# Patient Record
Sex: FEMALE | Race: WHITE | NOT HISPANIC OR LATINO | URBAN - METROPOLITAN AREA
[De-identification: names, ages, dates, MRNs, and addresses within clinical notes are randomized per-mention and may not be internally consistent; named-entity substitution may affect disease eponyms.]

---

## 2017-01-27 ENCOUNTER — OUTPATIENT (OUTPATIENT)
Dept: OUTPATIENT SERVICES | Facility: HOSPITAL | Age: 72
LOS: 1 days | End: 2017-01-27
Payer: MEDICARE

## 2017-01-27 VITALS
HEART RATE: 64 BPM | RESPIRATION RATE: 16 BRPM | TEMPERATURE: 97 F | SYSTOLIC BLOOD PRESSURE: 112 MMHG | DIASTOLIC BLOOD PRESSURE: 57 MMHG | HEIGHT: 61 IN | WEIGHT: 154.1 LBS

## 2017-01-27 DIAGNOSIS — Z98.890 OTHER SPECIFIED POSTPROCEDURAL STATES: Chronic | ICD-10-CM

## 2017-01-27 DIAGNOSIS — I25.10 ATHEROSCLEROTIC HEART DISEASE OF NATIVE CORONARY ARTERY WITHOUT ANGINA PECTORIS: ICD-10-CM

## 2017-01-27 DIAGNOSIS — G56.01 CARPAL TUNNEL SYNDROME, RIGHT UPPER LIMB: ICD-10-CM

## 2017-01-27 DIAGNOSIS — Z01.818 ENCOUNTER FOR OTHER PREPROCEDURAL EXAMINATION: ICD-10-CM

## 2017-01-27 LAB
ALBUMIN SERPL ELPH-MCNC: 4.1 G/DL — SIGNIFICANT CHANGE UP (ref 3.3–5)
ALP SERPL-CCNC: 44 U/L — SIGNIFICANT CHANGE UP (ref 40–120)
ALT FLD-CCNC: 28 U/L — SIGNIFICANT CHANGE UP (ref 12–78)
ANION GAP SERPL CALC-SCNC: 9 MMOL/L — SIGNIFICANT CHANGE UP (ref 5–17)
AST SERPL-CCNC: 25 U/L — SIGNIFICANT CHANGE UP (ref 15–37)
BILIRUB SERPL-MCNC: 0.4 MG/DL — SIGNIFICANT CHANGE UP (ref 0.2–1.2)
BUN SERPL-MCNC: 27 MG/DL — HIGH (ref 7–23)
CALCIUM SERPL-MCNC: 9.4 MG/DL — SIGNIFICANT CHANGE UP (ref 8.5–10.1)
CHLORIDE SERPL-SCNC: 106 MMOL/L — SIGNIFICANT CHANGE UP (ref 96–108)
CO2 SERPL-SCNC: 26 MMOL/L — SIGNIFICANT CHANGE UP (ref 22–31)
CREAT SERPL-MCNC: 1.1 MG/DL — SIGNIFICANT CHANGE UP (ref 0.5–1.3)
GLUCOSE SERPL-MCNC: 141 MG/DL — HIGH (ref 70–99)
HCT VFR BLD CALC: 33.8 % — LOW (ref 34.5–45)
HGB BLD-MCNC: 11.3 G/DL — LOW (ref 11.5–15.5)
MCHC RBC-ENTMCNC: 30.7 PG — SIGNIFICANT CHANGE UP (ref 27–34)
MCHC RBC-ENTMCNC: 33.3 GM/DL — SIGNIFICANT CHANGE UP (ref 32–36)
MCV RBC AUTO: 92 FL — SIGNIFICANT CHANGE UP (ref 80–100)
PLATELET # BLD AUTO: 194 K/UL — SIGNIFICANT CHANGE UP (ref 150–400)
POTASSIUM SERPL-MCNC: 4.1 MMOL/L — SIGNIFICANT CHANGE UP (ref 3.5–5.3)
POTASSIUM SERPL-SCNC: 4.1 MMOL/L — SIGNIFICANT CHANGE UP (ref 3.5–5.3)
PROT SERPL-MCNC: 7.8 G/DL — SIGNIFICANT CHANGE UP (ref 6–8.3)
RBC # BLD: 3.67 M/UL — LOW (ref 3.8–5.2)
RBC # FLD: 13 % — SIGNIFICANT CHANGE UP (ref 10.3–14.5)
SODIUM SERPL-SCNC: 141 MMOL/L — SIGNIFICANT CHANGE UP (ref 135–145)
WBC # BLD: 5.9 K/UL — SIGNIFICANT CHANGE UP (ref 3.8–10.5)
WBC # FLD AUTO: 5.9 K/UL — SIGNIFICANT CHANGE UP (ref 3.8–10.5)

## 2017-01-27 PROCEDURE — 93005 ELECTROCARDIOGRAM TRACING: CPT

## 2017-01-27 PROCEDURE — 85027 COMPLETE CBC AUTOMATED: CPT

## 2017-01-27 PROCEDURE — 80053 COMPREHEN METABOLIC PANEL: CPT

## 2017-01-27 PROCEDURE — G0463: CPT

## 2017-01-27 PROCEDURE — 93010 ELECTROCARDIOGRAM REPORT: CPT

## 2017-01-27 NOTE — H&P PST ADULT - PMH
Aortic valve prosthesis present    Arthritis    CAD (coronary artery disease)    Carpal tunnel syndrome of right wrist    Colitis    COPD (chronic obstructive pulmonary disease)    Heart murmur    Hyperlipemia    Hypertension    Hypothyroid    Other iron deficiency anemia    PVD (peripheral vascular disease)

## 2017-01-27 NOTE — H&P PST ADULT - NSANTHOSAYNRD_GEN_A_CORE
No. FLAVIO screening performed.  STOP BANG Legend: 0-2 = LOW Risk; 3-4 = INTERMEDIATE Risk; 5-8 = HIGH Risk

## 2017-01-27 NOTE — H&P PST ADULT - PSH
Aortic valve replaced  2011 with bovine  Carotid disease, bilateral    Cataract extraction status of left eye    Cataract extraction status of right eye    H/O carotid endarterectomy  Both sides 2002

## 2017-01-27 NOTE — H&P PST ADULT - PROBLEM SELECTOR PLAN 3
Cardiac clearance with Dr. Becerril. Advised to make appt ASAP. Continue with present management. Hold ASA one week pre op.

## 2017-01-27 NOTE — H&P PST ADULT - ASSESSMENT
72 yo female with right carpal tunnel syndrome scheduled for a release of right carpal tunnel on 2/7/17 with Dr. Hassan.

## 2017-01-27 NOTE — ASU PATIENT PROFILE, ADULT - VISION (WITH CORRECTIVE LENSES IF THE PATIENT USUALLY WEARS THEM):
Normal vision: sees adequately in most situations; can see medication labels, newsprint wears eyeglasses and contact lens/Normal vision: sees adequately in most situations; can see medication labels, newsprint

## 2017-01-27 NOTE — H&P PST ADULT - PROBLEM SELECTOR PLAN 2
Labs- CBC, CMP and EKG. CXR on chart from Nov 2015.   MC with Dr. Joseph  Pre op and [facility] instructions reviewed and given. Instructed to take all her routine meds am of surgery with a sip of wtaer. Hold HCTZ day of surgery. Hold MVI, Celebrex and ASA one week pre op.

## 2017-01-27 NOTE — H&P PST ADULT - HISTORY OF PRESENT ILLNESS
72 yo female with HTN and CAD scheduled for a release of right carpal tunnel on 2/7/17 with Dr. Hassan. C/O numbness, tingling and pain of the right hand. Denies pain today but feels pins and needles. Patient is right hand dominant.

## 2017-02-01 ENCOUNTER — APPOINTMENT (OUTPATIENT)
Dept: CARDIOLOGY | Facility: CLINIC | Age: 72
End: 2017-02-01

## 2017-02-01 VITALS
HEART RATE: 64 BPM | BODY MASS INDEX: 29.83 KG/M2 | WEIGHT: 158 LBS | OXYGEN SATURATION: 93 % | HEIGHT: 61 IN | SYSTOLIC BLOOD PRESSURE: 126 MMHG | DIASTOLIC BLOOD PRESSURE: 72 MMHG

## 2017-02-06 RX ORDER — SODIUM CHLORIDE 9 MG/ML
1000 INJECTION, SOLUTION INTRAVENOUS
Qty: 0 | Refills: 0 | Status: DISCONTINUED | OUTPATIENT
Start: 2017-02-07 | End: 2017-02-07

## 2017-02-06 RX ORDER — ACETAMINOPHEN 500 MG
650 TABLET ORAL ONCE
Qty: 0 | Refills: 0 | Status: COMPLETED | OUTPATIENT
Start: 2017-02-07 | End: 2017-02-07

## 2017-02-07 ENCOUNTER — OUTPATIENT (OUTPATIENT)
Dept: OUTPATIENT SERVICES | Facility: HOSPITAL | Age: 72
LOS: 1 days | Discharge: HOME | End: 2017-02-07
Payer: MEDICARE

## 2017-02-07 ENCOUNTER — TRANSCRIPTION ENCOUNTER (OUTPATIENT)
Age: 72
End: 2017-02-07

## 2017-02-07 VITALS — TEMPERATURE: 98 F | SYSTOLIC BLOOD PRESSURE: 119 MMHG | HEART RATE: 58 BPM | DIASTOLIC BLOOD PRESSURE: 40 MMHG

## 2017-02-07 VITALS
TEMPERATURE: 98 F | HEIGHT: 61 IN | HEART RATE: 66 BPM | SYSTOLIC BLOOD PRESSURE: 148 MMHG | WEIGHT: 154.1 LBS | DIASTOLIC BLOOD PRESSURE: 53 MMHG | OXYGEN SATURATION: 95 % | RESPIRATION RATE: 16 BRPM

## 2017-02-07 DIAGNOSIS — Z01.818 ENCOUNTER FOR OTHER PREPROCEDURAL EXAMINATION: ICD-10-CM

## 2017-02-07 DIAGNOSIS — Z98.890 OTHER SPECIFIED POSTPROCEDURAL STATES: Chronic | ICD-10-CM

## 2017-02-07 DIAGNOSIS — G56.01 CARPAL TUNNEL SYNDROME, RIGHT UPPER LIMB: ICD-10-CM

## 2017-02-07 PROCEDURE — 64721 CARPAL TUNNEL SURGERY: CPT | Mod: RT

## 2017-02-07 PROCEDURE — 64727 INTERNAL NEUROLYSIS: CPT | Mod: RT

## 2017-02-07 RX ORDER — SODIUM CHLORIDE 9 MG/ML
1000 INJECTION, SOLUTION INTRAVENOUS
Qty: 0 | Refills: 0 | Status: DISCONTINUED | OUTPATIENT
Start: 2017-02-07 | End: 2017-02-07

## 2017-02-07 RX ORDER — OXYCODONE HYDROCHLORIDE 5 MG/1
10 TABLET ORAL EVERY 6 HOURS
Qty: 0 | Refills: 0 | Status: DISCONTINUED | OUTPATIENT
Start: 2017-02-07 | End: 2017-02-07

## 2017-02-07 RX ORDER — HYDROMORPHONE HYDROCHLORIDE 2 MG/ML
0.5 INJECTION INTRAMUSCULAR; INTRAVENOUS; SUBCUTANEOUS
Qty: 0 | Refills: 0 | Status: DISCONTINUED | OUTPATIENT
Start: 2017-02-07 | End: 2017-02-07

## 2017-02-07 RX ORDER — OXYCODONE HYDROCHLORIDE 5 MG/1
5 TABLET ORAL EVERY 4 HOURS
Qty: 0 | Refills: 0 | Status: DISCONTINUED | OUTPATIENT
Start: 2017-02-07 | End: 2017-02-07

## 2017-02-07 RX ORDER — CEFAZOLIN SODIUM 1 G
2000 VIAL (EA) INJECTION ONCE
Qty: 0 | Refills: 0 | Status: COMPLETED | OUTPATIENT
Start: 2017-02-07 | End: 2017-02-07

## 2017-02-07 RX ADMIN — Medication 650 MILLIGRAM(S): at 07:21

## 2017-02-07 RX ADMIN — SODIUM CHLORIDE 80 MILLILITER(S): 9 INJECTION, SOLUTION INTRAVENOUS at 09:15

## 2017-02-07 RX ADMIN — SODIUM CHLORIDE 50 MILLILITER(S): 9 INJECTION, SOLUTION INTRAVENOUS at 07:16

## 2017-02-07 NOTE — ASU DISCHARGE PLAN (ADULT/PEDIATRIC). - INSTRUCTIONS
Call office for appointment if not already made.  Leave dressing in place until seen by Dr. Hassan. Increase fluids

## 2017-02-07 NOTE — ASU DISCHARGE PLAN (ADULT/PEDIATRIC). - MEDICATION SUMMARY - MEDICATIONS TO TAKE
I will START or STAY ON the medications listed below when I get home from the hospital:    aspirin 81 mg oral tablet  -- 2 tab(s) by mouth once a day  -- Indication: For prior med    CeleBREX 200 mg oral capsule  -- 1 tab(s) by mouth once a day (at bedtime)  -- Indication: For prior med    Tylenol  -- 2 tab(s) by mouth prn, As Needed  -- Indication: For prior med    Vicodin 5 mg-300 mg oral tablet  -- 1-2 tabs by mouth every 4-6 hours as needed for pain  - already e-prescribed by Dr. Hassan from his office EMR  -- Indication: For pain    quinapril 40 mg oral tablet  -- 1 tab(s) by mouth once a day  -- Indication: For prior med    Neurontin 300 mg oral capsule  -- 1 cap(s) by mouth once a day (at bedtime)  -- Indication: For prior med    Lipitor 40 mg oral tablet  -- 1 tab(s) by mouth once a day (at bedtime)  -- Indication: For prior med    simvastatin 10 mg oral tablet  -- 1 tab(s) by mouth once a day (at bedtime)  -- Indication: For prior med    ALPRAZolam 0.5 mg oral tablet  --  by mouth   as needed  -- Indication: For prior med    metoprolol succinate 25 mg oral tablet, extended release  -- 1 tab(s) by mouth once a day  -- Indication: For prior med    Norvasc 2.5 mg oral tablet  -- 1 tab(s) by mouth once a day  -- Indication: For prior med    hydroCHLOROthiazide  -- 1 tab(s) by mouth once a day  12.5 mg  -- Indication: For prior med    ferrous sulfate 200 mg (65 mg elemental iron) oral tablet  -- 2  by mouth once a day  -- Indication: For prior med    sucralfate 1 g oral tablet  -- 1 tab(s) by mouth 4 times a day (before meals and at bedtime)  -- Indication: For prior med    Cosopt  --  to each affected eye   1 drop 2 times daily  -- Indication: For prior med    Xalatan 0.005% ophthalmic solution  -- 1 drop(s) to each affected eye once a day (in the evening)  -- Indication: For prior med    PriLOSEC 20 mg oral delayed release capsule  -- 1 cap(s) by mouth once a day  -- Indication: For prior med    levothyroxine 88 mcg (0.088 mg) oral capsule  -- 1 cap(s) by mouth once a day  -- Indication: For prior med    Centrum Silver Women's  --   once a day  -- Indication: For prior med    multivitamin  -- 1 tab(s)  once a day  -- Indication: For prior med    Vitamin D3 1000 intl units oral capsule  -- 1 cap(s) by mouth once a day  -- Indication: For prior med    Vitamin B6 100 mg oral tablet  -- 1 tab(s) by mouth once a day  -- Indication: For prior med

## 2017-02-10 DIAGNOSIS — I73.9 PERIPHERAL VASCULAR DISEASE, UNSPECIFIED: ICD-10-CM

## 2017-02-10 DIAGNOSIS — G56.01 CARPAL TUNNEL SYNDROME, RIGHT UPPER LIMB: ICD-10-CM

## 2017-02-10 DIAGNOSIS — E78.5 HYPERLIPIDEMIA, UNSPECIFIED: ICD-10-CM

## 2017-02-10 DIAGNOSIS — E78.00 PURE HYPERCHOLESTEROLEMIA, UNSPECIFIED: ICD-10-CM

## 2017-02-10 DIAGNOSIS — I11.9 HYPERTENSIVE HEART DISEASE WITHOUT HEART FAILURE: ICD-10-CM

## 2017-02-10 DIAGNOSIS — Z95.3 PRESENCE OF XENOGENIC HEART VALVE: ICD-10-CM

## 2017-02-10 DIAGNOSIS — Z79.82 LONG TERM (CURRENT) USE OF ASPIRIN: ICD-10-CM

## 2017-02-10 DIAGNOSIS — E03.9 HYPOTHYROIDISM, UNSPECIFIED: ICD-10-CM

## 2017-02-10 DIAGNOSIS — J44.9 CHRONIC OBSTRUCTIVE PULMONARY DISEASE, UNSPECIFIED: ICD-10-CM

## 2017-02-10 DIAGNOSIS — I25.10 ATHEROSCLEROTIC HEART DISEASE OF NATIVE CORONARY ARTERY WITHOUT ANGINA PECTORIS: ICD-10-CM

## 2017-02-10 DIAGNOSIS — M19.90 UNSPECIFIED OSTEOARTHRITIS, UNSPECIFIED SITE: ICD-10-CM

## 2017-02-10 DIAGNOSIS — Z79.899 OTHER LONG TERM (CURRENT) DRUG THERAPY: ICD-10-CM

## 2017-02-10 DIAGNOSIS — Z87.891 PERSONAL HISTORY OF NICOTINE DEPENDENCE: ICD-10-CM

## 2017-04-25 PROBLEM — M19.90 UNSPECIFIED OSTEOARTHRITIS, UNSPECIFIED SITE: Chronic | Status: ACTIVE | Noted: 2017-01-27

## 2017-04-25 PROBLEM — R01.1 CARDIAC MURMUR, UNSPECIFIED: Chronic | Status: ACTIVE | Noted: 2017-01-27

## 2017-04-25 PROBLEM — I25.10 ATHEROSCLEROTIC HEART DISEASE OF NATIVE CORONARY ARTERY WITHOUT ANGINA PECTORIS: Chronic | Status: ACTIVE | Noted: 2017-01-27

## 2017-04-25 PROBLEM — D50.8 OTHER IRON DEFICIENCY ANEMIAS: Chronic | Status: ACTIVE | Noted: 2017-01-27

## 2017-04-27 ENCOUNTER — OUTPATIENT (OUTPATIENT)
Dept: OUTPATIENT SERVICES | Facility: HOSPITAL | Age: 72
LOS: 1 days | End: 2017-04-27
Payer: MEDICARE

## 2017-04-27 VITALS
SYSTOLIC BLOOD PRESSURE: 130 MMHG | HEART RATE: 55 BPM | WEIGHT: 153 LBS | RESPIRATION RATE: 16 BRPM | DIASTOLIC BLOOD PRESSURE: 80 MMHG | TEMPERATURE: 98 F

## 2017-04-27 DIAGNOSIS — Z98.890 OTHER SPECIFIED POSTPROCEDURAL STATES: Chronic | ICD-10-CM

## 2017-04-27 DIAGNOSIS — R19.07 GENERALIZED INTRA-ABDOMINAL AND PELVIC SWELLING, MASS AND LUMP: ICD-10-CM

## 2017-04-27 DIAGNOSIS — Z01.818 ENCOUNTER FOR OTHER PREPROCEDURAL EXAMINATION: ICD-10-CM

## 2017-04-27 LAB
ALBUMIN SERPL ELPH-MCNC: 4.2 G/DL — SIGNIFICANT CHANGE UP (ref 3.3–5)
ALP SERPL-CCNC: 45 U/L — SIGNIFICANT CHANGE UP (ref 40–120)
ALT FLD-CCNC: 30 U/L — SIGNIFICANT CHANGE UP (ref 12–78)
ANION GAP SERPL CALC-SCNC: 9 MMOL/L — SIGNIFICANT CHANGE UP (ref 5–17)
AST SERPL-CCNC: 26 U/L — SIGNIFICANT CHANGE UP (ref 15–37)
BASOPHILS # BLD AUTO: 0.1 K/UL — SIGNIFICANT CHANGE UP (ref 0–0.2)
BASOPHILS NFR BLD AUTO: 0.9 % — SIGNIFICANT CHANGE UP (ref 0–2)
BILIRUB SERPL-MCNC: 0.5 MG/DL — SIGNIFICANT CHANGE UP (ref 0.2–1.2)
BUN SERPL-MCNC: 28 MG/DL — HIGH (ref 7–23)
CALCIUM SERPL-MCNC: 9.3 MG/DL — SIGNIFICANT CHANGE UP (ref 8.5–10.1)
CHLORIDE SERPL-SCNC: 107 MMOL/L — SIGNIFICANT CHANGE UP (ref 96–108)
CO2 SERPL-SCNC: 25 MMOL/L — SIGNIFICANT CHANGE UP (ref 22–31)
CREAT SERPL-MCNC: 1.1 MG/DL — SIGNIFICANT CHANGE UP (ref 0.5–1.3)
EOSINOPHIL # BLD AUTO: 0.4 K/UL — SIGNIFICANT CHANGE UP (ref 0–0.5)
EOSINOPHIL NFR BLD AUTO: 6.4 % — HIGH (ref 0–6)
GLUCOSE SERPL-MCNC: 98 MG/DL — SIGNIFICANT CHANGE UP (ref 70–99)
HCT VFR BLD CALC: 34.6 % — SIGNIFICANT CHANGE UP (ref 34.5–45)
HGB BLD-MCNC: 11.8 G/DL — SIGNIFICANT CHANGE UP (ref 11.5–15.5)
LYMPHOCYTES # BLD AUTO: 1.9 K/UL — SIGNIFICANT CHANGE UP (ref 1–3.3)
LYMPHOCYTES # BLD AUTO: 31.3 % — SIGNIFICANT CHANGE UP (ref 13–44)
MCHC RBC-ENTMCNC: 31.1 PG — SIGNIFICANT CHANGE UP (ref 27–34)
MCHC RBC-ENTMCNC: 34 GM/DL — SIGNIFICANT CHANGE UP (ref 32–36)
MCV RBC AUTO: 91.6 FL — SIGNIFICANT CHANGE UP (ref 80–100)
MONOCYTES # BLD AUTO: 0.5 K/UL — SIGNIFICANT CHANGE UP (ref 0–0.9)
MONOCYTES NFR BLD AUTO: 8.2 % — SIGNIFICANT CHANGE UP (ref 1–9)
NEUTROPHILS # BLD AUTO: 3.2 K/UL — SIGNIFICANT CHANGE UP (ref 1.8–7.4)
NEUTROPHILS NFR BLD AUTO: 53.2 % — SIGNIFICANT CHANGE UP (ref 43–77)
PLATELET # BLD AUTO: 215 K/UL — SIGNIFICANT CHANGE UP (ref 150–400)
POTASSIUM SERPL-MCNC: 4.2 MMOL/L — SIGNIFICANT CHANGE UP (ref 3.5–5.3)
POTASSIUM SERPL-SCNC: 4.2 MMOL/L — SIGNIFICANT CHANGE UP (ref 3.5–5.3)
PROT SERPL-MCNC: 7.9 G/DL — SIGNIFICANT CHANGE UP (ref 6–8.3)
RBC # BLD: 3.78 M/UL — LOW (ref 3.8–5.2)
RBC # FLD: 12.9 % — SIGNIFICANT CHANGE UP (ref 10.3–14.5)
SODIUM SERPL-SCNC: 141 MMOL/L — SIGNIFICANT CHANGE UP (ref 135–145)
WBC # BLD: 6.1 K/UL — SIGNIFICANT CHANGE UP (ref 3.8–10.5)
WBC # FLD AUTO: 6.1 K/UL — SIGNIFICANT CHANGE UP (ref 3.8–10.5)

## 2017-04-27 PROCEDURE — 80053 COMPREHEN METABOLIC PANEL: CPT

## 2017-04-27 PROCEDURE — 93005 ELECTROCARDIOGRAM TRACING: CPT

## 2017-04-27 PROCEDURE — 86850 RBC ANTIBODY SCREEN: CPT

## 2017-04-27 PROCEDURE — 86900 BLOOD TYPING SEROLOGIC ABO: CPT

## 2017-04-27 PROCEDURE — 85027 COMPLETE CBC AUTOMATED: CPT

## 2017-04-27 PROCEDURE — G0463: CPT

## 2017-04-27 PROCEDURE — 86901 BLOOD TYPING SEROLOGIC RH(D): CPT

## 2017-04-27 PROCEDURE — 93010 ELECTROCARDIOGRAM REPORT: CPT | Mod: NC

## 2017-04-27 RX ORDER — SUCRALFATE 1 G
1 TABLET ORAL
Qty: 0 | Refills: 0 | COMMUNITY

## 2017-04-27 NOTE — H&P PST ADULT - PSH
Aortic valve replaced  2011 with bovine  Carotid disease, bilateral    Cataract extraction status of left eye    Cataract extraction status of right eye    H/O carotid endarterectomy  Both sides 2002 Aortic valve replaced  2011 with bovine  Carotid disease, bilateral    Cataract extraction status of left eye    Cataract extraction status of right eye    H/O carotid endarterectomy  Both sides 2002  H/O foot surgery  (Right foot, 2010)  History of colonoscopy    S/P carpal tunnel release  (Right, 1/2017)

## 2017-04-27 NOTE — H&P PST ADULT - NEGATIVE CARDIOVASCULAR SYMPTOMS
no peripheral edema/no dyspnea on exertion/no claudication/no paroxysmal nocturnal dyspnea/no palpitations/no chest pain/no orthopnea

## 2017-04-27 NOTE — H&P PST ADULT - ASSESSMENT
72 yo female with right carpal tunnel syndrome scheduled for a release of right carpal tunnel on 2/7/17 with Dr. Hassan. 73yo female with generalized intra-abdominal and pelvic swelling, mass and lump

## 2017-04-27 NOTE — H&P PST ADULT - HISTORY OF PRESENT ILLNESS
70 yo female with HTN and CAD scheduled for a release of right carpal tunnel on 2/7/17 with Dr. Hassan. C/O numbness, tingling and pain of the right hand. Denies pain today but feels pins and needles. Patient is right hand dominant. 72 yo female with HTN and CAD 71yo female with PMH of HTN and CAD here for PST. Pt states she was diagnosed with "sac filled with fluid in the uterus for the last 3 years". Pt s/p sonogram in office and was told "there is a change to the sac and I need surgery". Pt denies postmenopausal bleeding. Pt denies abdominal and pelvic pain. Pt electing for dilation and curettage hysteroscopy with myosure and ultrasound, leep of cervix on 5/9/17.

## 2017-04-27 NOTE — H&P PST ADULT - GASTROINTESTINAL DETAILS
no guarding/no organomegaly/no bruit/normal/nontender/soft/no rigidity/bowel sounds normal/no masses palpable/no rebound tenderness/no distention

## 2017-04-27 NOTE — H&P PST ADULT - PROBLEM SELECTOR PLAN 2
Labs- CBC, CMP and EKG. CXR on chart from Nov 2015.   MC with Dr. Joseph  Pre op and [facility] instructions reviewed and given. Instructed to take all her routine meds am of surgery with a sip of wtaer. Hold HCTZ day of surgery. Hold MVI, Celebrex and ASA one week pre op. Dilation and curettage hysteroscopy with myosure and ultrasound, leep of cervix on 5/9/17.   Medical clearance with Dr. Becerril. Pt being seen by dermatologist for rash later today.   CBC, Comprehensive panel and EKG ordered.   Pre-op instructions given and pt verbalized understanding.

## 2017-04-27 NOTE — H&P PST ADULT - NEUROLOGICAL
details… detailed exam negative Alert & oriented; no sensory, motor or coordination deficits, normal reflexes

## 2017-04-27 NOTE — H&P PST ADULT - PMH
Aortic valve prosthesis present    Arthritis    CAD (coronary artery disease)    Carpal tunnel syndrome of right wrist    Colitis    COPD (chronic obstructive pulmonary disease)    Heart murmur    Hyperlipemia    Hypertension    Hypothyroid    Other iron deficiency anemia    PVD (peripheral vascular disease) Aortic valve prosthesis present    Arthritis    CAD (coronary artery disease)    Glaucoma    Heart murmur    Hyperlipemia    Hypertension    Hypothyroid    Other iron deficiency anemia    PVD (peripheral vascular disease) Aortic valve prosthesis present    Arthritis    CAD (coronary artery disease)    Generalized intra-abdominal and pelvic swelling, mass and lump    Glaucoma    Heart murmur    Hyperlipemia    Hypertension    Hypothyroid    Other iron deficiency anemia    PVD (peripheral vascular disease)

## 2017-04-27 NOTE — H&P PST ADULT - OTHER CARE PROVIDERS
Dr. Becerril (Cardiology) Dr. Becerril (Cardiology), Dr. Kinney (Oncologist), Dr Chavez (Antonette Dye) - Dermatologist

## 2017-04-27 NOTE — H&P PST ADULT - PROBLEM SELECTOR PLAN 1
scheduled for a release of right carpal tunnel on 2/7/17 with Dr. Hassan. Cardiac clearance with Dr. Becerril. Advised to make appt ASAP. Continue with present management. Hold ASA one week pre op.

## 2017-05-01 ENCOUNTER — APPOINTMENT (OUTPATIENT)
Dept: CARDIOLOGY | Facility: CLINIC | Age: 72
End: 2017-05-01

## 2017-05-01 VITALS
HEART RATE: 70 BPM | SYSTOLIC BLOOD PRESSURE: 164 MMHG | HEIGHT: 61 IN | BODY MASS INDEX: 29.83 KG/M2 | DIASTOLIC BLOOD PRESSURE: 76 MMHG | WEIGHT: 158 LBS | OXYGEN SATURATION: 91 %

## 2017-05-01 DIAGNOSIS — I73.9 PERIPHERAL VASCULAR DISEASE, UNSPECIFIED: ICD-10-CM

## 2017-05-01 RX ORDER — SUCRALFATE 1 G/1
1 TABLET ORAL
Qty: 90 | Refills: 0 | Status: ACTIVE | COMMUNITY
Start: 2016-11-29

## 2017-11-07 ENCOUNTER — EMERGENCY (EMERGENCY)
Facility: HOSPITAL | Age: 72
LOS: 1 days | Discharge: HOME | End: 2017-11-07
Attending: EMERGENCY MEDICINE | Admitting: EMERGENCY MEDICINE
Payer: MEDICARE

## 2017-11-07 VITALS
HEART RATE: 72 BPM | TEMPERATURE: 98 F | SYSTOLIC BLOOD PRESSURE: 227 MMHG | HEIGHT: 62 IN | OXYGEN SATURATION: 99 % | DIASTOLIC BLOOD PRESSURE: 81 MMHG | RESPIRATION RATE: 16 BRPM | WEIGHT: 156.09 LBS

## 2017-11-07 DIAGNOSIS — R42 DIZZINESS AND GIDDINESS: ICD-10-CM

## 2017-11-07 DIAGNOSIS — Z98.890 OTHER SPECIFIED POSTPROCEDURAL STATES: Chronic | ICD-10-CM

## 2017-11-07 DIAGNOSIS — Z95.2 PRESENCE OF PROSTHETIC HEART VALVE: ICD-10-CM

## 2017-11-07 DIAGNOSIS — E03.9 HYPOTHYROIDISM, UNSPECIFIED: ICD-10-CM

## 2017-11-07 DIAGNOSIS — Z79.82 LONG TERM (CURRENT) USE OF ASPIRIN: ICD-10-CM

## 2017-11-07 DIAGNOSIS — I10 ESSENTIAL (PRIMARY) HYPERTENSION: ICD-10-CM

## 2017-11-07 DIAGNOSIS — I25.10 ATHEROSCLEROTIC HEART DISEASE OF NATIVE CORONARY ARTERY WITHOUT ANGINA PECTORIS: ICD-10-CM

## 2017-11-07 DIAGNOSIS — E78.5 HYPERLIPIDEMIA, UNSPECIFIED: ICD-10-CM

## 2017-11-07 LAB
ALBUMIN SERPL ELPH-MCNC: 4.1 G/DL — SIGNIFICANT CHANGE UP (ref 3.3–5)
ALP SERPL-CCNC: 49 U/L — SIGNIFICANT CHANGE UP (ref 40–120)
ALT FLD-CCNC: 53 U/L — SIGNIFICANT CHANGE UP (ref 12–78)
ANION GAP SERPL CALC-SCNC: 9 MMOL/L — SIGNIFICANT CHANGE UP (ref 5–17)
AST SERPL-CCNC: 41 U/L — HIGH (ref 15–37)
BASOPHILS # BLD AUTO: 0.1 K/UL — SIGNIFICANT CHANGE UP (ref 0–0.2)
BASOPHILS NFR BLD AUTO: 1.4 % — SIGNIFICANT CHANGE UP (ref 0–2)
BILIRUB SERPL-MCNC: 0.4 MG/DL — SIGNIFICANT CHANGE UP (ref 0.2–1.2)
BUN SERPL-MCNC: 21 MG/DL — SIGNIFICANT CHANGE UP (ref 7–23)
CALCIUM SERPL-MCNC: 9.2 MG/DL — SIGNIFICANT CHANGE UP (ref 8.5–10.1)
CHLORIDE SERPL-SCNC: 107 MMOL/L — SIGNIFICANT CHANGE UP (ref 96–108)
CK SERPL-CCNC: 116 U/L — SIGNIFICANT CHANGE UP (ref 26–192)
CO2 SERPL-SCNC: 23 MMOL/L — SIGNIFICANT CHANGE UP (ref 22–31)
CREAT SERPL-MCNC: 1.2 MG/DL — SIGNIFICANT CHANGE UP (ref 0.5–1.3)
EOSINOPHIL # BLD AUTO: 0.4 K/UL — SIGNIFICANT CHANGE UP (ref 0–0.5)
EOSINOPHIL NFR BLD AUTO: 7.1 % — HIGH (ref 0–6)
GLUCOSE SERPL-MCNC: 110 MG/DL — HIGH (ref 70–99)
HCT VFR BLD CALC: 36.7 % — SIGNIFICANT CHANGE UP (ref 34.5–45)
HGB BLD-MCNC: 12.2 G/DL — SIGNIFICANT CHANGE UP (ref 11.5–15.5)
INR BLD: 1.06 RATIO — SIGNIFICANT CHANGE UP (ref 0.88–1.16)
LYMPHOCYTES # BLD AUTO: 1.9 K/UL — SIGNIFICANT CHANGE UP (ref 1–3.3)
LYMPHOCYTES # BLD AUTO: 31 % — SIGNIFICANT CHANGE UP (ref 13–44)
MCHC RBC-ENTMCNC: 30.4 PG — SIGNIFICANT CHANGE UP (ref 27–34)
MCHC RBC-ENTMCNC: 33.3 GM/DL — SIGNIFICANT CHANGE UP (ref 32–36)
MCV RBC AUTO: 91.3 FL — SIGNIFICANT CHANGE UP (ref 80–100)
MONOCYTES # BLD AUTO: 0.6 K/UL — SIGNIFICANT CHANGE UP (ref 0–0.9)
MONOCYTES NFR BLD AUTO: 9.4 % — HIGH (ref 1–9)
NEUTROPHILS # BLD AUTO: 3.2 K/UL — SIGNIFICANT CHANGE UP (ref 1.8–7.4)
NEUTROPHILS NFR BLD AUTO: 51.1 % — SIGNIFICANT CHANGE UP (ref 43–77)
NT-PROBNP SERPL-SCNC: 736 PG/ML — HIGH (ref 0–125)
PLATELET # BLD AUTO: 251 K/UL — SIGNIFICANT CHANGE UP (ref 150–400)
POTASSIUM SERPL-MCNC: 4.3 MMOL/L — SIGNIFICANT CHANGE UP (ref 3.5–5.3)
POTASSIUM SERPL-SCNC: 4.3 MMOL/L — SIGNIFICANT CHANGE UP (ref 3.5–5.3)
PROT SERPL-MCNC: 8.7 G/DL — HIGH (ref 6–8.3)
PROTHROM AB SERPL-ACNC: 11.6 SEC — SIGNIFICANT CHANGE UP (ref 9.8–12.7)
RBC # BLD: 4.02 M/UL — SIGNIFICANT CHANGE UP (ref 3.8–5.2)
RBC # FLD: 12.5 % — SIGNIFICANT CHANGE UP (ref 10.3–14.5)
SODIUM SERPL-SCNC: 139 MMOL/L — SIGNIFICANT CHANGE UP (ref 135–145)
TROPONIN I SERPL-MCNC: <.015 NG/ML — SIGNIFICANT CHANGE UP (ref 0.01–0.04)
WBC # BLD: 6.2 K/UL — SIGNIFICANT CHANGE UP (ref 3.8–10.5)
WBC # FLD AUTO: 6.2 K/UL — SIGNIFICANT CHANGE UP (ref 3.8–10.5)

## 2017-11-07 PROCEDURE — 83880 ASSAY OF NATRIURETIC PEPTIDE: CPT

## 2017-11-07 PROCEDURE — 71045 X-RAY EXAM CHEST 1 VIEW: CPT

## 2017-11-07 PROCEDURE — 84484 ASSAY OF TROPONIN QUANT: CPT

## 2017-11-07 PROCEDURE — 36415 COLL VENOUS BLD VENIPUNCTURE: CPT

## 2017-11-07 PROCEDURE — 96374 THER/PROPH/DIAG INJ IV PUSH: CPT

## 2017-11-07 PROCEDURE — 85027 COMPLETE CBC AUTOMATED: CPT

## 2017-11-07 PROCEDURE — 99284 EMERGENCY DEPT VISIT MOD MDM: CPT | Mod: 25

## 2017-11-07 PROCEDURE — 99285 EMERGENCY DEPT VISIT HI MDM: CPT

## 2017-11-07 PROCEDURE — 85610 PROTHROMBIN TIME: CPT

## 2017-11-07 PROCEDURE — 70450 CT HEAD/BRAIN W/O DYE: CPT | Mod: 26

## 2017-11-07 PROCEDURE — 70450 CT HEAD/BRAIN W/O DYE: CPT

## 2017-11-07 PROCEDURE — 71010: CPT | Mod: 26

## 2017-11-07 PROCEDURE — 93005 ELECTROCARDIOGRAM TRACING: CPT

## 2017-11-07 PROCEDURE — 80053 COMPREHEN METABOLIC PANEL: CPT

## 2017-11-07 PROCEDURE — 82550 ASSAY OF CK (CPK): CPT

## 2017-11-07 RX ORDER — ACETAMINOPHEN 500 MG
650 TABLET ORAL ONCE
Qty: 0 | Refills: 0 | Status: COMPLETED | OUTPATIENT
Start: 2017-11-07 | End: 2017-11-07

## 2017-11-07 RX ORDER — LABETALOL HCL 100 MG
10 TABLET ORAL ONCE
Qty: 0 | Refills: 0 | Status: COMPLETED | OUTPATIENT
Start: 2017-11-07 | End: 2017-11-07

## 2017-11-07 RX ADMIN — Medication 10 MILLIGRAM(S): at 23:05

## 2017-11-07 RX ADMIN — Medication 650 MILLIGRAM(S): at 23:37

## 2017-11-07 NOTE — ED ADULT NURSE NOTE - PMH
Aortic valve prosthesis present    Arthritis    CAD (coronary artery disease)    Generalized intra-abdominal and pelvic swelling, mass and lump    Glaucoma    Heart murmur    Hyperlipemia    Hypertension    Hypothyroid    Other iron deficiency anemia    PVD (peripheral vascular disease)

## 2017-11-07 NOTE — ED ADULT NURSE NOTE - PSH
Aortic valve replaced  2011 with bovine  Carotid disease, bilateral    Cataract extraction status of left eye    Cataract extraction status of right eye    H/O carotid endarterectomy  Both sides 2002  H/O foot surgery  (Right foot, 2010)  History of colonoscopy    S/P carpal tunnel release  (Right, 1/2017)

## 2017-11-08 VITALS
TEMPERATURE: 98 F | RESPIRATION RATE: 18 BRPM | SYSTOLIC BLOOD PRESSURE: 176 MMHG | HEART RATE: 65 BPM | OXYGEN SATURATION: 95 % | DIASTOLIC BLOOD PRESSURE: 58 MMHG

## 2017-11-08 PROBLEM — Z95.2 PRESENCE OF PROSTHETIC HEART VALVE: Chronic | Status: ACTIVE | Noted: 2017-01-27

## 2017-11-08 RX ADMIN — Medication 650 MILLIGRAM(S): at 00:00

## 2017-11-08 NOTE — ED PROVIDER NOTE - OBJECTIVE STATEMENT
72 female presents to ER c/o elevated blood pressure, dizziness and headache. Patient states she recently went on vacation and was not watching her diet, ate foods high in sodium, and has noted her blood pressure has been increasing despite taking blood pressure medication. Patient states she took and extra dose of her metoprolol and doubled her norvasc today and had blood pressure of 227/81. Patient states she feels some shortness of breath, denies chest pain, no syncope.

## 2017-11-08 NOTE — ED PROVIDER NOTE - PROGRESS NOTE DETAILS
patient feeling well, labs, ekg, ct head reviwed, blood pressure improved, patient told she should stay overnight for cardiac consultation in the morning, patient states she feels well, does not want to stay, states she will call her PMD and cardiologist for the morning, understands to return to ER if having chest pain, difficulty breatihing or worsening of symptoms

## 2017-12-19 ENCOUNTER — NON-APPOINTMENT (OUTPATIENT)
Age: 72
End: 2017-12-19

## 2017-12-19 ENCOUNTER — APPOINTMENT (OUTPATIENT)
Dept: CARDIOLOGY | Facility: CLINIC | Age: 72
End: 2017-12-19
Payer: MEDICARE

## 2017-12-19 VITALS
OXYGEN SATURATION: 93 % | HEIGHT: 61 IN | DIASTOLIC BLOOD PRESSURE: 70 MMHG | HEART RATE: 59 BPM | BODY MASS INDEX: 28.51 KG/M2 | SYSTOLIC BLOOD PRESSURE: 130 MMHG | WEIGHT: 151 LBS

## 2017-12-19 DIAGNOSIS — I65.29 OCCLUSION AND STENOSIS OF UNSPECIFIED CAROTID ARTERY: ICD-10-CM

## 2017-12-19 DIAGNOSIS — R09.89 OTHER SPECIFIED SYMPTOMS AND SIGNS INVOLVING THE CIRCULATORY AND RESPIRATORY SYSTEMS: ICD-10-CM

## 2017-12-19 DIAGNOSIS — E78.00 PURE HYPERCHOLESTEROLEMIA, UNSPECIFIED: ICD-10-CM

## 2017-12-19 DIAGNOSIS — I10 ESSENTIAL (PRIMARY) HYPERTENSION: ICD-10-CM

## 2017-12-19 PROCEDURE — 93000 ELECTROCARDIOGRAM COMPLETE: CPT

## 2017-12-19 PROCEDURE — 99215 OFFICE O/P EST HI 40 MIN: CPT

## 2017-12-19 RX ORDER — ROSUVASTATIN CALCIUM 10 MG/1
10 TABLET, FILM COATED ORAL
Qty: 90 | Refills: 3 | Status: ACTIVE | COMMUNITY
Start: 1900-01-01 | End: 1900-01-01

## 2017-12-19 RX ORDER — AMLODIPINE BESYLATE 5 MG/1
5 TABLET ORAL
Qty: 180 | Refills: 3 | Status: ACTIVE | COMMUNITY
Start: 2016-03-08

## 2018-01-15 ENCOUNTER — APPOINTMENT (OUTPATIENT)
Dept: CARDIOLOGY | Facility: CLINIC | Age: 73
End: 2018-01-15
Payer: MEDICARE

## 2018-01-15 PROCEDURE — 93306 TTE W/DOPPLER COMPLETE: CPT

## 2018-01-19 ENCOUNTER — APPOINTMENT (OUTPATIENT)
Dept: CARDIOLOGY | Facility: CLINIC | Age: 73
End: 2018-01-19
Payer: MEDICARE

## 2018-01-19 PROCEDURE — 93880 EXTRACRANIAL BILAT STUDY: CPT

## 2018-02-05 ENCOUNTER — INPATIENT (INPATIENT)
Facility: HOSPITAL | Age: 73
LOS: 2 days | Discharge: HOME | DRG: 194 | End: 2018-02-08
Attending: FAMILY MEDICINE | Admitting: HOSPITALIST
Payer: MEDICARE

## 2018-02-05 VITALS
TEMPERATURE: 98 F | SYSTOLIC BLOOD PRESSURE: 132 MMHG | WEIGHT: 147.93 LBS | RESPIRATION RATE: 18 BRPM | DIASTOLIC BLOOD PRESSURE: 56 MMHG | HEART RATE: 76 BPM | OXYGEN SATURATION: 94 %

## 2018-02-05 DIAGNOSIS — J18.9 PNEUMONIA, UNSPECIFIED ORGANISM: ICD-10-CM

## 2018-02-05 DIAGNOSIS — M19.90 UNSPECIFIED OSTEOARTHRITIS, UNSPECIFIED SITE: ICD-10-CM

## 2018-02-05 DIAGNOSIS — Z98.890 OTHER SPECIFIED POSTPROCEDURAL STATES: Chronic | ICD-10-CM

## 2018-02-05 DIAGNOSIS — R74.8 ABNORMAL LEVELS OF OTHER SERUM ENZYMES: ICD-10-CM

## 2018-02-05 DIAGNOSIS — I10 ESSENTIAL (PRIMARY) HYPERTENSION: ICD-10-CM

## 2018-02-05 DIAGNOSIS — Z29.9 ENCOUNTER FOR PROPHYLACTIC MEASURES, UNSPECIFIED: ICD-10-CM

## 2018-02-05 DIAGNOSIS — D64.9 ANEMIA, UNSPECIFIED: ICD-10-CM

## 2018-02-05 DIAGNOSIS — E78.5 HYPERLIPIDEMIA, UNSPECIFIED: ICD-10-CM

## 2018-02-05 DIAGNOSIS — E03.9 HYPOTHYROIDISM, UNSPECIFIED: ICD-10-CM

## 2018-02-05 DIAGNOSIS — I25.10 ATHEROSCLEROTIC HEART DISEASE OF NATIVE CORONARY ARTERY WITHOUT ANGINA PECTORIS: ICD-10-CM

## 2018-02-05 DIAGNOSIS — H40.9 UNSPECIFIED GLAUCOMA: ICD-10-CM

## 2018-02-05 LAB
ALBUMIN SERPL ELPH-MCNC: 3.8 G/DL — SIGNIFICANT CHANGE UP (ref 3.3–5)
ALP SERPL-CCNC: 53 U/L — SIGNIFICANT CHANGE UP (ref 40–120)
ALT FLD-CCNC: 24 U/L — SIGNIFICANT CHANGE UP (ref 12–78)
ANION GAP SERPL CALC-SCNC: 9 MMOL/L — SIGNIFICANT CHANGE UP (ref 5–17)
APTT BLD: 34.2 SEC — SIGNIFICANT CHANGE UP (ref 27.5–37.4)
AST SERPL-CCNC: 42 U/L — HIGH (ref 15–37)
BASE EXCESS BLDA CALC-SCNC: -4.4 MMOL/L — LOW (ref -2–2)
BASOPHILS # BLD AUTO: 0.1 K/UL — SIGNIFICANT CHANGE UP (ref 0–0.2)
BASOPHILS NFR BLD AUTO: 1.1 % — SIGNIFICANT CHANGE UP (ref 0–2)
BILIRUB SERPL-MCNC: 0.3 MG/DL — SIGNIFICANT CHANGE UP (ref 0.2–1.2)
BLOOD GAS COMMENTS ARTERIAL: SIGNIFICANT CHANGE UP
BUN SERPL-MCNC: 19 MG/DL — SIGNIFICANT CHANGE UP (ref 7–23)
CALCIUM SERPL-MCNC: 8.9 MG/DL — SIGNIFICANT CHANGE UP (ref 8.5–10.1)
CHLORIDE SERPL-SCNC: 107 MMOL/L — SIGNIFICANT CHANGE UP (ref 96–108)
CO2 SERPL-SCNC: 23 MMOL/L — SIGNIFICANT CHANGE UP (ref 22–31)
CREAT SERPL-MCNC: 0.97 MG/DL — SIGNIFICANT CHANGE UP (ref 0.5–1.3)
EOSINOPHIL # BLD AUTO: 0.3 K/UL — SIGNIFICANT CHANGE UP (ref 0–0.5)
EOSINOPHIL NFR BLD AUTO: 4.8 % — SIGNIFICANT CHANGE UP (ref 0–6)
GLUCOSE SERPL-MCNC: 102 MG/DL — HIGH (ref 70–99)
HCO3 BLDA-SCNC: 21 MMOL/L — LOW (ref 23–27)
HCT VFR BLD CALC: 32.3 % — LOW (ref 34.5–45)
HGB BLD-MCNC: 11 G/DL — LOW (ref 11.5–15.5)
HOROWITZ INDEX BLDA+IHG-RTO: 40 — SIGNIFICANT CHANGE UP
INR BLD: 1.11 RATIO — SIGNIFICANT CHANGE UP (ref 0.88–1.16)
LACTATE SERPL-SCNC: 1.2 MMOL/L — SIGNIFICANT CHANGE UP (ref 0.7–2)
LYMPHOCYTES # BLD AUTO: 1.6 K/UL — SIGNIFICANT CHANGE UP (ref 1–3.3)
LYMPHOCYTES # BLD AUTO: 23.2 % — SIGNIFICANT CHANGE UP (ref 13–44)
MCHC RBC-ENTMCNC: 31.2 PG — SIGNIFICANT CHANGE UP (ref 27–34)
MCHC RBC-ENTMCNC: 34 GM/DL — SIGNIFICANT CHANGE UP (ref 32–36)
MCV RBC AUTO: 91.8 FL — SIGNIFICANT CHANGE UP (ref 80–100)
MONOCYTES # BLD AUTO: 0.5 K/UL — SIGNIFICANT CHANGE UP (ref 0–0.9)
MONOCYTES NFR BLD AUTO: 7.4 % — SIGNIFICANT CHANGE UP (ref 1–9)
NEUTROPHILS # BLD AUTO: 4.5 K/UL — SIGNIFICANT CHANGE UP (ref 1.8–7.4)
NEUTROPHILS NFR BLD AUTO: 63.5 % — SIGNIFICANT CHANGE UP (ref 43–77)
PCO2 BLDA: 33 MMHG — SIGNIFICANT CHANGE UP (ref 32–46)
PH BLDA: 7.4 — SIGNIFICANT CHANGE UP (ref 7.35–7.45)
PLATELET # BLD AUTO: 309 K/UL — SIGNIFICANT CHANGE UP (ref 150–400)
PO2 BLDA: 66 MMHG — LOW (ref 74–108)
POTASSIUM SERPL-MCNC: 3.7 MMOL/L — SIGNIFICANT CHANGE UP (ref 3.5–5.3)
POTASSIUM SERPL-SCNC: 3.7 MMOL/L — SIGNIFICANT CHANGE UP (ref 3.5–5.3)
PROT SERPL-MCNC: 8.3 G/DL — SIGNIFICANT CHANGE UP (ref 6–8.3)
PROTHROM AB SERPL-ACNC: 12.1 SEC — SIGNIFICANT CHANGE UP (ref 9.8–12.7)
RAPID RVP RESULT: DETECTED
RBC # BLD: 3.52 M/UL — LOW (ref 3.8–5.2)
RBC # FLD: 14 % — SIGNIFICANT CHANGE UP (ref 10.3–14.5)
RSV RNA SPEC QL NAA+PROBE: DETECTED
SAO2 % BLDA: 92 % — SIGNIFICANT CHANGE UP (ref 92–96)
SODIUM SERPL-SCNC: 139 MMOL/L — SIGNIFICANT CHANGE UP (ref 135–145)
WBC # BLD: 7.1 K/UL — SIGNIFICANT CHANGE UP (ref 3.8–10.5)
WBC # FLD AUTO: 7.1 K/UL — SIGNIFICANT CHANGE UP (ref 3.8–10.5)

## 2018-02-05 PROCEDURE — 99223 1ST HOSP IP/OBS HIGH 75: CPT | Mod: AI,GC

## 2018-02-05 PROCEDURE — 93010 ELECTROCARDIOGRAM REPORT: CPT

## 2018-02-05 PROCEDURE — 71046 X-RAY EXAM CHEST 2 VIEWS: CPT | Mod: 26

## 2018-02-05 PROCEDURE — 99285 EMERGENCY DEPT VISIT HI MDM: CPT

## 2018-02-05 RX ORDER — PANTOPRAZOLE SODIUM 20 MG/1
40 TABLET, DELAYED RELEASE ORAL
Qty: 0 | Refills: 0 | Status: DISCONTINUED | OUTPATIENT
Start: 2018-02-05 | End: 2018-02-08

## 2018-02-05 RX ORDER — ASPIRIN/CALCIUM CARB/MAGNESIUM 324 MG
162 TABLET ORAL DAILY
Qty: 0 | Refills: 0 | Status: DISCONTINUED | OUTPATIENT
Start: 2018-02-05 | End: 2018-02-08

## 2018-02-05 RX ORDER — LATANOPROST 0.05 MG/ML
1 SOLUTION/ DROPS OPHTHALMIC; TOPICAL AT BEDTIME
Qty: 0 | Refills: 0 | Status: DISCONTINUED | OUTPATIENT
Start: 2018-02-05 | End: 2018-02-08

## 2018-02-05 RX ORDER — ATORVASTATIN CALCIUM 80 MG/1
40 TABLET, FILM COATED ORAL AT BEDTIME
Qty: 0 | Refills: 0 | Status: DISCONTINUED | OUTPATIENT
Start: 2018-02-05 | End: 2018-02-08

## 2018-02-05 RX ORDER — ENOXAPARIN SODIUM 100 MG/ML
40 INJECTION SUBCUTANEOUS EVERY 24 HOURS
Qty: 0 | Refills: 0 | Status: DISCONTINUED | OUTPATIENT
Start: 2018-02-05 | End: 2018-02-08

## 2018-02-05 RX ORDER — AZITHROMYCIN 500 MG/1
500 TABLET, FILM COATED ORAL ONCE
Qty: 0 | Refills: 0 | Status: COMPLETED | OUTPATIENT
Start: 2018-02-05 | End: 2018-02-05

## 2018-02-05 RX ORDER — CEFTRIAXONE 500 MG/1
1 INJECTION, POWDER, FOR SOLUTION INTRAMUSCULAR; INTRAVENOUS EVERY 24 HOURS
Qty: 0 | Refills: 0 | Status: DISCONTINUED | OUTPATIENT
Start: 2018-02-06 | End: 2018-02-08

## 2018-02-05 RX ORDER — SUCRALFATE 1 G
1 TABLET ORAL AT BEDTIME
Qty: 0 | Refills: 0 | Status: DISCONTINUED | OUTPATIENT
Start: 2018-02-05 | End: 2018-02-08

## 2018-02-05 RX ORDER — SUCRALFATE 1 G
1 TABLET ORAL
Qty: 0 | Refills: 0 | COMMUNITY

## 2018-02-05 RX ORDER — TIZANIDINE 4 MG/1
2 TABLET ORAL
Qty: 0 | Refills: 0 | COMMUNITY

## 2018-02-05 RX ORDER — GABAPENTIN 400 MG/1
300 CAPSULE ORAL AT BEDTIME
Qty: 0 | Refills: 0 | Status: DISCONTINUED | OUTPATIENT
Start: 2018-02-05 | End: 2018-02-08

## 2018-02-05 RX ORDER — METOPROLOL TARTRATE 50 MG
50 TABLET ORAL
Qty: 0 | Refills: 0 | Status: DISCONTINUED | OUTPATIENT
Start: 2018-02-05 | End: 2018-02-08

## 2018-02-05 RX ORDER — PYRIDOXINE HCL (VITAMIN B6) 100 MG
100 TABLET ORAL DAILY
Qty: 0 | Refills: 0 | Status: DISCONTINUED | OUTPATIENT
Start: 2018-02-05 | End: 2018-02-08

## 2018-02-05 RX ORDER — SODIUM CHLORIDE 9 MG/ML
1000 INJECTION INTRAMUSCULAR; INTRAVENOUS; SUBCUTANEOUS ONCE
Qty: 0 | Refills: 0 | Status: COMPLETED | OUTPATIENT
Start: 2018-02-05 | End: 2018-02-05

## 2018-02-05 RX ORDER — LEVOTHYROXINE SODIUM 125 MCG
100 TABLET ORAL DAILY
Qty: 0 | Refills: 0 | Status: DISCONTINUED | OUTPATIENT
Start: 2018-02-05 | End: 2018-02-08

## 2018-02-05 RX ORDER — MULTIVIT-MIN/FERROUS GLUCONATE 9 MG/15 ML
1 LIQUID (ML) ORAL
Qty: 0 | Refills: 0 | COMMUNITY

## 2018-02-05 RX ORDER — FERROUS SULFATE 325(65) MG
2 TABLET ORAL
Qty: 0 | Refills: 0 | COMMUNITY

## 2018-02-05 RX ORDER — CEFTRIAXONE 500 MG/1
1 INJECTION, POWDER, FOR SOLUTION INTRAMUSCULAR; INTRAVENOUS ONCE
Qty: 0 | Refills: 0 | Status: COMPLETED | OUTPATIENT
Start: 2018-02-05 | End: 2018-02-05

## 2018-02-05 RX ORDER — CHOLECALCIFEROL (VITAMIN D3) 125 MCG
1000 CAPSULE ORAL DAILY
Qty: 0 | Refills: 0 | Status: DISCONTINUED | OUTPATIENT
Start: 2018-02-05 | End: 2018-02-08

## 2018-02-05 RX ORDER — IRON SUCROSE 20 MG/ML
0 INJECTION, SOLUTION INTRAVENOUS
Qty: 0 | Refills: 0 | COMMUNITY

## 2018-02-05 RX ORDER — CELECOXIB 200 MG/1
200 CAPSULE ORAL
Qty: 0 | Refills: 0 | Status: DISCONTINUED | OUTPATIENT
Start: 2018-02-05 | End: 2018-02-08

## 2018-02-05 RX ORDER — ZOLEDRONIC ACID 5 MG/100ML
0 INJECTION, SOLUTION INTRAVENOUS
Qty: 0 | Refills: 0 | COMMUNITY

## 2018-02-05 RX ORDER — LACTOBACILLUS ACIDOPHILUS 100MM CELL
1 CAPSULE ORAL DAILY
Qty: 0 | Refills: 0 | Status: DISCONTINUED | OUTPATIENT
Start: 2018-02-05 | End: 2018-02-08

## 2018-02-05 RX ORDER — MULTIVIT-MIN/FERROUS GLUCONATE 9 MG/15 ML
0 LIQUID (ML) ORAL
Qty: 0 | Refills: 0 | COMMUNITY

## 2018-02-05 RX ORDER — AZITHROMYCIN 500 MG/1
500 TABLET, FILM COATED ORAL EVERY 24 HOURS
Qty: 0 | Refills: 0 | Status: DISCONTINUED | OUTPATIENT
Start: 2018-02-06 | End: 2018-02-08

## 2018-02-05 RX ORDER — MULTIVIT-MIN/FERROUS GLUCONATE 9 MG/15 ML
1 LIQUID (ML) ORAL DAILY
Qty: 0 | Refills: 0 | Status: DISCONTINUED | OUTPATIENT
Start: 2018-02-05 | End: 2018-02-08

## 2018-02-05 RX ORDER — AMLODIPINE BESYLATE 2.5 MG/1
5 TABLET ORAL
Qty: 0 | Refills: 0 | Status: DISCONTINUED | OUTPATIENT
Start: 2018-02-05 | End: 2018-02-08

## 2018-02-05 RX ORDER — ACETAMINOPHEN 500 MG
650 TABLET ORAL ONCE
Qty: 0 | Refills: 0 | Status: COMPLETED | OUTPATIENT
Start: 2018-02-05 | End: 2018-02-05

## 2018-02-05 RX ORDER — IPRATROPIUM/ALBUTEROL SULFATE 18-103MCG
3 AEROSOL WITH ADAPTER (GRAM) INHALATION EVERY 8 HOURS
Qty: 0 | Refills: 0 | Status: DISCONTINUED | OUTPATIENT
Start: 2018-02-05 | End: 2018-02-06

## 2018-02-05 RX ORDER — ACETAMINOPHEN 500 MG
2 TABLET ORAL
Qty: 0 | Refills: 0 | COMMUNITY

## 2018-02-05 RX ORDER — DORZOLAMIDE HYDROCHLORIDE TIMOLOL MALEATE 20; 5 MG/ML; MG/ML
1 SOLUTION/ DROPS OPHTHALMIC
Qty: 0 | Refills: 0 | Status: DISCONTINUED | OUTPATIENT
Start: 2018-02-05 | End: 2018-02-08

## 2018-02-05 RX ORDER — LISINOPRIL 2.5 MG/1
40 TABLET ORAL DAILY
Qty: 0 | Refills: 0 | Status: DISCONTINUED | OUTPATIENT
Start: 2018-02-05 | End: 2018-02-08

## 2018-02-05 RX ADMIN — Medication 50 MILLIGRAM(S): at 18:30

## 2018-02-05 RX ADMIN — Medication 1 GRAM(S): at 23:40

## 2018-02-05 RX ADMIN — AMLODIPINE BESYLATE 5 MILLIGRAM(S): 2.5 TABLET ORAL at 18:30

## 2018-02-05 RX ADMIN — CELECOXIB 200 MILLIGRAM(S): 200 CAPSULE ORAL at 20:07

## 2018-02-05 RX ADMIN — Medication 3 MILLILITER(S): at 22:46

## 2018-02-05 RX ADMIN — CEFTRIAXONE 100 GRAM(S): 500 INJECTION, POWDER, FOR SOLUTION INTRAMUSCULAR; INTRAVENOUS at 15:58

## 2018-02-05 RX ADMIN — CELECOXIB 200 MILLIGRAM(S): 200 CAPSULE ORAL at 18:30

## 2018-02-05 RX ADMIN — ENOXAPARIN SODIUM 40 MILLIGRAM(S): 100 INJECTION SUBCUTANEOUS at 18:30

## 2018-02-05 RX ADMIN — GABAPENTIN 300 MILLIGRAM(S): 400 CAPSULE ORAL at 23:40

## 2018-02-05 RX ADMIN — AZITHROMYCIN 255 MILLIGRAM(S): 500 TABLET, FILM COATED ORAL at 17:19

## 2018-02-05 RX ADMIN — ATORVASTATIN CALCIUM 40 MILLIGRAM(S): 80 TABLET, FILM COATED ORAL at 23:40

## 2018-02-05 RX ADMIN — SODIUM CHLORIDE 1000 MILLILITER(S): 9 INJECTION INTRAMUSCULAR; INTRAVENOUS; SUBCUTANEOUS at 15:58

## 2018-02-05 RX ADMIN — Medication 650 MILLIGRAM(S): at 17:28

## 2018-02-05 NOTE — ED ADULT NURSE NOTE - OBJECTIVE STATEMENT
pt reports 1 week of wheezing, SOB, chest congestion, unproductive cough. c/o shortness of breath , chest congestion,. pt went to pcp and was prescribed antibiotics without relief. diminished lungs on the bases.  speaks in full sentences, equal chest rise and fall. ambulatory steadily. denies fever, chills, nausea, vomiting

## 2018-02-05 NOTE — H&P ADULT - PROBLEM SELECTOR PLAN 3
Chronic, stable  Continue Metoprolol and Norvasc Normocytic  Trend, consider iron studies and FOBT if persists

## 2018-02-05 NOTE — H&P ADULT - NSHPPHYSICALEXAM_GEN_ALL_CORE
General: Well developed, well nourished, NAD  HEENT: NCAT, PERRLA, EOMI bl, moist mucous membranes   Neck: Supple, nontender, no mass  Neurology: A&Ox3, nonfocal, CN II-XII grossly intact, sensation intact, no gait abnormalities   Respiratory: +R basilar crackles  CV: RRR, +S1/S2, no murmurs, rubs or gallops  Abdominal: Soft, NT, ND +BSx4  Extremities: No C/C/E, + peripheral pulses  MSK: Normal ROM, no joint erythema or warmth, no joint swelling   Skin: warm, dry, normal color, no rash or abnormal lesions

## 2018-02-05 NOTE — H&P ADULT - NSHPREVIEWOFSYSTEMS_GEN_ALL_CORE
CONSTITUTIONAL: No weakness, fevers or chills  EYES/ENT: No visual changes;  No vertigo or throat pain   NECK: No pain or stiffness  RESPIRATORY: +cough, no wheezing, hemoptysis; +shortness of breath  CARDIOVASCULAR: No chest pain or palpitations  GASTROINTESTINAL: No abdominal or epigastric pain. No nausea, vomiting, or hematemesis; No diarrhea or constipation. No melena or hematochezia.  GENITOURINARY: No dysuria, frequency or hematuria  NEUROLOGICAL: No numbness or weakness  SKIN: No itching, burning, rashes, or lesions   All other review of systems is negative unless indicated above.

## 2018-02-05 NOTE — ED PROVIDER NOTE - OBJECTIVE STATEMENT
pt c/o sob cough since 2/2. pt saw pmd, had outpt labs and cxr showing pna, has been on po abx, but sob worsening. no fevers, chills, ha, cp, abd pain, d/n/v.  pmd - federbush

## 2018-02-05 NOTE — H&P ADULT - PROBLEM SELECTOR PLAN 1
CXR shows R lobe PNA  Continue Zithromax and Ceftriaxone  RVP pending CXR shows R lobe PNA  Continue Zithromax and Ceftriaxone  Duonebs  Strep Ag, Legionella Ag pending  RVP pending

## 2018-02-05 NOTE — H&P ADULT - ASSESSMENT
71yo F w/PMHx Arthritis, Glaucoma, Aortic valve prosthesis(2011), CAD, HTN, HLD, PVD, Hypothyroid presents with worsening SOB, congestion and cough which began 1 week ago.  Admitted with R lobe PNA.

## 2018-02-05 NOTE — ED ADULT TRIAGE NOTE - CHIEF COMPLAINT QUOTE
c/o shortness of breath , chest congestion, unproductive cough X Friday. Dx with pneumonia, on antibiotic, no relief.

## 2018-02-05 NOTE — H&P ADULT - PROBLEM SELECTOR PLAN 8
Lovenox for DVT ppx Lovenox 40SQ for DVT ppx  Bacid appears stable   Trend, call PMD in am to confirm hx. consider workup as outpatient Continue Celebrex

## 2018-02-06 DIAGNOSIS — J22 UNSPECIFIED ACUTE LOWER RESPIRATORY INFECTION: ICD-10-CM

## 2018-02-06 LAB
ANION GAP SERPL CALC-SCNC: 10 MMOL/L — SIGNIFICANT CHANGE UP (ref 5–17)
BUN SERPL-MCNC: 16 MG/DL — SIGNIFICANT CHANGE UP (ref 7–23)
CALCIUM SERPL-MCNC: 8.5 MG/DL — SIGNIFICANT CHANGE UP (ref 8.5–10.1)
CHLORIDE SERPL-SCNC: 109 MMOL/L — HIGH (ref 96–108)
CO2 SERPL-SCNC: 21 MMOL/L — LOW (ref 22–31)
CREAT SERPL-MCNC: 0.78 MG/DL — SIGNIFICANT CHANGE UP (ref 0.5–1.3)
CRP SERPL-MCNC: 0.6 MG/DL — HIGH (ref 0–0.4)
GLUCOSE SERPL-MCNC: 80 MG/DL — SIGNIFICANT CHANGE UP (ref 70–99)
HCT VFR BLD CALC: 32.2 % — LOW (ref 34.5–45)
HGB BLD-MCNC: 10.7 G/DL — LOW (ref 11.5–15.5)
MCHC RBC-ENTMCNC: 30.6 PG — SIGNIFICANT CHANGE UP (ref 27–34)
MCHC RBC-ENTMCNC: 33.1 GM/DL — SIGNIFICANT CHANGE UP (ref 32–36)
MCV RBC AUTO: 92.2 FL — SIGNIFICANT CHANGE UP (ref 80–100)
PLATELET # BLD AUTO: 266 K/UL — SIGNIFICANT CHANGE UP (ref 150–400)
POTASSIUM SERPL-MCNC: 3.9 MMOL/L — SIGNIFICANT CHANGE UP (ref 3.5–5.3)
POTASSIUM SERPL-SCNC: 3.9 MMOL/L — SIGNIFICANT CHANGE UP (ref 3.5–5.3)
RBC # BLD: 3.49 M/UL — LOW (ref 3.8–5.2)
RBC # FLD: 14.3 % — SIGNIFICANT CHANGE UP (ref 10.3–14.5)
SODIUM SERPL-SCNC: 140 MMOL/L — SIGNIFICANT CHANGE UP (ref 135–145)
WBC # BLD: 6.3 K/UL — SIGNIFICANT CHANGE UP (ref 3.8–10.5)
WBC # FLD AUTO: 6.3 K/UL — SIGNIFICANT CHANGE UP (ref 3.8–10.5)

## 2018-02-06 PROCEDURE — 99233 SBSQ HOSP IP/OBS HIGH 50: CPT

## 2018-02-06 PROCEDURE — 71250 CT THORAX DX C-: CPT | Mod: 26

## 2018-02-06 PROCEDURE — 93306 TTE W/DOPPLER COMPLETE: CPT | Mod: 26

## 2018-02-06 RX ORDER — ALBUTEROL 90 UG/1
2.5 AEROSOL, METERED ORAL EVERY 8 HOURS
Qty: 0 | Refills: 0 | Status: DISCONTINUED | OUTPATIENT
Start: 2018-02-06 | End: 2018-02-08

## 2018-02-06 RX ORDER — BUDESONIDE AND FORMOTEROL FUMARATE DIHYDRATE 160; 4.5 UG/1; UG/1
2 AEROSOL RESPIRATORY (INHALATION)
Qty: 0 | Refills: 0 | Status: DISCONTINUED | OUTPATIENT
Start: 2018-02-06 | End: 2018-02-08

## 2018-02-06 RX ADMIN — Medication 50 MILLIGRAM(S): at 17:28

## 2018-02-06 RX ADMIN — CELECOXIB 200 MILLIGRAM(S): 200 CAPSULE ORAL at 17:32

## 2018-02-06 RX ADMIN — Medication 100 MICROGRAM(S): at 06:00

## 2018-02-06 RX ADMIN — Medication 1 GRAM(S): at 21:07

## 2018-02-06 RX ADMIN — AZITHROMYCIN 255 MILLIGRAM(S): 500 TABLET, FILM COATED ORAL at 16:09

## 2018-02-06 RX ADMIN — Medication 50 MILLIGRAM(S): at 06:00

## 2018-02-06 RX ADMIN — Medication 162 MILLIGRAM(S): at 11:32

## 2018-02-06 RX ADMIN — Medication 100 MILLIGRAM(S): at 11:33

## 2018-02-06 RX ADMIN — AMLODIPINE BESYLATE 5 MILLIGRAM(S): 2.5 TABLET ORAL at 17:28

## 2018-02-06 RX ADMIN — ENOXAPARIN SODIUM 40 MILLIGRAM(S): 100 INJECTION SUBCUTANEOUS at 17:32

## 2018-02-06 RX ADMIN — BUDESONIDE AND FORMOTEROL FUMARATE DIHYDRATE 2 PUFF(S): 160; 4.5 AEROSOL RESPIRATORY (INHALATION) at 18:52

## 2018-02-06 RX ADMIN — Medication 600 MILLIGRAM(S): at 01:49

## 2018-02-06 RX ADMIN — PANTOPRAZOLE SODIUM 40 MILLIGRAM(S): 20 TABLET, DELAYED RELEASE ORAL at 05:59

## 2018-02-06 RX ADMIN — AMLODIPINE BESYLATE 5 MILLIGRAM(S): 2.5 TABLET ORAL at 05:59

## 2018-02-06 RX ADMIN — DORZOLAMIDE HYDROCHLORIDE TIMOLOL MALEATE 1 DROP(S): 20; 5 SOLUTION/ DROPS OPHTHALMIC at 17:28

## 2018-02-06 RX ADMIN — ATORVASTATIN CALCIUM 40 MILLIGRAM(S): 80 TABLET, FILM COATED ORAL at 21:07

## 2018-02-06 RX ADMIN — ALBUTEROL 2.5 MILLIGRAM(S): 90 AEROSOL, METERED ORAL at 15:03

## 2018-02-06 RX ADMIN — GABAPENTIN 300 MILLIGRAM(S): 400 CAPSULE ORAL at 21:07

## 2018-02-06 RX ADMIN — CEFTRIAXONE 100 GRAM(S): 500 INJECTION, POWDER, FOR SOLUTION INTRAMUSCULAR; INTRAVENOUS at 15:12

## 2018-02-06 RX ADMIN — Medication 1000 UNIT(S): at 11:32

## 2018-02-06 RX ADMIN — Medication 1 TABLET(S): at 11:32

## 2018-02-06 RX ADMIN — CELECOXIB 200 MILLIGRAM(S): 200 CAPSULE ORAL at 19:07

## 2018-02-06 RX ADMIN — LATANOPROST 1 DROP(S): 0.05 SOLUTION/ DROPS OPHTHALMIC; TOPICAL at 21:07

## 2018-02-06 RX ADMIN — Medication 600 MILLIGRAM(S): at 17:28

## 2018-02-06 RX ADMIN — LISINOPRIL 40 MILLIGRAM(S): 2.5 TABLET ORAL at 05:59

## 2018-02-06 NOTE — CONSULT NOTE ADULT - PROBLEM SELECTOR RECOMMENDATION 9
on emp ABX  check markers  isolation precs for RSV  keep sat > 88 pct  will check ct chest to eval for eff and or atelectasis and extent of PNA  will change NEBS to albuterol and will change Mucinex prn to atc  will add symbicort  supportive medical regimen and care  prognosis guarded  nutrition  asst with ADL  serial labs  old TTE reviewed, HFpEF stage II, will repeat TTE, proBNP noted

## 2018-02-06 NOTE — CONSULT NOTE ADULT - SUBJECTIVE AND OBJECTIVE BOX
Date/Time Patient Seen:  		  Referring MD:   Data Reviewed	       Patient is a 72y old  Female who presents with a chief complaint of SOB (05 Feb 2018 17:26)      Subjective/HPI  in bed  seen and examined  vs and meds reviewed  on Isolation precs  RSV infection positive on RVP  on o2 NC    CXR shows PNA    73yo F w/PMHx Arthritis, Glaucoma, Aortic valve prosthesis(2011), CAD, HTN, HLD, PVD, Hypothyroid presents with worsening SOB, congestion and cough which began 1 week ago.  Pt saw her PCP 3 days ago and was prescribed Augmentin.    positive sick contacts       PAST MEDICAL & SURGICAL HISTORY:  Generalized intra-abdominal and pelvic swelling, mass and lump  Glaucoma  Aortic valve prosthesis present  CAD (coronary artery disease)  Arthritis  Heart murmur  Other iron deficiency anemia  Carpal tunnel syndrome of right wrist  PVD (peripheral vascular disease)  Hyperlipemia  Colitis  Hypothyroid  Hypertension  COPD (chronic obstructive pulmonary disease)  H/O foot surgery: (Right foot, 2010)  History of colonoscopy  S/P carpal tunnel release: (Right, 1/2017)  H/O carotid endarterectomy: Both sides 2002  Aortic valve replaced: 2011 with bovine  Carotid disease, bilateral  Cataract extraction status of left eye  Cataract extraction status of right eye        Medication list         MEDICATIONS  (STANDING):  ALBUTerol/ipratropium for Nebulization 3 milliLiter(s) Nebulizer every 8 hours  amLODIPine   Tablet 5 milliGRAM(s) Oral two times a day  aspirin  chewable 162 milliGRAM(s) Oral daily  atorvastatin 40 milliGRAM(s) Oral at bedtime  azithromycin  IVPB 500 milliGRAM(s) IV Intermittent every 24 hours  cefTRIAXone   IVPB 1 Gram(s) IV Intermittent every 24 hours  celecoxib 200 milliGRAM(s) Oral after dinner  cholecalciferol 1000 Unit(s) Oral daily  dorzolamide 2%/timolol 0.5% Ophthalmic Solution 1 Drop(s) Both EYES two times a day  enoxaparin Injectable 40 milliGRAM(s) SubCutaneous every 24 hours  gabapentin 300 milliGRAM(s) Oral at bedtime  lactobacillus acidophilus 1 Tablet(s) Oral daily  latanoprost 0.005% Ophthalmic Solution 1 Drop(s) Both EYES at bedtime  levothyroxine 100 MICROGram(s) Oral daily  lisinopril 40 milliGRAM(s) Oral daily  metoprolol succinate ER 50 milliGRAM(s) Oral two times a day  multivitamin/minerals 1 Tablet(s) Oral daily  pantoprazole    Tablet 40 milliGRAM(s) Oral before breakfast  pyridoxine 100 milliGRAM(s) Oral daily  sucralfate 1 Gram(s) Oral at bedtime    MEDICATIONS  (PRN):  guaiFENesin  milliGRAM(s) Oral every 12 hours PRN Cough         Vitals log        ICU Vital Signs Last 24 Hrs  T(C): 36.7 (06 Feb 2018 05:58), Max: 36.9 (05 Feb 2018 14:40)  T(F): 98 (06 Feb 2018 05:58), Max: 98.5 (05 Feb 2018 14:40)  HR: 70 (06 Feb 2018 05:58) (67 - 80)  BP: 135/73 (06 Feb 2018 05:58) (132/56 - 146/65)  BP(mean): --  ABP: --  ABP(mean): --  RR: 18 (06 Feb 2018 05:58) (16 - 18)  SpO2: 91% (06 Feb 2018 05:58) (90% - 94%)           Input and Output:  I&O's Detail      Lab Data                        10.7   6.3   )-----------( 266      ( 06 Feb 2018 08:10 )             32.2     02-06    140  |  109<H>  |  16  ----------------------------<  80  3.9   |  21<L>  |  0.78    Ca    8.5      06 Feb 2018 08:10    TPro  8.3  /  Alb  3.8  /  TBili  0.3  /  DBili  x   /  AST  42<H>  /  ALT  24  /  AlkPhos  53  02-05    ABG - ( 05 Feb 2018 22:58 )  pH: 7.40  /  pCO2: 33    /  pO2: 66    / HCO3: 21    / Base Excess: -4.4  /  SaO2: 92                      Review of Systems	      Objective     Physical Examination  head at  heart s1s2  lungs dec BS  abd soft        Pertinent Lab findings & Imaging      Multani:  NO   Adequate UO     I&O's Detail           Discussed with:     Cultures:	        Radiology

## 2018-02-06 NOTE — PATIENT PROFILE ADULT. - NS TRANSFER PATIENT BELONGINGS
Clothing/Jewelry/Money (specify)/yellow wedding band with white stones, earrings/Cell Phone/PDA (specify)

## 2018-02-06 NOTE — PROGRESS NOTE ADULT - PROBLEM SELECTOR PLAN 1
CXR shows R lobe PNA  Continue Zithromax and Ceftriaxone  Duonebs  Strep Ag, Legionella Ag pending  RVP pending CT chest + for multilobar pneumonia   Continue Zithromax and Ceftriaxone  Duonebs  Strep Ag, Legionella Ag pending  RVP , +RSV  F/u Blood cultures.

## 2018-02-06 NOTE — PATIENT PROFILE ADULT. - VISION (WITH CORRECTIVE LENSES IF THE PATIENT USUALLY WEARS THEM):
reading glasses/ contact lens/Partially impaired: cannot see medication labels or newsprint, but can see obstacles in path, and the surrounding layout; can count fingers at arm's length

## 2018-02-06 NOTE — PROGRESS NOTE ADULT - ASSESSMENT
73yo F w/PMHx Arthritis, Glaucoma, Aortic valve prosthesis(2011), CAD, HTN, HLD, PVD, Hypothyroid presents with worsening SOB, congestion and cough which began 1 week ago.  Admitted with R lobe PNA. 73yo F w/PMHx Arthritis, Glaucoma, Aortic valve prosthesis(2011), CAD, HTN, HLD, PVD, Hypothyroid presents with worsening SOB, congestion and cough which began 1 week ago.  Admitted with multilobar pneumonia

## 2018-02-07 LAB
CEA SERPL-MCNC: 8.6 NG/ML — HIGH (ref 0–3.8)
LDH SERPL L TO P-CCNC: 356 U/L — HIGH (ref 50–242)

## 2018-02-07 PROCEDURE — 99233 SBSQ HOSP IP/OBS HIGH 50: CPT

## 2018-02-07 RX ORDER — SODIUM CHLORIDE 0.65 %
1 AEROSOL, SPRAY (ML) NASAL
Qty: 0 | Refills: 0 | Status: DISCONTINUED | OUTPATIENT
Start: 2018-02-07 | End: 2018-02-08

## 2018-02-07 RX ADMIN — Medication 100 MILLIGRAM(S): at 23:21

## 2018-02-07 RX ADMIN — ATORVASTATIN CALCIUM 40 MILLIGRAM(S): 80 TABLET, FILM COATED ORAL at 23:20

## 2018-02-07 RX ADMIN — Medication 600 MILLIGRAM(S): at 17:00

## 2018-02-07 RX ADMIN — PANTOPRAZOLE SODIUM 40 MILLIGRAM(S): 20 TABLET, DELAYED RELEASE ORAL at 06:25

## 2018-02-07 RX ADMIN — DORZOLAMIDE HYDROCHLORIDE TIMOLOL MALEATE 1 DROP(S): 20; 5 SOLUTION/ DROPS OPHTHALMIC at 06:59

## 2018-02-07 RX ADMIN — Medication 50 MILLIGRAM(S): at 17:00

## 2018-02-07 RX ADMIN — GABAPENTIN 300 MILLIGRAM(S): 400 CAPSULE ORAL at 23:21

## 2018-02-07 RX ADMIN — ENOXAPARIN SODIUM 40 MILLIGRAM(S): 100 INJECTION SUBCUTANEOUS at 18:34

## 2018-02-07 RX ADMIN — Medication 100 MILLIGRAM(S): at 11:21

## 2018-02-07 RX ADMIN — Medication 100 MICROGRAM(S): at 05:37

## 2018-02-07 RX ADMIN — LISINOPRIL 40 MILLIGRAM(S): 2.5 TABLET ORAL at 06:25

## 2018-02-07 RX ADMIN — Medication 100 MILLIGRAM(S): at 13:45

## 2018-02-07 RX ADMIN — Medication 1 TABLET(S): at 11:20

## 2018-02-07 RX ADMIN — Medication 1 SPRAY(S): at 06:59

## 2018-02-07 RX ADMIN — Medication 1 TABLET(S): at 11:19

## 2018-02-07 RX ADMIN — ALBUTEROL 2.5 MILLIGRAM(S): 90 AEROSOL, METERED ORAL at 15:01

## 2018-02-07 RX ADMIN — Medication 1000 UNIT(S): at 11:19

## 2018-02-07 RX ADMIN — CEFTRIAXONE 100 GRAM(S): 500 INJECTION, POWDER, FOR SOLUTION INTRAMUSCULAR; INTRAVENOUS at 15:43

## 2018-02-07 RX ADMIN — Medication 1 GRAM(S): at 23:21

## 2018-02-07 RX ADMIN — ALBUTEROL 2.5 MILLIGRAM(S): 90 AEROSOL, METERED ORAL at 07:33

## 2018-02-07 RX ADMIN — BUDESONIDE AND FORMOTEROL FUMARATE DIHYDRATE 2 PUFF(S): 160; 4.5 AEROSOL RESPIRATORY (INHALATION) at 06:27

## 2018-02-07 RX ADMIN — BUDESONIDE AND FORMOTEROL FUMARATE DIHYDRATE 2 PUFF(S): 160; 4.5 AEROSOL RESPIRATORY (INHALATION) at 18:34

## 2018-02-07 RX ADMIN — AMLODIPINE BESYLATE 5 MILLIGRAM(S): 2.5 TABLET ORAL at 17:01

## 2018-02-07 RX ADMIN — DORZOLAMIDE HYDROCHLORIDE TIMOLOL MALEATE 1 DROP(S): 20; 5 SOLUTION/ DROPS OPHTHALMIC at 17:00

## 2018-02-07 RX ADMIN — AMLODIPINE BESYLATE 5 MILLIGRAM(S): 2.5 TABLET ORAL at 06:26

## 2018-02-07 RX ADMIN — LATANOPROST 1 DROP(S): 0.05 SOLUTION/ DROPS OPHTHALMIC; TOPICAL at 23:21

## 2018-02-07 RX ADMIN — CELECOXIB 200 MILLIGRAM(S): 200 CAPSULE ORAL at 18:35

## 2018-02-07 RX ADMIN — Medication 50 MILLIGRAM(S): at 06:26

## 2018-02-07 RX ADMIN — Medication 162 MILLIGRAM(S): at 11:21

## 2018-02-07 RX ADMIN — AZITHROMYCIN 255 MILLIGRAM(S): 500 TABLET, FILM COATED ORAL at 17:03

## 2018-02-07 RX ADMIN — Medication 600 MILLIGRAM(S): at 06:25

## 2018-02-07 RX ADMIN — ALBUTEROL 2.5 MILLIGRAM(S): 90 AEROSOL, METERED ORAL at 23:05

## 2018-02-07 NOTE — PROGRESS NOTE ADULT - PROBLEM SELECTOR PLAN 1
ct chest reviewed  LN noted  will check LDH and CEA  will need outpatient PET scan and possible LN biopsy  will cont Pulm Rx regimen and ABX for likely PNA as evidenced on CT chest   pt will need CT chest repeat to monitor for resolution of infiltrate  cont supportive measures and isolation precs.   nutrition  oral hygiene  keep sat > 88 pct  increase activity as tolerated  cough regimen

## 2018-02-07 NOTE — PROGRESS NOTE ADULT - ASSESSMENT
71yo F w/PMHx Arthritis, Glaucoma, Aortic valve prosthesis(2011), CAD, HTN, HLD, PVD, Hypothyroid presents with worsening SOB, congestion and cough which began 1 week ago.  Admitted with multilobar pneumonia

## 2018-02-07 NOTE — PROGRESS NOTE ADULT - PROBLEM SELECTOR PLAN 1
CT chest + for multilobar pneumonia   Continue Zithromax and Ceftriaxone day 3   Duonebs  Strep Ag, Legionella Ag pending  RVP , +RSV  neg Blood cultures.

## 2018-02-07 NOTE — PROGRESS NOTE ADULT - ATTENDING COMMENTS
pt seen and examine  see above - pt with multifocal pna on day 3 Rocephin / Zithromax , blood cult neg , dc plan in am .
Continue antibiotics.  F/u Cultures.  Nebs PRN   Ambulate as tolerated.  Droplet precautions

## 2018-02-07 NOTE — PROGRESS NOTE ADULT - PROBLEM SELECTOR PLAN 4
mild / nonspecific only ast
appears stable   Trend, call PMD in am to confirm hx. consider workup as outpatient

## 2018-02-08 ENCOUNTER — TRANSCRIPTION ENCOUNTER (OUTPATIENT)
Age: 73
End: 2018-02-08

## 2018-02-08 VITALS — OXYGEN SATURATION: 90 %

## 2018-02-08 PROCEDURE — 85730 THROMBOPLASTIN TIME PARTIAL: CPT

## 2018-02-08 PROCEDURE — 96375 TX/PRO/DX INJ NEW DRUG ADDON: CPT

## 2018-02-08 PROCEDURE — 87633 RESP VIRUS 12-25 TARGETS: CPT

## 2018-02-08 PROCEDURE — 83605 ASSAY OF LACTIC ACID: CPT

## 2018-02-08 PROCEDURE — 36600 WITHDRAWAL OF ARTERIAL BLOOD: CPT

## 2018-02-08 PROCEDURE — 96365 THER/PROPH/DIAG IV INF INIT: CPT

## 2018-02-08 PROCEDURE — 94640 AIRWAY INHALATION TREATMENT: CPT

## 2018-02-08 PROCEDURE — 80053 COMPREHEN METABOLIC PANEL: CPT

## 2018-02-08 PROCEDURE — 71046 X-RAY EXAM CHEST 2 VIEWS: CPT

## 2018-02-08 PROCEDURE — 99285 EMERGENCY DEPT VISIT HI MDM: CPT | Mod: 25

## 2018-02-08 PROCEDURE — 96372 THER/PROPH/DIAG INJ SC/IM: CPT | Mod: 59

## 2018-02-08 PROCEDURE — 83615 LACTATE (LD) (LDH) ENZYME: CPT

## 2018-02-08 PROCEDURE — 71250 CT THORAX DX C-: CPT

## 2018-02-08 PROCEDURE — 85027 COMPLETE CBC AUTOMATED: CPT

## 2018-02-08 PROCEDURE — C8929: CPT

## 2018-02-08 PROCEDURE — 99239 HOSP IP/OBS DSCHRG MGMT >30: CPT

## 2018-02-08 PROCEDURE — 87040 BLOOD CULTURE FOR BACTERIA: CPT

## 2018-02-08 PROCEDURE — 87486 CHLMYD PNEUM DNA AMP PROBE: CPT

## 2018-02-08 PROCEDURE — 99221 1ST HOSP IP/OBS SF/LOW 40: CPT

## 2018-02-08 PROCEDURE — 36415 COLL VENOUS BLD VENIPUNCTURE: CPT

## 2018-02-08 PROCEDURE — 87798 DETECT AGENT NOS DNA AMP: CPT

## 2018-02-08 PROCEDURE — 87581 M.PNEUMON DNA AMP PROBE: CPT

## 2018-02-08 PROCEDURE — 93005 ELECTROCARDIOGRAM TRACING: CPT

## 2018-02-08 PROCEDURE — 93306 TTE W/DOPPLER COMPLETE: CPT

## 2018-02-08 PROCEDURE — 96376 TX/PRO/DX INJ SAME DRUG ADON: CPT

## 2018-02-08 PROCEDURE — 82378 CARCINOEMBRYONIC ANTIGEN: CPT

## 2018-02-08 PROCEDURE — 85610 PROTHROMBIN TIME: CPT

## 2018-02-08 PROCEDURE — 94760 N-INVAS EAR/PLS OXIMETRY 1: CPT

## 2018-02-08 RX ORDER — LACTOBACILLUS ACIDOPHILUS 100MM CELL
1 CAPSULE ORAL
Qty: 10 | Refills: 0 | OUTPATIENT
Start: 2018-02-08 | End: 2018-02-17

## 2018-02-08 RX ORDER — BUDESONIDE AND FORMOTEROL FUMARATE DIHYDRATE 160; 4.5 UG/1; UG/1
2 AEROSOL RESPIRATORY (INHALATION)
Qty: 1 | Refills: 0 | OUTPATIENT
Start: 2018-02-08 | End: 2018-03-09

## 2018-02-08 RX ORDER — TIZANIDINE 4 MG/1
1 TABLET ORAL
Qty: 0 | Refills: 0 | COMMUNITY

## 2018-02-08 RX ORDER — CIPROFLOXACIN LACTATE 400MG/40ML
1 VIAL (ML) INTRAVENOUS
Qty: 6 | Refills: 0 | OUTPATIENT
Start: 2018-02-08 | End: 2018-02-13

## 2018-02-08 RX ORDER — ALBUTEROL 90 UG/1
3 AEROSOL, METERED ORAL
Qty: 1 | Refills: 0 | OUTPATIENT
Start: 2018-02-08 | End: 2018-03-09

## 2018-02-08 RX ORDER — LACTOBACILLUS ACIDOPHILUS 100MM CELL
1 CAPSULE ORAL
Qty: 0 | Refills: 0 | COMMUNITY
Start: 2018-02-08

## 2018-02-08 RX ADMIN — BUDESONIDE AND FORMOTEROL FUMARATE DIHYDRATE 2 PUFF(S): 160; 4.5 AEROSOL RESPIRATORY (INHALATION) at 07:05

## 2018-02-08 RX ADMIN — Medication 162 MILLIGRAM(S): at 11:02

## 2018-02-08 RX ADMIN — ALBUTEROL 2.5 MILLIGRAM(S): 90 AEROSOL, METERED ORAL at 07:36

## 2018-02-08 RX ADMIN — Medication 1 SPRAY(S): at 00:16

## 2018-02-08 RX ADMIN — Medication 1 TABLET(S): at 11:02

## 2018-02-08 RX ADMIN — Medication 600 MILLIGRAM(S): at 06:59

## 2018-02-08 RX ADMIN — Medication 50 MILLIGRAM(S): at 06:59

## 2018-02-08 RX ADMIN — LISINOPRIL 40 MILLIGRAM(S): 2.5 TABLET ORAL at 07:00

## 2018-02-08 RX ADMIN — AMLODIPINE BESYLATE 5 MILLIGRAM(S): 2.5 TABLET ORAL at 07:01

## 2018-02-08 RX ADMIN — Medication 1000 UNIT(S): at 11:02

## 2018-02-08 RX ADMIN — Medication 1 TABLET(S): at 11:03

## 2018-02-08 RX ADMIN — Medication 100 MICROGRAM(S): at 06:59

## 2018-02-08 RX ADMIN — Medication 100 MILLIGRAM(S): at 11:02

## 2018-02-08 RX ADMIN — PANTOPRAZOLE SODIUM 40 MILLIGRAM(S): 20 TABLET, DELAYED RELEASE ORAL at 07:00

## 2018-02-08 RX ADMIN — DORZOLAMIDE HYDROCHLORIDE TIMOLOL MALEATE 1 DROP(S): 20; 5 SOLUTION/ DROPS OPHTHALMIC at 07:00

## 2018-02-08 NOTE — PROGRESS NOTE ADULT - PROBLEM SELECTOR PLAN 1
on emp ABX  on NEBS  on Mucinex  RSV supportive measures  monitor vs and Sat  CT chest reviewed, LN noted, LDH and CEA elevated  will need LN sampling as outpatient, after recovery from RSV and LRTI  increase activity  nutrition  dvt p  oral and skin hygiene  discussed with the patient

## 2018-02-08 NOTE — DISCHARGE NOTE ADULT - CARE PLAN
Principal Discharge DX:	PNA (pneumonia)  Goal:	s/p iv abx  change to po abx , blood cult neg  Assessment and plan of treatment:	stable  Secondary Diagnosis:	Hypertension  Assessment and plan of treatment:	low salt diet/ on  meds  Secondary Diagnosis:	Hypothyroid  Assessment and plan of treatment:	on meds / tsh  Secondary Diagnosis:	CAD (coronary artery disease)  Assessment and plan of treatment:	on meds stable on asa

## 2018-02-08 NOTE — DISCHARGE NOTE ADULT - HOSPITAL COURSE
73yo F w/PMHx Arthritis, Glaucoma, Aortic valve prosthesis(2011), CAD, HTN, HLD, PVD, Hypothyroid presents with worsening SOB, congestion and cough which began 1 week ago.    Admitted with multilobar pneumonia  ct chest -   Multifocal pneumonia right lung. Small right pleural effusion. Please   image to resolution.  Mediastinal and probable bilateral hilar adenopathy    started on iv abx Zithromax and Ceftriaxone   Duonebs  RVP , +RSV  ,  Blood cultures neg to date .     ·   hx CAD (coronary artery disease). Continue ASA.   ·: Anemia, unspecified type.: Normocytic  mild , out pt iron studies  .    Elevated liver enzymes.  mild / nonspecific possible sec to infection / resolved    : Hypertension.  chr  stable on    Metoprolol and Norvasc.       hosp course pt start feeling better  on  iv abx , no fever , no distress .     pt with multifocal pna on day 4  Rocephin / Zithromax switch to po abx  , blood cult neg tod ate  , dc plan .   .  as per pulmo dr garcia Lower resp. tract infection on emp ABX  on NEBS  on Mucinex  RSV supportive measures  monitor vs and Sat  CT chest reviewed,-  LN noted, LDH and CEA elevated  will need LN sampling as outpatient, after recovery from RSV and LRTI  increase activity .  pt medically stable  clear by pulm dr jose flores to be dc , pt clear to fu dr estrada office pmd notified dc plan fu with in 1wk , fu pulm out pt FOR LN BIOPSY AND PET SCAN .   physical exam 2-8-18 ,  day of dc Vital Signs Last 24 Hrs  T(C): 36.6 (08 Feb 2018 04:53), Max: 36.8 (07 Feb 2018 20:36)  T(F): 97.9 (08 Feb 2018 04:53), Max: 98.2 (07 Feb 2018 20:36)  HR: 73 (08 Feb 2018 07:38) (65 - 93)  BP: 127/77 (08 Feb 2018 04:53) (106/63 - 148/69)  BP(mean): --  RR: 16 (08 Feb 2018 04:53) (16 - 18)  SpO2: 89% (08 Feb 2018 10:57) (89% - 94%)  GEN no distress , HEENT nt/nc , perrla , CVS s1s2 no tachy , , CHEST bl air entery present  coarse  mild bl , no wheeze  , CNS aao/3 , no focal deficit  , GI soft , bs present ,  intact , EXT no edema, pedal pulse present , SKIN no rash.

## 2018-02-08 NOTE — DISCHARGE NOTE ADULT - CARE PROVIDER_API CALL
Freddie Pradhan), Internal Medicine  [address] [place] [address]  Phone: [phone]  Fax: [phone]

## 2018-02-08 NOTE — DISCHARGE NOTE ADULT - PLAN OF CARE
s/p iv abx  change to po abx , blood cult neg stable low salt diet/ on  meds on meds / tsh on meds stable on asa

## 2018-02-08 NOTE — DISCHARGE NOTE ADULT - MEDICATION SUMMARY - MEDICATIONS TO TAKE
I will START or STAY ON the medications listed below when I get home from the hospital:    aspirin 81 mg oral tablet  -- 2 tab(s) by mouth once a day  -- Indication: For CAD (coronary artery disease)    CeleBREX 200 mg oral capsule  -- 1 tab(s) by mouth once a day (at bedtime)  -- Indication: For Arthritis    quinapril 40 mg oral tablet  -- 1 tab(s) by mouth once a day  -- Indication: For Hypertension    Neurontin 300 mg oral capsule  -- 1 cap(s) by mouth once a day (at bedtime)  -- Indication: For Neeuropathy     rosuvastatin 10 mg oral tablet  -- 1 tab(s) by mouth once a day (at bedtime)  -- Indication: For Hld    Metoprolol Succinate ER 50 mg oral tablet, extended release  -- 1 tab(s) by mouth 2 times a day  -- Indication: For Htn    Symbicort 80 mcg-4.5 mcg/inh inhalation aerosol  -- 2 puff(s) inhaled 2 times a day   -- Check with your doctor before becoming pregnant.  For inhalation only.  Rinse mouth thoroughly after use.    -- Indication: For bronchospasm     albuterol 0.63 mg/3 mL (0.021%) inhalation solution  -- 3 milliliter(s) by nebulizer 2 times a day, As Needed -for bronchospasm   -- For inhalation only.  It is very important that you take or use this exactly as directed.  Do not skip doses or discontinue unless directed by your doctor.  Obtain medical advice before taking any non-prescription drugs as some may affect the action of this medication.    -- Indication: For brochospasm     Norvasc 5 mg oral tablet  -- 1 tab(s) by mouth 2 times a day  -- Indication: For Hypertension    guaiFENesin 100 mg/5 mL oral liquid  -- 5 milliliter(s) by mouth every 6 hours, As needed, Cough  -- Indication: For Cough     sucralfate 1 g oral tablet  -- 1 tab(s) by mouth once a day (at bedtime)  -- Indication: For Gi prophy     dorzolamide-timolol 2.23%-0.68% ophthalmic solution  -- 1 drop(s) to each affected eye 2 times a day  -- Indication: For Glaucoma    Xalatan 0.005% ophthalmic solution  -- 1 drop(s) to each affected eye once a day (in the evening)  -- Indication: For Glaucoma    lactobacillus acidophilus oral capsule  -- 1 cap(s) by mouth once a day   -- Indication: For Gi prophy     PriLOSEC 20 mg oral delayed release capsule  -- 1 cap(s) by mouth once a day  -- Indication: For Gi prophy     Levaquin 500 mg oral tablet  -- 1 tab(s) by mouth every 24 hours   -- Avoid prolonged or excessive exposure to direct and/or artificial sunlight while taking this medication.  Do not take dairy products, antacids, or iron preparations within one hour of this medication.  Finish all this medication unless otherwise directed by prescriber.  May cause drowsiness or dizziness.  Medication should be taken with plenty of water.    -- Indication: For Pneumonia    levothyroxine 100 mcg (0.1 mg) oral tablet  -- 1 tab(s) by mouth once a day  -- Indication: For Hypothyroid    Centrum Silver Women's oral tablet  -- 1 tab(s) by mouth once a day  -- Indication: For Need for prophylactic measure    Vitamin B6 100 mg oral tablet  -- 1 tab(s) by mouth once a day  -- Indication: For Need for prophylactic measure    Vitamin D3 1000 intl units oral capsule  -- 1 cap(s) by mouth once a day  -- Indication: For Need for prophylactic measure I will START or STAY ON the medications listed below when I get home from the hospital:    aspirin 81 mg oral tablet  -- 2 tab(s) by mouth once a day  -- Indication: For CAD (coronary artery disease)    CeleBREX 200 mg oral capsule  -- 1 tab(s) by mouth once a day (at bedtime)  -- Indication: For Arthritis    quinapril 40 mg oral tablet  -- 1 tab(s) by mouth once a day  -- Indication: For Hypertension    Neurontin 300 mg oral capsule  -- 1 cap(s) by mouth once a day (at bedtime)  -- Indication: For Neuropathy    rosuvastatin 10 mg oral tablet  -- 1 tab(s) by mouth once a day (at bedtime)  -- Indication: For Hyperlipemia    Metoprolol Succinate ER 50 mg oral tablet, extended release  -- 1 tab(s) by mouth 2 times a day  -- Indication: For Hypertension    Symbicort 80 mcg-4.5 mcg/inh inhalation aerosol  -- 2 puff(s) inhaled 2 times a day   -- Check with your doctor before becoming pregnant.  For inhalation only.  Rinse mouth thoroughly after use.    -- Indication: For bronchopspam     albuterol 0.63 mg/3 mL (0.021%) inhalation solution  -- 3 milliliter(s) by nebulizer 2 times a day, As Needed -for bronchospasm   -- For inhalation only.  It is very important that you take or use this exactly as directed.  Do not skip doses or discontinue unless directed by your doctor.  Obtain medical advice before taking any non-prescription drugs as some may affect the action of this medication.    -- Indication: For bronchospasm     Norvasc 5 mg oral tablet  -- 1 tab(s) by mouth 2 times a day  -- Indication: For Htn    guaiFENesin 100 mg/5 mL oral liquid  -- 5 milliliter(s) by mouth every 6 hours, As needed, Cough  -- Indication: For Cough     sucralfate 1 g oral tablet  -- 1 tab(s) by mouth once a day (at bedtime)  -- Indication: For Gi prophy     dorzolamide-timolol 2.23%-0.68% ophthalmic solution  -- 1 drop(s) to each affected eye 2 times a day  -- Indication: For Glaucoma    Xalatan 0.005% ophthalmic solution  -- 1 drop(s) to each affected eye once a day (in the evening)  -- Indication: For Glaucoma    lactobacillus acidophilus oral capsule  -- 1 cap(s) by mouth once a day   -- Indication: For Gi prophy     PriLOSEC 20 mg oral delayed release capsule  -- 1 cap(s) by mouth once a day  -- Indication: For Gi prophy     Levaquin 500 mg oral tablet  -- 1 tab(s) by mouth every 24 hours   -- Avoid prolonged or excessive exposure to direct and/or artificial sunlight while taking this medication.  Do not take dairy products, antacids, or iron preparations within one hour of this medication.  Finish all this medication unless otherwise directed by prescriber.  May cause drowsiness or dizziness.  Medication should be taken with plenty of water.    -- Indication: For PNA (pneumonia)    levothyroxine 100 mcg (0.1 mg) oral tablet  -- 1 tab(s) by mouth once a day  -- Indication: For Hypothyroid    Centrum Silver Women's oral tablet  -- 1 tab(s) by mouth once a day  -- Indication: For Need for prophylactic measure    Vitamin B6 100 mg oral tablet  -- 1 tab(s) by mouth once a day  -- Indication: For Need for prophylactic measure    Vitamin D3 1000 intl units oral capsule  -- 1 cap(s) by mouth once a day  -- Indication: For Need for prophylactic measure

## 2018-02-08 NOTE — DISCHARGE NOTE ADULT - PATIENT PORTAL LINK FT
You can access the [facility] Patient Portal, offered by [facility], by registering with the following website: [email or web address]

## 2018-02-08 NOTE — DISCHARGE NOTE ADULT - NSTOBACCOHOTLINE_GEN_A_CS
[facility] Smokers Quitline ([id number]) [facility] Smokers Quitline ([id number])

## 2018-02-08 NOTE — DISCHARGE NOTE ADULT - ADDITIONAL INSTRUCTIONS
- need out pt PET SCAN   also possible  LYMPHNODE BIOPSY  out pt need LN sampling as outpatient, after recovery from RSV and LRTI  increase activity  / as tumor marker CEA high / pmd dr estrada notified dc plan  fu with in 1wk . pt will need to [facility] pulmonary in 1wk.

## 2018-02-08 NOTE — PROGRESS NOTE ADULT - SUBJECTIVE AND OBJECTIVE BOX
Date/Time Patient Seen:  		  Referring MD:   Data Reviewed	       Patient is a 72y old  Female who presents with a chief complaint of shortness of breath (06 Feb 2018 15:55)  in bed  feels better  on ABX  on isolation precs      Subjective/HPI     PAST MEDICAL & SURGICAL HISTORY:  Generalized intra-abdominal and pelvic swelling, mass and lump  Glaucoma  Aortic valve prosthesis present  CAD (coronary artery disease)  Arthritis  Heart murmur  Other iron deficiency anemia  Carpal tunnel syndrome of right wrist  PVD (peripheral vascular disease)  Hyperlipemia  Colitis  Hypothyroid  Hypertension  COPD (chronic obstructive pulmonary disease)  H/O foot surgery: (Right foot, 2010)  History of colonoscopy  S/P carpal tunnel release: (Right, 1/2017)  H/O carotid endarterectomy: Both sides 2002  Aortic valve replaced: 2011 with bovine  Carotid disease, bilateral  Cataract extraction status of left eye  Cataract extraction status of right eye        Medication list         MEDICATIONS  (STANDING):  ALBUTerol    0.083% 2.5 milliGRAM(s) Nebulizer every 8 hours  amLODIPine   Tablet 5 milliGRAM(s) Oral two times a day  aspirin  chewable 162 milliGRAM(s) Oral daily  atorvastatin 40 milliGRAM(s) Oral at bedtime  azithromycin  IVPB 500 milliGRAM(s) IV Intermittent every 24 hours  buDESOnide  80 MICROgram(s)/formoterol 4.5 MICROgram(s) Inhaler 2 Puff(s) Inhalation two times a day  cefTRIAXone   IVPB 1 Gram(s) IV Intermittent every 24 hours  celecoxib 200 milliGRAM(s) Oral after dinner  cholecalciferol 1000 Unit(s) Oral daily  dorzolamide 2%/timolol 0.5% Ophthalmic Solution 1 Drop(s) Both EYES two times a day  enoxaparin Injectable 40 milliGRAM(s) SubCutaneous every 24 hours  gabapentin 300 milliGRAM(s) Oral at bedtime  guaiFENesin  milliGRAM(s) Oral every 12 hours  lactobacillus acidophilus 1 Tablet(s) Oral daily  latanoprost 0.005% Ophthalmic Solution 1 Drop(s) Both EYES at bedtime  levothyroxine 100 MICROGram(s) Oral daily  lisinopril 40 milliGRAM(s) Oral daily  metoprolol succinate ER 50 milliGRAM(s) Oral two times a day  multivitamin/minerals 1 Tablet(s) Oral daily  pantoprazole    Tablet 40 milliGRAM(s) Oral before breakfast  pyridoxine 100 milliGRAM(s) Oral daily  sucralfate 1 Gram(s) Oral at bedtime    MEDICATIONS  (PRN):         Vitals log        ICU Vital Signs Last 24 Hrs  T(C): 36.7 (07 Feb 2018 04:40), Max: 37.1 (06 Feb 2018 19:27)  T(F): 98 (07 Feb 2018 04:40), Max: 98.7 (06 Feb 2018 19:27)  HR: 65 (07 Feb 2018 04:40) (65 - 72)  BP: 110/66 (07 Feb 2018 04:40) (110/66 - 135/73)  BP(mean): --  ABP: --  ABP(mean): --  RR: 17 (07 Feb 2018 04:40) (17 - 20)  SpO2: 90% (07 Feb 2018 04:40) (90% - 96%)           Input and Output:  I&O's Detail    06 Feb 2018 07:01  -  07 Feb 2018 05:47  --------------------------------------------------------  IN:    IV PiggyBack: 300 mL  Total IN: 300 mL    OUT:  Total OUT: 0 mL    Total NET: 300 mL          Lab Data                        10.7   6.3   )-----------( 266      ( 06 Feb 2018 08:10 )             32.2     02-06    140  |  109<H>  |  16  ----------------------------<  80  3.9   |  21<L>  |  0.78    Ca    8.5      06 Feb 2018 08:10    TPro  8.3  /  Alb  3.8  /  TBili  0.3  /  DBili  x   /  AST  42<H>  /  ALT  24  /  AlkPhos  53  02-05    ABG - ( 05 Feb 2018 22:58 )  pH: 7.40  /  pCO2: 33    /  pO2: 66    / HCO3: 21    / Base Excess: -4.4  /  SaO2: 92                      Review of Systems	      Objective     Physical Examination    head at  heart s1s2  lungs dec BS  abd soft      Pertinent Lab findings & Imaging      Rangel:  NO   Adequate UO     I&O's Detail    06 Feb 2018 07:01  -  07 Feb 2018 05:47  --------------------------------------------------------  IN:    IV PiggyBack: 300 mL  Total IN: 300 mL    OUT:  Total OUT: 0 mL    Total NET: 300 mL               Discussed with:     Cultures:	        Radiology
Date/Time Patient Seen:  		  Referring MD:   Data Reviewed	       Patient is a 72y old  Female who presents with a chief complaint of shortness of breath (06 Feb 2018 15:55)  in bed  seen and examined  vs and meds reviewed        Subjective/HPI     PAST MEDICAL & SURGICAL HISTORY:  Generalized intra-abdominal and pelvic swelling, mass and lump  Glaucoma  Aortic valve prosthesis present  CAD (coronary artery disease)  Arthritis  Heart murmur  Other iron deficiency anemia  Carpal tunnel syndrome of right wrist  PVD (peripheral vascular disease)  Hyperlipemia  Colitis  Hypothyroid  Hypertension  COPD (chronic obstructive pulmonary disease)  H/O foot surgery: (Right foot, 2010)  History of colonoscopy  S/P carpal tunnel release: (Right, 1/2017)  H/O carotid endarterectomy: Both sides 2002  Aortic valve replaced: 2011 with bovine  Carotid disease, bilateral  Cataract extraction status of left eye  Cataract extraction status of right eye        Medication list         MEDICATIONS  (STANDING):  ALBUTerol    0.083% 2.5 milliGRAM(s) Nebulizer every 8 hours  amLODIPine   Tablet 5 milliGRAM(s) Oral two times a day  aspirin  chewable 162 milliGRAM(s) Oral daily  atorvastatin 40 milliGRAM(s) Oral at bedtime  azithromycin  IVPB 500 milliGRAM(s) IV Intermittent every 24 hours  buDESOnide  80 MICROgram(s)/formoterol 4.5 MICROgram(s) Inhaler 2 Puff(s) Inhalation two times a day  cefTRIAXone   IVPB 1 Gram(s) IV Intermittent every 24 hours  celecoxib 200 milliGRAM(s) Oral after dinner  cholecalciferol 1000 Unit(s) Oral daily  dorzolamide 2%/timolol 0.5% Ophthalmic Solution 1 Drop(s) Both EYES two times a day  enoxaparin Injectable 40 milliGRAM(s) SubCutaneous every 24 hours  gabapentin 300 milliGRAM(s) Oral at bedtime  guaiFENesin  milliGRAM(s) Oral every 12 hours  lactobacillus acidophilus 1 Tablet(s) Oral daily  latanoprost 0.005% Ophthalmic Solution 1 Drop(s) Both EYES at bedtime  levothyroxine 100 MICROGram(s) Oral daily  lisinopril 40 milliGRAM(s) Oral daily  metoprolol succinate ER 50 milliGRAM(s) Oral two times a day  multivitamin/minerals 1 Tablet(s) Oral daily  pantoprazole    Tablet 40 milliGRAM(s) Oral before breakfast  pyridoxine 100 milliGRAM(s) Oral daily  sucralfate 1 Gram(s) Oral at bedtime    MEDICATIONS  (PRN):  guaiFENesin    Syrup 100 milliGRAM(s) Oral every 6 hours PRN Cough  sodium chloride 0.65% Nasal 1 Spray(s) Both Nostrils two times a day PRN Nasal Congestion         Vitals log        ICU Vital Signs Last 24 Hrs  T(C): 36.6 (08 Feb 2018 04:53), Max: 36.8 (07 Feb 2018 20:36)  T(F): 97.9 (08 Feb 2018 04:53), Max: 98.2 (07 Feb 2018 20:36)  HR: 65 (08 Feb 2018 04:53) (65 - 93)  BP: 127/77 (08 Feb 2018 04:53) (106/63 - 148/69)  BP(mean): --  ABP: --  ABP(mean): --  RR: 16 (08 Feb 2018 04:53) (16 - 18)  SpO2: 94% (08 Feb 2018 04:53) (90% - 94%)           Input and Output:  I&O's Detail    06 Feb 2018 07:01  -  07 Feb 2018 07:00  --------------------------------------------------------  IN:    IV PiggyBack: 300 mL  Total IN: 300 mL    OUT:  Total OUT: 0 mL    Total NET: 300 mL          Lab Data                        10.7   6.3   )-----------( 266      ( 06 Feb 2018 08:10 )             32.2     02-06    140  |  109<H>  |  16  ----------------------------<  80  3.9   |  21<L>  |  0.78    Ca    8.5      06 Feb 2018 08:10              Review of Systems	      Objective     Physical Examination    head at  heart s1s2  lungs dec BS  abd soft      Pertinent Lab findings & Imaging      Rangel:  NO   Adequate UO     I&O's Detail    06 Feb 2018 07:01  -  07 Feb 2018 07:00  --------------------------------------------------------  IN:    IV PiggyBack: 300 mL  Total IN: 300 mL    OUT:  Total OUT: 0 mL    Total NET: 300 mL               Discussed with:     Cultures:	        Radiology
Patient is a 72y old  Female who presents with a chief complaint of SOB (05 Feb 2018 17:26)       INTERVAL HPI /OVERNIGHT EVENTS: Sob has been improving, No chest pain, palpitation or any other new complaints     MEDICATIONS  (STANDING):  ALBUTerol    0.083% 2.5 milliGRAM(s) Nebulizer every 8 hours  amLODIPine   Tablet 5 milliGRAM(s) Oral two times a day  aspirin  chewable 162 milliGRAM(s) Oral daily  atorvastatin 40 milliGRAM(s) Oral at bedtime  azithromycin  IVPB 500 milliGRAM(s) IV Intermittent every 24 hours  buDESOnide  80 MICROgram(s)/formoterol 4.5 MICROgram(s) Inhaler 2 Puff(s) Inhalation two times a day  cefTRIAXone   IVPB 1 Gram(s) IV Intermittent every 24 hours  celecoxib 200 milliGRAM(s) Oral after dinner  cholecalciferol 1000 Unit(s) Oral daily  dorzolamide 2%/timolol 0.5% Ophthalmic Solution 1 Drop(s) Both EYES two times a day  enoxaparin Injectable 40 milliGRAM(s) SubCutaneous every 24 hours  gabapentin 300 milliGRAM(s) Oral at bedtime  guaiFENesin  milliGRAM(s) Oral every 12 hours  lactobacillus acidophilus 1 Tablet(s) Oral daily  latanoprost 0.005% Ophthalmic Solution 1 Drop(s) Both EYES at bedtime  levothyroxine 100 MICROGram(s) Oral daily  lisinopril 40 milliGRAM(s) Oral daily  metoprolol succinate ER 50 milliGRAM(s) Oral two times a day  multivitamin/minerals 1 Tablet(s) Oral daily  pantoprazole    Tablet 40 milliGRAM(s) Oral before breakfast  pyridoxine 100 milliGRAM(s) Oral daily  sucralfate 1 Gram(s) Oral at bedtime    MEDICATIONS  (PRN):      Allergies    No Known Allergies    Intolerances        REVIEW OF SYSTEMS:  CONSTITUTIONAL: No fever,  or fatigue  EYES: No eye pain, visual disturbances, or discharge  ENMT:  No difficulty hearing, tinnitus, vertigo; No sinus or throat pain  NECK: No pain or stiffness  RESPIRATORY: No cough, wheezing, chills or hemoptysis; + shortness of breath  CARDIOVASCULAR: No chest pain, palpitations, dizziness, or leg swelling  GASTROINTESTINAL: No abdominal or epigastric pain. No nausea, vomiting, or hematemesis; No diarrhea or constipation. No melena or hematochezia.  GENITOURINARY: No dysuria, frequency, hematuria, or incontinence  NEUROLOGICAL: No headaches, loss of strength, numbness, or tremors  SKIN: No itching, burning, rashes, or lesions   LYMPH NODES: No enlarged glands  ENDOCRINE: No heat or cold intolerance; No hair loss; No polydipsia or polyuria  MUSCULOSKELETAL: No joint pain or swelling; No muscle, back, or extremity pain  HEME/LYMPH: No easy bruising, or bleeding gums  ALLERGY AND IMMUNOLOGIC: No hives or eczema    Vital Signs Last 24 Hrs  T(C): 36.7 (06 Feb 2018 05:58), Max: 36.9 (05 Feb 2018 14:40)  T(F): 98 (06 Feb 2018 05:58), Max: 98.5 (05 Feb 2018 14:40)  HR: 70 (06 Feb 2018 05:58) (67 - 80)  BP: 135/73 (06 Feb 2018 05:58) (132/56 - 146/65)  BP(mean): --  RR: 18 (06 Feb 2018 05:58) (16 - 18)  SpO2: 91% (06 Feb 2018 05:58) (90% - 94%)    PHYSICAL EXAM:  GENERAL: NAD, well-groomed, well-developed  HEAD:  Atraumatic, Normocephalic  EYES: EOMI, PERRLA, conjunctiva and sclera clear  ENMT: No tonsillar erythema, exudates, or enlargement; Moist mucous membranes.  NECK: Supple, No JVD, Normal thyroid  NERVOUS SYSTEM:  Alert & Oriented X3, Good concentration; Non Focal   CHEST/LUNG: Decreased breath sounds bilat, + crackles   HEART: Regular rate and rhythm; No murmurs, rubs, or gallops  ABDOMEN: Soft, Nontender, Nondistended; Bowel sounds present  EXTREMITIES:  2+ Peripheral Pulses, No clubbing, cyanosis, or edema  LYMPH: No lymphadenopathy noted  SKIN: No rashes or lesions    LABS:                        10.7   6.3   )-----------( 266      ( 06 Feb 2018 08:10 )             32.2     06 Feb 2018 08:10    140    |  109    |  16     ----------------------------<  80     3.9     |  21     |  0.78     Ca    8.5        06 Feb 2018 08:10    TPro  8.3    /  Alb  3.8    /  TBili  0.3    /  DBili  x      /  AST  42     /  ALT  24     /  AlkPhos  53     05 Feb 2018 15:45    PT/INR - ( 05 Feb 2018 15:45 )   PT: 12.1 sec;   INR: 1.11 ratio         PTT - ( 05 Feb 2018 15:45 )  PTT:34.2 sec  CAPILLARY BLOOD GLUCOSE        BLOOD CULTURE    RADIOLOGY & ADDITIONAL TESTS:    Imaging Personally Reviewed:  [ ] YES     Consultant(s) Notes Reviewed:      Care Discussed with Consultants/Other Providers:
Patient is a 72y old  Female who presents with a chief complaint of shortness of breath (06 Feb 2018 15:55)      HPI:  73yo F w/PMHx Arthritis, Glaucoma, Aortic valve prosthesis(2011), CAD, HTN, HLD, PVD, Hypothyroid presents with worsening SOB, congestion and cough which began 1 week ago.  Pt saw her PCP 3 days ago and was prescribed Augmentin.  Pt states her condition has continued to worsen over the past 3 days.  Notes associated intermittent headaches, nausea and body aches.  Denies fever or chills.  No sick contacts.      admitted with multibolar pna with rvp positive - pt seen and examine today pt state still dry cough , no fever , no resp distress , tolerating po  , oob .     INTERVAL HPI/OVERNIGHT EVENTS:  T(C): 36.6 (02-07-18 @ 14:10), Max: 37.1 (02-06-18 @ 19:27)  HR: 67 (02-07-18 @ 14:10) (65 - 72)  BP: 106/63 (02-07-18 @ 14:10) (106/63 - 128/64)  RR: 17 (02-07-18 @ 14:10) (17 - 20)  SpO2: 92% (02-07-18 @ 14:10) (90% - 96%)  Wt(kg): --  I&O's Summary    06 Feb 2018 07:01  -  07 Feb 2018 07:00  --------------------------------------------------------  IN: 300 mL / OUT: 0 mL / NET: 300 mL      REVIEW OF SYSTEMS:  CONSTITUTIONAL: No fever,  or fatigue  EYES: No eye pain, visual disturbances,   ENMT: ; No sinus or throat pain  NECK: No pain or stiffness  RESPIRATORY:  yes  cough,  mild wheezing,  + shortness of breath  CARDIOVASCULAR: No chest pain, palpitations, dizziness, or leg swelling  GASTROINTESTINAL: No abdominal or epigastric pain. No nausea, vomiting,; No diarrhea or constipation. No melena  GENITOURINARY: No dysuria, frequency, hematuria,   NEUROLOGICAL: No headaches, loss of strength, numbness, or tremors  SKIN: No itching, burning, rashes, or lesions     MUSCULOSKELETAL: No joint pain or swelling; No muscle, back, or extremity pain        PHYSICAL EXAM:  GENERAL: NAD, well-groomed, well-developed  HEAD:  Atraumatic, Normocephalic  EYES: EOMI, PERRLA, conjunctiva and sclera clear  ENMT: Moist mucous membranes.  NECK: Supple, No JVD,   NERVOUS SYSTEM:  Alert & Oriented X3, Good concentration; Non Focal   CHEST/LUNG: Decreased breath sounds bilat, + coarse   HEART: Regular rate and rhythm; No murmurs, rubs, or gallops  ABDOMEN: Soft, Nontender, Nondistended; Bowel sounds present  EXTREMITIES:  2+ Peripheral Pulses, No clubbing, cyanosis, or edema    SKIN: No rashes or lesions      MEDICATIONS  (STANDING):  ALBUTerol    0.083% 2.5 milliGRAM(s) Nebulizer every 8 hours  amLODIPine   Tablet 5 milliGRAM(s) Oral two times a day  aspirin  chewable 162 milliGRAM(s) Oral daily  atorvastatin 40 milliGRAM(s) Oral at bedtime  azithromycin  IVPB 500 milliGRAM(s) IV Intermittent every 24 hours  buDESOnide  80 MICROgram(s)/formoterol 4.5 MICROgram(s) Inhaler 2 Puff(s) Inhalation two times a day  cefTRIAXone   IVPB 1 Gram(s) IV Intermittent every 24 hours  celecoxib 200 milliGRAM(s) Oral after dinner  cholecalciferol 1000 Unit(s) Oral daily  dorzolamide 2%/timolol 0.5% Ophthalmic Solution 1 Drop(s) Both EYES two times a day  enoxaparin Injectable 40 milliGRAM(s) SubCutaneous every 24 hours  gabapentin 300 milliGRAM(s) Oral at bedtime  guaiFENesin  milliGRAM(s) Oral every 12 hours  lactobacillus acidophilus 1 Tablet(s) Oral daily  latanoprost 0.005% Ophthalmic Solution 1 Drop(s) Both EYES at bedtime  levothyroxine 100 MICROGram(s) Oral daily  lisinopril 40 milliGRAM(s) Oral daily  metoprolol succinate ER 50 milliGRAM(s) Oral two times a day  multivitamin/minerals 1 Tablet(s) Oral daily  pantoprazole    Tablet 40 milliGRAM(s) Oral before breakfast  pyridoxine 100 milliGRAM(s) Oral daily  sucralfate 1 Gram(s) Oral at bedtime    MEDICATIONS  (PRN):  guaiFENesin    Syrup 100 milliGRAM(s) Oral every 6 hours PRN Cough  sodium chloride 0.65% Nasal 1 Spray(s) Both Nostrils two times a day PRN Nasal Congestion      LABS:                        10.7   6.3   )-----------( 266      ( 06 Feb 2018 08:10 )             32.2     02-06    140  |  109<H>  |  16  ----------------------------<  80  3.9   |  21<L>  |  0.78    Ca    8.5      06 Feb 2018 08:10    TPro  8.3  /  Alb  3.8  /  TBili  0.3  /  DBili  x   /  AST  42<H>  /  ALT  24  /  AlkPhos  53  02-05    PT/INR - ( 05 Feb 2018 15:45 )   PT: 12.1 sec;   INR: 1.11 ratio         PTT - ( 05 Feb 2018 15:45 )  PTT:34.2 sec    CAPILLARY BLOOD GLUCOSE        ABG - ( 05 Feb 2018 22:58 )  pH: 7.40  /  pCO2: 33    /  pO2: 66    / HCO3: 21    / Base Excess: -4.4  /  SaO2: 92                02-05 @ 21:48   No growth to date.  --  --          RADIOLOGY & ADDITIONAL TESTS:    Imaging Personally Reviewed:     no new test , echo -Left Ventricle: Not all standard views obtained. Grossly, normal left   ventricular systolic function.  Aortic Valve: Bioprosthetic aortic valve replacement with no significant   aortic insufficiency visualized. Peak transaortic gradient equals 41   mmHg, and mean transaortic gradient equals 24 mmHg, which is probably   mildly elevated in the setting of a prosthetic valve.  Mitral Valve: Mitral annular calcification and calcified mitral valve   leaflets. Mild mitral insufficiency.  Left Atrium: Mild enlarged  Right Ventricle: Normal right ventricular size and systolic function.  Right Atrium: Normal  Pericardium/Pleura: Normal pericardium with no pericardial effusion.      Advance Directives:  full code

## 2018-02-10 LAB
CULTURE RESULTS: SIGNIFICANT CHANGE UP
CULTURE RESULTS: SIGNIFICANT CHANGE UP
SPECIMEN SOURCE: SIGNIFICANT CHANGE UP
SPECIMEN SOURCE: SIGNIFICANT CHANGE UP

## 2018-02-15 ENCOUNTER — APPOINTMENT (OUTPATIENT)
Dept: THORACIC SURGERY | Facility: CLINIC | Age: 73
End: 2018-02-15
Payer: MEDICARE

## 2018-02-15 VITALS
HEIGHT: 61 IN | SYSTOLIC BLOOD PRESSURE: 116 MMHG | OXYGEN SATURATION: 87 % | BODY MASS INDEX: 27.94 KG/M2 | WEIGHT: 148 LBS | HEART RATE: 76 BPM | DIASTOLIC BLOOD PRESSURE: 60 MMHG

## 2018-02-15 DIAGNOSIS — R59.0 LOCALIZED ENLARGED LYMPH NODES: ICD-10-CM

## 2018-02-15 DIAGNOSIS — Z87.891 PERSONAL HISTORY OF NICOTINE DEPENDENCE: ICD-10-CM

## 2018-02-15 PROCEDURE — 99205 OFFICE O/P NEW HI 60 MIN: CPT

## 2018-02-15 RX ORDER — METOPROLOL SUCCINATE 25 MG/1
25 TABLET, EXTENDED RELEASE ORAL
Qty: 90 | Refills: 0 | Status: COMPLETED | COMMUNITY
Start: 2017-10-21

## 2018-02-15 RX ORDER — SIMVASTATIN 40 MG/1
40 TABLET, FILM COATED ORAL
Qty: 90 | Refills: 3 | Status: DISCONTINUED | COMMUNITY
End: 2018-02-15

## 2018-02-15 RX ORDER — HYDROCORTISONE 1 %
12 CREAM (GRAM) TOPICAL
Qty: 1200 | Refills: 0 | Status: COMPLETED | COMMUNITY
Start: 2017-09-26

## 2018-02-15 RX ORDER — PREDNISONE 5 MG/1
5 TABLET ORAL
Qty: 30 | Refills: 0 | Status: ACTIVE | COMMUNITY
Start: 2018-02-14

## 2018-02-15 RX ORDER — AMLODIPINE BESYLATE 2.5 MG/1
2.5 TABLET ORAL
Qty: 90 | Refills: 0 | Status: COMPLETED | COMMUNITY
Start: 2017-11-07

## 2018-02-15 RX ORDER — ATORVASTATIN CALCIUM 10 MG/1
10 TABLET, FILM COATED ORAL
Qty: 90 | Refills: 0 | Status: COMPLETED | COMMUNITY
Start: 2017-08-17

## 2018-02-15 RX ORDER — PSYLLIUM HUSK 0.4 G
CAPSULE ORAL
Refills: 0 | Status: ACTIVE | COMMUNITY

## 2018-02-15 RX ORDER — PANTOPRAZOLE 40 MG/1
40 TABLET, DELAYED RELEASE ORAL
Qty: 30 | Refills: 0 | Status: DISCONTINUED | COMMUNITY
Start: 2016-11-11 | End: 2018-02-15

## 2018-02-15 RX ORDER — LEVOFLOXACIN 500 MG/1
500 TABLET, FILM COATED ORAL
Qty: 6 | Refills: 0 | Status: COMPLETED | COMMUNITY
Start: 2018-02-08

## 2018-02-15 RX ORDER — SIMVASTATIN 5 MG/1
5 TABLET, FILM COATED ORAL
Qty: 90 | Refills: 0 | Status: COMPLETED | COMMUNITY
Start: 2017-12-07

## 2018-02-15 RX ORDER — TIZANIDINE 2 MG/1
2 TABLET ORAL
Qty: 30 | Refills: 0 | Status: ACTIVE | COMMUNITY
Start: 2017-12-02

## 2018-02-15 RX ORDER — MUPIROCIN 20 MG/G
2 OINTMENT TOPICAL
Qty: 22 | Refills: 0 | Status: COMPLETED | COMMUNITY
Start: 2018-02-12

## 2018-02-15 RX ORDER — HYDRALAZINE HYDROCHLORIDE 10 MG/1
10 TABLET ORAL
Qty: 180 | Refills: 0 | Status: COMPLETED | COMMUNITY
Start: 2017-12-07

## 2018-02-15 RX ORDER — MOMETASONE FUROATE 1 MG/G
0.1 CREAM TOPICAL
Qty: 60 | Refills: 0 | Status: COMPLETED | COMMUNITY
Start: 2017-11-06

## 2018-02-15 RX ORDER — BENZONATATE 200 MG/1
200 CAPSULE ORAL
Qty: 30 | Refills: 0 | Status: COMPLETED | COMMUNITY
Start: 2018-02-12

## 2018-02-15 RX ORDER — AMOXICILLIN AND CLAVULANATE POTASSIUM 875; 125 MG/1; MG/1
875-125 TABLET, COATED ORAL
Qty: 14 | Refills: 0 | Status: COMPLETED | COMMUNITY
Start: 2018-02-02

## 2018-02-15 RX ORDER — HYDROCHLOROTHIAZIDE 12.5 MG/1
12.5 CAPSULE ORAL
Qty: 90 | Refills: 0 | Status: DISCONTINUED | COMMUNITY
Start: 2016-03-08 | End: 2018-02-15

## 2018-02-15 RX ORDER — LEVOTHYROXINE SODIUM 0.1 MG/1
100 TABLET ORAL
Refills: 0 | Status: ACTIVE | COMMUNITY

## 2018-02-26 ENCOUNTER — OUTPATIENT (OUTPATIENT)
Dept: OUTPATIENT SERVICES | Facility: HOSPITAL | Age: 73
LOS: 1 days | End: 2018-02-26

## 2018-02-26 VITALS
TEMPERATURE: 97 F | HEIGHT: 61 IN | DIASTOLIC BLOOD PRESSURE: 60 MMHG | RESPIRATION RATE: 14 BRPM | WEIGHT: 141.98 LBS | HEART RATE: 63 BPM | OXYGEN SATURATION: 91 % | SYSTOLIC BLOOD PRESSURE: 130 MMHG

## 2018-02-26 DIAGNOSIS — R06.00 DYSPNEA, UNSPECIFIED: ICD-10-CM

## 2018-02-26 DIAGNOSIS — R59.0 LOCALIZED ENLARGED LYMPH NODES: ICD-10-CM

## 2018-02-26 DIAGNOSIS — Z98.890 OTHER SPECIFIED POSTPROCEDURAL STATES: Chronic | ICD-10-CM

## 2018-02-26 DIAGNOSIS — Z95.2 PRESENCE OF PROSTHETIC HEART VALVE: ICD-10-CM

## 2018-02-26 LAB
ALBUMIN SERPL ELPH-MCNC: 3.9 G/DL — SIGNIFICANT CHANGE UP (ref 3.3–5)
ALP SERPL-CCNC: 59 U/L — SIGNIFICANT CHANGE UP (ref 40–120)
ALT FLD-CCNC: 38 U/L — HIGH (ref 4–33)
APTT BLD: 25.1 SEC — LOW (ref 27.5–37.4)
AST SERPL-CCNC: 58 U/L — HIGH (ref 4–32)
BILIRUB SERPL-MCNC: 0.2 MG/DL — SIGNIFICANT CHANGE UP (ref 0.2–1.2)
BLD GP AB SCN SERPL QL: NEGATIVE — SIGNIFICANT CHANGE UP
BUN SERPL-MCNC: 29 MG/DL — HIGH (ref 7–23)
CALCIUM SERPL-MCNC: 8.8 MG/DL — SIGNIFICANT CHANGE UP (ref 8.4–10.5)
CHLORIDE SERPL-SCNC: 101 MMOL/L — SIGNIFICANT CHANGE UP (ref 98–107)
CO2 SERPL-SCNC: 21 MMOL/L — LOW (ref 22–31)
CREAT SERPL-MCNC: 0.91 MG/DL — SIGNIFICANT CHANGE UP (ref 0.5–1.3)
GLUCOSE SERPL-MCNC: 106 MG/DL — HIGH (ref 70–99)
HCT VFR BLD CALC: 35 % — SIGNIFICANT CHANGE UP (ref 34.5–45)
HGB BLD-MCNC: 11.5 G/DL — SIGNIFICANT CHANGE UP (ref 11.5–15.5)
INR BLD: 0.98 — SIGNIFICANT CHANGE UP (ref 0.88–1.17)
MCHC RBC-ENTMCNC: 30.6 PG — SIGNIFICANT CHANGE UP (ref 27–34)
MCHC RBC-ENTMCNC: 32.9 % — SIGNIFICANT CHANGE UP (ref 32–36)
MCV RBC AUTO: 93.1 FL — SIGNIFICANT CHANGE UP (ref 80–100)
NRBC # FLD: 0 — SIGNIFICANT CHANGE UP
PLATELET # BLD AUTO: 392 K/UL — SIGNIFICANT CHANGE UP (ref 150–400)
PMV BLD: 9.8 FL — SIGNIFICANT CHANGE UP (ref 7–13)
POTASSIUM SERPL-MCNC: 4.8 MMOL/L — SIGNIFICANT CHANGE UP (ref 3.5–5.3)
POTASSIUM SERPL-SCNC: 4.8 MMOL/L — SIGNIFICANT CHANGE UP (ref 3.5–5.3)
PROT SERPL-MCNC: 7.3 G/DL — SIGNIFICANT CHANGE UP (ref 6–8.3)
PROTHROM AB SERPL-ACNC: 11.3 SEC — SIGNIFICANT CHANGE UP (ref 9.8–13.1)
RBC # BLD: 3.76 M/UL — LOW (ref 3.8–5.2)
RBC # FLD: 14.2 % — SIGNIFICANT CHANGE UP (ref 10.3–14.5)
RH IG SCN BLD-IMP: POSITIVE — SIGNIFICANT CHANGE UP
SODIUM SERPL-SCNC: 138 MMOL/L — SIGNIFICANT CHANGE UP (ref 135–145)
WBC # BLD: 15.43 K/UL — HIGH (ref 3.8–10.5)
WBC # FLD AUTO: 15.43 K/UL — HIGH (ref 3.8–10.5)

## 2018-02-26 PROCEDURE — 93010 ELECTROCARDIOGRAM REPORT: CPT

## 2018-02-26 RX ORDER — SUCRALFATE 1 G
1 TABLET ORAL
Qty: 0 | Refills: 0 | COMMUNITY

## 2018-02-26 RX ORDER — AMLODIPINE BESYLATE 2.5 MG/1
1 TABLET ORAL
Qty: 0 | Refills: 0 | COMMUNITY

## 2018-02-26 RX ORDER — METOPROLOL TARTRATE 50 MG
1 TABLET ORAL
Qty: 0 | Refills: 0 | COMMUNITY

## 2018-02-26 RX ORDER — OMEPRAZOLE 10 MG/1
1 CAPSULE, DELAYED RELEASE ORAL
Qty: 0 | Refills: 0 | COMMUNITY

## 2018-02-26 RX ORDER — DORZOLAMIDE HYDROCHLORIDE TIMOLOL MALEATE 20; 5 MG/ML; MG/ML
1 SOLUTION/ DROPS OPHTHALMIC
Qty: 0 | Refills: 0 | COMMUNITY

## 2018-02-26 RX ORDER — GABAPENTIN 400 MG/1
1 CAPSULE ORAL
Qty: 0 | Refills: 0 | COMMUNITY

## 2018-02-26 RX ORDER — MULTIVIT-MIN/FERROUS GLUCONATE 9 MG/15 ML
1 LIQUID (ML) ORAL
Qty: 0 | Refills: 0 | COMMUNITY

## 2018-02-26 RX ORDER — PYRIDOXINE HCL (VITAMIN B6) 100 MG
1 TABLET ORAL
Qty: 0 | Refills: 0 | COMMUNITY

## 2018-02-26 RX ORDER — QUINAPRIL HYDROCHLORIDE 40 MG/1
1 TABLET, FILM COATED ORAL
Qty: 0 | Refills: 0 | COMMUNITY

## 2018-02-26 RX ORDER — ROSUVASTATIN CALCIUM 5 MG/1
1 TABLET ORAL
Qty: 0 | Refills: 0 | COMMUNITY

## 2018-02-26 RX ORDER — SODIUM CHLORIDE 9 MG/ML
1000 INJECTION, SOLUTION INTRAVENOUS
Qty: 0 | Refills: 0 | Status: DISCONTINUED | OUTPATIENT
Start: 2018-03-01 | End: 2018-03-01

## 2018-02-26 RX ORDER — LEVOTHYROXINE SODIUM 125 MCG
1 TABLET ORAL
Qty: 0 | Refills: 0 | COMMUNITY

## 2018-02-26 NOTE — H&P PST ADULT - NS MD HP INPLANTS MED DEV
Heart valve/Bovine aortic valve replacement, hardware to right foot Porcine aortic valve replacement, [employer] Epic supra 19 mm. Model [id number] serial  [id number],  hardware to right foot/Heart valve

## 2018-02-26 NOTE — H&P PST ADULT - PROBLEM SELECTOR PLAN 2
Pt reports she was seen by cardiology for pre-op eval, requested on chart. I spoke with Dr. Becerril, OK for pt to stop her ASA fo 4 days, last dose was yesterday 2/25/18.

## 2018-02-26 NOTE — H&P PST ADULT - RS GEN HX ROS MEA POS PC
Pt on nasal O2 2L/min  for past few weeks.  O2 SATS on 2L O2 ranges from low 88-91 per pt/dyspnea dyspnea/Pt on nasal O2 2L/min at night and prn SOB during daytime,  for past few weeks.  O2 SATS on 2L O2 ranges from low 88-91 per pt

## 2018-02-26 NOTE — H&P PST ADULT - ASSESSMENT
71 y/o female with SOB when walking short distances which started 1 month ago which has been worsening. CXR was done, dx with pneumonia. Admitted to [facility].  Chest CT was done, enlarged lymph nodes discovered.  Now scheduled for Flexible Bronchoscopy, Endobronchial ultrasound, possible mediastinoscopy 3/1/18.

## 2018-02-26 NOTE — H&P PST ADULT - HISTORY OF PRESENT ILLNESS
71 y/o female with SOB when walking short distances which started 1 month ago which has been worsening. CXR was done, dx with pneumonia. Admitted to [facility].  Chest CT was done, enlarged lymph nodes discovered.  Now scheduled for Flexible Bronchoscopy, Endobronchial ultrasound, possible mediastinoscopy 3/1/18.

## 2018-02-26 NOTE — H&P PST ADULT - RESPIRATORY AND THORAX COMMENTS
SOB walking short distances which started 1 month ago which has been worsening. CXR was done, dx with pneumonia. Admitted to [facility].  Chest CT was done, enlarged lymph nodes discovered.  Now scheduled for Flexible Bronchoscopy, Endobronchial ultrasound, possible mediastinoscopy 3/1/18. SOB walking short distances which started 1 month ago which has been worsening. CXR was done, dx with pneumonia. Admitted to [facility].  Chest CT was done, enlarged lymph nodes discovered.  Now scheduled for Flexible Bronchoscopy, Endobronchial ultrasound, possible mediastinoscopy 3/1/18.. pt reports she started on Prednisone 2 weeks ago, and completed weaning dose pack.  She was off the medication for a few days, and now on day 3 of weaning course of prednisone

## 2018-02-26 NOTE — H&P PST ADULT - NEGATIVE CARDIOVASCULAR SYMPTOMS
no palpitations/no claudication/no peripheral edema/no chest pain/no paroxysmal nocturnal dyspnea/no orthopnea

## 2018-02-26 NOTE — H&P PST ADULT - PROBLEM SELECTOR PLAN 1
Flexible Bronchoscopy, Endobronchial ultrasound, possible mediastinoscopy 3/1/18.     CBC CMP PT/PTT T&S EKG    Most recent Pulmonary consult/ Note requested on chart    FLAVIO precautions, OR booking informed

## 2018-02-26 NOTE — H&P PST ADULT - PMH
Aortic valve prosthesis present    Arthritis    CAD (coronary artery disease)    Emphysema    Generalized intra-abdominal and pelvic swelling, mass and lump    Glaucoma    Heart murmur    Hyperlipemia    Hypertension    Hypothyroid    Other iron deficiency anemia    Pneumonia  2/2018  PVD (peripheral vascular disease)

## 2018-02-26 NOTE — H&P PST ADULT - CARDIOVASCULAR SYMPTOMS
dyspnea on exertion/when ambulting short distances dyspnea on exertion/when ambulating short distances

## 2018-03-01 ENCOUNTER — TRANSCRIPTION ENCOUNTER (OUTPATIENT)
Age: 73
End: 2018-03-01

## 2018-03-01 ENCOUNTER — RESULT REVIEW (OUTPATIENT)
Age: 73
End: 2018-03-01

## 2018-03-01 ENCOUNTER — APPOINTMENT (OUTPATIENT)
Dept: THORACIC SURGERY | Facility: HOSPITAL | Age: 73
End: 2018-03-01
Payer: MEDICARE

## 2018-03-01 ENCOUNTER — INPATIENT (INPATIENT)
Facility: HOSPITAL | Age: 73
LOS: 0 days | Discharge: HOME | End: 2018-03-02
Attending: THORACIC SURGERY (CARDIOTHORACIC VASCULAR SURGERY) | Admitting: THORACIC SURGERY (CARDIOTHORACIC VASCULAR SURGERY)
Payer: MEDICARE

## 2018-03-01 VITALS
RESPIRATION RATE: 18 BRPM | SYSTOLIC BLOOD PRESSURE: 132 MMHG | DIASTOLIC BLOOD PRESSURE: 46 MMHG | TEMPERATURE: 98 F | WEIGHT: 141.98 LBS | HEART RATE: 71 BPM | OXYGEN SATURATION: 90 % | HEIGHT: 61 IN

## 2018-03-01 DIAGNOSIS — Z98.890 OTHER SPECIFIED POSTPROCEDURAL STATES: Chronic | ICD-10-CM

## 2018-03-01 DIAGNOSIS — R59.0 LOCALIZED ENLARGED LYMPH NODES: ICD-10-CM

## 2018-03-01 LAB — RH IG SCN BLD-IMP: POSITIVE — SIGNIFICANT CHANGE UP

## 2018-03-01 PROCEDURE — 71045 X-RAY EXAM CHEST 1 VIEW: CPT | Mod: 26

## 2018-03-01 PROCEDURE — 31652 BRONCH EBUS SAMPLNG 1/2 NODE: CPT

## 2018-03-01 PROCEDURE — 88172 CYTP DX EVAL FNA 1ST EA SITE: CPT | Mod: 26

## 2018-03-01 PROCEDURE — 88173 CYTOPATH EVAL FNA REPORT: CPT | Mod: 26

## 2018-03-01 PROCEDURE — 88360 TUMOR IMMUNOHISTOCHEM/MANUAL: CPT | Mod: 26

## 2018-03-01 PROCEDURE — 88341 IMHCHEM/IMCYTCHM EA ADD ANTB: CPT | Mod: 26,59

## 2018-03-01 PROCEDURE — 88342 IMHCHEM/IMCYTCHM 1ST ANTB: CPT | Mod: 26,59

## 2018-03-01 PROCEDURE — 88305 TISSUE EXAM BY PATHOLOGIST: CPT | Mod: 26

## 2018-03-01 PROCEDURE — 88112 CYTOPATH CELL ENHANCE TECH: CPT | Mod: 26,59

## 2018-03-01 RX ORDER — BUDESONIDE, MICRONIZED 100 %
0.5 POWDER (GRAM) MISCELLANEOUS
Qty: 0 | Refills: 0 | Status: DISCONTINUED | OUTPATIENT
Start: 2018-03-01 | End: 2018-03-02

## 2018-03-01 RX ORDER — BUDESONIDE AND FORMOTEROL FUMARATE DIHYDRATE 160; 4.5 UG/1; UG/1
2 AEROSOL RESPIRATORY (INHALATION)
Qty: 0 | Refills: 0 | COMMUNITY

## 2018-03-01 RX ORDER — LEVOTHYROXINE SODIUM 125 MCG
100 TABLET ORAL DAILY
Qty: 0 | Refills: 0 | Status: DISCONTINUED | OUTPATIENT
Start: 2018-03-01 | End: 2018-03-02

## 2018-03-01 RX ORDER — ACETAMINOPHEN 500 MG
650 TABLET ORAL ONCE
Qty: 0 | Refills: 0 | Status: COMPLETED | OUTPATIENT
Start: 2018-03-01 | End: 2018-03-01

## 2018-03-01 RX ORDER — PANTOPRAZOLE SODIUM 20 MG/1
40 TABLET, DELAYED RELEASE ORAL
Qty: 0 | Refills: 0 | Status: DISCONTINUED | OUTPATIENT
Start: 2018-03-01 | End: 2018-03-02

## 2018-03-01 RX ORDER — DOCUSATE SODIUM 100 MG
100 CAPSULE ORAL THREE TIMES A DAY
Qty: 0 | Refills: 0 | Status: DISCONTINUED | OUTPATIENT
Start: 2018-03-01 | End: 2018-03-02

## 2018-03-01 RX ORDER — ASPIRIN/CALCIUM CARB/MAGNESIUM 324 MG
162 TABLET ORAL DAILY
Qty: 0 | Refills: 0 | Status: DISCONTINUED | OUTPATIENT
Start: 2018-03-02 | End: 2018-03-02

## 2018-03-01 RX ORDER — AMLODIPINE BESYLATE 2.5 MG/1
5 TABLET ORAL
Qty: 0 | Refills: 0 | Status: DISCONTINUED | OUTPATIENT
Start: 2018-03-01 | End: 2018-03-02

## 2018-03-01 RX ORDER — LISINOPRIL 2.5 MG/1
40 TABLET ORAL DAILY
Qty: 0 | Refills: 0 | Status: DISCONTINUED | OUTPATIENT
Start: 2018-03-01 | End: 2018-03-02

## 2018-03-01 RX ORDER — ALPRAZOLAM 0.25 MG
0.5 TABLET ORAL THREE TIMES A DAY
Qty: 0 | Refills: 0 | Status: DISCONTINUED | OUTPATIENT
Start: 2018-03-01 | End: 2018-03-02

## 2018-03-01 RX ORDER — ATORVASTATIN CALCIUM 80 MG/1
40 TABLET, FILM COATED ORAL AT BEDTIME
Qty: 0 | Refills: 0 | Status: DISCONTINUED | OUTPATIENT
Start: 2018-03-01 | End: 2018-03-02

## 2018-03-01 RX ORDER — METOPROLOL TARTRATE 50 MG
50 TABLET ORAL
Qty: 0 | Refills: 0 | Status: DISCONTINUED | OUTPATIENT
Start: 2018-03-01 | End: 2018-03-02

## 2018-03-01 RX ORDER — GABAPENTIN 400 MG/1
300 CAPSULE ORAL AT BEDTIME
Qty: 0 | Refills: 0 | Status: DISCONTINUED | OUTPATIENT
Start: 2018-03-01 | End: 2018-03-02

## 2018-03-01 RX ORDER — DORZOLAMIDE HYDROCHLORIDE TIMOLOL MALEATE 20; 5 MG/ML; MG/ML
1 SOLUTION/ DROPS OPHTHALMIC
Qty: 0 | Refills: 0 | Status: DISCONTINUED | OUTPATIENT
Start: 2018-03-01 | End: 2018-03-02

## 2018-03-01 RX ORDER — ALBUTEROL 90 UG/1
2.5 AEROSOL, METERED ORAL
Qty: 0 | Refills: 0 | Status: DISCONTINUED | OUTPATIENT
Start: 2018-03-01 | End: 2018-03-02

## 2018-03-01 RX ORDER — LATANOPROST 0.05 MG/ML
1 SOLUTION/ DROPS OPHTHALMIC; TOPICAL AT BEDTIME
Qty: 0 | Refills: 0 | Status: DISCONTINUED | OUTPATIENT
Start: 2018-03-01 | End: 2018-03-02

## 2018-03-01 RX ORDER — CIPROFLOXACIN LACTATE 400MG/40ML
1 VIAL (ML) INTRAVENOUS
Qty: 0 | Refills: 0 | COMMUNITY

## 2018-03-01 RX ORDER — HEPARIN SODIUM 5000 [USP'U]/ML
5000 INJECTION INTRAVENOUS; SUBCUTANEOUS EVERY 12 HOURS
Qty: 0 | Refills: 0 | Status: DISCONTINUED | OUTPATIENT
Start: 2018-03-01 | End: 2018-03-02

## 2018-03-01 RX ADMIN — GABAPENTIN 300 MILLIGRAM(S): 400 CAPSULE ORAL at 21:50

## 2018-03-01 RX ADMIN — Medication 5 MILLIGRAM(S): at 21:50

## 2018-03-01 RX ADMIN — DORZOLAMIDE HYDROCHLORIDE TIMOLOL MALEATE 1 DROP(S): 20; 5 SOLUTION/ DROPS OPHTHALMIC at 20:58

## 2018-03-01 RX ADMIN — Medication 650 MILLIGRAM(S): at 18:00

## 2018-03-01 RX ADMIN — ATORVASTATIN CALCIUM 40 MILLIGRAM(S): 80 TABLET, FILM COATED ORAL at 21:50

## 2018-03-01 RX ADMIN — LATANOPROST 1 DROP(S): 0.05 SOLUTION/ DROPS OPHTHALMIC; TOPICAL at 21:38

## 2018-03-01 RX ADMIN — SODIUM CHLORIDE 30 MILLILITER(S): 9 INJECTION, SOLUTION INTRAVENOUS at 19:51

## 2018-03-01 RX ADMIN — SODIUM CHLORIDE 30 MILLILITER(S): 9 INJECTION, SOLUTION INTRAVENOUS at 17:35

## 2018-03-01 RX ADMIN — Medication 650 MILLIGRAM(S): at 18:30

## 2018-03-01 RX ADMIN — HEPARIN SODIUM 5000 UNIT(S): 5000 INJECTION INTRAVENOUS; SUBCUTANEOUS at 18:05

## 2018-03-01 RX ADMIN — Medication 35 MILLIGRAM(S): at 23:23

## 2018-03-01 RX ADMIN — DORZOLAMIDE HYDROCHLORIDE TIMOLOL MALEATE 1 DROP(S): 20; 5 SOLUTION/ DROPS OPHTHALMIC at 21:16

## 2018-03-01 NOTE — BRIEF OPERATIVE NOTE - PROCEDURE
<<-----Click on this checkbox to enter Procedure EBUS during bronchoscopy  03/01/2018  EBUS with TBNA and  endobronchial biopsy  Active  CSUMMERS

## 2018-03-01 NOTE — ASU PREOP CHECKLIST - COMMENTS
took took quinapril, metoprolol, norvasc, and levothyroxine this am with a sip of water took quinapril, metoprolol, nor vasc, and levothyroxine this am with a sip of water

## 2018-03-02 VITALS — OXYGEN SATURATION: 94 %

## 2018-03-02 PROCEDURE — 99238 HOSP IP/OBS DSCHRG MGMT 30/<: CPT

## 2018-03-02 RX ORDER — OMEGA-3 ACID ETHYL ESTERS 1 G
1 CAPSULE ORAL
Qty: 0 | Refills: 0 | COMMUNITY

## 2018-03-02 RX ORDER — DOCUSATE SODIUM 100 MG
1 CAPSULE ORAL
Qty: 0 | Refills: 0 | COMMUNITY
Start: 2018-03-02

## 2018-03-02 RX ORDER — CELECOXIB 200 MG/1
1 CAPSULE ORAL
Qty: 0 | Refills: 0 | COMMUNITY

## 2018-03-02 RX ORDER — CALCIUM CARBONATE 500(1250)
2 TABLET ORAL ONCE
Qty: 0 | Refills: 0 | Status: COMPLETED | OUTPATIENT
Start: 2018-03-02 | End: 2018-03-02

## 2018-03-02 RX ORDER — LACTOBACILLUS ACIDOPHILUS 100MM CELL
1 CAPSULE ORAL
Qty: 0 | Refills: 0 | COMMUNITY

## 2018-03-02 RX ADMIN — Medication 162 MILLIGRAM(S): at 11:24

## 2018-03-02 RX ADMIN — Medication 0.5 MILLIGRAM(S): at 10:13

## 2018-03-02 RX ADMIN — LISINOPRIL 40 MILLIGRAM(S): 2.5 TABLET ORAL at 06:26

## 2018-03-02 RX ADMIN — AMLODIPINE BESYLATE 5 MILLIGRAM(S): 2.5 TABLET ORAL at 05:34

## 2018-03-02 RX ADMIN — Medication 50 MILLIGRAM(S): at 05:34

## 2018-03-02 RX ADMIN — HEPARIN SODIUM 5000 UNIT(S): 5000 INJECTION INTRAVENOUS; SUBCUTANEOUS at 05:34

## 2018-03-02 RX ADMIN — Medication 20 MILLIGRAM(S): at 05:34

## 2018-03-02 RX ADMIN — Medication 2 TABLET(S): at 11:23

## 2018-03-02 RX ADMIN — Medication 100 MICROGRAM(S): at 05:35

## 2018-03-02 RX ADMIN — PANTOPRAZOLE SODIUM 40 MILLIGRAM(S): 20 TABLET, DELAYED RELEASE ORAL at 05:35

## 2018-03-02 RX ADMIN — DORZOLAMIDE HYDROCHLORIDE TIMOLOL MALEATE 1 DROP(S): 20; 5 SOLUTION/ DROPS OPHTHALMIC at 08:54

## 2018-03-02 NOTE — DISCHARGE NOTE ADULT - HOSPITAL COURSE
71 y/o female c/o worsening SOB when walking short distances for 1 month. CXR showed PNA & she was admitted to [facility].  Chest CT showed enlarged lymph nodes.   Therefore on 3/1, she underwent a Flexible Bronchoscopy, EBBUS, TBNA.  She did well and was discharged the next day. 73 y/o female c/o worsening SOB when walking short distances for 1 month. CXR showed PNA & she was admitted to [facility].  Chest CT showed enlarged lymph nodes.   Therefore on 3/1, she underwent a Flexible Bronchoscopy, EBBUS, TBNA.  She did well and was discharged the next day. Kept for observation overnight. Today pt feels well. C.o productive cough with back pain at times, present prior to admission. Using Oxygen as she does at home. O2 sats remain stable. Lungs decreased throughout.   Vital Signs Last 24 Hrs  T(C): 36.8 (02 Mar 2018 08:50), Max: 36.9 (01 Mar 2018 19:49)  T(F): 98.2 (02 Mar 2018 08:50), Max: 98.4 (01 Mar 2018 19:49)  HR: 76 (02 Mar 2018 10:18) (61 - 82)  BP: 126/37 (02 Mar 2018 08:50) (106/40 - 157/60)  BP(mean): --  RR: 22 (02 Mar 2018 08:50) (18 - 23)  SpO2: 94% (02 Mar 2018 10:18) (88% - 94%)  Cleared for discharge for Dr. Galo. Will follow up in office in 7-10 days.

## 2018-03-02 NOTE — DISCHARGE NOTE ADULT - NS AS ACTIVITY OBS
Walking-Outdoors allowed/Stairs allowed/Walking-Indoors allowed/Do not make important decisions/Do not drive or operate machinery/Return to Work/School allowed/Showering allowed/Driving allowed/No Heavy lifting/straining

## 2018-03-02 NOTE — DISCHARGE NOTE ADULT - CARE PLAN
Principal Discharge DX:	Shortness of breath  Goal:	s/p EBUS. Goal is improved breathing and final pathology.  Assessment and plan of treatment:	Walk 4-5 x per day. Increase as tolerated. You may climb stairs. Continue to use incentive spirometer. Cont to use breathing treatments and oxygen as ordered. You may shower daily.   See Dr. Galo in 7-10 days. Call for an apt. [phone].   See Dr. Marks Pulmonologist next week for follow up

## 2018-03-02 NOTE — DISCHARGE NOTE ADULT - CARE PROVIDER_API CALL
Gigi Galo), Thoracic Surgery  [id number] [place] [address]  [place] [address]  Phone: [phone]  Fax: [phone]    Freddie Pradhan), Internal Medicine  [address] [facility]  [address]  [place] [address]  Phone: [phone]  Fax: [phone]    Weil, Peter A (MD), Critical Care Medicine; Internal Medicine; Pulmonary Disease  [address] [facility]  [address]  [place] [address]  Phone: [phone]  Fax: [phone]

## 2018-03-02 NOTE — PROGRESS NOTE ADULT - SUBJECTIVE AND OBJECTIVE BOX
ANESTHESIA POSTOP CHECK    72y Female POSTOP DAY 1 S/P     Vital Signs Last 24 Hrs  T(C): 36.8 (02 Mar 2018 08:50), Max: 36.9 (01 Mar 2018 19:49)  T(F): 98.2 (02 Mar 2018 08:50), Max: 98.4 (01 Mar 2018 19:49)  HR: 82 (02 Mar 2018 08:50) (61 - 82)  BP: 126/37 (02 Mar 2018 08:50) (106/40 - 157/60)  BP(mean): --  RR: 22 (02 Mar 2018 08:50) (18 - 23)  SpO2: 88% (02 Mar 2018 08:50) (88% - 91%)  I&O's Summary    01 Mar 2018 07:01  -  02 Mar 2018 07:00  --------------------------------------------------------  IN: 135 mL / OUT: 550 mL / NET: -415 mL        [ x] NO APPARENT ANESTHESIA COMPLICATIONS      Comments:

## 2018-03-02 NOTE — DISCHARGE NOTE ADULT - MEDICATION SUMMARY - MEDICATIONS TO TAKE
I will START or STAY ON the medications listed below when I get home from the hospital:    guiafenesin ( 100mg/5ml)  -- orally every 6 hours, As Needed  -- Indication: For cough    budesonide 0.5 mg/2 mL inhalation suspension  -- 2 milliliter(s) inhaled 2 times a day  -- Indication: For inhaler    predniSONE 5 mg oral tablet  -- decreasing doses as per md instructions  -- Indication: For steroid, continue taper as instructed.     Aspirin Low Dose 81 mg oral delayed release tablet  -- 2 tab(s) by mouth once a day in morning  -- Indication: For antiplatelet    CeleBREX 200 mg oral capsule  -- 1 cap(s) by mouth once a day (at bedtime) -   -- Indication: For analgesic    Tylenol 325 mg oral tablet  -- 2 tab(s) by mouth every 6 hours, As Needed for pain  -- Indication: For pain    quinapril 40 mg oral tablet  -- 1 tab(s) by mouth once a day in morning  -- Indication: For blood pressrue    Neurontin 300 mg oral capsule  -- 1 cap(s) by mouth once a day (at bedtime)  -- Indication: For pain    Crestor 10 mg oral tablet  -- 1 tab(s) by mouth once a day (at bedtime)  -- Indication: For cholesterol    benzonatate 200 mg oral capsule  -- 1 cap(s) by mouth 3 times a day, As Needed  -- Indication: For cough    ALPRAZolam 0.5 mg oral tablet  -- 1 tab(s) by mouth 3 times a day, As Needed  -- Indication: For anxiety    Metoprolol Succinate ER 50 mg oral tablet, extended release  -- 1 tab(s) by mouth 2 times a day  -- Indication: For heart rate control    albuterol 0.63 mg/3 mL (0.021%) inhalation solution  -- 3 milliliter(s) inhaled 2 times a day, As Needed  -- Indication: For breathing    Norvasc 5 mg oral tablet  -- 1 tab(s) by mouth 2 times a day  -- Indication: For blood pressure    docusate sodium 100 mg oral capsule  -- 1 cap(s) by mouth 3 times a day  -- Indication: For constipation    sucralfate 1 g oral tablet  -- 1 tab(s) by mouth once a day (at bedtime)  -- Indication: For GI agent    tiZANidine 2 mg oral tablet  -- 2 tab(s) by mouth once a day (at bedtime), As Needed  -- Indication: For muscle relaxant    Fish Oil 1200 mg oral capsule  -- 1 cap(s) by mouth 2 times daily   -- Indication: For vitamin    Cosopt 2.23%-0.68% ophthalmic solution  -- 1 drop(s) to each affected eye 2 times a day  -- Indication: For eye drop    Xalatan 0.005% ophthalmic solution  -- 1 drop(s) to each affected eye once a day (in the evening)  -- Indication: For eye drop    lactobacillus acidophilus oral capsule  -- 1 cap(s) by mouth once a day in morning -  -- Indication: For probiotic    PriLOSEC 20 mg oral delayed release capsule  -- 1 cap(s) by mouth once a day in morning  -- Indication: For stomach protection, take whatever you normally do    esomeprazole 40 mg oral delayed release capsule  -- 1 cap(s) by mouth once a day, As Needed  -- Indication: For stomach protection, take whatever you normally do    levothyroxine 100 mcg (0.1 mg) oral tablet  -- 1 tab(s) by mouth once a day in morning  -- Indication: For thyroid    Multiple Vitamins oral tablet  -- 1 tab(s) by mouth once a day in morning   -- Indication: For vitamin    Vitamin B6 100 mg oral tablet  -- 1 tab(s) by mouth once a day in morning  -- Indication: For vitamin    Vitamin D3 1000 intl units oral tablet  -- 1 tab(s) by mouth once a day in morning  -- Indication: For vitamin

## 2018-03-02 NOTE — DISCHARGE NOTE ADULT - PATIENT PORTAL LINK FT
You can access the [facility] Patient Portal, offered by [facility], by registering with the following website: [email or web address]

## 2018-03-02 NOTE — DISCHARGE NOTE ADULT - PLAN OF CARE
s/p EBUS. Goal is improved breathing and final pathology. Walk 4-5 x per day. Increase as tolerated. You may climb stairs. Continue to use incentive spirometer. Cont to use breathing treatments and oxygen as ordered. You may shower daily.   See Dr. Galo in 7-10 days. Call for an apt. [phone].   See Dr. Marks Pulmonologist next week for follow up

## 2018-03-02 NOTE — PROGRESS NOTE ADULT - SUBJECTIVE AND OBJECTIVE BOX
Pt is without complaints.  She voided and tolerated her PO intake.    VSS    Lungs CTA  Heart S1, S2  Abd Soft    CXR- no PTX    A/P S/p EBUS, TBNA, endobronchial bx    Discharge home 3/2

## 2018-03-02 NOTE — DISCHARGE NOTE ADULT - CARE PROVIDERS DIRECT ADDRESSES
[email or web address]

## 2018-03-05 ENCOUNTER — INPATIENT (INPATIENT)
Facility: HOSPITAL | Age: 73
LOS: 2 days | Discharge: HOME | DRG: 682 | End: 2018-03-08
Attending: INTERNAL MEDICINE | Admitting: HOSPITALIST
Payer: MEDICARE

## 2018-03-05 VITALS
HEART RATE: 78 BPM | HEIGHT: 61 IN | DIASTOLIC BLOOD PRESSURE: 51 MMHG | SYSTOLIC BLOOD PRESSURE: 100 MMHG | TEMPERATURE: 96 F | OXYGEN SATURATION: 92 % | RESPIRATION RATE: 15 BRPM | WEIGHT: 139.99 LBS

## 2018-03-05 DIAGNOSIS — Z98.890 OTHER SPECIFIED POSTPROCEDURAL STATES: Chronic | ICD-10-CM

## 2018-03-05 LAB
ALBUMIN SERPL ELPH-MCNC: 3.1 G/DL — LOW (ref 3.3–5)
ALP SERPL-CCNC: 60 U/L — SIGNIFICANT CHANGE UP (ref 40–120)
ALT FLD-CCNC: 55 U/L — SIGNIFICANT CHANGE UP (ref 12–78)
ANION GAP SERPL CALC-SCNC: 12 MMOL/L — SIGNIFICANT CHANGE UP (ref 5–17)
APPEARANCE UR: CLEAR — SIGNIFICANT CHANGE UP
AST SERPL-CCNC: 79 U/L — HIGH (ref 15–37)
BACTERIA # UR AUTO: ABNORMAL
BASOPHILS # BLD AUTO: 0.1 K/UL — SIGNIFICANT CHANGE UP (ref 0–0.2)
BASOPHILS NFR BLD AUTO: 0.7 % — SIGNIFICANT CHANGE UP (ref 0–2)
BILIRUB SERPL-MCNC: 0.5 MG/DL — SIGNIFICANT CHANGE UP (ref 0.2–1.2)
BILIRUB UR-MCNC: ABNORMAL
BUN SERPL-MCNC: 45 MG/DL — HIGH (ref 7–23)
CALCIUM SERPL-MCNC: 8.7 MG/DL — SIGNIFICANT CHANGE UP (ref 8.5–10.1)
CHLORIDE SERPL-SCNC: 101 MMOL/L — SIGNIFICANT CHANGE UP (ref 96–108)
CO2 SERPL-SCNC: 22 MMOL/L — SIGNIFICANT CHANGE UP (ref 22–31)
COLOR SPEC: YELLOW — SIGNIFICANT CHANGE UP
CREAT SERPL-MCNC: 1.4 MG/DL — HIGH (ref 0.5–1.3)
DIFF PNL FLD: ABNORMAL
EOSINOPHIL # BLD AUTO: 0.1 K/UL — SIGNIFICANT CHANGE UP (ref 0–0.5)
EOSINOPHIL NFR BLD AUTO: 0.3 % — SIGNIFICANT CHANGE UP (ref 0–6)
EPI CELLS # UR: SIGNIFICANT CHANGE UP
GLUCOSE SERPL-MCNC: 145 MG/DL — HIGH (ref 70–99)
GLUCOSE UR QL: NEGATIVE — SIGNIFICANT CHANGE UP
HCT VFR BLD CALC: 36.6 % — SIGNIFICANT CHANGE UP (ref 34.5–45)
HGB BLD-MCNC: 12.6 G/DL — SIGNIFICANT CHANGE UP (ref 11.5–15.5)
INR BLD: 1.08 RATIO — SIGNIFICANT CHANGE UP (ref 0.88–1.16)
KETONES UR-MCNC: ABNORMAL
LACTATE SERPL-SCNC: 3.7 MMOL/L — HIGH (ref 0.7–2)
LEUKOCYTE ESTERASE UR-ACNC: ABNORMAL
LIDOCAIN IGE QN: 286 U/L — SIGNIFICANT CHANGE UP (ref 73–393)
LYMPHOCYTES # BLD AUTO: 0.8 K/UL — LOW (ref 1–3.3)
LYMPHOCYTES # BLD AUTO: 4.5 % — LOW (ref 13–44)
MCHC RBC-ENTMCNC: 31 PG — SIGNIFICANT CHANGE UP (ref 27–34)
MCHC RBC-ENTMCNC: 34.4 GM/DL — SIGNIFICANT CHANGE UP (ref 32–36)
MCV RBC AUTO: 90.2 FL — SIGNIFICANT CHANGE UP (ref 80–100)
MONOCYTES # BLD AUTO: 0.8 K/UL — SIGNIFICANT CHANGE UP (ref 0–0.9)
MONOCYTES NFR BLD AUTO: 4.4 % — SIGNIFICANT CHANGE UP (ref 1–9)
NEUTROPHILS # BLD AUTO: 16.2 K/UL — HIGH (ref 1.8–7.4)
NEUTROPHILS NFR BLD AUTO: 90.2 % — HIGH (ref 43–77)
NITRITE UR-MCNC: NEGATIVE — SIGNIFICANT CHANGE UP
PH UR: 5 — SIGNIFICANT CHANGE UP (ref 5–8)
PLATELET # BLD AUTO: 270 K/UL — SIGNIFICANT CHANGE UP (ref 150–400)
POTASSIUM SERPL-MCNC: 4.4 MMOL/L — SIGNIFICANT CHANGE UP (ref 3.5–5.3)
POTASSIUM SERPL-SCNC: 4.4 MMOL/L — SIGNIFICANT CHANGE UP (ref 3.5–5.3)
PROT SERPL-MCNC: 7.1 G/DL — SIGNIFICANT CHANGE UP (ref 6–8.3)
PROT UR-MCNC: 25 MG/DL
PROTHROM AB SERPL-ACNC: 11.8 SEC — SIGNIFICANT CHANGE UP (ref 9.8–12.7)
RBC # BLD: 4.06 M/UL — SIGNIFICANT CHANGE UP (ref 3.8–5.2)
RBC # FLD: 13.8 % — SIGNIFICANT CHANGE UP (ref 10.3–14.5)
RBC CASTS # UR COMP ASSIST: SIGNIFICANT CHANGE UP /HPF (ref 0–4)
SODIUM SERPL-SCNC: 135 MMOL/L — SIGNIFICANT CHANGE UP (ref 135–145)
SP GR SPEC: 1.01 — SIGNIFICANT CHANGE UP (ref 1.01–1.02)
UROBILINOGEN FLD QL: 4
WBC # BLD: 18 K/UL — HIGH (ref 3.8–10.5)
WBC # FLD AUTO: 18 K/UL — HIGH (ref 3.8–10.5)
WBC UR QL: ABNORMAL

## 2018-03-05 PROCEDURE — 71045 X-RAY EXAM CHEST 1 VIEW: CPT | Mod: 26

## 2018-03-05 RX ORDER — IOHEXOL 300 MG/ML
30 INJECTION, SOLUTION INTRAVENOUS ONCE
Qty: 0 | Refills: 0 | Status: COMPLETED | OUTPATIENT
Start: 2018-03-05 | End: 2018-03-05

## 2018-03-05 RX ORDER — SODIUM CHLORIDE 9 MG/ML
1000 INJECTION INTRAMUSCULAR; INTRAVENOUS; SUBCUTANEOUS
Qty: 0 | Refills: 0 | Status: COMPLETED | OUTPATIENT
Start: 2018-03-05 | End: 2018-03-06

## 2018-03-05 RX ORDER — MORPHINE SULFATE 50 MG/1
2 CAPSULE, EXTENDED RELEASE ORAL ONCE
Qty: 0 | Refills: 0 | Status: DISCONTINUED | OUTPATIENT
Start: 2018-03-05 | End: 2018-03-05

## 2018-03-05 RX ORDER — VANCOMYCIN HCL 1 G
125 VIAL (EA) INTRAVENOUS ONCE
Qty: 0 | Refills: 0 | Status: COMPLETED | OUTPATIENT
Start: 2018-03-05 | End: 2018-03-05

## 2018-03-05 RX ORDER — ONDANSETRON 8 MG/1
4 TABLET, FILM COATED ORAL ONCE
Qty: 0 | Refills: 0 | Status: COMPLETED | OUTPATIENT
Start: 2018-03-05 | End: 2018-03-05

## 2018-03-05 RX ADMIN — Medication 125 MILLIGRAM(S): at 23:29

## 2018-03-05 RX ADMIN — MORPHINE SULFATE 2 MILLIGRAM(S): 50 CAPSULE, EXTENDED RELEASE ORAL at 22:43

## 2018-03-05 RX ADMIN — SODIUM CHLORIDE 1000 MILLILITER(S): 9 INJECTION INTRAMUSCULAR; INTRAVENOUS; SUBCUTANEOUS at 22:42

## 2018-03-05 RX ADMIN — IOHEXOL 30 MILLILITER(S): 300 INJECTION, SOLUTION INTRAVENOUS at 22:44

## 2018-03-05 RX ADMIN — Medication 60 MILLIGRAM(S): at 23:29

## 2018-03-05 RX ADMIN — MORPHINE SULFATE 2 MILLIGRAM(S): 50 CAPSULE, EXTENDED RELEASE ORAL at 23:48

## 2018-03-05 RX ADMIN — ONDANSETRON 4 MILLIGRAM(S): 8 TABLET, FILM COATED ORAL at 22:43

## 2018-03-05 NOTE — ED ADULT NURSE NOTE - OBJECTIVE STATEMENT
Pt received ambulatory, alert and oriented x 3 c/o ab pain and diarrhea. Pt stated 8/10 sharp pain to lower abdomen, c/o diarrhea--5 episodes in 3 hours. Pt c/o nausea, denies vomiting, fever, chills, chest pain. 22 g IV inserted to left wrist. Blood specimen obtained and sent to labs. No acute s/s of distress.

## 2018-03-06 ENCOUNTER — TRANSCRIPTION ENCOUNTER (OUTPATIENT)
Age: 73
End: 2018-03-06

## 2018-03-06 DIAGNOSIS — I25.10 ATHEROSCLEROTIC HEART DISEASE OF NATIVE CORONARY ARTERY WITHOUT ANGINA PECTORIS: ICD-10-CM

## 2018-03-06 DIAGNOSIS — J18.9 PNEUMONIA, UNSPECIFIED ORGANISM: ICD-10-CM

## 2018-03-06 DIAGNOSIS — Z29.9 ENCOUNTER FOR PROPHYLACTIC MEASURES, UNSPECIFIED: ICD-10-CM

## 2018-03-06 DIAGNOSIS — E78.5 HYPERLIPIDEMIA, UNSPECIFIED: ICD-10-CM

## 2018-03-06 DIAGNOSIS — J43.9 EMPHYSEMA, UNSPECIFIED: ICD-10-CM

## 2018-03-06 DIAGNOSIS — I10 ESSENTIAL (PRIMARY) HYPERTENSION: ICD-10-CM

## 2018-03-06 DIAGNOSIS — K76.9 LIVER DISEASE, UNSPECIFIED: ICD-10-CM

## 2018-03-06 DIAGNOSIS — R19.7 DIARRHEA, UNSPECIFIED: ICD-10-CM

## 2018-03-06 DIAGNOSIS — N17.9 ACUTE KIDNEY FAILURE, UNSPECIFIED: ICD-10-CM

## 2018-03-06 DIAGNOSIS — A41.9 SEPSIS, UNSPECIFIED ORGANISM: ICD-10-CM

## 2018-03-06 LAB
ALBUMIN SERPL ELPH-MCNC: 2.6 G/DL — LOW (ref 3.3–5)
ALP SERPL-CCNC: 60 U/L — SIGNIFICANT CHANGE UP (ref 40–120)
ALT FLD-CCNC: 144 U/L — HIGH (ref 12–78)
ANION GAP SERPL CALC-SCNC: 11 MMOL/L — SIGNIFICANT CHANGE UP (ref 5–17)
AST SERPL-CCNC: 158 U/L — HIGH (ref 15–37)
BILIRUB SERPL-MCNC: 0.6 MG/DL — SIGNIFICANT CHANGE UP (ref 0.2–1.2)
BUN SERPL-MCNC: 41 MG/DL — HIGH (ref 7–23)
C DIFF BY PCR RESULT: SIGNIFICANT CHANGE UP
C DIFF TOX GENS STL QL NAA+PROBE: SIGNIFICANT CHANGE UP
CALCIUM SERPL-MCNC: 7.2 MG/DL — LOW (ref 8.5–10.1)
CEA SERPL-MCNC: 12.2 NG/ML — HIGH (ref 0–3.8)
CHLORIDE SERPL-SCNC: 105 MMOL/L — SIGNIFICANT CHANGE UP (ref 96–108)
CO2 SERPL-SCNC: 16 MMOL/L — LOW (ref 22–31)
CREAT SERPL-MCNC: 0.99 MG/DL — SIGNIFICANT CHANGE UP (ref 0.5–1.3)
GLUCOSE SERPL-MCNC: 137 MG/DL — HIGH (ref 70–99)
HAV IGM SER-ACNC: SIGNIFICANT CHANGE UP
HBV CORE IGM SER-ACNC: SIGNIFICANT CHANGE UP
HBV SURFACE AG SER-ACNC: SIGNIFICANT CHANGE UP
HCT VFR BLD CALC: 31.8 % — LOW (ref 34.5–45)
HCV AB S/CO SERPL IA: 0.07 S/CO — SIGNIFICANT CHANGE UP
HCV AB SERPL-IMP: SIGNIFICANT CHANGE UP
HGB BLD-MCNC: 10.8 G/DL — LOW (ref 11.5–15.5)
LACTATE SERPL-SCNC: 2.3 MMOL/L — HIGH (ref 0.7–2)
LACTATE SERPL-SCNC: 2.7 MMOL/L — HIGH (ref 0.7–2)
LDH SERPL L TO P-CCNC: 769 U/L — HIGH (ref 50–242)
LYMPHOCYTES # BLD AUTO: 1 % — LOW (ref 13–44)
MCHC RBC-ENTMCNC: 31.1 PG — SIGNIFICANT CHANGE UP (ref 27–34)
MCHC RBC-ENTMCNC: 34 GM/DL — SIGNIFICANT CHANGE UP (ref 32–36)
MCV RBC AUTO: 91.4 FL — SIGNIFICANT CHANGE UP (ref 80–100)
MONOCYTES NFR BLD AUTO: 1 % — SIGNIFICANT CHANGE UP (ref 1–9)
MRSA PCR RESULT.: SIGNIFICANT CHANGE UP
NEUTROPHILS NFR BLD AUTO: 95 % — HIGH (ref 43–77)
NON-GYNECOLOGICAL CYTOLOGY STUDY: SIGNIFICANT CHANGE UP
PLATELET # BLD AUTO: 231 K/UL — SIGNIFICANT CHANGE UP (ref 150–400)
POTASSIUM SERPL-MCNC: 4.7 MMOL/L — SIGNIFICANT CHANGE UP (ref 3.5–5.3)
POTASSIUM SERPL-SCNC: 4.7 MMOL/L — SIGNIFICANT CHANGE UP (ref 3.5–5.3)
PROT SERPL-MCNC: 6 G/DL — SIGNIFICANT CHANGE UP (ref 6–8.3)
RBC # BLD: 3.48 M/UL — LOW (ref 3.8–5.2)
RBC # FLD: 13.8 % — SIGNIFICANT CHANGE UP (ref 10.3–14.5)
S AUREUS DNA NOSE QL NAA+PROBE: SIGNIFICANT CHANGE UP
SODIUM SERPL-SCNC: 132 MMOL/L — LOW (ref 135–145)
WBC # BLD: 18.1 K/UL — HIGH (ref 3.8–10.5)
WBC # FLD AUTO: 18.1 K/UL — HIGH (ref 3.8–10.5)

## 2018-03-06 PROCEDURE — 99223 1ST HOSP IP/OBS HIGH 75: CPT | Mod: AI,GC

## 2018-03-06 PROCEDURE — 12345: CPT | Mod: NC

## 2018-03-06 PROCEDURE — 99285 EMERGENCY DEPT VISIT HI MDM: CPT

## 2018-03-06 PROCEDURE — 74176 CT ABD & PELVIS W/O CONTRAST: CPT | Mod: 26

## 2018-03-06 RX ORDER — METOPROLOL TARTRATE 50 MG
50 TABLET ORAL DAILY
Qty: 0 | Refills: 0 | Status: DISCONTINUED | OUTPATIENT
Start: 2018-03-06 | End: 2018-03-08

## 2018-03-06 RX ORDER — IPRATROPIUM/ALBUTEROL SULFATE 18-103MCG
3 AEROSOL WITH ADAPTER (GRAM) INHALATION
Qty: 0 | Refills: 0 | COMMUNITY

## 2018-03-06 RX ORDER — ALBUTEROL 90 UG/1
3 AEROSOL, METERED ORAL
Qty: 0 | Refills: 0 | COMMUNITY

## 2018-03-06 RX ORDER — HEPARIN SODIUM 5000 [USP'U]/ML
5000 INJECTION INTRAVENOUS; SUBCUTANEOUS EVERY 12 HOURS
Qty: 0 | Refills: 0 | Status: DISCONTINUED | OUTPATIENT
Start: 2018-03-06 | End: 2018-03-07

## 2018-03-06 RX ORDER — AMLODIPINE BESYLATE 2.5 MG/1
5 TABLET ORAL
Qty: 0 | Refills: 0 | Status: DISCONTINUED | OUTPATIENT
Start: 2018-03-06 | End: 2018-03-08

## 2018-03-06 RX ORDER — VANCOMYCIN HCL 1 G
1000 VIAL (EA) INTRAVENOUS ONCE
Qty: 0 | Refills: 0 | Status: COMPLETED | OUTPATIENT
Start: 2018-03-06 | End: 2018-03-06

## 2018-03-06 RX ORDER — LACTOBACILLUS ACIDOPHILUS 100MM CELL
1 CAPSULE ORAL
Qty: 0 | Refills: 0 | COMMUNITY

## 2018-03-06 RX ORDER — ALPRAZOLAM 0.25 MG
0.25 TABLET ORAL ONCE
Qty: 0 | Refills: 0 | Status: DISCONTINUED | OUTPATIENT
Start: 2018-03-06 | End: 2018-03-06

## 2018-03-06 RX ORDER — ALBUTEROL 90 UG/1
2.5 AEROSOL, METERED ORAL EVERY 8 HOURS
Qty: 0 | Refills: 0 | Status: DISCONTINUED | OUTPATIENT
Start: 2018-03-06 | End: 2018-03-08

## 2018-03-06 RX ORDER — LATANOPROST 0.05 MG/ML
1 SOLUTION/ DROPS OPHTHALMIC; TOPICAL AT BEDTIME
Qty: 0 | Refills: 0 | Status: DISCONTINUED | OUTPATIENT
Start: 2018-03-06 | End: 2018-03-08

## 2018-03-06 RX ORDER — PIPERACILLIN AND TAZOBACTAM 4; .5 G/20ML; G/20ML
3.38 INJECTION, POWDER, LYOPHILIZED, FOR SOLUTION INTRAVENOUS ONCE
Qty: 0 | Refills: 0 | Status: COMPLETED | OUTPATIENT
Start: 2018-03-06 | End: 2018-03-06

## 2018-03-06 RX ORDER — OMEPRAZOLE 10 MG/1
1 CAPSULE, DELAYED RELEASE ORAL
Qty: 0 | Refills: 0 | COMMUNITY

## 2018-03-06 RX ORDER — PIPERACILLIN AND TAZOBACTAM 4; .5 G/20ML; G/20ML
3.38 INJECTION, POWDER, LYOPHILIZED, FOR SOLUTION INTRAVENOUS EVERY 8 HOURS
Qty: 0 | Refills: 0 | Status: DISCONTINUED | OUTPATIENT
Start: 2018-03-06 | End: 2018-03-06

## 2018-03-06 RX ORDER — PANTOPRAZOLE SODIUM 20 MG/1
40 TABLET, DELAYED RELEASE ORAL
Qty: 0 | Refills: 0 | Status: DISCONTINUED | OUTPATIENT
Start: 2018-03-06 | End: 2018-03-08

## 2018-03-06 RX ORDER — ASPIRIN/CALCIUM CARB/MAGNESIUM 324 MG
81 TABLET ORAL DAILY
Qty: 0 | Refills: 0 | Status: DISCONTINUED | OUTPATIENT
Start: 2018-03-06 | End: 2018-03-08

## 2018-03-06 RX ORDER — CHOLECALCIFEROL (VITAMIN D3) 125 MCG
1000 CAPSULE ORAL DAILY
Qty: 0 | Refills: 0 | Status: DISCONTINUED | OUTPATIENT
Start: 2018-03-06 | End: 2018-03-08

## 2018-03-06 RX ORDER — DORZOLAMIDE HYDROCHLORIDE TIMOLOL MALEATE 20; 5 MG/ML; MG/ML
1 SOLUTION/ DROPS OPHTHALMIC
Qty: 0 | Refills: 0 | Status: DISCONTINUED | OUTPATIENT
Start: 2018-03-06 | End: 2018-03-08

## 2018-03-06 RX ORDER — ATORVASTATIN CALCIUM 80 MG/1
40 TABLET, FILM COATED ORAL AT BEDTIME
Qty: 0 | Refills: 0 | Status: DISCONTINUED | OUTPATIENT
Start: 2018-03-06 | End: 2018-03-08

## 2018-03-06 RX ORDER — ESOMEPRAZOLE MAGNESIUM 40 MG/1
1 CAPSULE, DELAYED RELEASE ORAL
Qty: 0 | Refills: 0 | COMMUNITY

## 2018-03-06 RX ORDER — VANCOMYCIN HCL 1 G
1000 VIAL (EA) INTRAVENOUS EVERY 24 HOURS
Qty: 0 | Refills: 0 | Status: DISCONTINUED | OUTPATIENT
Start: 2018-03-06 | End: 2018-03-06

## 2018-03-06 RX ORDER — GABAPENTIN 400 MG/1
300 CAPSULE ORAL AT BEDTIME
Qty: 0 | Refills: 0 | Status: DISCONTINUED | OUTPATIENT
Start: 2018-03-06 | End: 2018-03-08

## 2018-03-06 RX ORDER — FUROSEMIDE 40 MG
10 TABLET ORAL ONCE
Qty: 0 | Refills: 0 | Status: COMPLETED | OUTPATIENT
Start: 2018-03-06 | End: 2018-03-06

## 2018-03-06 RX ORDER — BUDESONIDE, MICRONIZED 100 %
2 POWDER (GRAM) MISCELLANEOUS
Qty: 0 | Refills: 0 | COMMUNITY

## 2018-03-06 RX ORDER — BUDESONIDE, MICRONIZED 100 %
0.5 POWDER (GRAM) MISCELLANEOUS
Qty: 0 | Refills: 0 | Status: DISCONTINUED | OUTPATIENT
Start: 2018-03-06 | End: 2018-03-08

## 2018-03-06 RX ORDER — PYRIDOXINE HCL (VITAMIN B6) 100 MG
100 TABLET ORAL DAILY
Qty: 0 | Refills: 0 | Status: DISCONTINUED | OUTPATIENT
Start: 2018-03-06 | End: 2018-03-08

## 2018-03-06 RX ORDER — ALPRAZOLAM 0.25 MG
1 TABLET ORAL
Qty: 0 | Refills: 0 | COMMUNITY

## 2018-03-06 RX ORDER — VANCOMYCIN HCL 1 G
VIAL (EA) INTRAVENOUS
Qty: 0 | Refills: 0 | Status: DISCONTINUED | OUTPATIENT
Start: 2018-03-06 | End: 2018-03-06

## 2018-03-06 RX ORDER — VANCOMYCIN HCL 1 G
125 VIAL (EA) INTRAVENOUS EVERY 6 HOURS
Qty: 0 | Refills: 0 | Status: DISCONTINUED | OUTPATIENT
Start: 2018-03-06 | End: 2018-03-06

## 2018-03-06 RX ORDER — LEVOTHYROXINE SODIUM 125 MCG
100 TABLET ORAL DAILY
Qty: 0 | Refills: 0 | Status: DISCONTINUED | OUTPATIENT
Start: 2018-03-06 | End: 2018-03-08

## 2018-03-06 RX ORDER — SUCRALFATE 1 G
1 TABLET ORAL AT BEDTIME
Qty: 0 | Refills: 0 | Status: DISCONTINUED | OUTPATIENT
Start: 2018-03-06 | End: 2018-03-08

## 2018-03-06 RX ORDER — SODIUM CHLORIDE 9 MG/ML
1000 INJECTION INTRAMUSCULAR; INTRAVENOUS; SUBCUTANEOUS ONCE
Qty: 0 | Refills: 0 | Status: COMPLETED | OUTPATIENT
Start: 2018-03-06 | End: 2018-03-06

## 2018-03-06 RX ADMIN — ALBUTEROL 2.5 MILLIGRAM(S): 90 AEROSOL, METERED ORAL at 19:45

## 2018-03-06 RX ADMIN — Medication 1 GRAM(S): at 21:21

## 2018-03-06 RX ADMIN — ALBUTEROL 2.5 MILLIGRAM(S): 90 AEROSOL, METERED ORAL at 15:12

## 2018-03-06 RX ADMIN — Medication 250 MILLIGRAM(S): at 15:03

## 2018-03-06 RX ADMIN — PANTOPRAZOLE SODIUM 40 MILLIGRAM(S): 20 TABLET, DELAYED RELEASE ORAL at 08:17

## 2018-03-06 RX ADMIN — GABAPENTIN 300 MILLIGRAM(S): 400 CAPSULE ORAL at 21:17

## 2018-03-06 RX ADMIN — Medication 0.5 MILLIGRAM(S): at 19:44

## 2018-03-06 RX ADMIN — Medication 100 MILLIGRAM(S): at 16:07

## 2018-03-06 RX ADMIN — Medication 100 MICROGRAM(S): at 06:30

## 2018-03-06 RX ADMIN — Medication 10 MILLIGRAM(S): at 23:08

## 2018-03-06 RX ADMIN — PIPERACILLIN AND TAZOBACTAM 25 GRAM(S): 4; .5 INJECTION, POWDER, LYOPHILIZED, FOR SOLUTION INTRAVENOUS at 10:29

## 2018-03-06 RX ADMIN — PIPERACILLIN AND TAZOBACTAM 200 GRAM(S): 4; .5 INJECTION, POWDER, LYOPHILIZED, FOR SOLUTION INTRAVENOUS at 03:19

## 2018-03-06 RX ADMIN — Medication 600 MILLIGRAM(S): at 22:03

## 2018-03-06 RX ADMIN — Medication 81 MILLIGRAM(S): at 11:00

## 2018-03-06 RX ADMIN — DORZOLAMIDE HYDROCHLORIDE TIMOLOL MALEATE 1 DROP(S): 20; 5 SOLUTION/ DROPS OPHTHALMIC at 17:48

## 2018-03-06 RX ADMIN — Medication 1 TABLET(S): at 11:00

## 2018-03-06 RX ADMIN — DORZOLAMIDE HYDROCHLORIDE TIMOLOL MALEATE 1 DROP(S): 20; 5 SOLUTION/ DROPS OPHTHALMIC at 06:30

## 2018-03-06 RX ADMIN — Medication 0.5 MILLIGRAM(S): at 08:15

## 2018-03-06 RX ADMIN — SODIUM CHLORIDE 1000 MILLILITER(S): 9 INJECTION INTRAMUSCULAR; INTRAVENOUS; SUBCUTANEOUS at 00:07

## 2018-03-06 RX ADMIN — ATORVASTATIN CALCIUM 40 MILLIGRAM(S): 80 TABLET, FILM COATED ORAL at 21:18

## 2018-03-06 RX ADMIN — Medication 20 MILLIGRAM(S): at 18:29

## 2018-03-06 RX ADMIN — Medication 0.25 MILLIGRAM(S): at 01:28

## 2018-03-06 RX ADMIN — LATANOPROST 1 DROP(S): 0.05 SOLUTION/ DROPS OPHTHALMIC; TOPICAL at 23:03

## 2018-03-06 RX ADMIN — HEPARIN SODIUM 5000 UNIT(S): 5000 INJECTION INTRAVENOUS; SUBCUTANEOUS at 06:29

## 2018-03-06 RX ADMIN — Medication 100 MILLIGRAM(S): at 11:00

## 2018-03-06 RX ADMIN — Medication 1000 UNIT(S): at 11:00

## 2018-03-06 RX ADMIN — AMLODIPINE BESYLATE 5 MILLIGRAM(S): 2.5 TABLET ORAL at 17:48

## 2018-03-06 RX ADMIN — Medication 125 MILLIGRAM(S): at 11:02

## 2018-03-06 RX ADMIN — HEPARIN SODIUM 5000 UNIT(S): 5000 INJECTION INTRAVENOUS; SUBCUTANEOUS at 17:50

## 2018-03-06 RX ADMIN — PIPERACILLIN AND TAZOBACTAM 25 GRAM(S): 4; .5 INJECTION, POWDER, LYOPHILIZED, FOR SOLUTION INTRAVENOUS at 18:29

## 2018-03-06 RX ADMIN — Medication 100 MILLIGRAM(S): at 21:21

## 2018-03-06 RX ADMIN — Medication 125 MILLIGRAM(S): at 06:30

## 2018-03-06 RX ADMIN — SODIUM CHLORIDE 1000 MILLILITER(S): 9 INJECTION INTRAMUSCULAR; INTRAVENOUS; SUBCUTANEOUS at 03:19

## 2018-03-06 NOTE — H&P ADULT - PROBLEM SELECTOR PLAN 8
- continue norvasc, metoprolol with hold parameters  - hold hydralazine for now due to low BP  - hold quinapril for now due to KALYAN

## 2018-03-06 NOTE — H&P ADULT - PROBLEM SELECTOR PLAN 4
- possibly from dehydration  - s/p 3 IVF boluses  - f/u AM lab and monitor renal function  - hold home med quinapril for now due to KALYAN  - renal dosing for empiric abx therapy for pneumonia   - avoid nephrotoxic agents

## 2018-03-06 NOTE — ED PROVIDER NOTE - OBJECTIVE STATEMENT
72 female presents to ER c/o lower abdominal pain, cramping and diarrhea started today associated with nausea, decreased appetite. Patient had recently been admitted to [place] 2/5/18 to 2/8/18 for pneumonia, treated with antibiotics and at [facility] 3/1/18 to 3/2/18 for broncoscopy, currently on oral steroids.

## 2018-03-06 NOTE — CONSULT NOTE ADULT - SUBJECTIVE AND OBJECTIVE BOX
Date/Time Patient Seen:  		  Referring MD:   Data Reviewed	       Patient is a 72y old  Female who presents with a chief complaint of Diarrhea, weakness (06 Mar 2018 03:59)      Subjective/HPI    in bed  seen and examined  vs and meds reviewed  on nc o2 and on isolation precs  pt with recent bronchoscopy and LN sampling by thoracic surgery  pt known to me from prior admission to [facility]  pt sees Dr. Weil for Pulmonary medicine as outpatient    73 yo female with pmhx of AVR, arthritis, CAD, emphysema on 3L home O2, glaucoma, c. diff, PNA, HLD, HTN, hypothyroidism, anemia, PVD, presented to ED with multiple episodes of diarrhea and SOB. Patient was recently admitted to [place] on 2/2018 with multilobar PNA, found to be RSV+, was treated with rocephin and zithromax. Patient was found to have mediastinal lymphadenopathy on chest CT and underwent bronchoscopy, EBUS with TBNA and endobronchial biopsy at [facility] on 3/1, stated that she will get the results of the biopsy by end of this week. Patient was discharged home from [facility] and stated that she continues to feel increasing SOB and weak since her initial PNA diagnosis. Patient also stated that she started having multiple episodes of watery diarrhea today(5 at home, 3 in ER). Complained of right sided abdominal pain during examination. Also complained of right sided middle back pain for the past few days. Denies any fever, chills, chest pain, palpitations, nausea, vomiting, dysuria.      PAST MEDICAL & SURGICAL HISTORY:  Clostridium difficile colitis  Emphysema  Pneumonia: 2/2018  Generalized intra-abdominal and pelvic swelling, mass and lump  Glaucoma  Aortic valve prosthesis present  CAD (coronary artery disease)  Arthritis  Heart murmur  Other iron deficiency anemia  Carpal tunnel syndrome of right wrist  PVD (peripheral vascular disease)  Hyperlipemia  Colitis  Hypothyroid  Hypertension  COPD (chronic obstructive pulmonary disease)  H/O foot surgery: (Right foot, 2010)  History of colonoscopy  S/P carpal tunnel release: (Right, 1/2017)  H/O carotid endarterectomy: Both sides 2002  Aortic valve replaced: 2011 with bovine  Carotid disease, bilateral  Cataract extraction status of left eye  Cataract extraction status of right eye        Medication list         MEDICATIONS  (STANDING):  amLODIPine   Tablet 5 milliGRAM(s) Oral two times a day  aspirin enteric coated 81 milliGRAM(s) Oral daily  atorvastatin 40 milliGRAM(s) Oral at bedtime  buDESOnide   0.5 milliGRAM(s) Respule 0.5 milliGRAM(s) Inhalation two times a day  cholecalciferol 1000 Unit(s) Oral daily  dorzolamide 2%/timolol 0.5% Ophthalmic Solution 1 Drop(s) Both EYES two times a day  gabapentin 300 milliGRAM(s) Oral at bedtime  heparin  Injectable 5000 Unit(s) SubCutaneous every 12 hours  latanoprost 0.005% Ophthalmic Solution 1 Drop(s) Both EYES at bedtime  levothyroxine 100 MICROGram(s) Oral daily  metoprolol succinate ER 50 milliGRAM(s) Oral daily  multivitamin 1 Tablet(s) Oral daily  pantoprazole    Tablet 40 milliGRAM(s) Oral before breakfast  piperacillin/tazobactam IVPB. 3.375 Gram(s) IV Intermittent every 8 hours  pyridoxine 100 milliGRAM(s) Oral daily  sucralfate 1 Gram(s) Oral at bedtime  vancomycin    Solution 125 milliGRAM(s) Oral every 6 hours    MEDICATIONS  (PRN):         Vitals log        ICU Vital Signs Last 24 Hrs  T(C): 36.6 (06 Mar 2018 06:10), Max: 36.6 (06 Mar 2018 06:10)  T(F): 97.9 (06 Mar 2018 06:10), Max: 97.9 (06 Mar 2018 06:10)  HR: 77 (06 Mar 2018 03:23) (72 - 80)  BP: 102/60 (06 Mar 2018 06:10) (100/51 - 107/60)  BP(mean): --  ABP: --  ABP(mean): --  RR: 17 (06 Mar 2018 06:10) (15 - 18)  SpO2: 95% (06 Mar 2018 06:10) (92% - 97%)       non smoker  non drinker      Input and Output:  I&O's Detail      Lab Data                        10.8   18.1  )-----------( 231      ( 06 Mar 2018 07:39 )             31.8     03-06    132<L>  |  105  |  41<H>  ----------------------------<  137<H>  4.7   |  16<L>  |  0.99    Ca    7.2<L>      06 Mar 2018 06:55    TPro  6.0  /  Alb  2.6<L>  /  TBili  0.6  /  DBili  x   /  AST  158<H>  /  ALT  144<H>  /  AlkPhos  60  03-06            Review of Systems	    sob  araujo  weak  diarrhea  frail      Objective     Physical Examination  extr trace edema  head at  heart s1s2  lungs dec BS  abd soft  cn grossly int  weak and frail        Pertinent Lab findings & Imaging      Multani:  NO   Adequate UO     I&O's Detail           Discussed with:     Cultures:	        Radiology      EXAM:  CT ABDOMEN AND PELVIS OC                            PROCEDURE DATE:  03/06/2018          INTERPRETATION:  CT ABDOMEN AND PELVIS WITHOUT CONTRAST    INDICATION: Abdominal pain and diarrhea.    TECHNIQUE: Abdominopelvic CT without intravenous contrast.    COMPARISON: Prior CT 11/14/2012. CT of the chest 2/6/2018.    FINDINGS:    ABSENCE OF INTRAVENOUS CONTRAST LIMITS EVALUATION FOR FOCAL LESIONS,   NEOPLASM, AND VASCULAR PATHOLOGY.    Lower Thorax: There is dense consolidation of the right middle and right   lower lobes, likely with a small pleural effusion.    Liver: New 3.4 cm hypodense lesion in the posterior right hepatic lobe.   New 1.9 cm hypodense lesion in the inferior right hepatic lobe.  Biliary: No dilatation.      Spleen: No suspicious lesions.      Pancreas: No inflammatory changes or ductal dilatation.      Adrenals: Normal.      Kidneys: No hydronephrosis or solid mass.      Vessels: Normal caliber.        GI tract: No evidence of small bowel obstruction. No wall thickening or   inflammatory changes.        Peritoneum/retroperitoneum and mesentery: No free air. No organized fluid   collection. No adenopathy.        Pelvic organs/Bladder: No pelvic masses. Bladder is normal.        Abdominal wall: Unremarkable.  Bones and soft tissues: No destructive lesion.        IMPRESSION:    Dense consolidation of the right middle lower lobes, progressing from   prior exam 2/6/2018 likely reflecting pneumonia. Follow-up to resolution   is advised. Necrotizing pneumonia is not excluded. Possibility of   underlying neoplasm is raised given the progression from prior exam.    New ill-defined hypodense lesions in the liver, suspicious for   possibility of metastatic disease. Follow-up evaluation is recommended   with contrast enhanced MRI.                JOSÉ COLÓN M.D., ATTENDING RADIOLOGIST  This document has been electronically signed. Mar  6 2018  2:37AM

## 2018-03-06 NOTE — H&P ADULT - NSHPOUTPATIENTPROVIDERS_GEN_ALL_CORE
Dr. Pradhan PMD  Dr. Becerril Cardio   Dr. Weil Pulm Dr. Kobren Gyn  Dr. Salazar Onco  Dr. Urrutia Endocrine

## 2018-03-06 NOTE — H&P ADULT - NSHPREVIEWOFSYSTEMS_GEN_ALL_CORE
Constitutional: +fatigue, denies fever, chills, diaphoresis   HEENT: denies blurry vision, difficulty hearing  Respiratory: + worsening SOB, +BAZAN, +cough, denies sputum production, wheezing, hemoptysis  Cardiovascular: denies CP, palpitations, edema  Gastrointestinal: + diarrhea, + right sided abdominal pain, denies nausea, vomiting, constipation, melena, hematochezia   Genitourinary: denies dysuria, frequency, urgency, hematuria   Musculoskeletal: +right sided back pain  Neurologic: denies headache, weakness, dizziness, paresthesias, numbness/tingling  ROS negative except as noted above

## 2018-03-06 NOTE — CONSULT NOTE ADULT - SUBJECTIVE AND OBJECTIVE BOX
[facility], Division of Infectious Diseases  SHIRA Hernandez A. Lee    Covering Dr. COURTNEY Newton    PAYAL, GENIE  72y, Female  [id number]    HPI--  72F recently hospitalized here with RSV infection and concern of pneumonia. Patient had a CT scan of the chest which was notable for R side consolidation and pleural effusion. Patient was treated  for pneumonia and discharged home on PO Levofloxacin. She followed up withe Dr. Weil of pulmonary and was referred to Dr. Galo for further evaluation. Patient underwent EBUS at [facility] on 3/1. Procedure was supposed to be ambulatory but she was kept in hospital a day for her hypoxemia. She is on a tapering course of steroids. Her breathing has not worsened, but whatever improvement she had has plateaued. Full operative report is not available for review, but the brief operative note reports endobronchial implants. Pathology released this PM is consistent with metastatic carcinoma, with special studies pending (patient is unaware of this diagnosis).     The day prior to admission she underwent trigger point injections in her neck. That evening she developed prolific nonbloody diarrhea. She spent "3 hours" in the bathroom. She had nausea but no vomiting. Her breathing is about the same. No CP. No fevers, chills, or rigors. No urinary symptoms. Patient states that she had a history of C. difficile colitis when she had her OHS in 2011, but another physician told her that she did not have in fact have C. difficile.       PMH/PSH--  Clostridium difficile colitis  Emphysema  Pneumonia  Generalized intra-abdominal and pelvic swelling, mass and lump  Glaucoma  Aortic valve prosthesis present  CAD (coronary artery disease)  Arthritis  Heart murmur  Other iron deficiency anemia  Carpal tunnel syndrome of right wrist  PVD (peripheral vascular disease)  Hyperlipemia  Colitis  Hypothyroid  Hypertension  COPD (chronic obstructive pulmonary disease)  H/O foot surgery  History of colonoscopy  S/P carpal tunnel release  H/O carotid endarterectomy  Aortic valve replaced  Carotid disease, bilateral  Cataract extraction status of left eye  Cataract extraction status of right eye      Allergies-- denies      Medications--  Antibiotics: piperacillin/tazobactam IVPB. 3.375 Gram(s) IV Intermittent every 8 hours  vancomycin    Solution 125 milliGRAM(s) Oral every 6 hours  vancomycin  IVPB        Immunologic:   Other: ALBUTerol    0.083%  amLODIPine   Tablet  aspirin enteric coated  atorvastatin  buDESOnide   0.5 milliGRAM(s) Respule  cholecalciferol  dorzolamide 2%/timolol 0.5% Ophthalmic Solution  gabapentin  heparin  Injectable  latanoprost 0.005% Ophthalmic Solution  levothyroxine  metoprolol succinate ER  multivitamin  pantoprazole    Tablet  pyridoxine  sucralfate      Social History--  EtOH: denies   Tobacco: denies   Drug Use: denies     Family/Marital History--  No pertinent family history in first degree relatives    Remainder not relevant to clinical concern.      Review of Systems:  A >=10-point review of systems was obtained.   Review of systems otherwise negative except as previously noted.    Physical Exam--  Vital Signs: T(F): 98 (03-06-18 @ 15:21), Max: 98.1 (03-06-18 @ 08:40)  HR: 70 (03-06-18 @ 15:21)  BP: 110/60 (03-06-18 @ 15:21)  RR: 19 (03-06-18 @ 15:21)  SpO2: 93% (03-06-18 @ 15:21)  Wt(kg): --  General: Nontoxic-appearing Female in no acute distress.  HEENT: AT/NC. PERRL. EOMI. Anicteric. Conjunctiva pink and moist. Oropharynx clear.  Neck: Not rigid. No sense of mass.  Nodes: None palpable.  Lungs: Dinimished breath sounds bilaterally R>L  without rales, wheezing or rhonchi  Heart: Regular rate and rhythm. No Murmur. No rub. No gallop. No palpable thrill.  Abdomen: Bowel sounds present and normoactive. Soft. Mildly distended. Nontender. No sense of mass. No organomegaly.  Back: No spinal tenderness. No costovertebral angle tenderness.   Extremities: No cyanosis or clubbing. No edema.   Skin: Warm. Dry. Good turgor. No rash. No vasculitic stigmata.  Psychiatric: Appropriate affect and mood for situation.       Laboratory & Imaging Data--  CBC                        10.8   18.1  )-----------( 231      ( 06 Mar 2018 07:39 )             31.8       Chemistries  03-06    132<L>  |  105  |  41<H>  ----------------------------<  137<H>  4.7   |  16<L>  |  0.99    Ca    7.2<L>      06 Mar 2018 06:55    TPro  6.0  /  Alb  2.6<L>  /  TBili  0.6  /  DBili  x   /  AST  158<H>  /  ALT  144<H>  /  AlkPhos  60  03-06      < from: CT Abdomen and Pelvis w/ Oral Cont (03.06.18 @ 02:24) >  EXAM:  CT ABDOMEN AND PELVIS OC                        PROCEDURE DATE:  03/06/2018    INTERPRETATION:  CT ABDOMEN AND PELVIS WITHOUT CONTRAST  INDICATION: Abdominal pain and diarrhea.  TECHNIQUE: Abdominopelvic CT without intravenous contrast.  COMPARISON: Prior CT 11/14/2012. CT of the chest 2/6/2018.    FINDINGS:  ABSENCE OF INTRAVENOUS CONTRAST LIMITS EVALUATION FOR FOCAL LESIONS,   NEOPLASM, AND VASCULAR PATHOLOGY.    Lower Thorax: There is dense consolidation of the rightmiddle and right   lower lobes, likely with a small pleural effusion.    Liver: New 3.4 cm hypodense lesion in the posterior right hepatic lobe.   New 1.9 cm hypodense lesion in the inferior right hepatic lobe.  Biliary: No dilatation.      Spleen: No suspicious lesions.      Pancreas: No inflammatory changes or ductal dilatation.      Adrenals: Normal.      Kidneys: No hydronephrosis or solid mass.      Vessels: Normal caliber.        GI tract: No evidence of small bowel obstruction. No wall thickening or   inflammatory changes.        Peritoneum/retroperitoneum and mesentery: No free air. No organized fluid   collection. No adenopathy.        Pelvic organs/Bladder: No pelvic masses. Bladder is normal.        Abdominal wall: Unremarkable.  Bones and soft tissues: No destructive lesion.        IMPRESSION:  Dense consolidation of the right middle lower lobes, progressing from   prior exam 2/6/2018 likely reflecting pneumonia. Follow-up to resolution   is advised. Necrotizing pneumonia isnot excluded. Possibility of   underlying neoplasm is raised given the progression from prior exam.    New ill-defined hypodense lesions in the liver, suspicious for   possibility of metastatic disease. Follow-up evaluation is recommended   with contrast enhanced MRI.    JOSÉ COLÓN M.D., ATTENDING RADIOLOGIST  This document has been electronically signed. Mar  6 2018  2:37AM    < end of copied text >    Culture Data  No new data

## 2018-03-06 NOTE — CONSULT NOTE ADULT - SUBJECTIVE AND OBJECTIVE BOX
Chief Complaint:  Patient is a 72y old  Female who presents with a chief complaint of Diarrhea, weakness (06 Mar 2018 03:59)      HPI:Patient is a 73 yo female with pmhx of AVR, arthritis, CAD, emphysema on 3L home O2, glaucoma, c. diff, PNA, HLD, HTN, hypothyroidism, anemia, PVD, presented to ED with multiple episodes of diarrhea and SOB. Patient was recently admitted to [place] on 2018 with multilobar PNA, found to be RSV+, was treated with rocephin and zithromax. Patient was found to have mediastinal lymphadenopathy on chest CT and underwent bronchoscopy, EBUS with TBNA and endobronchial biopsy at [facility] on 3/1, stated that she will get the results of the biopsy by end of this week. Patient was discharged home from [facility] and stated that she continues to feel increasing SOB and weak since her initial PNA diagnosis. Patient also stated that she started having multiple episodes of watery diarrhea today(5 at home, 3 in ER). Complained of right sided abdominal pain during examination. Also complained of right sided middle back pain for the past few days. Denies any fever, chills, chest pain, palpitations, nausea, vomiting, dysuria.     In the ED, patient was afebrile 96.8, 80bpm, 107/60, RR 18@ 96% supplemental O2  WBC 18, H/H 12.6/36.6, plt 270  Na 135, K 4.4, BUN 45, Cr 1.4, glucose 145, GFR 37  Lactate 2.3 (3.7 initially)   UA negative for infection   CT abdomen: Dense consolidation of the right middle lower lobes, progressing from prior exam 2018 likely reflecting pneumonia. Necrotizing pneumonia is not excluded. Possibility of underlying neoplasm is raised given the progression from prior exam. New ill-defined hypodense lesions in the liver, suspicious for possibility of metastatic disease.  CXR done on 3/1 showed: partially loculated moderate right pleural effusion with enlarging pleural effusion and worsening aeration  ECHO on 2018 showed normal LV function   CEA (): 8.6  Patient received 1x zosyn, 1x vanco, 3x IVF bolus, 1x xanax, 1x solumedrol, 1x morphine, and 1x zofran in ED      Allergies:  No Known Allergies      Medications:  ALBUTerol    0.083% 2.5 milliGRAM(s) Nebulizer every 8 hours  amLODIPine   Tablet 5 milliGRAM(s) Oral two times a day  aspirin enteric coated 81 milliGRAM(s) Oral daily  atorvastatin 40 milliGRAM(s) Oral at bedtime  buDESOnide   0.5 milliGRAM(s) Respule 0.5 milliGRAM(s) Inhalation two times a day  cholecalciferol 1000 Unit(s) Oral daily  dorzolamide 2%/timolol 0.5% Ophthalmic Solution 1 Drop(s) Both EYES two times a day  gabapentin 300 milliGRAM(s) Oral at bedtime  heparin  Injectable 5000 Unit(s) SubCutaneous every 12 hours  latanoprost 0.005% Ophthalmic Solution 1 Drop(s) Both EYES at bedtime  levothyroxine 100 MICROGram(s) Oral daily  metoprolol succinate ER 50 milliGRAM(s) Oral daily  multivitamin 1 Tablet(s) Oral daily  pantoprazole    Tablet 40 milliGRAM(s) Oral before breakfast  piperacillin/tazobactam IVPB. 3.375 Gram(s) IV Intermittent every 8 hours  pyridoxine 100 milliGRAM(s) Oral daily  sucralfate 1 Gram(s) Oral at bedtime  vancomycin    Solution 125 milliGRAM(s) Oral every 6 hours  vancomycin  IVPB      vancomycin  IVPB 1000 milliGRAM(s) IV Intermittent once      PMHX/PSHX:  Clostridium difficile colitis  Emphysema  Pneumonia  Generalized intra-abdominal and pelvic swelling, mass and lump  Glaucoma  Aortic valve prosthesis present  CAD (coronary artery disease)  Arthritis  Heart murmur  Other iron deficiency anemia  Carpal tunnel syndrome of right wrist  PVD (peripheral vascular disease)  Hyperlipemia  Colitis  Hypothyroid  Hypertension  COPD (chronic obstructive pulmonary disease)  H/O foot surgery  History of colonoscopy  S/P carpal tunnel release  H/O carotid endarterectomy  Aortic valve replaced  Carotid disease, bilateral  Cataract extraction status of left eye  Cataract extraction status of right eye      Family history:  No pertinent family history in first degree relatives      Social History: lives at home, ambulates minimally with walker   	previous tobacco smoker for >40 years  currently denies tobacco, alcohol, or drug use    ROS:     General:  No wt loss, fevers, chills, night sweats, fatigue,   Eyes:  Good vision, no reported pain  ENT:  No sore throat, pain, runny nose, dysphagia  CV:  No pain, palpitations, hypo/hypertension  Resp:  No dyspnea, cough, tachypnea, wheezing  GI:  + diarrhea, No nausea, No vomiting, No constipation, No weight loss, No fever, No pruritis, No rectal bleeding, No tarry stools, No dysphagia,  :  No pain, bleeding, incontinence, nocturia  Muscle:  No pain, weakness  Neuro:  No weakness, tingling, memory problems  Psych:  No fatigue, insomnia, mood problems, depression  Endocrine:  No polyuria, polydipsia, cold/heat intolerance  Heme:  No petechiae, ecchymosis, easy bruisability  Skin:  No rash, tattoos, scars, edema      PHYSICAL EXAM:   Vital Signs:  Vital Signs Last 24 Hrs  T(C): 36.7 (06 Mar 2018 08:40), Max: 36.7 (06 Mar 2018 08:40)  T(F): 98.1 (06 Mar 2018 08:40), Max: 98.1 (06 Mar 2018 08:40)  HR: 62 (06 Mar 2018 08:52) (62 - 80)  BP: 119/66 (06 Mar 2018 08:40) (100/51 - 119/66)  BP(mean): --  RR: 18 (06 Mar 2018 08:40) (15 - 18)  SpO2: 91% (06 Mar 2018 08:52) (91% - 97%)  Daily Height in cm: 154.94 (05 Mar 2018 20:57)    Daily     GENERAL:  Appears stated age, well-groomed, well-nourished, no distress  HEENT:  NC/AT,  conjunctivae clear and pink, no thyromegaly, nodules, adenopathy, no JVD, sclera -anicteric  CHEST:  Full & symmetric excursion, no increased effort, breath sounds clear  HEART:  Regular rhythm, S1, S2, no murmur/rub/S3/S4, no abdominal bruit, no edema  ABDOMEN:  Soft, non-tender, non-distended, normoactive bowel sounds,  no masses ,no hepato-splenomegaly, no signs of chronic liver disease  EXTEREMITIES:  no cyanosis,clubbing or edema  SKIN:  No rash/erythema/ecchymoses/petechiae/wounds/abscess/warm/dry  NEURO:  Alert, oriented, no asterixis, no tremor, no encephalopathy    LABS:                        10.8   18.1  )-----------( 231      ( 06 Mar 2018 07:39 )             31.8     03-06    132<L>  |  105  |  41<H>  ----------------------------<  137<H>  4.7   |  16<L>  |  0.99    Ca    7.2<L>      06 Mar 2018 06:55    TPro  6.0  /  Alb  2.6<L>  /  TBili  0.6  /  DBili  x   /  AST  158<H>  /  ALT  144<H>  /  AlkPhos  60  03-06    LIVER FUNCTIONS - ( 06 Mar 2018 06:55 )  Alb: 2.6 g/dL / Pro: 6.0 g/dL / ALK PHOS: 60 U/L / ALT: 144 U/L / AST: 158 U/L / GGT: x           PT/INR - ( 05 Mar 2018 22:31 )   PT: 11.8 sec;   INR: 1.08 ratio           Urinalysis Basic - ( 05 Mar 2018 22:55 )    Color: Yellow / Appearance: Clear / S.015 / pH: x  Gluc: x / Ketone: Trace  / Bili: Small / Urobili: 4   Blood: x / Protein: 25 mg/dL / Nitrite: Negative   Leuk Esterase: Moderate / RBC: 0-2 /HPF / WBC 6-10   Sq Epi: x / Non Sq Epi: Few / Bacteria: Few      Amylase Serum--      Lipase [id number]       Ammonia--      Imaging:

## 2018-03-06 NOTE — PATIENT PROFILE ADULT. - PMH
Aortic valve prosthesis present    Arthritis    CAD (coronary artery disease)    Clostridium difficile colitis    Emphysema    Generalized intra-abdominal and pelvic swelling, mass and lump    Glaucoma    Heart murmur    Hyperlipemia    Hypertension    Hypothyroid    Other iron deficiency anemia    Pneumonia  2/2018  PVD (peripheral vascular disease)

## 2018-03-06 NOTE — H&P ADULT - PROBLEM SELECTOR PLAN 1
- patient found to have initial T96.5 on presentation, with leukocytosis WBC18, Lactate 3.7 initially which trended down to 2.3 after fluid boluses.   - CT abdomen showed dense consolidation of right middle lobe likely pneumonia, evaluate for possible necrotizing PNA vs neoplasm.  - continue IV vanco and zosyn (renal dosing) for empiric coverage  - Patient's last echo showed normal LV function, received 3x IVF boluses in ED, f/u repeat Lactate level, if elevated, consider continuing gentle hydration   - f/u repeat lactate  - ID consult Dr. Mcdaniel   - f/u AM labs   - f/u cultures - patient found to have initial T96.5 on presentation, with leukocytosis WBC18, Lactate 3.7 initially which trended down to 2.3 after fluid boluses.   - CT abdomen showed dense consolidation of right middle lobe likely pneumonia, evaluate for possible necrotizing PNA vs neoplasm.  - continue IV zosyn and PO Vanco (renal dosing) for empiric coverage for pna and cdiff   - Patient's last echo showed normal LV function, received 3x IVF boluses in ED, f/u repeat Lactate level, if elevated, consider continuing gentle hydration   - f/u repeat lactate  - ID consult Dr. Mcdaniel   - f/u AM labs   - f/u cultures - patient found to have initial T96.5 on presentation, with leukocytosis WBC18, Lactate 3.7 initially which trended down to 2.3 after fluid boluses.   - CT abdomen showed dense consolidation of right middle lobe likely pneumonia, evaluate for possible necrotizing PNA vs neoplasm.  - continue IV zosyn and IV Vanco (renal dosing) for empiric coverage for pna   - continue PO vanco for c. diff coverage   - Patient's last echo showed normal LV function, received 3x IVF boluses in ED, f/u repeat Lactate level, if elevated, consider continuing gentle hydration   - f/u repeat lactate  - ID consult Dr. Mcdaniel   - f/u AM labs   - f/u cultures - patient found to have initial T96.5 on presentation, with leukocytosis WBC18, Lactate 3.7 initially which trended down to 2.3 after fluid boluses.   - CT abdomen showed dense consolidation of right middle lobe likely pneumonia, evaluate for possible necrotizing PNA vs neoplasm.  - continue IV zosyn    - continue PO vanco for c. diff coverage   - Patient's last echo showed normal LV function, received 3x IVF boluses in ED, f/u repeat Lactate level, if elevated, consider continuing gentle hydration   - f/u repeat lactate  - ID consult Dr. Mcdaniel   - f/u AM labs   - f/u cultures

## 2018-03-06 NOTE — H&P ADULT - HISTORY OF PRESENT ILLNESS
Patient is a 73 yo female with pmhx of AVR, arthritis, CAD, emphysema on 3L home O2, glaucoma, c. diff, PNA, HLD, HTN, hypothyroidism, anemia, PVD, presented to ED with multiple episodes of diarrhea and SOB. Patient was recently admitted to [place] on 2/2018 with multilobar PNA, found to be RSV+, was treated with rocephin and zithromax. Patient was found to have mediastinal lymphadenopathy on chest CT and underwent bronchoscopy, EBUS with TBNA and endobronchial biopsy at [facility] on 3/1, stated that she will get the results of the biopsy by end of this week. Patient was discharged home from [facility] and stated that she continues to feel increasing SOB and weak since her initial PNA diagnosis. Patient also stated that she started having multiple episodes of watery diarrhea today. Complained of right sided abdominal pain during examination. Also complained of right sided middle back pain for the past few days. Denies any fever, chills, chest pain, palpitations, nausea, vomiting, dysuria.     In the ED, patient was afebrile 96.8, 80bpm, 107/60, RR 18@ 96% supplemental O2  WBC 18, H/H 12.6/36.6, plt 270  Na 135, K 4.4, BUN 45, Cr 1.4, glucose 145, GFR 37  Lactate 2.3 (3.7 initially)   UA negative for infection   CT abdomen: Dense consolidation of the right middle lower lobes, progressing from prior exam 2/6/2018 likely reflecting pneumonia. Necrotizing pneumonia is not excluded. Possibility of underlying neoplasm is raised given the progression from prior exam. New ill-defined hypodense lesions in the liver, suspicious for possibility of metastatic disease.  CXR done on 3/1 showed: partially loculated moderate right pleural effusion with enlarging pleural effusion and worsening aeration  ECHO on 2/2018 showed normal LV function   CEA (2/7): 8.6  Patient received 1x zosyn, 1x vanco, 3x IVF bolus, 1x xanax, 1x solumedrol, 1x morphine, and 1x zofran in ED Patient is a 73 yo female with pmhx of AVR, arthritis, CAD, emphysema on 3L home O2, glaucoma, c. diff, PNA, HLD, HTN, hypothyroidism, anemia, PVD, presented to ED with multiple episodes of diarrhea and SOB. Patient was recently admitted to [place] on 2/2018 with multilobar PNA, found to be RSV+, was treated with rocephin and zithromax. Patient was found to have mediastinal lymphadenopathy on chest CT and underwent bronchoscopy, EBUS with TBNA and endobronchial biopsy at [facility] on 3/1, stated that she will get the results of the biopsy by end of this week. Patient was discharged home from [facility] and stated that she continues to feel increasing SOB and weak since her initial PNA diagnosis. Patient also stated that she started having multiple episodes of watery diarrhea today(5 at home, 3 in ER). Complained of right sided abdominal pain during examination. Also complained of right sided middle back pain for the past few days. Denies any fever, chills, chest pain, palpitations, nausea, vomiting, dysuria.     In the ED, patient was afebrile 96.8, 80bpm, 107/60, RR 18@ 96% supplemental O2  WBC 18, H/H 12.6/36.6, plt 270  Na 135, K 4.4, BUN 45, Cr 1.4, glucose 145, GFR 37  Lactate 2.3 (3.7 initially)   UA negative for infection   CT abdomen: Dense consolidation of the right middle lower lobes, progressing from prior exam 2/6/2018 likely reflecting pneumonia. Necrotizing pneumonia is not excluded. Possibility of underlying neoplasm is raised given the progression from prior exam. New ill-defined hypodense lesions in the liver, suspicious for possibility of metastatic disease.  CXR done on 3/1 showed: partially loculated moderate right pleural effusion with enlarging pleural effusion and worsening aeration  ECHO on 2/2018 showed normal LV function   CEA (2/7): 8.6  Patient received 1x zosyn, 1x vanco, 3x IVF bolus, 1x xanax, 1x solumedrol, 1x morphine, and 1x zofran in ED

## 2018-03-06 NOTE — H&P ADULT - NSHPSOCIALHISTORY_GEN_ALL_CORE
lives at home, ambulates minimally with walker   previous tobacco smoker for >40 years  currently denies tobacco, alcohol, or drug use

## 2018-03-06 NOTE — ED PROVIDER NOTE - MEDICAL DECISION MAKING DETAILS
72 female recent hospitalization, c/o lower abdomional pain and diarrhea, has prior history of c.diff, f/u labs, cat scan, if fluids, send stool for c.diff

## 2018-03-06 NOTE — CHART NOTE - NSCHARTNOTEFT_GEN_A_CORE
Internal Medicine Chart Update / Progress Note    Please see H&P written today by Dr. Villareal for more information.     Patient is a 72y old  Female who presents with a chief complaint of diarrhea (06 Mar 2018 13:47)    INTERVAL HPI/OVERNIGHT EVENTS: Pt stated that diarrhea was watery and profuse overnight, but stopped in the early morning. Denies fever, chills, abd pain. Pt has a dry cough and SOB. Denies CP.     MEDICATIONS  (STANDING):  ALBUTerol    0.083% 2.5 milliGRAM(s) Nebulizer every 8 hours  amLODIPine   Tablet 5 milliGRAM(s) Oral two times a day  aspirin enteric coated 81 milliGRAM(s) Oral daily  atorvastatin 40 milliGRAM(s) Oral at bedtime  buDESOnide   0.5 milliGRAM(s) Respule 0.5 milliGRAM(s) Inhalation two times a day  cholecalciferol 1000 Unit(s) Oral daily  dorzolamide 2%/timolol 0.5% Ophthalmic Solution 1 Drop(s) Both EYES two times a day  gabapentin 300 milliGRAM(s) Oral at bedtime  heparin  Injectable 5000 Unit(s) SubCutaneous every 12 hours  latanoprost 0.005% Ophthalmic Solution 1 Drop(s) Both EYES at bedtime  levothyroxine 100 MICROGram(s) Oral daily  metoprolol succinate ER 50 milliGRAM(s) Oral daily  multivitamin 1 Tablet(s) Oral daily  pantoprazole    Tablet 40 milliGRAM(s) Oral before breakfast  predniSONE   Tablet 30 milliGRAM(s) Oral daily  pyridoxine 100 milliGRAM(s) Oral daily  sucralfate 1 Gram(s) Oral at bedtime    MEDICATIONS  (PRN):  guaiFENesin    Syrup 100 milliGRAM(s) Oral every 6 hours PRN Cough      Allergies    No Known Allergies    Intolerances        REVIEW OF SYSTEMS:  CONSTITUTIONAL: No fever or chills  HEENT:  No headache, no sore throat  RESPIRATORY: + dry cough, +shortness of breath  CARDIOVASCULAR: No chest pain, palpitations, or leg swelling  GASTROINTESTINAL: +diarrhea (resolving); No abd pain, nausea, vomiting  GENITOURINARY: No dysuria, frequency, or hematuria  NEUROLOGICAL: no focal weakness or dizziness  MUSCULOSKELETAL: no myalgias     Vital Signs Last 24 Hrs  T(C): 36.5 (06 Mar 2018 21:32), Max: 36.7 (06 Mar 2018 08:40)  T(F): 97.7 (06 Mar 2018 21:32), Max: 98.1 (06 Mar 2018 08:40)  HR: 84 (06 Mar 2018 21:32) (62 - 84)  BP: 117/66 (06 Mar 2018 21:32) (102/60 - 123/68)  BP(mean): --  RR: 19 (06 Mar 2018 21:32) (17 - 19)  SpO2: 94% (06 Mar 2018 21:32) (84% - 97%)    PHYSICAL EXAM:  GENERAL: NAD at rest  HEENT:  EOMI, pupils are not round (chronic from eye surgeries as per pt); moist mucous membranes  CHEST/LUNG:  diminished breath sounds on right; grossly clear on left  HEART:  RRR, S1, S2, 3/6 systolic murmur  ABDOMEN:  BS+, soft, nontender, nondistended  EXTREMITIES: no edema, cyanosis, or calf tenderness  NERVOUS SYSTEM: AA&Ox3    LABS:                        10.8   18.1  )-----------( 231      ( 06 Mar 2018 07:39 )             31.8     CBC Full  -  ( 06 Mar 2018 07:39 )  WBC Count : 18.1 K/uL  Hemoglobin : 10.8 g/dL  Hematocrit : 31.8 %  Platelet Count - Automated : 231 K/uL  Mean Cell Volume : 91.4 fl  Mean Cell Hemoglobin : 31.1 pg  Mean Cell Hemoglobin Concentration : 34.0 gm/dL  Auto Neutrophil # : x  Auto Lymphocyte # : x  Auto Monocyte # : x  Auto Eosinophil # : x  Auto Basophil # : x  Auto Neutrophil % : 95.0 %  Auto Lymphocyte % : 1.0 %  Auto Monocyte % : 1.0 %  Auto Eosinophil % : x  Auto Basophil % : x    06 Mar 2018 06:55    132    |  105    |  41     ----------------------------<  137    4.7     |  16     |  0.99     Ca    7.2        06 Mar 2018 06:55    TPro  6.0    /  Alb  2.6    /  TBili  0.6    /  DBili  x      /  AST  158    /  ALT  144    /  AlkPhos  60     06 Mar 2018 06:55    PT/INR - ( 05 Mar 2018 22:31 )   PT: 11.8 sec;   INR: 1.08 ratio           Urinalysis Basic - ( 05 Mar 2018 22:55 )    Color: Yellow / Appearance: Clear / S.015 / pH: x  Gluc: x / Ketone: Trace  / Bili: Small / Urobili: 4   Blood: x / Protein: 25 mg/dL / Nitrite: Negative   Leuk Esterase: Moderate / RBC: 0-2 /HPF / WBC 6-10   Sq Epi: x / Non Sq Epi: Few / Bacteria: Few      CAPILLARY BLOOD GLUCOSE              RADIOLOGY & ADDITIONAL TESTS:  CT Abd/P: Dense consolidation of the right middle lower lobes, progressing from prior exam 2018 likely reflecting pneumonia. Follow-up to resolution is advised. Necrotizing pneumonia is not excluded. Possibility of underlying neoplasm is raised given the progression from prior exam.    New ill-defined hypodense lesions in the liver, suspicious for possibility of metastatic disease. Follow-up evaluation is recommended with contrast enhanced MRI.    Personally reviewed.       A&P:  71yo F with PMH of AVR, arthritis, CAD, emphysema on 3L home O2, glaucoma, ?hx of c. diff, HLD, HTN, hypothyroidism, anemia, PVD, recent admission for RSV / presumed R-sided PNA, presented to ED with profuse watery diarrhea x1 day and persistent SOB, admitted for possible sepsis due to PNA, r/o c. diff, now found to have metastatic carcinoma.   -c diff PCR now back and negative   -pt's leukocytosis is likely due to high steroid use and metastatic carcinoma. -- notably did not have leukocytosis on discharge from hospital on  when pt was initiated on treatment for presumed R-sided PNA and had RSV. Subsequently due to difficulty breathing was placed on high dose prednisone by PMD (had started taking 60mg daily several days prior to  when had WBC of 15.4 at time of EBUS; and is now at 30mg daily on a slow taper prior to this admission with WBC of 18)  -the pathology from the biopsies taken during the EBUS came back this afternoon and are showing metastatic carcinoma  -liver lesions are likely mets, so cancelled pt's biopsy that had been ordered by GI -- discussed with Dr. Bonilla  -discontinued Abx after discussion with Dr. Barrios  -will speak with Dr. Salazar tomorrow morning about any further diagnostic imaging he'd recommend while we await immunohistochemical stains to better characterize the malignant cells and then discuss diagnosis / plan with pt in the morning (Dr. Salazar had seen pt in the past)  -pt's dyspnea likely due to malignancy; will leave on the dose of prednisone she was currently on in her taper for now and discuss with Dr. Forde regarding whether he feels pt is getting symptomatic benefit and about tapering schedule; c/w albuterol q8h    Consultant(s) Notes Reviewed:  [x] YES  [ ] NO    Time spent: 45 min Internal Medicine Chart Update / Progress Note    Please see H&P written today by Dr. Villareal for more information.     Patient is a 72y old  Female who presents with a chief complaint of profuse diarrhea and persistent SOB. (06 Mar 2018 13:47)    INTERVAL HPI/OVERNIGHT EVENTS: Pt stated that diarrhea was watery and profuse overnight, but stopped in the early morning. Denies fever, chills, abd pain. Pt has a dry cough and SOB. Denies CP.     MEDICATIONS  (STANDING):  ALBUTerol    0.083% 2.5 milliGRAM(s) Nebulizer every 8 hours  amLODIPine   Tablet 5 milliGRAM(s) Oral two times a day  aspirin enteric coated 81 milliGRAM(s) Oral daily  atorvastatin 40 milliGRAM(s) Oral at bedtime  buDESOnide   0.5 milliGRAM(s) Respule 0.5 milliGRAM(s) Inhalation two times a day  cholecalciferol 1000 Unit(s) Oral daily  dorzolamide 2%/timolol 0.5% Ophthalmic Solution 1 Drop(s) Both EYES two times a day  gabapentin 300 milliGRAM(s) Oral at bedtime  heparin  Injectable 5000 Unit(s) SubCutaneous every 12 hours  latanoprost 0.005% Ophthalmic Solution 1 Drop(s) Both EYES at bedtime  levothyroxine 100 MICROGram(s) Oral daily  metoprolol succinate ER 50 milliGRAM(s) Oral daily  multivitamin 1 Tablet(s) Oral daily  pantoprazole    Tablet 40 milliGRAM(s) Oral before breakfast  predniSONE   Tablet 30 milliGRAM(s) Oral daily  pyridoxine 100 milliGRAM(s) Oral daily  sucralfate 1 Gram(s) Oral at bedtime    MEDICATIONS  (PRN):  guaiFENesin    Syrup 100 milliGRAM(s) Oral every 6 hours PRN Cough      Allergies    No Known Allergies    Intolerances        REVIEW OF SYSTEMS:  CONSTITUTIONAL: No fever or chills  HEENT:  No headache, no sore throat  RESPIRATORY: + dry cough, +shortness of breath  CARDIOVASCULAR: No chest pain, palpitations, or leg swelling  GASTROINTESTINAL: +diarrhea (resolving); No abd pain, nausea, vomiting  GENITOURINARY: No dysuria, frequency, or hematuria  NEUROLOGICAL: no focal weakness or dizziness  MUSCULOSKELETAL: no myalgias     Vital Signs Last 24 Hrs  T(C): 36.5 (06 Mar 2018 21:32), Max: 36.7 (06 Mar 2018 08:40)  T(F): 97.7 (06 Mar 2018 21:32), Max: 98.1 (06 Mar 2018 08:40)  HR: 84 (06 Mar 2018 21:32) (62 - 84)  BP: 117/66 (06 Mar 2018 21:32) (102/60 - 123/68)  BP(mean): --  RR: 19 (06 Mar 2018 21:32) (17 - 19)  SpO2: 94% (06 Mar 2018 21:32) (84% - 97%)    PHYSICAL EXAM:  GENERAL: NAD at rest  HEENT:  EOMI, pupils are not round (chronic from eye surgeries as per pt); moist mucous membranes  CHEST/LUNG:  diminished breath sounds on right; grossly clear on left  HEART:  RRR, S1, S2, 3/6 systolic murmur  ABDOMEN:  BS+, soft, nontender, nondistended  EXTREMITIES: no edema, cyanosis, or calf tenderness  NERVOUS SYSTEM: AA&Ox3    LABS:                        10.8   18.1  )-----------( 231      ( 06 Mar 2018 07:39 )             31.8     CBC Full  -  ( 06 Mar 2018 07:39 )  WBC Count : 18.1 K/uL  Hemoglobin : 10.8 g/dL  Hematocrit : 31.8 %  Platelet Count - Automated : 231 K/uL  Mean Cell Volume : 91.4 fl  Mean Cell Hemoglobin : 31.1 pg  Mean Cell Hemoglobin Concentration : 34.0 gm/dL  Auto Neutrophil # : x  Auto Lymphocyte # : x  Auto Monocyte # : x  Auto Eosinophil # : x  Auto Basophil # : x  Auto Neutrophil % : 95.0 %  Auto Lymphocyte % : 1.0 %  Auto Monocyte % : 1.0 %  Auto Eosinophil % : x  Auto Basophil % : x    06 Mar 2018 06:55    132    |  105    |  41     ----------------------------<  137    4.7     |  16     |  0.99     Ca    7.2        06 Mar 2018 06:55    TPro  6.0    /  Alb  2.6    /  TBili  0.6    /  DBili  x      /  AST  158    /  ALT  144    /  AlkPhos  60     06 Mar 2018 06:55    PT/INR - ( 05 Mar 2018 22:31 )   PT: 11.8 sec;   INR: 1.08 ratio           Urinalysis Basic - ( 05 Mar 2018 22:55 )    Color: Yellow / Appearance: Clear / S.015 / pH: x  Gluc: x / Ketone: Trace  / Bili: Small / Urobili: 4   Blood: x / Protein: 25 mg/dL / Nitrite: Negative   Leuk Esterase: Moderate / RBC: 0-2 /HPF / WBC 6-10   Sq Epi: x / Non Sq Epi: Few / Bacteria: Few      CAPILLARY BLOOD GLUCOSE              RADIOLOGY & ADDITIONAL TESTS:  CT Abd/P: Dense consolidation of the right middle lower lobes, progressing from prior exam 2018 likely reflecting pneumonia. Follow-up to resolution is advised. Necrotizing pneumonia is not excluded. Possibility of underlying neoplasm is raised given the progression from prior exam.    New ill-defined hypodense lesions in the liver, suspicious for possibility of metastatic disease. Follow-up evaluation is recommended with contrast enhanced MRI.    Personally reviewed.       A&P:  73yo F with PMH of AVR, arthritis, CAD, emphysema on 3L home O2, glaucoma, ?hx of c. diff, HLD, HTN, hypothyroidism, anemia, PVD, recent admission for RSV / presumed R-sided PNA, presented to ED with profuse watery diarrhea x1 day and persistent SOB, admitted for possible sepsis due to PNA, r/o c. diff, now found to have metastatic carcinoma.   -c diff PCR now back and negative   -pt's leukocytosis is likely due to high steroid use and metastatic carcinoma. -- notably did not have leukocytosis on discharge from hospital on  when pt was initiated on treatment for presumed R-sided PNA and had RSV. Subsequently due to difficulty breathing was placed on high dose prednisone by PMD (had started taking 60mg daily several days prior to  when had WBC of 15.4 at time of EBUS; and is now at 30mg daily on a slow taper prior to this admission with WBC of 18)  -the pathology from the biopsies taken during the EBUS came back this afternoon and are showing metastatic carcinoma  -liver lesions are likely mets, so cancelled pt's biopsy that had been ordered by GI -- discussed with Dr. Bonilla  -discontinued Abx after discussion with Dr. Barrios  -will speak with Dr. Salazar tomorrow morning about any further diagnostic imaging he'd recommend while we await immunohistochemical stains to better characterize the malignant cells and then discuss diagnosis / plan with pt in the morning (Dr. Salazar had seen pt in the past)  -pt's dyspnea likely due to malignancy; will leave on the dose of prednisone she was currently on in her taper for now and discuss with Dr. Forde regarding whether he feels pt is getting symptomatic benefit and about tapering schedule; c/w albuterol q8h    Consultant(s) Notes Reviewed:  [x] YES  [ ] NO    Time spent: 45 min

## 2018-03-06 NOTE — H&P ADULT - PROBLEM SELECTOR PLAN 5
- new liver lesion on CT abdomen  - consider follow up outpatient with heme/onc  - f/u GI consult - new liver lesion on CT abdomen  - consider follow up with heme/onc  - f/u GI consult

## 2018-03-06 NOTE — H&P ADULT - NSHPPHYSICALEXAM_GEN_ALL_CORE
Physical Exam:  General: Well developed, well nourished, mild distress, appeared uncomfortable   HEENT: NCAT, PERRL, EOMI bl, moist mucous membranes   Neck: Supple, nontender, no mass  Neurology: A&Ox3, nonfocal, sensation intact  Respiratory: +diffuse rhonchi b/l   CV: RRR, +S1/S2, +murmur  Abdominal: +tenderness to palpation of right upper and lower quadrants, Soft, ND +BSx4  Extremities: No C/C/E, + peripheral pulses  MSK: +tenderness to palpation of right middle back  Skin: warm, dry

## 2018-03-06 NOTE — CONSULT NOTE ADULT - PROBLEM SELECTOR RECOMMENDATION 9
poss PNA, ct abd reviewed, suggest poss PNA  will check legionella and strep ag  ID eval pending  eval for C diff in the works  isolation precs  COPD - pulmicort and albuterol nebs  keep sat > 88 pct  old TTE shows valv heart disease, monitor vs and HD and Sat  pt recently had EBUS done with Dr. Iraj lane LN sampling done, path pending   will follow up
given recent antibiotic use, would empirically treat with PO vanco 125 grams q 6 hours for possible c diff infection  check stool for c diff  stool studies ordered  if negative consider imodium

## 2018-03-06 NOTE — CONSULT NOTE ADULT - ASSESSMENT
R/O C. difficile colitis-- at risk w antibiotic use/hospitalization  CT not impressive for colon inflammation  Abdominal exam benign, on steroids  Breathing no worse than pre-bronchoscopy.   Main concern is that chest findings on abdominopelvic CT relate to malignancy rather than infection.   Steroids could suppress temperature curve and increase WBC, too  However with prolific diarrhea her main complaint resulting in this hospital stay, I doubt active pneumonia at present. I do not think the R:B of continuing systemic antibiotics is favorable at this time.     Suggestions--  Stop Zosyn  Await C. diff PCR  Cont enteral vancomycin  Monitor respiratory status  ?Dedicated CT chest  ?Need for further Thoracic intervention    Thank you for the courtesy of this referral.  Reviewed with Shantel Forde & Yolande.    Abdias Barrios MD  [phone]

## 2018-03-06 NOTE — H&P ADULT - PROBLEM SELECTOR PLAN 10
- heparin DVT ppx   - PO diet   - aspiration precaution  - isolation precaution   IMPROVE VTE Individual Risk Assessment          RISK                                                          Points    [  ] Previous VTE                                                3  [  ] Thrombophilia                                             2  [  ] Lower limb paralysis                                   2        (unable to hold up >15 seconds)    [  ] Current Cancer                                            2         (within 6 months)  [x] Immobilization > 24 hrs                              1  [  ] ICU/CCU stay > 24 hours                            1  [ x] Age > 60                                                    1    IMPROVE VTE Score _2

## 2018-03-06 NOTE — H&P ADULT - PROBLEM SELECTOR PLAN 2
- f/u official CXR result  -consider repeat CT chest   - continue empiric IV abx coverage  - continue home medication pulmicort and duoneb treatment for SOB/wheezing  - aspiration precaution   - trend lactate  - f/u AM labs  - f/u pulm consult Dr. Forde   - f/u ID consult Dr. Mcdaniel  - f/u cardio consult - f/u official CXR result  -consider repeat CT chest   - continue empiric IV zosyn and PO vanco abx coverage  - continue home medication pulmicort and duoneb treatment for SOB/wheezing  - aspiration precaution   - trend lactate  - f/u AM labs  - f/u pulm consult Dr. Forde   - f/u ID consult Dr. Mcdaniel - f/u official CXR result  -consider repeat CT chest   - continue empiric IV zosyn and IV vanco abx coverage for pna  - continue PO vanco for empiric therapy for cdiff   - continue home medication pulmicort and duoneb treatment for SOB/wheezing  - aspiration precaution   - trend lactate  - f/u AM labs  - f/u pulm consult Dr. Forde   - f/u ID consult Dr. Mcdaniel - f/u official CXR result  -consider repeat CT chest   - continue empiric IV zosyn and IV vanco abx coverage for pna  - continue PO vanco for empiric therapy for cdiff   - continue home medication pulmicort and duoneb treatment for SOB/wheezing  - continue home med prednisone taper (5mg-4 tabs until 3/8, 5mg-2 tabs until 3/11)  - aspiration precaution   - trend lactate  - f/u AM labs  - f/u pulm consult Dr. Forde   - f/u ID consult Dr. Mcdaniel - f/u official CXR result  -consider repeat CT chest   - continue empiric IV zosyn and IV vanco abx coverage for pna  - continue PO vanco for empiric therapy for cdiff   - continue home medication pulmicort and duoneb treatment for SOB/wheezing  - continue home med prednisone taper (5mg-4 tabs for until 3/8, 5mg-2 tabs until 3/11)  - aspiration precaution   - trend lactate  - f/u AM labs  - f/u pulm consult Dr. Fored   - f/u ID consult Dr. Mcdaniel - f/u official CXR result  -consider repeat CT chest   - continue empiric IV zosyn coverage for pna  consider IV vanco after clearing it with ID  - continue PO vanco for empiric therapy for cdiff   - continue home medication pulmicort and duoneb treatment for SOB/wheezing  - continue home med prednisone taper (5mg-4 tabs for until 3/8, 5mg-2 tabs until 3/11)  - aspiration precaution   - trend lactate  - f/u AM labs  - f/u pulm consult Dr. Forde   - f/u ID consult Dr. Mcdaniel

## 2018-03-06 NOTE — CONSULT NOTE ADULT - PROBLEM SELECTOR RECOMMENDATION 3
CT a/p with new ill-defined hypodense lesions in the liver, suspicious for   possibility of metastatic disease  MRI abd with contrast ordered  Ca 19-9, CEA, AFP ordered  elevated LFTs, monitor daily, check acute viral hepatitis panel CT a/p with new ill-defined hypodense lesions in the liver, suspicious for   possibility of metastatic disease  MRI abd with contrast ordered  CT guided IR biopsy ordered   Ca 19-9, CEA, AFP ordered  elevated LFTs, monitor daily, check acute viral hepatitis panel

## 2018-03-06 NOTE — ED PROVIDER NOTE - CARE PLAN
Principal Discharge DX:	Generalized abdominal pain  Secondary Diagnosis:	Diarrhea, unspecified type Principal Discharge DX:	Pneumonia  Secondary Diagnosis:	Diarrhea, unspecified type

## 2018-03-06 NOTE — H&P ADULT - PROBLEM SELECTOR PLAN 3
- 1 day hx of multiple episodes of watery diarrhea  - hx of recent abx use   - f/u c diff toxin PCR  - f/u GI consult  - f/u ID consult  - isolation precaution - 1 day hx of multiple episodes of watery diarrhea  - hx of recent abx use   - f/u c diff toxin PCR  - f/u GI consult Dr. Bonilla   - f/u ID consult  - isolation precaution - 1 day hx of multiple episodes of watery diarrhea  - hx of recent abx use   - continue PO vanco for empiric coverage   - f/u c diff toxin PCR  - f/u GI consult Dr. Bonilla   - f/u ID consult  - isolation precaution

## 2018-03-07 LAB
AFP-TM SERPL-MCNC: 1.8 NG/ML — SIGNIFICANT CHANGE UP
ALBUMIN SERPL ELPH-MCNC: 2.8 G/DL — LOW (ref 3.3–5)
ALP SERPL-CCNC: 60 U/L — SIGNIFICANT CHANGE UP (ref 40–120)
ALT FLD-CCNC: 99 U/L — HIGH (ref 12–78)
ANION GAP SERPL CALC-SCNC: 11 MMOL/L — SIGNIFICANT CHANGE UP (ref 5–17)
AST SERPL-CCNC: 85 U/L — HIGH (ref 15–37)
BASOPHILS # BLD AUTO: 0 K/UL — SIGNIFICANT CHANGE UP (ref 0–0.2)
BASOPHILS NFR BLD AUTO: 0.3 % — SIGNIFICANT CHANGE UP (ref 0–2)
BILIRUB SERPL-MCNC: 0.3 MG/DL — SIGNIFICANT CHANGE UP (ref 0.2–1.2)
BUN SERPL-MCNC: 26 MG/DL — HIGH (ref 7–23)
CALCIUM SERPL-MCNC: 8.5 MG/DL — SIGNIFICANT CHANGE UP (ref 8.5–10.1)
CANCER AG19-9 SERPL-ACNC: 77.4 U/ML — HIGH
CHLORIDE SERPL-SCNC: 102 MMOL/L — SIGNIFICANT CHANGE UP (ref 96–108)
CO2 SERPL-SCNC: 22 MMOL/L — SIGNIFICANT CHANGE UP (ref 22–31)
CREAT SERPL-MCNC: 1 MG/DL — SIGNIFICANT CHANGE UP (ref 0.5–1.3)
CULTURE RESULTS: NO GROWTH — SIGNIFICANT CHANGE UP
EOSINOPHIL # BLD AUTO: 0 K/UL — SIGNIFICANT CHANGE UP (ref 0–0.5)
EOSINOPHIL NFR BLD AUTO: 0 % — SIGNIFICANT CHANGE UP (ref 0–6)
GLUCOSE SERPL-MCNC: 229 MG/DL — HIGH (ref 70–99)
HCT VFR BLD CALC: 33.1 % — LOW (ref 34.5–45)
HGB BLD-MCNC: 10.7 G/DL — LOW (ref 11.5–15.5)
LEGIONELLA AG UR QL: NEGATIVE — SIGNIFICANT CHANGE UP
LYMPHOCYTES # BLD AUTO: 0.7 K/UL — LOW (ref 1–3.3)
LYMPHOCYTES # BLD AUTO: 5.4 % — LOW (ref 13–44)
MAGNESIUM SERPL-MCNC: 2.4 MG/DL — SIGNIFICANT CHANGE UP (ref 1.6–2.6)
MCHC RBC-ENTMCNC: 29.9 PG — SIGNIFICANT CHANGE UP (ref 27–34)
MCHC RBC-ENTMCNC: 32.4 GM/DL — SIGNIFICANT CHANGE UP (ref 32–36)
MCV RBC AUTO: 92.2 FL — SIGNIFICANT CHANGE UP (ref 80–100)
MONOCYTES # BLD AUTO: 0.5 K/UL — SIGNIFICANT CHANGE UP (ref 0–0.9)
MONOCYTES NFR BLD AUTO: 3.7 % — SIGNIFICANT CHANGE UP (ref 1–9)
NEUTROPHILS # BLD AUTO: 11.8 K/UL — HIGH (ref 1.8–7.4)
NEUTROPHILS NFR BLD AUTO: 90.7 % — HIGH (ref 43–77)
PHOSPHATE SERPL-MCNC: 3 MG/DL — SIGNIFICANT CHANGE UP (ref 2.5–4.5)
PLATELET # BLD AUTO: 242 K/UL — SIGNIFICANT CHANGE UP (ref 150–400)
POTASSIUM SERPL-MCNC: 4.1 MMOL/L — SIGNIFICANT CHANGE UP (ref 3.5–5.3)
POTASSIUM SERPL-SCNC: 4.1 MMOL/L — SIGNIFICANT CHANGE UP (ref 3.5–5.3)
PROT SERPL-MCNC: 6.5 G/DL — SIGNIFICANT CHANGE UP (ref 6–8.3)
RBC # BLD: 3.59 M/UL — LOW (ref 3.8–5.2)
RBC # FLD: 13.8 % — SIGNIFICANT CHANGE UP (ref 10.3–14.5)
SODIUM SERPL-SCNC: 135 MMOL/L — SIGNIFICANT CHANGE UP (ref 135–145)
SPECIMEN SOURCE: SIGNIFICANT CHANGE UP
WBC # BLD: 13 K/UL — HIGH (ref 3.8–10.5)
WBC # FLD AUTO: 13 K/UL — HIGH (ref 3.8–10.5)

## 2018-03-07 PROCEDURE — 99233 SBSQ HOSP IP/OBS HIGH 50: CPT

## 2018-03-07 RX ORDER — IBUPROFEN 200 MG
400 TABLET ORAL ONCE
Qty: 0 | Refills: 0 | Status: COMPLETED | OUTPATIENT
Start: 2018-03-07 | End: 2018-03-07

## 2018-03-07 RX ORDER — LIDOCAINE 4 G/100G
1 CREAM TOPICAL ONCE
Qty: 0 | Refills: 0 | Status: COMPLETED | OUTPATIENT
Start: 2018-03-07 | End: 2018-03-07

## 2018-03-07 RX ORDER — ACETYLCYSTEINE 200 MG/ML
3 VIAL (ML) MISCELLANEOUS THREE TIMES A DAY
Qty: 0 | Refills: 0 | Status: DISCONTINUED | OUTPATIENT
Start: 2018-03-07 | End: 2018-03-08

## 2018-03-07 RX ADMIN — ALBUTEROL 2.5 MILLIGRAM(S): 90 AEROSOL, METERED ORAL at 14:01

## 2018-03-07 RX ADMIN — Medication 1 TABLET(S): at 11:54

## 2018-03-07 RX ADMIN — Medication 81 MILLIGRAM(S): at 11:54

## 2018-03-07 RX ADMIN — DORZOLAMIDE HYDROCHLORIDE TIMOLOL MALEATE 1 DROP(S): 20; 5 SOLUTION/ DROPS OPHTHALMIC at 18:41

## 2018-03-07 RX ADMIN — Medication 400 MILLIGRAM(S): at 04:15

## 2018-03-07 RX ADMIN — Medication 3 MILLILITER(S): at 14:01

## 2018-03-07 RX ADMIN — Medication 50 MILLIGRAM(S): at 05:24

## 2018-03-07 RX ADMIN — Medication 3 MILLILITER(S): at 21:24

## 2018-03-07 RX ADMIN — Medication 100 MILLIGRAM(S): at 11:54

## 2018-03-07 RX ADMIN — Medication 100 MICROGRAM(S): at 05:24

## 2018-03-07 RX ADMIN — Medication 30 MILLIGRAM(S): at 05:24

## 2018-03-07 RX ADMIN — ALBUTEROL 2.5 MILLIGRAM(S): 90 AEROSOL, METERED ORAL at 07:22

## 2018-03-07 RX ADMIN — Medication 1 GRAM(S): at 21:20

## 2018-03-07 RX ADMIN — ATORVASTATIN CALCIUM 40 MILLIGRAM(S): 80 TABLET, FILM COATED ORAL at 21:20

## 2018-03-07 RX ADMIN — AMLODIPINE BESYLATE 5 MILLIGRAM(S): 2.5 TABLET ORAL at 18:39

## 2018-03-07 RX ADMIN — Medication 1000 UNIT(S): at 11:54

## 2018-03-07 RX ADMIN — Medication 0.5 MILLIGRAM(S): at 21:24

## 2018-03-07 RX ADMIN — HEPARIN SODIUM 5000 UNIT(S): 5000 INJECTION INTRAVENOUS; SUBCUTANEOUS at 05:23

## 2018-03-07 RX ADMIN — Medication 3 MILLILITER(S): at 07:21

## 2018-03-07 RX ADMIN — PANTOPRAZOLE SODIUM 40 MILLIGRAM(S): 20 TABLET, DELAYED RELEASE ORAL at 05:23

## 2018-03-07 RX ADMIN — GABAPENTIN 300 MILLIGRAM(S): 400 CAPSULE ORAL at 21:20

## 2018-03-07 RX ADMIN — DORZOLAMIDE HYDROCHLORIDE TIMOLOL MALEATE 1 DROP(S): 20; 5 SOLUTION/ DROPS OPHTHALMIC at 05:23

## 2018-03-07 RX ADMIN — LATANOPROST 1 DROP(S): 0.05 SOLUTION/ DROPS OPHTHALMIC; TOPICAL at 21:20

## 2018-03-07 RX ADMIN — Medication 0.5 MILLIGRAM(S): at 07:21

## 2018-03-07 RX ADMIN — LIDOCAINE 1 PATCH: 4 CREAM TOPICAL at 22:16

## 2018-03-07 RX ADMIN — AMLODIPINE BESYLATE 5 MILLIGRAM(S): 2.5 TABLET ORAL at 05:23

## 2018-03-07 NOTE — PROGRESS NOTE ADULT - ASSESSMENT
71yo F with PMH of AVR, arthritis, CAD, COPD on 3L home O2, glaucoma, ?hx of c. diff, HLD, HTN, hypothyroidism, anemia, PVD, recent admission for RSV / presumed R-sided PNA, presented to ED with profuse watery diarrhea x1 day and persistent SOB, admitted for question of sepsis due to PNA, r/o c. diff, now found to have metastatic carcinoma.

## 2018-03-07 NOTE — PROGRESS NOTE ADULT - SUBJECTIVE AND OBJECTIVE BOX
Date/Time Patient Seen:  		  Referring MD:   Data Reviewed	       Patient is a 72y old  Female who presents with a chief complaint of diarrhea (06 Mar 2018 13:47)  in chair  seen and examined  cough  sob  anxious  frail      Subjective/HPI     PAST MEDICAL & SURGICAL HISTORY:  Clostridium difficile colitis  Emphysema  Pneumonia: 2/2018  Generalized intra-abdominal and pelvic swelling, mass and lump  Glaucoma  Aortic valve prosthesis present  CAD (coronary artery disease)  Arthritis  Heart murmur  Other iron deficiency anemia  Carpal tunnel syndrome of right wrist  PVD (peripheral vascular disease)  Hyperlipemia  Colitis  Hypothyroid  Hypertension  COPD (chronic obstructive pulmonary disease)  H/O foot surgery: (Right foot, 2010)  History of colonoscopy  S/P carpal tunnel release: (Right, 1/2017)  H/O carotid endarterectomy: Both sides 2002  Aortic valve replaced: 2011 with bovine  Carotid disease, bilateral  Cataract extraction status of left eye  Cataract extraction status of right eye        Medication list         MEDICATIONS  (STANDING):  acetylcysteine 10% Inhalation 3 milliLiter(s) Inhalation three times a day  ALBUTerol    0.083% 2.5 milliGRAM(s) Nebulizer every 8 hours  amLODIPine   Tablet 5 milliGRAM(s) Oral two times a day  aspirin enteric coated 81 milliGRAM(s) Oral daily  atorvastatin 40 milliGRAM(s) Oral at bedtime  buDESOnide   0.5 milliGRAM(s) Respule 0.5 milliGRAM(s) Inhalation two times a day  cholecalciferol 1000 Unit(s) Oral daily  dorzolamide 2%/timolol 0.5% Ophthalmic Solution 1 Drop(s) Both EYES two times a day  gabapentin 300 milliGRAM(s) Oral at bedtime  heparin  Injectable 5000 Unit(s) SubCutaneous every 12 hours  latanoprost 0.005% Ophthalmic Solution 1 Drop(s) Both EYES at bedtime  levothyroxine 100 MICROGram(s) Oral daily  metoprolol succinate ER 50 milliGRAM(s) Oral daily  multivitamin 1 Tablet(s) Oral daily  pantoprazole    Tablet 40 milliGRAM(s) Oral before breakfast  predniSONE   Tablet 30 milliGRAM(s) Oral daily  pyridoxine 100 milliGRAM(s) Oral daily  sucralfate 1 Gram(s) Oral at bedtime    MEDICATIONS  (PRN):  guaiFENesin    Syrup 100 milliGRAM(s) Oral every 6 hours PRN Cough         Vitals log        ICU Vital Signs Last 24 Hrs  T(C): 36.6 (07 Mar 2018 05:20), Max: 36.7 (06 Mar 2018 08:40)  T(F): 97.9 (07 Mar 2018 05:20), Max: 98.1 (06 Mar 2018 08:40)  HR: 94 (07 Mar 2018 05:20) (62 - 94)  BP: 106/63 (07 Mar 2018 05:20) (102/60 - 123/68)  BP(mean): --  ABP: --  ABP(mean): --  RR: 18 (07 Mar 2018 05:20) (17 - 19)  SpO2: 91% (07 Mar 2018 05:20) (84% - 95%)           Input and Output:  I&O's Detail    06 Mar 2018 07:01  -  07 Mar 2018 05:43  --------------------------------------------------------  IN:  Total IN: 0 mL    OUT:    Voided: 320 mL  Total OUT: 320 mL    Total NET: -320 mL          Lab Data                        10.8   18.1  )-----------( 231      ( 06 Mar 2018 07:39 )             31.8     03-06    132<L>  |  105  |  41<H>  ----------------------------<  137<H>  4.7   |  16<L>  |  0.99    Ca    7.2<L>      06 Mar 2018 06:55    TPro  6.0  /  Alb  2.6<L>  /  TBili  0.6  /  DBili  x   /  AST  158<H>  /  ALT  144<H>  /  AlkPhos  60  03-06            Review of Systems	      Objective     Physical Examination    head at  heart s1s2  lungs dec BS  abd soft      Pertinent Lab findings & Imaging      Rangel:  NO   Adequate UO     I&O's Detail    06 Mar 2018 07:01  -  07 Mar 2018 05:43  --------------------------------------------------------  IN:  Total IN: 0 mL    OUT:    Voided: 320 mL  Total OUT: 320 mL    Total NET: -320 mL               Discussed with:     Cultures:	        Radiology

## 2018-03-07 NOTE — PROGRESS NOTE ADULT - PROBLEM SELECTOR PLAN 9
- continue norvasc, metoprolol with hold parameters  - hold hydralazine for now due to low BP  - hold quinapril for now due to KALYAN  - BP has been normal range without those anti-hypertensives

## 2018-03-07 NOTE — PROGRESS NOTE ADULT - PROBLEM SELECTOR PLAN 5
- likely from dehydration  - s/p 3L NS in the ER with improvement in KALYAN  - if renal function remains stable, consider restarting home ACEi, though BP has been normal off of it so far

## 2018-03-07 NOTE — PROGRESS NOTE ADULT - ATTENDING COMMENTS
Advanced care planning was discussed with patient and family.  Advanced care planning forms were reviewed and discussed.  Risks, benefits and alternatives of gastroenterologic procedures were discussed in detail and all questions were answered.    25 minutes spent.
Note written by attending, see above.

## 2018-03-07 NOTE — PROGRESS NOTE ADULT - PROBLEM SELECTOR PLAN 7
- continue supplemental O2 via NC   - continue pulmicort, duoneb treatment  - continue with prednisone

## 2018-03-07 NOTE — PROGRESS NOTE ADULT - SUBJECTIVE AND OBJECTIVE BOX
Interval Events: Diarrhea has resolved. No new overnight event.  No N/V/D.  Tolerating diet.    HPI:Patient is a 71 yo female with pmhx of AVR, arthritis, CAD, emphysema on 3L home O2, glaucoma, c. diff, PNA, HLD, HTN, hypothyroidism, anemia, PVD, presented to ED with multiple episodes of diarrhea and SOB. Patient was recently admitted to [facility] on 2018 with multilobar PNA, found to be RSV+, was treated with rocephin and zithromax. Patient was found to have mediastinal lymphadenopathy on chest CT and underwent bronchoscopy, EBUS with TBNA and endobronchial biopsy at [facility] on 3/1, stated that she will get the results of the biopsy by end of this week. Patient was discharged home from [facility] and stated that she continues to feel increasing SOB and weak since her initial PNA diagnosis. Patient also stated that she started having multiple episodes of watery diarrhea today(5 at home, 3 in ER). Complained of right sided abdominal pain during examination. Also complained of right sided middle back pain for the past few days. Denies any fever, chills, chest pain, palpitations, nausea, vomiting, dysuria.     MEDICATIONS  (STANDING):  acetylcysteine 10% Inhalation 3 milliLiter(s) Inhalation three times a day  ALBUTerol    0.083% 2.5 milliGRAM(s) Nebulizer every 8 hours  amLODIPine   Tablet 5 milliGRAM(s) Oral two times a day  aspirin enteric coated 81 milliGRAM(s) Oral daily  atorvastatin 40 milliGRAM(s) Oral at bedtime  buDESOnide   0.5 milliGRAM(s) Respule 0.5 milliGRAM(s) Inhalation two times a day  cholecalciferol 1000 Unit(s) Oral daily  dorzolamide 2%/timolol 0.5% Ophthalmic Solution 1 Drop(s) Both EYES two times a day  gabapentin 300 milliGRAM(s) Oral at bedtime  heparin  Injectable 5000 Unit(s) SubCutaneous every 12 hours  latanoprost 0.005% Ophthalmic Solution 1 Drop(s) Both EYES at bedtime  levothyroxine 100 MICROGram(s) Oral daily  metoprolol succinate ER 50 milliGRAM(s) Oral daily  multivitamin 1 Tablet(s) Oral daily  pantoprazole    Tablet 40 milliGRAM(s) Oral before breakfast  predniSONE   Tablet 30 milliGRAM(s) Oral daily  pyridoxine 100 milliGRAM(s) Oral daily  sucralfate 1 Gram(s) Oral at bedtime    MEDICATIONS  (PRN):  guaiFENesin    Syrup 100 milliGRAM(s) Oral every 6 hours PRN Cough      Allergies    No Known Allergies    Intolerances        Review of Systems:    General:  No wt loss, fevers, chills, night sweats,fatigue,   Eyes:  Good vision, no reported pain  ENT:  No sore throat, pain, runny nose, dysphagia  CV:  No pain, palpitations, hypo/hypertension  Resp:  No dyspnea, cough, tachypnea, wheezing  GI:  No pain, No nausea, No vomiting, No diarrhea, No constipation, No weight loss, No fever, No pruritis, No rectal bleeding, No melena, No dysphagia  :  No pain, bleeding, incontinence, nocturia  Muscle:  No pain, weakness  Neuro:  No weakness, tingling, memory problems  Psych:  No fatigue, insomnia, mood problems, depression  Endocrine:  No polyuria, polydypsia, cold/heat intolerance  Heme:  No petechiae, ecchymosis, easy bruisability  Skin:  No rash, tattoos, scars, edema      Vital Signs Last 24 Hrs  T(C): 36.7 (07 Mar 2018 14:01), Max: 36.7 (06 Mar 2018 15:21)  T(F): 98.1 (07 Mar 2018 14:01), Max: 98.1 (07 Mar 2018 14:01)  HR: 84 (07 Mar 2018 14:03) (68 - 94)  BP: 138/72 (07 Mar 2018 14:01) (106/63 - 138/72)  BP(mean): --  RR: 17 (07 Mar 2018 14:01) (17 - 19)  SpO2: 88% (07 Mar 2018 14:03) (84% - 94%)    PHYSICAL EXAM:    Constitutional: NAD, well-developed  HEENT: EOMI, throat clear  Neck: No LAD, supple  Respiratory: CTA and P  Cardiovascular: S1 and S2, RRR, no M  Gastrointestinal: BS+, soft, NT/ND, neg HSM,  Extremities: No peripheral edema, neg clubing, cyanosis  Vascular: 2+ peripheral pulses  Neurological: A/O x 3, no focal deficits  Psychiatric: Normal mood, normal affect  Skin: No rashes      LABS:                        10.7   13.0  )-----------( 242      ( 07 Mar 2018 06:17 )             33.1     03-07    135  |  102  |  26<H>  ----------------------------<  229<H>  4.1   |  22  |  1.00    Ca    8.5      07 Mar 2018 06:17  Phos  3.0     03-07  Mg     2.4     -07    TPro  6.5  /  Alb  2.8<L>  /  TBili  0.3  /  DBili  x   /  AST  85<H>  /  ALT  99<H>  /  AlkPhos  60  03-07    PT/INR - ( 05 Mar 2018 22:31 )   PT: 11.8 sec;   INR: 1.08 ratio           Urinalysis Basic - ( 05 Mar 2018 22:55 )    Color: Yellow / Appearance: Clear / S.015 / pH: x  Gluc: x / Ketone: Trace  / Bili: Small / Urobili: 4   Blood: x / Protein: 25 mg/dL / Nitrite: Negative   Leuk Esterase: Moderate / RBC: 0-2 /HPF / WBC 6-10   Sq Epi: x / Non Sq Epi: Few / Bacteria: Few        RADIOLOGY & ADDITIONAL TESTS:

## 2018-03-07 NOTE — PROGRESS NOTE ADULT - SUBJECTIVE AND OBJECTIVE BOX
Patient is a 72y old  Female who presents with a chief complaint of diarrhea (06 Mar 2018 13:47)      INTERVAL HPI/OVERNIGHT EVENTS: Pt states diarrhea has resolved. SOB is present but relatively stable. No CP, palpitations, fever, chills.    MEDICATIONS  (STANDING):  acetylcysteine 10% Inhalation 3 milliLiter(s) Inhalation three times a day  ALBUTerol    0.083% 2.5 milliGRAM(s) Nebulizer every 8 hours  amLODIPine   Tablet 5 milliGRAM(s) Oral two times a day  aspirin enteric coated 81 milliGRAM(s) Oral daily  atorvastatin 40 milliGRAM(s) Oral at bedtime  buDESOnide   0.5 milliGRAM(s) Respule 0.5 milliGRAM(s) Inhalation two times a day  cholecalciferol 1000 Unit(s) Oral daily  dorzolamide 2%/timolol 0.5% Ophthalmic Solution 1 Drop(s) Both EYES two times a day  gabapentin 300 milliGRAM(s) Oral at bedtime  latanoprost 0.005% Ophthalmic Solution 1 Drop(s) Both EYES at bedtime  levothyroxine 100 MICROGram(s) Oral daily  metoprolol succinate ER 50 milliGRAM(s) Oral daily  multivitamin 1 Tablet(s) Oral daily  pantoprazole    Tablet 40 milliGRAM(s) Oral before breakfast  predniSONE   Tablet 30 milliGRAM(s) Oral daily  pyridoxine 100 milliGRAM(s) Oral daily  sucralfate 1 Gram(s) Oral at bedtime    MEDICATIONS  (PRN):  guaiFENesin    Syrup 100 milliGRAM(s) Oral every 6 hours PRN Cough      Allergies    No Known Allergies    Intolerances        REVIEW OF SYSTEMS:  CONSTITUTIONAL: No fever or chills  HEENT:  No headache, no sore throat  RESPIRATORY: +shortness of breath, +rare cough  CARDIOVASCULAR: No chest pain, palpitations, or leg swelling  GASTROINTESTINAL: No abd pain, nausea, vomiting, or diarrhea  GENITOURINARY: No dysuria, frequency, or hematuria  NEUROLOGICAL: no focal weakness or dizziness  MUSCULOSKELETAL: no myalgias     Vital Signs Last 24 Hrs  T(C): 36.7 (07 Mar 2018 21:28), Max: 36.7 (07 Mar 2018 14:01)  T(F): 98.1 (07 Mar 2018 21:28), Max: 98.1 (07 Mar 2018 14:01)  HR: 73 (07 Mar 2018 21:28) (73 - 94)  BP: 124/70 (07 Mar 2018 21:28) (106/63 - 138/72)  BP(mean): --  RR: 18 (07 Mar 2018 21:28) (16 - 18)  SpO2: 92% (07 Mar 2018 21:28) (88% - 92%)    PHYSICAL EXAM:  GENERAL: NAD at rest  HEENT:  EOMI, pupils are not round (chronic from eye surgeries as per pt); moist mucous membranes  CHEST/LUNG:  diminished breath sounds on right; grossly clear on left  HEART:  RRR, S1, S2, 3/6 systolic murmur  ABDOMEN:  BS+, soft, nontender, nondistended  EXTREMITIES: no edema, cyanosis, or calf tenderness  NERVOUS SYSTEM: AA&Ox3    LABS:                        10.7   13.0  )-----------( 242      ( 07 Mar 2018 06:17 )             33.1     CBC Full  -  ( 07 Mar 2018 06:17 )  WBC Count : 13.0 K/uL  Hemoglobin : 10.7 g/dL  Hematocrit : 33.1 %  Platelet Count - Automated : 242 K/uL  Mean Cell Volume : 92.2 fl  Mean Cell Hemoglobin : 29.9 pg  Mean Cell Hemoglobin Concentration : 32.4 gm/dL  Auto Neutrophil # : 11.8 K/uL  Auto Lymphocyte # : 0.7 K/uL  Auto Monocyte # : 0.5 K/uL  Auto Eosinophil # : 0.0 K/uL  Auto Basophil # : 0.0 K/uL  Auto Neutrophil % : 90.7 %  Auto Lymphocyte % : 5.4 %  Auto Monocyte % : 3.7 %  Auto Eosinophil % : 0.0 %  Auto Basophil % : 0.3 %    07 Mar 2018 06:17    135    |  102    |  26     ----------------------------<  229    4.1     |  22     |  1.00     Ca    8.5        07 Mar 2018 06:17  Phos  3.0       07 Mar 2018 06:17  Mg     2.4       07 Mar 2018 06:17    TPro  6.5    /  Alb  2.8    /  TBili  0.3    /  DBili  x      /  AST  85     /  ALT  99     /  AlkPhos  60     07 Mar 2018 06:17        CAPILLARY BLOOD GLUCOSE            Culture - Urine (collected 03-06-18 @ 12:44)  Source: .Urine Clean Catch (Midstream)  Final Report (03-07-18 @ 08:32):    No growth    Culture - Blood (collected 03-06-18 @ 08:14)  Source: .Blood Blood-Peripheral  Preliminary Report (03-07-18 @ 09:01):    No growth to date.    Culture - Blood (collected 03-06-18 @ 08:14)  Source: .Blood Blood-Peripheral  Preliminary Report (03-07-18 @ 09:01):    No growth to date.        RADIOLOGY & ADDITIONAL TESTS:  CT Abd/P: Dense consolidation of the right middle lower lobes, progressing from prior exam 2/6/2018 likely reflecting pneumonia. Follow-up to resolution is advised. Necrotizing pneumonia is not excluded. Possibility of underlying neoplasm is raised given the progression from prior exam.    New ill-defined hypodense lesions in the liver, suspicious for possibility of metastatic disease. Follow-up evaluation is recommended with contrast enhanced MRI.    Personally reviewed.     Consultant(s) Notes Reviewed:  [x] YES  [ ] NO

## 2018-03-07 NOTE — CONSULT NOTE ADULT - ASSESSMENT
71 y/o woman with PMH of iron def anemia 2/2 AVMs on biweekly IV iron, s/p valve replacement who underwent EBUS for evaluation of mediastinal LAD and was d/w cancer.      - s/p EBUS 3/1/18 prelim report is: metastatic carcinoma   - spoke with [facility] lab, no final report is available, to call tomorrow at [phone]  - liver lesions seen on CT scan, suspicious for metastatic disease   - additional studies will be needed for staging  - may need an MRI liver/ brain ( pt needs sedation for the MRI)   - anemia is stable , last IV iron was given 2/28/17, ferritin is stable 90  - further recommendation pending above

## 2018-03-07 NOTE — CONSULT NOTE ADULT - SUBJECTIVE AND OBJECTIVE BOX
Patient is a 72y old  Female who presents with a chief complaint of diarrhea (06 Mar 2018 13:47)      HPI:  Patient is a 71 yo female with pmhx of AVR, arthritis, CAD, emphysema on 3L home O2, glaucoma, c. diff, PNA, HLD, HTN, hypothyroidism, anemia, PVD, presented to ED with multiple episodes of diarrhea and SOB. Patient was recently admitted to [place] on 2018 with multilobar PNA, found to be RSV+, was treated with rocephin and zithromax. Patient was found to have mediastinal lymphadenopathy on chest CT and underwent bronchoscopy, EBUS with TBNA and endobronchial biopsy at [facility] on 3/1,       CT abdomen: Dense consolidation of the right middle lower lobes, progressing from prior exam 2018 likely reflecting pneumonia. Necrotizing pneumonia is not excluded. Possibility of underlying neoplasm is raised given the progression from prior exam. New ill-defined hypodense lesions in the liver, suspicious for possibility of metastatic disease.  CXR done on 3/1 showed: partially loculated moderate right pleural effusion with enlarging pleural effusion and worsening aeration  ECHO on 2018 showed normal LV function   CEA (): 8.6  Patient received 1x zosyn, 1x vanco, 3x IVF bolus, 1x xanax, 1x solumedrol, 1x morphine, and 1x zofran in ED (06 Mar 2018 03:59)       ROS:    CONSTITUTIONAL: No fever, weight loss, +fatigue  EYES: No eye pain, visual disturbances, or discharge  ENMT:  No difficulty hearing, tinnitus, vertigo; No sinus or throat pain  NECK: No pain or stiffness  BREASTS: No pain, masses, or nipple discharge  RESPIRATORY: +cough, wheezing, chills or hemoptysis; + shortness of breath for a month  CARDIOVASCULAR: No chest pain, palpitations, dizziness, or leg swelling, no decreased exercise tolerance  GASTROINTESTINAL: No abdominal or epigastric pain. No nausea, vomiting, or hematemesis; No diarrhea or constipation. No melena or hematochezia.  GENITOURINARY: No dysuria, frequency, hematuria, or incontinence  NEUROLOGICAL: No headaches, memory loss, loss of strength, numbness, or tremors  SKIN: No itching, burning, rashes, or lesions   LYMPH NODES: No enlargement or tenderness noted  ENDOCRINE: No heat or cold intolerance; No hair loss  MUSCULOSKELETAL: No muscle or back pain  PSYCHIATRIC: No depression, anxiety, mood swings, or difficulty sleeping  HEME/LYMPH: No easy bruising, or bleeding gums  ALLERGY AND IMMUNOLOGIC: No hives or eczema      PAST MEDICAL & SURGICAL HISTORY:  Clostridium difficile colitis  Emphysema  Pneumonia: 2018  Generalized intra-abdominal and pelvic swelling, mass and lump  Glaucoma  Aortic valve prosthesis present  CAD (coronary artery disease)  Arthritis  Heart murmur  Other iron deficiency anemia  PVD (peripheral vascular disease)  Hyperlipemia  Hypothyroid  Hypertension  H/O foot surgery: (Right foot, )  History of colonoscopy  S/P carpal tunnel release: (Right, 2017)  H/O carotid endarterectomy: Both sides   Aortic valve replaced:  with bovine  Carotid disease, bilateral  Cataract extraction status of left eye  Cataract extraction status of right eye      SOCIAL HISTORY:ex smoker , no ETOH    FAMILY HISTORY:  father , brother  from lung cancer       MEDICATIONS  (STANDING):  acetylcysteine 10% Inhalation 3 milliLiter(s) Inhalation three times a day  ALBUTerol    0.083% 2.5 milliGRAM(s) Nebulizer every 8 hours  amLODIPine   Tablet 5 milliGRAM(s) Oral two times a day  aspirin enteric coated 81 milliGRAM(s) Oral daily  atorvastatin 40 milliGRAM(s) Oral at bedtime  buDESOnide   0.5 milliGRAM(s) Respule 0.5 milliGRAM(s) Inhalation two times a day  cholecalciferol 1000 Unit(s) Oral daily  dorzolamide 2%/timolol 0.5% Ophthalmic Solution 1 Drop(s) Both EYES two times a day  gabapentin 300 milliGRAM(s) Oral at bedtime  heparin  Injectable 5000 Unit(s) SubCutaneous every 12 hours  latanoprost 0.005% Ophthalmic Solution 1 Drop(s) Both EYES at bedtime  levothyroxine 100 MICROGram(s) Oral daily  metoprolol succinate ER 50 milliGRAM(s) Oral daily  multivitamin 1 Tablet(s) Oral daily  pantoprazole    Tablet 40 milliGRAM(s) Oral before breakfast  predniSONE   Tablet 30 milliGRAM(s) Oral daily  pyridoxine 100 milliGRAM(s) Oral daily  sucralfate 1 Gram(s) Oral at bedtime    MEDICATIONS  (PRN):  guaiFENesin    Syrup 100 milliGRAM(s) Oral every 6 hours PRN Cough      Allergies    No Known Allergies    Intolerances        Vital Signs Last 24 Hrs  T(C): 36.6 (07 Mar 2018 05:20), Max: 36.7 (06 Mar 2018 15:21)  T(F): 97.9 (07 Mar 2018 05:20), Max: 98 (06 Mar 2018 15:21)  HR: 73 (07 Mar 2018 07:25) (68 - 94)  BP: 106/63 (07 Mar 2018 05:20) (106/63 - 123/68)  BP(mean): --  RR: 18 (07 Mar 2018 05:20) (18 - 19)  SpO2: 88% (07 Mar 2018 07:25) (84% - 94%)    PHYSICAL EXAM  General: adult in NAD  HEENT: clear oropharynx, anicteric sclera, pink conjunctivae  Neck: supple  CV: normal S1S2 with no murmur rubs or gallops  Lungs: clear to auscultation, no wheezes, decreased BS bases   Abdomen: soft non-tender non-distended, no hepatosplenomegaly  Ext: no clubbing cyanosis or edema  Skin: no rashes and no petechiae  Neuro: alert and oriented X3 no focal deficits      LABS:    CBC Full  -  ( 07 Mar 2018 06:17 )  WBC Count : 13.0 K/uL  Hemoglobin : 10.7 g/dL  Hematocrit : 33.1 %  Platelet Count - Automated : 242 K/uL  Mean Cell Volume : 92.2 fl  Mean Cell Hemoglobin : 29.9 pg  Mean Cell Hemoglobin Concentration : 32.4 gm/dL  Auto Neutrophil # : 11.8 K/uL  Auto Lymphocyte # : 0.7 K/uL  Auto Monocyte # : 0.5 K/uL  Auto Eosinophil # : 0.0 K/uL  Auto Basophil # : 0.0 K/uL  Auto Neutrophil % : 90.7 %  Auto Lymphocyte % : 5.4 %  Auto Monocyte % : 3.7 %  Auto Eosinophil % : 0.0 %  Auto Basophil % : 0.3 %    03-07    135  |  102  |  26<H>  ----------------------------<  229<H>  4.1   |  22  |  1.00    Ca    8.5      07 Mar 2018 06:17  Phos  3.0     03-07  Mg     2.4     03-07    TPro  6.5  /  Alb  2.8<L>  /  TBili  0.3  /  DBili  x   /  AST  85<H>  /  ALT  99<H>  /  AlkPhos  60  03-07    PT/INR - ( 05 Mar 2018 22:31 )   PT: 11.8 sec;   INR: 1.08 ratio               BLOOD SMEAR INTERPRETATION:  RBC: normocytic , normochromic with poikiloanizocytosis no significant rouleaux  WBC: increased  in number ,+ toxic granulation , normal morphology  plt: adequate in number no clumps , giant/ large plt     RADIOLOGY & ADDITIONAL STUDIES:  < from: CT Abdomen and Pelvis w/ Oral Cont (18 @ 02:24) >  COMPARISON: Prior CT 2012. CT of the chest 2018.    FINDINGS:    ABSENCE OF INTRAVENOUS CONTRAST LIMITS EVALUATION FOR FOCAL LESIONS,   NEOPLASM, AND VASCULAR PATHOLOGY.    Lower Thorax: There is dense consolidation of the rightmiddle and right   lower lobes, likely with a small pleural effusion.    Liver: New 3.4 cm hypodense lesion in the posterior right hepatic lobe.   New 1.9 cm hypodense lesion in the inferior right hepatic lobe.  Biliary: No dilatation.      Spleen: No suspicious lesions.      Pancreas: No inflammatory changes or ductal dilatation.      Adrenals: Normal.      Kidneys: No hydronephrosis or solid mass.      Vessels: Normal caliber.        GI tract: No evidence of small bowel obstruction. No wall thickening or   inflammatory changes.        Peritoneum/retroperitoneum and mesentery: No free air. No organized fluid   collection. No adenopathy.        Pelvic organs/Bladder: No pelvic masses. Bladder is normal.        Abdominal wall: Unremarkable.  Bones and soft tissues: No destructive lesion.        < end of copied text >

## 2018-03-07 NOTE — PROGRESS NOTE ADULT - SUBJECTIVE AND OBJECTIVE BOX
[facility], Division of Infectious Diseases  SHIRA Hernandez A. Lee    Name: GENIE MOE  Age: 72y  Gender: Female  MRN: [id number]    Interval History--  Notes reviewed. No diarrhea for > 24h. Denies pain. Knows that she has cancer, waiting to speak with the oncology team. Denies fevers, chills, or rigors. Respiratory status stable. No other complaints.    Past Medical History--  Clostridium difficile colitis  Emphysema  Pneumonia  Generalized intra-abdominal and pelvic swelling, mass and lump  Glaucoma  Aortic valve prosthesis present  CAD (coronary artery disease)  Arthritis  Heart murmur  Other iron deficiency anemia  Carpal tunnel syndrome of right wrist  PVD (peripheral vascular disease)  Hyperlipemia  Colitis  Hypothyroid  Hypertension  COPD (chronic obstructive pulmonary disease)  H/O foot surgery  History of colonoscopy  S/P carpal tunnel release  H/O carotid endarterectomy  Aortic valve replaced  Carotid disease, bilateral  Cataract extraction status of left eye  Cataract extraction status of right eye      For details regarding the patient's social history, family history, and other miscellaneous elements, please refer the initial infectious diseases consultation and/or the admitting history and physical examination for this admission.    Allergies    No Known Allergies    Intolerances        Medications--  Antibiotics:    Immunologic:    Other:  acetylcysteine 10% Inhalation  ALBUTerol    0.083%  amLODIPine   Tablet  aspirin enteric coated  atorvastatin  buDESOnide   0.5 milliGRAM(s) Respule  cholecalciferol  dorzolamide 2%/timolol 0.5% Ophthalmic Solution  gabapentin  guaiFENesin    Syrup PRN  heparin  Injectable  latanoprost 0.005% Ophthalmic Solution  levothyroxine  metoprolol succinate ER  multivitamin  pantoprazole    Tablet  predniSONE   Tablet  pyridoxine  sucralfate      Review of Systems--  A 10-point review of systems was obtained.   Review of systems otherwise unchanged compared to prior visit except as previously noted.    Physical Examination--  Vital Signs: T(F): 97.9 (03-07-18 @ 05:20), Max: 98 (03-06-18 @ 15:21)  HR: 73 (03-07-18 @ 07:25)  BP: 106/63 (03-07-18 @ 05:20)  RR: 18 (03-07-18 @ 05:20)  SpO2: 88% (03-07-18 @ 07:25)  Wt(kg): --  General: Nontoxic-appearing Female in no acute distress.  HEENT: AT/NC. Anicteric. Conjunctiva pink and moist. Oropharynx clear.  Neck: Not rigid. No sense of mass.  Nodes: None palpable.  Lungs: Dinimished breath sounds bilaterally R>L  without rales, wheezing or rhonchi  Heart: Regular rate and rhythm. No Murmur. No rub. No gallop. No palpable thrill.  Abdomen: Bowel sounds present and normoactive. Soft. Mildly distended. Nontender. No sense of mass. No organomegaly.  Extremities: No cyanosis or clubbing. No edema.   Skin: Warm. Dry. Good turgor. No rash. No vasculitic stigmata.  Psychiatric: Appropriate affect and mood for situation.       Laboratory Studies--  CBC                        10.7   13.0  )-----------( 242      ( 07 Mar 2018 06:17 )             33.1       Chemistries  03-07    135  |  102  |  26<H>  ----------------------------<  229<H>  4.1   |  22  |  1.00    Ca    8.5      07 Mar 2018 06:17  Phos  3.0     03-07  Mg     2.4     03-07    TPro  6.5  /  Alb  2.8<L>  /  TBili  0.3  /  DBili  x   /  AST  85<H>  /  ALT  99<H>  /  AlkPhos  60  03-07    Clostridium difficile Toxin by PCR (03.06.18 @ 07:55)    Clostridium difficile Toxin by PCR: The results of this test should be interpreted with consideration of all  clinical and laboratory findings. This test determines the presence of  the C. difficile tcdB gene at a given time and is not intended to  identify antibiotic associated disease or C. difficile infection without  clinical context. Successful treatment is based on the resolution of  clinical symptoms. This test should not be used as a "test of cure"  because C. difficile DNA will persist after successful treatment. Repeat  testing will not be permitted.    This test is performed on the BD MAX system using Real-Time PCR and  fluorogenic target-specific hybridization.    [facility]      Culture Data    Culture - Urine (collected 06 Mar 2018 12:44)  Source: .Urine Clean Catch (Midstream)  Final Report (07 Mar 2018 08:32):    No growth    Culture - Blood (collected 06 Mar 2018 08:14)  Source: .Blood Blood-Peripheral  Preliminary Report (07 Mar 2018 09:01):    No growth to date.    Culture - Blood (collected 06 Mar 2018 08:14)  Source: .Blood Blood-Peripheral  Preliminary Report (07 Mar 2018 09:01):    No growth to date.

## 2018-03-07 NOTE — PROGRESS NOTE ADULT - PROBLEM SELECTOR PLAN 3
CT a/p with new ill-defined hypodense lesions in the liver, suspicious for   possibility of metastatic disease  heme onc eval appreciated   may need an MRI liver/ brain ( pt needs sedation for the MRI)

## 2018-03-07 NOTE — PROGRESS NOTE ADULT - PROBLEM SELECTOR PLAN 3
-leukocytosis is likely due to high steroid use and possible reactive from worsening metastatic carcinoma. -- notably did not have leukocytosis on discharge from hospital on 02/06 when pt was initiated on treatment for presumed R-sided PNA and had RSV. Subsequently due to difficulty breathing was placed on high dose prednisone by PMD (had started taking 60mg daily several days prior to 2/26 when had WBC of 15.4 at time of EBUS; and is now at 30mg daily on a slow taper prior to this admission with WBC of 18)  -leukocytosis coming down, possibly with steroid taper.  -discontinued all antibiotics yesterday as sepsis is unlikely

## 2018-03-07 NOTE — PROGRESS NOTE ADULT - PROBLEM SELECTOR PLAN 4
- c diff toxin PCR negative  - diarrhea resolved spontaneously in 1 day -- may have been a mild enteritis

## 2018-03-07 NOTE — PROGRESS NOTE ADULT - PROBLEM SELECTOR PLAN 2
Had been on 3L NC at home, but requiring 5L NC as inpatient to maintain SpO2 above 88%.  Likely due to progression of malignancy.   C/w steroids and nebs. f/up pulm and onc

## 2018-03-07 NOTE — PROGRESS NOTE ADULT - PROBLEM SELECTOR PLAN 10
- heparin DVT ppx (will hold 1-2 doses as pt having blood oozing from last site heparin was injected in her abdomen)

## 2018-03-07 NOTE — PROGRESS NOTE ADULT - ASSESSMENT
Diarrhea resolved.   C. diff negative.  Abdominal exam benign, on steroids  Breathing no worse than pre-bronchoscopy.   I do not believe this woman has pneumonia presently.  Suspecf leukocytosis related to steroids +/- progressive tumor    Suggestions--  Defer antibiotics  I'll sign off at this time.   Thank you for the courtesy of this referral.     Abdias Barrios MD  [phone]

## 2018-03-07 NOTE — PROGRESS NOTE ADULT - PROBLEM SELECTOR PLAN 6
- new liver lesions on CT abdomen with high suspicion for metastases especially given the pathology from lung / LN biopsies of metastatic carcinoma  - pt refuses MRI as inpatient due to severe claustrophobia and defers to outpt setting where she will try to schedule it in an open MRI center.

## 2018-03-08 ENCOUNTER — APPOINTMENT (OUTPATIENT)
Dept: THORACIC SURGERY | Facility: CLINIC | Age: 73
End: 2018-03-08

## 2018-03-08 ENCOUNTER — TRANSCRIPTION ENCOUNTER (OUTPATIENT)
Age: 73
End: 2018-03-08

## 2018-03-08 VITALS — WEIGHT: 138.89 LBS

## 2018-03-08 DIAGNOSIS — D72.829 ELEVATED WHITE BLOOD CELL COUNT, UNSPECIFIED: ICD-10-CM

## 2018-03-08 DIAGNOSIS — C79.9 SECONDARY MALIGNANT NEOPLASM OF UNSPECIFIED SITE: ICD-10-CM

## 2018-03-08 DIAGNOSIS — J96.21 ACUTE AND CHRONIC RESPIRATORY FAILURE WITH HYPOXIA: ICD-10-CM

## 2018-03-08 DIAGNOSIS — J44.9 CHRONIC OBSTRUCTIVE PULMONARY DISEASE, UNSPECIFIED: ICD-10-CM

## 2018-03-08 LAB
ANION GAP SERPL CALC-SCNC: 10 MMOL/L — SIGNIFICANT CHANGE UP (ref 5–17)
BUN SERPL-MCNC: 20 MG/DL — SIGNIFICANT CHANGE UP (ref 7–23)
CALCIUM SERPL-MCNC: 8.7 MG/DL — SIGNIFICANT CHANGE UP (ref 8.5–10.1)
CHLORIDE SERPL-SCNC: 102 MMOL/L — SIGNIFICANT CHANGE UP (ref 96–108)
CO2 SERPL-SCNC: 23 MMOL/L — SIGNIFICANT CHANGE UP (ref 22–31)
CREAT SERPL-MCNC: 0.65 MG/DL — SIGNIFICANT CHANGE UP (ref 0.5–1.3)
FERRITIN SERPL-MCNC: 257 NG/ML — HIGH (ref 15–150)
GLUCOSE SERPL-MCNC: 81 MG/DL — SIGNIFICANT CHANGE UP (ref 70–99)
HCT VFR BLD CALC: 34.1 % — LOW (ref 34.5–45)
HGB BLD-MCNC: 11.9 G/DL — SIGNIFICANT CHANGE UP (ref 11.5–15.5)
IRON SATN MFR SERPL: 21 % — SIGNIFICANT CHANGE UP (ref 14–50)
IRON SATN MFR SERPL: 41 UG/DL — SIGNIFICANT CHANGE UP (ref 30–160)
MCHC RBC-ENTMCNC: 31.3 PG — SIGNIFICANT CHANGE UP (ref 27–34)
MCHC RBC-ENTMCNC: 34.8 GM/DL — SIGNIFICANT CHANGE UP (ref 32–36)
MCV RBC AUTO: 90 FL — SIGNIFICANT CHANGE UP (ref 80–100)
NON-GYNECOLOGICAL CYTOLOGY STUDY: SIGNIFICANT CHANGE UP
PLATELET # BLD AUTO: 249 K/UL — SIGNIFICANT CHANGE UP (ref 150–400)
POTASSIUM SERPL-MCNC: 4.2 MMOL/L — SIGNIFICANT CHANGE UP (ref 3.5–5.3)
POTASSIUM SERPL-SCNC: 4.2 MMOL/L — SIGNIFICANT CHANGE UP (ref 3.5–5.3)
RBC # BLD: 3.79 M/UL — LOW (ref 3.8–5.2)
RBC # BLD: 3.83 M/UL — SIGNIFICANT CHANGE UP (ref 3.8–5.2)
RBC # FLD: 13.8 % — SIGNIFICANT CHANGE UP (ref 10.3–14.5)
RETICS #: 133.3 K/UL — HIGH (ref 25–125)
RETICS/RBC NFR: 3.5 % — HIGH (ref 0.5–2.5)
S PNEUM AG UR QL: NEGATIVE — SIGNIFICANT CHANGE UP
SODIUM SERPL-SCNC: 135 MMOL/L — SIGNIFICANT CHANGE UP (ref 135–145)
TIBC SERPL-MCNC: 192 UG/DL — LOW (ref 220–430)
UIBC SERPL-MCNC: 151 UG/DL — SIGNIFICANT CHANGE UP (ref 110–370)
WBC # BLD: 13 K/UL — HIGH (ref 3.8–10.5)
WBC # FLD AUTO: 13 K/UL — HIGH (ref 3.8–10.5)

## 2018-03-08 PROCEDURE — 80048 BASIC METABOLIC PNL TOTAL CA: CPT

## 2018-03-08 PROCEDURE — 87899 AGENT NOS ASSAY W/OPTIC: CPT

## 2018-03-08 PROCEDURE — 80074 ACUTE HEPATITIS PANEL: CPT

## 2018-03-08 PROCEDURE — 80053 COMPREHEN METABOLIC PANEL: CPT

## 2018-03-08 PROCEDURE — 87449 NOS EACH ORGANISM AG IA: CPT

## 2018-03-08 PROCEDURE — 82378 CARCINOEMBRYONIC ANTIGEN: CPT

## 2018-03-08 PROCEDURE — 36415 COLL VENOUS BLD VENIPUNCTURE: CPT

## 2018-03-08 PROCEDURE — 96374 THER/PROPH/DIAG INJ IV PUSH: CPT

## 2018-03-08 PROCEDURE — 83605 ASSAY OF LACTIC ACID: CPT

## 2018-03-08 PROCEDURE — 85610 PROTHROMBIN TIME: CPT

## 2018-03-08 PROCEDURE — 87641 MR-STAPH DNA AMP PROBE: CPT

## 2018-03-08 PROCEDURE — 87086 URINE CULTURE/COLONY COUNT: CPT

## 2018-03-08 PROCEDURE — 94640 AIRWAY INHALATION TREATMENT: CPT

## 2018-03-08 PROCEDURE — 85045 AUTOMATED RETICULOCYTE COUNT: CPT

## 2018-03-08 PROCEDURE — 83690 ASSAY OF LIPASE: CPT

## 2018-03-08 PROCEDURE — 82728 ASSAY OF FERRITIN: CPT

## 2018-03-08 PROCEDURE — 99239 HOSP IP/OBS DSCHRG MGMT >30: CPT

## 2018-03-08 PROCEDURE — 86301 IMMUNOASSAY TUMOR CA 19-9: CPT

## 2018-03-08 PROCEDURE — 87040 BLOOD CULTURE FOR BACTERIA: CPT

## 2018-03-08 PROCEDURE — 83735 ASSAY OF MAGNESIUM: CPT

## 2018-03-08 PROCEDURE — 83550 IRON BINDING TEST: CPT

## 2018-03-08 PROCEDURE — 87640 STAPH A DNA AMP PROBE: CPT

## 2018-03-08 PROCEDURE — 71045 X-RAY EXAM CHEST 1 VIEW: CPT

## 2018-03-08 PROCEDURE — 93005 ELECTROCARDIOGRAM TRACING: CPT

## 2018-03-08 PROCEDURE — 99285 EMERGENCY DEPT VISIT HI MDM: CPT | Mod: 25

## 2018-03-08 PROCEDURE — 96375 TX/PRO/DX INJ NEW DRUG ADDON: CPT

## 2018-03-08 PROCEDURE — 84100 ASSAY OF PHOSPHORUS: CPT

## 2018-03-08 PROCEDURE — 81001 URINALYSIS AUTO W/SCOPE: CPT

## 2018-03-08 PROCEDURE — 85027 COMPLETE CBC AUTOMATED: CPT

## 2018-03-08 PROCEDURE — 82105 ALPHA-FETOPROTEIN SERUM: CPT

## 2018-03-08 PROCEDURE — 83615 LACTATE (LD) (LDH) ENZYME: CPT

## 2018-03-08 PROCEDURE — 74176 CT ABD & PELVIS W/O CONTRAST: CPT

## 2018-03-08 PROCEDURE — 87493 C DIFF AMPLIFIED PROBE: CPT

## 2018-03-08 PROCEDURE — 83036 HEMOGLOBIN GLYCOSYLATED A1C: CPT

## 2018-03-08 RX ORDER — LIDOCAINE 4 G/100G
1 CREAM TOPICAL
Qty: 30 | Refills: 0 | OUTPATIENT
Start: 2018-03-08 | End: 2018-04-06

## 2018-03-08 RX ORDER — ALPRAZOLAM 0.25 MG
1 TABLET ORAL
Qty: 0 | Refills: 0 | COMMUNITY

## 2018-03-08 RX ORDER — MORPHINE SULFATE 50 MG/1
1 CAPSULE, EXTENDED RELEASE ORAL ONCE
Qty: 0 | Refills: 0 | Status: DISCONTINUED | OUTPATIENT
Start: 2018-03-08 | End: 2018-03-08

## 2018-03-08 RX ORDER — SENNA PLUS 8.6 MG/1
2 TABLET ORAL AT BEDTIME
Qty: 0 | Refills: 0 | Status: DISCONTINUED | OUTPATIENT
Start: 2018-03-08 | End: 2018-03-08

## 2018-03-08 RX ORDER — DOCUSATE SODIUM 100 MG
100 CAPSULE ORAL
Qty: 0 | Refills: 0 | Status: DISCONTINUED | OUTPATIENT
Start: 2018-03-08 | End: 2018-03-08

## 2018-03-08 RX ORDER — IPRATROPIUM/ALBUTEROL SULFATE 18-103MCG
3 AEROSOL WITH ADAPTER (GRAM) INHALATION
Qty: 0 | Refills: 0 | COMMUNITY

## 2018-03-08 RX ORDER — ALPRAZOLAM 0.25 MG
1 TABLET ORAL
Qty: 45 | Refills: 0 | OUTPATIENT
Start: 2018-03-08 | End: 2018-03-22

## 2018-03-08 RX ORDER — IPRATROPIUM/ALBUTEROL SULFATE 18-103MCG
3 AEROSOL WITH ADAPTER (GRAM) INHALATION
Qty: 360 | Refills: 0 | OUTPATIENT
Start: 2018-03-08 | End: 2018-04-06

## 2018-03-08 RX ORDER — QUINAPRIL HYDROCHLORIDE 40 MG/1
1 TABLET, FILM COATED ORAL
Qty: 0 | Refills: 0 | COMMUNITY

## 2018-03-08 RX ORDER — DOCUSATE SODIUM 100 MG
1 CAPSULE ORAL
Qty: 0 | Refills: 0 | COMMUNITY
Start: 2018-03-08

## 2018-03-08 RX ORDER — ACETAMINOPHEN 500 MG
1000 TABLET ORAL ONCE
Qty: 0 | Refills: 0 | Status: COMPLETED | OUTPATIENT
Start: 2018-03-08 | End: 2018-03-08

## 2018-03-08 RX ORDER — FENTANYL CITRATE 50 UG/ML
1 INJECTION INTRAVENOUS
Qty: 0 | Refills: 0 | Status: DISCONTINUED | OUTPATIENT
Start: 2018-03-08 | End: 2018-03-08

## 2018-03-08 RX ORDER — ALPRAZOLAM 0.25 MG
0.25 TABLET ORAL ONCE
Qty: 0 | Refills: 0 | Status: DISCONTINUED | OUTPATIENT
Start: 2018-03-08 | End: 2018-03-08

## 2018-03-08 RX ORDER — HYDRALAZINE HCL 50 MG
1 TABLET ORAL
Qty: 0 | Refills: 0 | COMMUNITY

## 2018-03-08 RX ORDER — CELECOXIB 200 MG/1
1 CAPSULE ORAL
Qty: 0 | Refills: 0 | COMMUNITY

## 2018-03-08 RX ADMIN — Medication 1000 MILLIGRAM(S): at 10:28

## 2018-03-08 RX ADMIN — Medication 100 MILLIGRAM(S): at 06:51

## 2018-03-08 RX ADMIN — Medication 100 MILLIGRAM(S): at 11:45

## 2018-03-08 RX ADMIN — Medication 3 MILLILITER(S): at 07:49

## 2018-03-08 RX ADMIN — Medication 3 MILLILITER(S): at 14:13

## 2018-03-08 RX ADMIN — Medication 1 TABLET(S): at 11:45

## 2018-03-08 RX ADMIN — MORPHINE SULFATE 1 MILLIGRAM(S): 50 CAPSULE, EXTENDED RELEASE ORAL at 04:22

## 2018-03-08 RX ADMIN — LIDOCAINE 1 PATCH: 4 CREAM TOPICAL at 04:13

## 2018-03-08 RX ADMIN — Medication 100 MICROGRAM(S): at 05:31

## 2018-03-08 RX ADMIN — Medication 100 MILLIGRAM(S): at 09:00

## 2018-03-08 RX ADMIN — Medication 0.5 MILLIGRAM(S): at 07:49

## 2018-03-08 RX ADMIN — AMLODIPINE BESYLATE 5 MILLIGRAM(S): 2.5 TABLET ORAL at 05:31

## 2018-03-08 RX ADMIN — PANTOPRAZOLE SODIUM 40 MILLIGRAM(S): 20 TABLET, DELAYED RELEASE ORAL at 05:31

## 2018-03-08 RX ADMIN — Medication 50 MILLIGRAM(S): at 05:31

## 2018-03-08 RX ADMIN — FENTANYL CITRATE 1 PATCH: 50 INJECTION INTRAVENOUS at 06:54

## 2018-03-08 RX ADMIN — MORPHINE SULFATE 1 MILLIGRAM(S): 50 CAPSULE, EXTENDED RELEASE ORAL at 04:37

## 2018-03-08 RX ADMIN — Medication 81 MILLIGRAM(S): at 11:45

## 2018-03-08 RX ADMIN — ALBUTEROL 2.5 MILLIGRAM(S): 90 AEROSOL, METERED ORAL at 14:17

## 2018-03-08 RX ADMIN — Medication 0.25 MILLIGRAM(S): at 12:57

## 2018-03-08 RX ADMIN — Medication 400 MILLIGRAM(S): at 09:28

## 2018-03-08 RX ADMIN — Medication 1000 UNIT(S): at 11:45

## 2018-03-08 RX ADMIN — Medication 30 MILLIGRAM(S): at 05:31

## 2018-03-08 RX ADMIN — ALBUTEROL 2.5 MILLIGRAM(S): 90 AEROSOL, METERED ORAL at 07:49

## 2018-03-08 RX ADMIN — DORZOLAMIDE HYDROCHLORIDE TIMOLOL MALEATE 1 DROP(S): 20; 5 SOLUTION/ DROPS OPHTHALMIC at 05:30

## 2018-03-08 RX ADMIN — ALBUTEROL 2.5 MILLIGRAM(S): 90 AEROSOL, METERED ORAL at 00:35

## 2018-03-08 NOTE — PROGRESS NOTE ADULT - SUBJECTIVE AND OBJECTIVE BOX
Interval Events: Diarrhea has resolved. No new overnight event.  No N/V/D.  Tolerating diet.    HPI:Patient is a 71 yo female with pmhx of AVR, arthritis, CAD, emphysema on 3L home O2, glaucoma, c. diff, PNA, HLD, HTN, hypothyroidism, anemia, PVD, presented to ED with multiple episodes of diarrhea and SOB. Patient was recently admitted to [place] on 2/2018 with multilobar PNA, found to be RSV+, was treated with rocephin and zithromax. Patient was found to have mediastinal lymphadenopathy on chest CT and underwent bronchoscopy, EBUS with TBNA and endobronchial biopsy at [facility] on 3/1, stated that she will get the results of the biopsy by end of this week. Patient was discharged home from [facility] and stated that she continues to feel increasing SOB and weak since her initial PNA diagnosis. Patient also stated that she started having multiple episodes of watery diarrhea today(5 at home, 3 in ER). Complained of right sided abdominal pain during examination. Also complained of right sided middle back pain for the past few days. Denies any fever, chills, chest pain, palpitations, nausea, vomiting, dysuria.     MEDICATIONS  (STANDING):  acetylcysteine 10% Inhalation 3 milliLiter(s) Inhalation three times a day  ALBUTerol    0.083% 2.5 milliGRAM(s) Nebulizer every 8 hours  amLODIPine   Tablet 5 milliGRAM(s) Oral two times a day  aspirin enteric coated 81 milliGRAM(s) Oral daily  atorvastatin 40 milliGRAM(s) Oral at bedtime  buDESOnide   0.5 milliGRAM(s) Respule 0.5 milliGRAM(s) Inhalation two times a day  cholecalciferol 1000 Unit(s) Oral daily  docusate sodium 100 milliGRAM(s) Oral two times a day  dorzolamide 2%/timolol 0.5% Ophthalmic Solution 1 Drop(s) Both EYES two times a day  fentaNYL   Patch  12 MICROgram(s)/Hr 1 Patch Transdermal every 72 hours  gabapentin 300 milliGRAM(s) Oral at bedtime  latanoprost 0.005% Ophthalmic Solution 1 Drop(s) Both EYES at bedtime  levothyroxine 100 MICROGram(s) Oral daily  metoprolol succinate ER 50 milliGRAM(s) Oral daily  multivitamin 1 Tablet(s) Oral daily  pantoprazole    Tablet 40 milliGRAM(s) Oral before breakfast  predniSONE   Tablet 30 milliGRAM(s) Oral daily  pyridoxine 100 milliGRAM(s) Oral daily  senna 2 Tablet(s) Oral at bedtime  sucralfate 1 Gram(s) Oral at bedtime    MEDICATIONS  (PRN):  guaiFENesin    Syrup 100 milliGRAM(s) Oral every 6 hours PRN Cough      Allergies    No Known Allergies    Intolerances        Review of Systems:    General:  No wt loss, fevers, chills, night sweats,fatigue,   Eyes:  Good vision, no reported pain  ENT:  No sore throat, pain, runny nose, dysphagia  CV:  No pain, palpitations, hypo/hypertension  Resp:  No dyspnea, cough, tachypnea, wheezing  GI:  No pain, No nausea, No vomiting, No diarrhea, No constipation, No weight loss, No fever, No pruritis, No rectal bleeding, No melena, No dysphagia  :  No pain, bleeding, incontinence, nocturia  Muscle:  No pain, weakness  Neuro:  No weakness, tingling, memory problems  Psych:  No fatigue, insomnia, mood problems, depression  Endocrine:  No polyuria, polydypsia, cold/heat intolerance  Heme:  No petechiae, ecchymosis, easy bruisability  Skin:  No rash, tattoos, scars, edema      Vital Signs Last 24 Hrs  T(C): 36.5 (08 Mar 2018 04:05), Max: 36.7 (07 Mar 2018 14:01)  T(F): 97.7 (08 Mar 2018 04:05), Max: 98.1 (07 Mar 2018 14:01)  HR: 84 (08 Mar 2018 04:05) (73 - 87)  BP: 115/57 (08 Mar 2018 04:05) (115/57 - 138/72)  BP(mean): --  RR: 17 (08 Mar 2018 04:05) (16 - 18)  SpO2: 91% (08 Mar 2018 04:05) (88% - 92%)    PHYSICAL EXAM:    Constitutional: NAD, well-developed  HEENT: EOMI, throat clear  Neck: No LAD, supple  Respiratory: CTA and P  Cardiovascular: S1 and S2, RRR, no M  Gastrointestinal: BS+, soft, NT/ND, neg HSM,  Extremities: No peripheral edema, neg clubing, cyanosis  Vascular: 2+ peripheral pulses  Neurological: A/O x 3, no focal deficits  Psychiatric: Normal mood, normal affect  Skin: No rashes      LABS:                        11.9   13.0  )-----------( 249      ( 08 Mar 2018 08:49 )             34.1     03-08    135  |  102  |  20  ----------------------------<  81  4.2   |  23  |  0.65    Ca    8.7      08 Mar 2018 08:49  Phos  3.0     03-07  Mg     2.4     03-07    TPro  6.5  /  Alb  2.8<L>  /  TBili  0.3  /  DBili  x   /  AST  85<H>  /  ALT  99<H>  /  AlkPhos  60  03-07          RADIOLOGY & ADDITIONAL TESTS:

## 2018-03-08 NOTE — DISCHARGE NOTE ADULT - MEDICATION SUMMARY - MEDICATIONS TO CHANGE
I will SWITCH the dose or number of times a day I take the medications listed below when I get home from the hospital:    predniSONE 5 mg oral tablet  -- decreasing doses as per md instructions    ALPRAZolam 0.5 mg oral tablet  -- 1 tab(s) by mouth , As Needed

## 2018-03-08 NOTE — DISCHARGE NOTE ADULT - HOSPITAL COURSE
HPI: HPI:   Patient is a 71 yo female with pmhx of AVR, arthritis, CAD, emphysema on 3L home O2, glaucoma, c. diff, PNA, HLD, HTN, hypothyroidism, anemia, PVD, presented to ED with multiple episodes of diarrhea and SOB. Patient was recently admitted to [facility] on 2/2018 with multilobar PNA, found to be RSV+, was treated with rocephin and zithromax. Patient was found to have mediastinal lymphadenopathy on chest CT and underwent bronchoscopy, EBUS with TBNA and endobronchial biopsy at [facility] on 3/1, stated that she will get the results of the biopsy by end of this week. Patient was discharged home from [facility] and stated that she continues to feel increasing SOB and weak since her initial PNA diagnosis. Patient also stated that she started having multiple episodes of watery diarrhea today (5 at home, 3 in ER). Complained of right sided abdominal pain during examination. Also complained of right sided middle back pain for the past few days. Denies any fever, chills, chest pain, palpitations, nausea, vomiting, dysuria.     Hospital Course:  In the ED, patient was afebrile 96.8, 80bpm, 107/60, RR 18@ 96% supplemental O2; WBC 18, Lactate 2.3 (3.7 initially); UA negative.  CT abdomen showed: Dense consolidation of the right middle lower lobes, progressing from prior exam 2/6/2018 likely reflecting pneumonia. Necrotizing pneumonia is not excluded. Possibility of underlying neoplasm is raised given the progression from prior exam. New ill-defined hypodense lesions in the liver, suspicious for possibility of metastatic disease.  CXR done on 3/1 showed: partially loculated moderate right pleural effusion with enlarging pleural effusion and worsening aeration  CEA (2/7): 8.6  Patient initially given vanco/zosyn for possible HCAP that had incompletely responded to levaquin. Empirically started on vanco po until c diff PCR came back negative. Pt's diarrhea resolved spontaneously. Pathology from biopsies taken by EBUS on 3/1 came back positive for metastatic carcinoma. All antibiotics were discontinued as the leukocytosis could be explained by the high dose systemic steroids pt had been taking as outpt and the lack of fever or significant deterioration that would have been expected by a PNA that was resistant to treatment for a month. ID (Denis) was on the case. Onc (Martin) also evaluated the pt, who was one of his office pts. Pt offered further staging imaging as inpatient including MRI of the brain, but pt opted to continue work-up as outpt because she is very claustrophobic and wishes to use open MRIs as outpt if possible. Will also have PET scan as outpt to eval liver lesions for signs of mets. Pt's respiration was stable on supplemental O2 via NC, which she had at home. Pulm (Eula) gave recommendations for symptomatic management of the likely lung ca. GI ([facility]) also was consulted on admission when the liver lesions were visualized. Pt's leukocytosis trended down off antibiotics with lower dose of steroids.   Pt was stable and discharged to home and will f/up with oncology in the beginning of next week.     On day of discharge:  ROS: +SOB (stable for weeks), denies CP, palpitations, diarrhea, abd pain, fever, chills.   VS: BP: 105/58; T: 98.2; HR: 90; RR: 18; 91% on 4L NC  PHYSICAL EXAM:  GENERAL: NAD at rest  HEENT:  EOMI, pupils are not round (chronic from eye surgeries as per pt); moist mucous membranes  CHEST/LUNG:  diminished breath sounds on right; grossly clear on left  HEART:  RRR, S1, S2, 3/6 systolic murmur  ABDOMEN:  BS+, soft, nontender, nondistended  EXTREMITIES: no edema, cyanosis, or calf tenderness  NERVOUS SYSTEM: AA&Ox3    Called pt’s PMD’s ([facility]) office and left message regarding discharge.    Time spent: 45min

## 2018-03-08 NOTE — DISCHARGE NOTE ADULT - PLAN OF CARE
Take your inhalers and nebulizer treatments and prednisone 20mg daily for now. Follow up with your pulmonologist in a week. Use 5Liters of oxygen with nasal cannula. Stop taking the quinapril and the hydralazine. Continue the metoprolol and the amlodipine. Monitor your blood pressure and make a log of blood pressure values to show your PMD and decide if you need to add back any blood pressure meds. You were found to have 2 liver lesions on CT scan. Follow up with Dr. Salazar to schedule further imaging like a PET scan or an MRI of your abdomen to further investigate. Your kidney function was decreased when you came to the hospital. That may have been due to dehydration from diarrhea, but may also have been contributed by the quinapril and celebrex you were taking. Stop taking those two medications for now. Follow up with your PMD about when/if it is safe to restart them. Continue your home aspirin and crestor and follow up with PMD or cardiology. the diarrhea you had at home resolved. Follow up with PMD. diagnosis and treatment Biopsies confirmed malignancy. Please follow up with your oncologist Dr. Salazar on Monday to schedule further diagnostic/staging scans as an outpatient and to discuss the remainder of the biopsy results as they come back and make a treatment plan. Follow up with your PMD.   Use 5Liters of oxygen with nasal cannula.

## 2018-03-08 NOTE — PROGRESS NOTE ADULT - PROBLEM SELECTOR PLAN 1
copd - prednisone, inhaler regimen, albuterol nebs, 02 support as needed, keep sat > 88 pct  pt with pain, diffuse upper back, will give IV tylenol now, will add Fentanyl patch - lowest dose, will add colace and senna for bowel regimen  Onc eval noted, full path report pending, suspect mets lung ca  GI eval noted  ID follow up  increase activity as tolerated  prognosis guarded
ct shows poss PNA  ID eval - no ABX at present  monitor sx  Bx shows Mets Ca - s/p EBUS with Dr. Galo  Onc cx pending  cont Albuterol - will add Mucomyst, cont steroids at curr dose - COPD  o2 support, keep sat > 88 pct  s/p LASIX x 1 overnight  I and O  cvs regimen and BP control  prognosis poor  Bx shows mets ca  will follow
-pathology from biopsies of endobronchial lesion and thoracic LN both positive for metastatic carcinoma (awaiting immunohistochemical stains to better characterize the malignant cells)  -onc consult appreciated  -pt will need MRI Abd and MRI Brain for further staging purposes   -pt had been hesitant to have MRI in [facility] due to severe claustrophobia; seems pt will schedule the tests as outpt to have done in open MRI if possible, and if not, with sedation as she has had in previous outpt MRIs at  Radiology
c diff negative   stool studies ordered  improving
c diff negative   stool studies ordered  improving

## 2018-03-08 NOTE — PROGRESS NOTE ADULT - SUBJECTIVE AND OBJECTIVE BOX
Date/Time Patient Seen:  		  Referring MD:   Data Reviewed	       Patient is a 72y old  Female who presents with a chief complaint of diarrhea (06 Mar 2018 13:47)  in bed  seen and examined  vs and meds reviewed        Subjective/HPI     PAST MEDICAL & SURGICAL HISTORY:  Clostridium difficile colitis  Emphysema  Pneumonia: 2/2018  Generalized intra-abdominal and pelvic swelling, mass and lump  Glaucoma  Aortic valve prosthesis present  CAD (coronary artery disease)  Arthritis  Heart murmur  Other iron deficiency anemia  Carpal tunnel syndrome of right wrist  PVD (peripheral vascular disease)  Hyperlipemia  Colitis  Hypothyroid  Hypertension  COPD (chronic obstructive pulmonary disease)  H/O foot surgery: (Right foot, 2010)  History of colonoscopy  S/P carpal tunnel release: (Right, 1/2017)  H/O carotid endarterectomy: Both sides 2002  Aortic valve replaced: 2011 with bovine  Carotid disease, bilateral  Cataract extraction status of left eye  Cataract extraction status of right eye        Medication list         MEDICATIONS  (STANDING):  acetaminophen  IVPB. 1000 milliGRAM(s) IV Intermittent once  acetylcysteine 10% Inhalation 3 milliLiter(s) Inhalation three times a day  ALBUTerol    0.083% 2.5 milliGRAM(s) Nebulizer every 8 hours  amLODIPine   Tablet 5 milliGRAM(s) Oral two times a day  aspirin enteric coated 81 milliGRAM(s) Oral daily  atorvastatin 40 milliGRAM(s) Oral at bedtime  buDESOnide   0.5 milliGRAM(s) Respule 0.5 milliGRAM(s) Inhalation two times a day  cholecalciferol 1000 Unit(s) Oral daily  docusate sodium 100 milliGRAM(s) Oral two times a day  dorzolamide 2%/timolol 0.5% Ophthalmic Solution 1 Drop(s) Both EYES two times a day  fentaNYL   Patch  12 MICROgram(s)/Hr 1 Patch Transdermal every 72 hours  gabapentin 300 milliGRAM(s) Oral at bedtime  latanoprost 0.005% Ophthalmic Solution 1 Drop(s) Both EYES at bedtime  levothyroxine 100 MICROGram(s) Oral daily  metoprolol succinate ER 50 milliGRAM(s) Oral daily  multivitamin 1 Tablet(s) Oral daily  pantoprazole    Tablet 40 milliGRAM(s) Oral before breakfast  predniSONE   Tablet 30 milliGRAM(s) Oral daily  pyridoxine 100 milliGRAM(s) Oral daily  senna 2 Tablet(s) Oral at bedtime  sucralfate 1 Gram(s) Oral at bedtime    MEDICATIONS  (PRN):  guaiFENesin    Syrup 100 milliGRAM(s) Oral every 6 hours PRN Cough         Vitals log        ICU Vital Signs Last 24 Hrs  T(C): 36.5 (08 Mar 2018 04:05), Max: 36.7 (07 Mar 2018 14:01)  T(F): 97.7 (08 Mar 2018 04:05), Max: 98.1 (07 Mar 2018 14:01)  HR: 84 (08 Mar 2018 04:05) (73 - 87)  BP: 115/57 (08 Mar 2018 04:05) (115/57 - 138/72)  BP(mean): --  ABP: --  ABP(mean): --  RR: 17 (08 Mar 2018 04:05) (16 - 18)  SpO2: 91% (08 Mar 2018 04:05) (88% - 92%)           Input and Output:  I&O's Detail    06 Mar 2018 07:01  -  07 Mar 2018 07:00  --------------------------------------------------------  IN:  Total IN: 0 mL    OUT:    Voided: 320 mL  Total OUT: 320 mL    Total NET: -320 mL          Lab Data                        10.7   13.0  )-----------( 242      ( 07 Mar 2018 06:17 )             33.1     03-07    135  |  102  |  26<H>  ----------------------------<  229<H>  4.1   |  22  |  1.00    Ca    8.5      07 Mar 2018 06:17  Phos  3.0     03-07  Mg     2.4     03-07    TPro  6.5  /  Alb  2.8<L>  /  TBili  0.3  /  DBili  x   /  AST  85<H>  /  ALT  99<H>  /  AlkPhos  60  03-07            Review of Systems	      Objective     Physical Examination    head at  heart s1s2  lungs dec BS  cn grossly int      Pertinent Lab findings & Imaging      Rangel:  NO   Adequate UO     I&O's Detail    06 Mar 2018 07:01  -  07 Mar 2018 07:00  --------------------------------------------------------  IN:  Total IN: 0 mL    OUT:    Voided: 320 mL  Total OUT: 320 mL    Total NET: -320 mL               Discussed with:     Cultures:	        Radiology

## 2018-03-08 NOTE — DISCHARGE NOTE ADULT - CARE PLAN
Principal Discharge DX:	Cancer  Goal:	diagnosis and treatment  Assessment and plan of treatment:	Biopsies confirmed malignancy. Please follow up with your oncologist Dr. Salazar on Monday to schedule further diagnostic/staging scans as an outpatient and to discuss the remainder of the biopsy results as they come back and make a treatment plan. Follow up with your PMD.   Use 5Liters of oxygen with nasal cannula.  Secondary Diagnosis:	Emphysema  Assessment and plan of treatment:	Take your inhalers and nebulizer treatments and prednisone 20mg daily for now. Follow up with your pulmonologist in a week. Use 5Liters of oxygen with nasal cannula.  Secondary Diagnosis:	Hypertension  Assessment and plan of treatment:	Stop taking the quinapril and the hydralazine. Continue the metoprolol and the amlodipine. Monitor your blood pressure and make a log of blood pressure values to show your PMD and decide if you need to add back any blood pressure meds.  Secondary Diagnosis:	Liver lesion  Assessment and plan of treatment:	You were found to have 2 liver lesions on CT scan. Follow up with Dr. Salazar to schedule further imaging like a PET scan or an MRI of your abdomen to further investigate.  Secondary Diagnosis:	KALYAN (acute kidney injury)  Assessment and plan of treatment:	Your kidney function was decreased when you came to the hospital. That may have been due to dehydration from diarrhea, but may also have been contributed by the quinapril and celebrex you were taking. Stop taking those two medications for now. Follow up with your PMD about when/if it is safe to restart them.  Secondary Diagnosis:	CAD (coronary artery disease)  Assessment and plan of treatment:	Continue your home aspirin and crestor and follow up with PMD or cardiology.  Secondary Diagnosis:	Diarrhea, unspecified type  Assessment and plan of treatment:	the diarrhea you had at home resolved. Follow up with PMD.

## 2018-03-08 NOTE — DISCHARGE NOTE ADULT - MEDICATION SUMMARY - MEDICATIONS TO STOP TAKING
I will STOP taking the medications listed below when I get home from the hospital:    quinapril 40 mg oral tablet  -- 1 tab(s) by mouth once a day in morning    CeleBREX 200 mg oral capsule  -- 1 cap(s) by mouth once a day (at bedtime) -    hydrALAZINE 10 mg oral tablet  -- 1 tab(s) by mouth 2 times a day

## 2018-03-08 NOTE — DISCHARGE NOTE ADULT - SECONDARY DIAGNOSIS.
Hypertension Liver lesion KALYAN (acute kidney injury) CAD (coronary artery disease) Diarrhea, unspecified type Emphysema

## 2018-03-08 NOTE — PROGRESS NOTE ADULT - PROBLEM SELECTOR PROBLEM 1
Acute on chronic respiratory failure with hypoxemia
Pneumonia
Diarrhea, unspecified type
Diarrhea, unspecified type
Metastatic carcinoma

## 2018-03-08 NOTE — PROGRESS NOTE ADULT - ASSESSMENT
73 y/o woman with PMH of iron def anemia 2/2 AVMs on biweekly IV iron, s/p valve replacement who underwent EBUS for evaluation of mediastinal LAD and was d/w cancer.      - s/p EBUS 3/1/18 prelim report is: metastatic carcinoma   - spoke with [facility] lab, no final report is available, to call tomorrow at [phone]  - liver lesions seen on CT scan, suspicious for metastatic disease   - additional studies will be needed for staging  - lab work remains stable and will need to check biomarkers and complete staging workup with PET/CT  - if discharged today will re-evaluate as an outpatient

## 2018-03-08 NOTE — DISCHARGE NOTE ADULT - PATIENT PORTAL LINK FT
You can access the [facility] Portal, offered by [facility], by registering with the following website: [email or web address]

## 2018-03-08 NOTE — DISCHARGE NOTE ADULT - MEDICATION SUMMARY - MEDICATIONS TO TAKE
I will START or STAY ON the medications listed below when I get home from the hospital:    Pulmicort Respules 0.5 mg/2 mL inhalation suspension  -- 2 milliliter(s) inhaled 2 times a day  -- Indication: For COPD (chronic obstructive pulmonary disease)    predniSONE 10 mg oral tablet  -- 2 tab(s) by mouth once a day  -- Indication: For COPD (chronic obstructive pulmonary disease)    Aspirin Low Dose 81 mg oral delayed release tablet  -- 2 tab(s) by mouth once a day in morning  -- Indication: For CAD (coronary artery disease)    Tylenol 325 mg oral tablet  -- 2 tab(s) by mouth every 6 hours, As Needed for pain  -- Indication: For Pain if needed    Percocet 5/325 oral tablet  -- 1 tab(s) by mouth every 6 hours, As Needed for severe pain MDD:4tabs  -- Caution federal law prohibits the transfer of this drug to any person other  than the person for whom it was prescribed.  May cause drowsiness.  Alcohol may intensify this effect.  Use care when operating dangerous machinery.  This prescription cannot be refilled.  This product contains acetaminophen.  Do not use  with any other product containing acetaminophen to prevent possible liver damage.  Using more of this medication than prescribed may cause serious breathing problems.    -- Indication: For Severe pain if needed    Neurontin 300 mg oral capsule  -- 1 cap(s) by mouth once a day (at bedtime)  -- Indication: For Neuropathy    Crestor 10 mg oral tablet  -- 1 tab(s) by mouth once a day (at bedtime)  -- Indication: For CAD (coronary artery disease)    benzonatate 200 mg oral capsule  -- 1 cap(s) by mouth 3 times a day, As Needed  -- Indication: For COugh if needed    ALPRAZolam 0.25 mg oral tablet  -- 1 tab(s) by mouth 3 times a day, As Needed for anxiety MDD:3tab  -- Avoid grapefruit and grapefruit juice while taking this medication.  Caution federal law prohibits the transfer of this drug to any person other  than the person for whom it was prescribed.  Do not take this drug if you are pregnant.  May cause drowsiness.  Alcohol may intensify this effect.  Use care when operating dangerous machinery.    -- Indication: For Anxiety if needed    Metoprolol Succinate ER 50 mg oral tablet, extended release  -- 1 tab(s) by mouth 2 times a day  -- Indication: For Hypertension    ipratropium-albuterol 0.5 mg-2.5 mg/3 mLinhalation solution  -- 3 milliliter(s) inhaled 4 times a day, As Needed wheezing or shortness of breath  -- Indication: For COPD (chronic obstructive pulmonary disease)    Norvasc 5 mg oral tablet  -- 1 tab(s) by mouth 2 times a day  -- Indication: For Hypertension    Lidoderm 5% topical film  -- Apply on skin to affected area once a day (to the back of neck where you are having pain)  -- For external use only.  Remove old patch prior to applying a new patch.    -- Indication: For Pain on back/neck    docusate sodium 100 mg oral capsule  -- 1 cap(s) by mouth 2 times a day  -- Indication: For COnstipation    sucralfate 1 g oral tablet  -- 1 tab(s) by mouth once a day (at bedtime)  -- Indication: For Home stomach coating medication    tiZANidine 2 mg oral tablet  -- 2 tab(s) by mouth once a day (at bedtime), As Needed  -- Indication: For Home muscle relaxant    Fish Oil 1200 mg oral capsule  -- 1 cap(s) by mouth 2 times daily   -- Indication: For Home supplement    Cosopt 2.23%-0.68% ophthalmic solution  -- 1 drop(s) to each affected eye 2 times a day  -- Indication: For Home eye drops    Xalatan 0.005% ophthalmic solution  -- 1 drop(s) to each affected eye once a day (in the evening)  -- Indication: For Home eye drops    PriLOSEC 20 mg oral delayed release capsule  -- 1 cap(s) by mouth 2 times a day  -- Indication: For Acid reflux    levothyroxine 100 mcg (0.1 mg) oral tablet  -- 1 tab(s) by mouth once a day in morning  -- Indication: For Hypothyroid    Multiple Vitamins oral tablet  -- 1 tab(s) by mouth once a day in morning   -- Indication: For Home vitamin    Vitamin B6 100 mg oral tablet  -- 1 tab(s) by mouth once a day in morning  -- Indication: For Home vitamin    Vitamin D3 1000 intl units oral tablet  -- 1 tab(s) by mouth once a day in morning  -- Indication: For Home vitamin

## 2018-03-08 NOTE — DISCHARGE NOTE ADULT - CARE PROVIDER_API CALL
Twin Salazar), Hematology; Internal Medicine; Medical Oncology  [address] [facility]  [address]  [place] [address]  Phone: [phone]  Fax: [phone]    Freddie Pradhan), Internal Medicine  [address] [facility]  [address]  [place] [address]  Phone: [phone]  Fax: [phone]    Weil, Peter A (MD), Critical Care Medicine; Internal Medicine; Pulmonary Disease  [address] [facility]  [address]  [place] [address]  Phone: [phone]  Fax: [phone]

## 2018-03-08 NOTE — PROGRESS NOTE ADULT - SUBJECTIVE AND OBJECTIVE BOX
Interval History:  remains weak. continues on treatment for shortness of breath  awaiting final pathology from [facility]    Chart reviewed and events noted;   Overnight events:    MEDICATIONS  (STANDING):  acetylcysteine 10% Inhalation 3 milliLiter(s) Inhalation three times a day  ALBUTerol    0.083% 2.5 milliGRAM(s) Nebulizer every 8 hours  amLODIPine   Tablet 5 milliGRAM(s) Oral two times a day  aspirin enteric coated 81 milliGRAM(s) Oral daily  atorvastatin 40 milliGRAM(s) Oral at bedtime  buDESOnide   0.5 milliGRAM(s) Respule 0.5 milliGRAM(s) Inhalation two times a day  cholecalciferol 1000 Unit(s) Oral daily  docusate sodium 100 milliGRAM(s) Oral two times a day  dorzolamide 2%/timolol 0.5% Ophthalmic Solution 1 Drop(s) Both EYES two times a day  fentaNYL   Patch  12 MICROgram(s)/Hr 1 Patch Transdermal every 72 hours  gabapentin 300 milliGRAM(s) Oral at bedtime  latanoprost 0.005% Ophthalmic Solution 1 Drop(s) Both EYES at bedtime  levothyroxine 100 MICROGram(s) Oral daily  metoprolol succinate ER 50 milliGRAM(s) Oral daily  multivitamin 1 Tablet(s) Oral daily  pantoprazole    Tablet 40 milliGRAM(s) Oral before breakfast  predniSONE   Tablet 30 milliGRAM(s) Oral daily  pyridoxine 100 milliGRAM(s) Oral daily  senna 2 Tablet(s) Oral at bedtime  sucralfate 1 Gram(s) Oral at bedtime    MEDICATIONS  (PRN):  guaiFENesin    Syrup 100 milliGRAM(s) Oral every 6 hours PRN Cough      Vital Signs Last 24 Hrs  T(C): 36.8 (08 Mar 2018 14:41), Max: 36.8 (08 Mar 2018 14:41)  T(F): 98.2 (08 Mar 2018 14:41), Max: 98.2 (08 Mar 2018 14:41)  HR: 90 (08 Mar 2018 14:41) (73 - 90)  BP: 105/58 (08 Mar 2018 14:41) (105/58 - 134/61)  BP(mean): --  RR: 18 (08 Mar 2018 14:41) (16 - 18)  SpO2: 91% (08 Mar 2018 14:41) (91% - 92%)    PHYSICAL EXAM  General: adult in NAD, chronically ill appearing  HEENT: clear oropharynx, anicteric sclera, pink conjunctivae  Neck: supple  CV: normal S1S2 with no murmur rubs or gallops  Lungs: decreased BS bilaterally  Abdomen: soft non-tender non-distended, no hepato/splenomegaly  Ext: no clubbing cyanosis or edema  Skin: no rashes and no petichiae  Neuro: alert and oriented X3 no focal deficits      LABS:  CBC Full  -  ( 08 Mar 2018 08:49 )  WBC Count : 13.0 K/uL  Hemoglobin : 11.9 g/dL  Hematocrit : 34.1 %  Platelet Count - Automated : 249 K/uL  Mean Cell Volume : 90.0 fl  Mean Cell Hemoglobin : 31.3 pg  Mean Cell Hemoglobin Concentration : 34.8 gm/dL  Auto Neutrophil # : x  Auto Lymphocyte # : x  Auto Monocyte # : x  Auto Eosinophil # : x  Auto Basophil # : x  Auto Neutrophil % : x  Auto Lymphocyte % : x  Auto Monocyte % : x  Auto Eosinophil % : x  Auto Basophil % : x    03-08    135  |  102  |  20  ----------------------------<  81  4.2   |  23  |  0.65    Ca    8.7      08 Mar 2018 08:49  Phos  3.0     03-07  Mg     2.4     03-07    TPro  6.5  /  Alb  2.8<L>  /  TBili  0.3  /  DBili  x   /  AST  85<H>  /  ALT  99<H>  /  AlkPhos  60  03-07        fe studies  Iron - Total Binding Capacity.: 192 ug/dL (03-08 @ 12:28)  Ferritin, Serum: 257 ng/mL (03-08 @ 12:28)      WBC trend  13.0 K/uL (03-08-18 @ 08:49)  13.0 K/uL (03-07-18 @ 06:17)  18.1 K/uL (03-06-18 @ 07:39)  18.0 K/uL (03-05-18 @ 22:31)      Hgb trend  11.9 g/dL (03-08-18 @ 08:49)  10.7 g/dL (03-07-18 @ 06:17)  10.8 g/dL (03-06-18 @ 07:39)  12.6 g/dL (03-05-18 @ 22:31)      plt trend  249 K/uL (03-08-18 @ 08:49)  242 K/uL (03-07-18 @ 06:17)  231 K/uL (03-06-18 @ 07:39)  270 K/uL (03-05-18 @ 22:31)        RADIOLOGY & ADDITIONAL STUDIES:

## 2018-03-14 ENCOUNTER — INPATIENT (INPATIENT)
Facility: HOSPITAL | Age: 73
LOS: 8 days | DRG: 846 | End: 2018-03-23
Attending: FAMILY MEDICINE | Admitting: HOSPITALIST
Payer: MEDICARE

## 2018-03-14 VITALS
OXYGEN SATURATION: 85 % | RESPIRATION RATE: 26 BRPM | TEMPERATURE: 97 F | DIASTOLIC BLOOD PRESSURE: 69 MMHG | HEART RATE: 86 BPM | WEIGHT: 136.91 LBS | SYSTOLIC BLOOD PRESSURE: 121 MMHG | HEIGHT: 61 IN

## 2018-03-14 DIAGNOSIS — Z29.9 ENCOUNTER FOR PROPHYLACTIC MEASURES, UNSPECIFIED: ICD-10-CM

## 2018-03-14 DIAGNOSIS — C79.9 SECONDARY MALIGNANT NEOPLASM OF UNSPECIFIED SITE: ICD-10-CM

## 2018-03-14 DIAGNOSIS — C34.90 MALIGNANT NEOPLASM OF UNSPECIFIED PART OF UNSPECIFIED BRONCHUS OR LUNG: ICD-10-CM

## 2018-03-14 DIAGNOSIS — Z98.890 OTHER SPECIFIED POSTPROCEDURAL STATES: Chronic | ICD-10-CM

## 2018-03-14 DIAGNOSIS — I25.10 ATHEROSCLEROTIC HEART DISEASE OF NATIVE CORONARY ARTERY WITHOUT ANGINA PECTORIS: ICD-10-CM

## 2018-03-14 DIAGNOSIS — E78.5 HYPERLIPIDEMIA, UNSPECIFIED: ICD-10-CM

## 2018-03-14 DIAGNOSIS — I10 ESSENTIAL (PRIMARY) HYPERTENSION: ICD-10-CM

## 2018-03-14 DIAGNOSIS — E03.9 HYPOTHYROIDISM, UNSPECIFIED: ICD-10-CM

## 2018-03-14 DIAGNOSIS — M19.90 UNSPECIFIED OSTEOARTHRITIS, UNSPECIFIED SITE: ICD-10-CM

## 2018-03-14 DIAGNOSIS — J44.9 CHRONIC OBSTRUCTIVE PULMONARY DISEASE, UNSPECIFIED: ICD-10-CM

## 2018-03-14 DIAGNOSIS — K76.9 LIVER DISEASE, UNSPECIFIED: ICD-10-CM

## 2018-03-14 DIAGNOSIS — J90 PLEURAL EFFUSION, NOT ELSEWHERE CLASSIFIED: ICD-10-CM

## 2018-03-14 DIAGNOSIS — R06.02 SHORTNESS OF BREATH: ICD-10-CM

## 2018-03-14 DIAGNOSIS — R79.89 OTHER SPECIFIED ABNORMAL FINDINGS OF BLOOD CHEMISTRY: ICD-10-CM

## 2018-03-14 LAB
ALBUMIN SERPL ELPH-MCNC: 2.9 G/DL — LOW (ref 3.3–5)
ALP SERPL-CCNC: 82 U/L — SIGNIFICANT CHANGE UP (ref 40–120)
ALT FLD-CCNC: 42 U/L — SIGNIFICANT CHANGE UP (ref 12–78)
ANION GAP SERPL CALC-SCNC: 14 MMOL/L — SIGNIFICANT CHANGE UP (ref 5–17)
APTT BLD: 25.3 SEC — LOW (ref 27.5–37.4)
AST SERPL-CCNC: 89 U/L — HIGH (ref 15–37)
BASE EXCESS BLDA CALC-SCNC: -0.8 MMOL/L — SIGNIFICANT CHANGE UP (ref -2–2)
BASOPHILS # BLD AUTO: 0 K/UL — SIGNIFICANT CHANGE UP (ref 0–0.2)
BASOPHILS NFR BLD AUTO: 0.4 % — SIGNIFICANT CHANGE UP (ref 0–2)
BILIRUB SERPL-MCNC: 0.4 MG/DL — SIGNIFICANT CHANGE UP (ref 0.2–1.2)
BLOOD GAS COMMENTS ARTERIAL: SIGNIFICANT CHANGE UP
BLOOD GAS COMMENTS ARTERIAL: SIGNIFICANT CHANGE UP
BUN SERPL-MCNC: 31 MG/DL — HIGH (ref 7–23)
CALCIUM SERPL-MCNC: 8.6 MG/DL — SIGNIFICANT CHANGE UP (ref 8.5–10.1)
CHLORIDE SERPL-SCNC: 98 MMOL/L — SIGNIFICANT CHANGE UP (ref 96–108)
CO2 SERPL-SCNC: 21 MMOL/L — LOW (ref 22–31)
CREAT SERPL-MCNC: 0.93 MG/DL — SIGNIFICANT CHANGE UP (ref 0.5–1.3)
CRP SERPL-MCNC: 1.9 MG/DL — HIGH (ref 0–0.4)
EOSINOPHIL # BLD AUTO: 0 K/UL — SIGNIFICANT CHANGE UP (ref 0–0.5)
EOSINOPHIL NFR BLD AUTO: 0.2 % — SIGNIFICANT CHANGE UP (ref 0–6)
GLUCOSE SERPL-MCNC: 224 MG/DL — HIGH (ref 70–99)
HCO3 BLDA-SCNC: 24 MMOL/L — SIGNIFICANT CHANGE UP (ref 23–27)
HCT VFR BLD CALC: 32.8 % — LOW (ref 34.5–45)
HGB BLD-MCNC: 11.4 G/DL — LOW (ref 11.5–15.5)
HOROWITZ INDEX BLDA+IHG-RTO: 50 — SIGNIFICANT CHANGE UP
INR BLD: 1.07 RATIO — SIGNIFICANT CHANGE UP (ref 0.88–1.16)
LACTATE SERPL-SCNC: 5 MMOL/L — CRITICAL HIGH (ref 0.7–2)
LACTATE SERPL-SCNC: 5.4 MMOL/L — CRITICAL HIGH (ref 0.7–2)
LYMPHOCYTES # BLD AUTO: 0.6 K/UL — LOW (ref 1–3.3)
LYMPHOCYTES # BLD AUTO: 6.3 % — LOW (ref 13–44)
MCHC RBC-ENTMCNC: 30.9 PG — SIGNIFICANT CHANGE UP (ref 27–34)
MCHC RBC-ENTMCNC: 34.6 GM/DL — SIGNIFICANT CHANGE UP (ref 32–36)
MCV RBC AUTO: 89.2 FL — SIGNIFICANT CHANGE UP (ref 80–100)
MONOCYTES # BLD AUTO: 0.3 K/UL — SIGNIFICANT CHANGE UP (ref 0–0.9)
MONOCYTES NFR BLD AUTO: 3.1 % — SIGNIFICANT CHANGE UP (ref 1–9)
NEUTROPHILS # BLD AUTO: 9.2 K/UL — HIGH (ref 1.8–7.4)
NEUTROPHILS NFR BLD AUTO: 90 % — HIGH (ref 43–77)
PCO2 BLDA: 40 MMHG — SIGNIFICANT CHANGE UP (ref 32–46)
PH BLDA: 7.39 — SIGNIFICANT CHANGE UP (ref 7.35–7.45)
PLATELET # BLD AUTO: 286 K/UL — SIGNIFICANT CHANGE UP (ref 150–400)
PO2 BLDA: 77 MMHG — SIGNIFICANT CHANGE UP (ref 74–108)
POTASSIUM SERPL-MCNC: 4 MMOL/L — SIGNIFICANT CHANGE UP (ref 3.5–5.3)
POTASSIUM SERPL-SCNC: 4 MMOL/L — SIGNIFICANT CHANGE UP (ref 3.5–5.3)
PROCALCITONIN SERPL-MCNC: 0.21 NG/ML — HIGH (ref 0–0.04)
PROT SERPL-MCNC: 7 G/DL — SIGNIFICANT CHANGE UP (ref 6–8.3)
PROTHROM AB SERPL-ACNC: 11.7 SEC — SIGNIFICANT CHANGE UP (ref 9.8–12.7)
RBC # BLD: 3.68 M/UL — LOW (ref 3.8–5.2)
RBC # FLD: 13.7 % — SIGNIFICANT CHANGE UP (ref 10.3–14.5)
SAO2 % BLDA: 97 % — HIGH (ref 92–96)
SODIUM SERPL-SCNC: 133 MMOL/L — LOW (ref 135–145)
WBC # BLD: 10.2 K/UL — SIGNIFICANT CHANGE UP (ref 3.8–10.5)
WBC # FLD AUTO: 10.2 K/UL — SIGNIFICANT CHANGE UP (ref 3.8–10.5)

## 2018-03-14 PROCEDURE — 93010 ELECTROCARDIOGRAM REPORT: CPT

## 2018-03-14 PROCEDURE — 99285 EMERGENCY DEPT VISIT HI MDM: CPT | Mod: 25

## 2018-03-14 PROCEDURE — 71045 X-RAY EXAM CHEST 1 VIEW: CPT | Mod: 26

## 2018-03-14 PROCEDURE — 99223 1ST HOSP IP/OBS HIGH 75: CPT | Mod: AI,GC

## 2018-03-14 RX ORDER — ENOXAPARIN SODIUM 100 MG/ML
40 INJECTION SUBCUTANEOUS EVERY 24 HOURS
Qty: 0 | Refills: 0 | Status: DISCONTINUED | OUTPATIENT
Start: 2018-03-14 | End: 2018-03-22

## 2018-03-14 RX ORDER — AMLODIPINE BESYLATE 2.5 MG/1
1 TABLET ORAL
Qty: 0 | Refills: 0 | COMMUNITY

## 2018-03-14 RX ORDER — ATORVASTATIN CALCIUM 80 MG/1
40 TABLET, FILM COATED ORAL AT BEDTIME
Qty: 0 | Refills: 0 | Status: DISCONTINUED | OUTPATIENT
Start: 2018-03-14 | End: 2018-03-23

## 2018-03-14 RX ORDER — DORZOLAMIDE HYDROCHLORIDE TIMOLOL MALEATE 20; 5 MG/ML; MG/ML
1 SOLUTION/ DROPS OPHTHALMIC
Qty: 0 | Refills: 0 | Status: DISCONTINUED | OUTPATIENT
Start: 2018-03-14 | End: 2018-03-23

## 2018-03-14 RX ORDER — PYRIDOXINE HCL (VITAMIN B6) 100 MG
1 TABLET ORAL
Qty: 0 | Refills: 0 | COMMUNITY

## 2018-03-14 RX ORDER — PANTOPRAZOLE SODIUM 20 MG/1
40 TABLET, DELAYED RELEASE ORAL
Qty: 0 | Refills: 0 | Status: DISCONTINUED | OUTPATIENT
Start: 2018-03-14 | End: 2018-03-18

## 2018-03-14 RX ORDER — GABAPENTIN 400 MG/1
1 CAPSULE ORAL
Qty: 0 | Refills: 0 | COMMUNITY

## 2018-03-14 RX ORDER — ALBUTEROL 90 UG/1
2.5 AEROSOL, METERED ORAL EVERY 8 HOURS
Qty: 0 | Refills: 0 | Status: DISCONTINUED | OUTPATIENT
Start: 2018-03-14 | End: 2018-03-17

## 2018-03-14 RX ORDER — CEFTRIAXONE 500 MG/1
1 INJECTION, POWDER, FOR SOLUTION INTRAMUSCULAR; INTRAVENOUS ONCE
Qty: 0 | Refills: 0 | Status: COMPLETED | OUTPATIENT
Start: 2018-03-14 | End: 2018-03-14

## 2018-03-14 RX ORDER — BUDESONIDE, MICRONIZED 100 %
2 POWDER (GRAM) MISCELLANEOUS
Qty: 0 | Refills: 0 | COMMUNITY

## 2018-03-14 RX ORDER — DOCUSATE SODIUM 100 MG
100 CAPSULE ORAL
Qty: 0 | Refills: 0 | Status: DISCONTINUED | OUTPATIENT
Start: 2018-03-14 | End: 2018-03-19

## 2018-03-14 RX ORDER — AZITHROMYCIN 500 MG/1
500 TABLET, FILM COATED ORAL ONCE
Qty: 0 | Refills: 0 | Status: COMPLETED | OUTPATIENT
Start: 2018-03-14 | End: 2018-03-14

## 2018-03-14 RX ORDER — OMEPRAZOLE 10 MG/1
1 CAPSULE, DELAYED RELEASE ORAL
Qty: 0 | Refills: 0 | COMMUNITY

## 2018-03-14 RX ORDER — ASPIRIN/CALCIUM CARB/MAGNESIUM 324 MG
2 TABLET ORAL
Qty: 0 | Refills: 0 | COMMUNITY

## 2018-03-14 RX ORDER — HYDRALAZINE HCL 50 MG
1 TABLET ORAL
Qty: 0 | Refills: 0 | COMMUNITY

## 2018-03-14 RX ORDER — TIZANIDINE 4 MG/1
2 TABLET ORAL
Qty: 0 | Refills: 0 | COMMUNITY

## 2018-03-14 RX ORDER — OMEGA-3 ACID ETHYL ESTERS 1 G
1 CAPSULE ORAL
Qty: 0 | Refills: 0 | Status: DISCONTINUED | OUTPATIENT
Start: 2018-03-14 | End: 2018-03-23

## 2018-03-14 RX ORDER — ACETAMINOPHEN 500 MG
650 TABLET ORAL EVERY 6 HOURS
Qty: 0 | Refills: 0 | Status: DISCONTINUED | OUTPATIENT
Start: 2018-03-14 | End: 2018-03-19

## 2018-03-14 RX ORDER — LATANOPROST 0.05 MG/ML
1 SOLUTION/ DROPS OPHTHALMIC; TOPICAL AT BEDTIME
Qty: 0 | Refills: 0 | Status: DISCONTINUED | OUTPATIENT
Start: 2018-03-14 | End: 2018-03-23

## 2018-03-14 RX ORDER — ASPIRIN/CALCIUM CARB/MAGNESIUM 324 MG
162 TABLET ORAL DAILY
Qty: 0 | Refills: 0 | Status: DISCONTINUED | OUTPATIENT
Start: 2018-03-14 | End: 2018-03-20

## 2018-03-14 RX ORDER — METOPROLOL TARTRATE 50 MG
1 TABLET ORAL
Qty: 0 | Refills: 0 | COMMUNITY

## 2018-03-14 RX ORDER — ONDANSETRON 8 MG/1
4 TABLET, FILM COATED ORAL EVERY 6 HOURS
Qty: 0 | Refills: 0 | Status: DISCONTINUED | OUTPATIENT
Start: 2018-03-14 | End: 2018-03-23

## 2018-03-14 RX ORDER — ALBUTEROL 90 UG/1
2 AEROSOL, METERED ORAL
Qty: 0 | Refills: 0 | COMMUNITY

## 2018-03-14 RX ORDER — OMEGA-3 ACID ETHYL ESTERS 1 G
1 CAPSULE ORAL
Qty: 0 | Refills: 0 | COMMUNITY

## 2018-03-14 RX ORDER — PYRIDOXINE HCL (VITAMIN B6) 100 MG
100 TABLET ORAL DAILY
Qty: 0 | Refills: 0 | Status: DISCONTINUED | OUTPATIENT
Start: 2018-03-14 | End: 2018-03-23

## 2018-03-14 RX ORDER — ALPRAZOLAM 0.25 MG
0.25 TABLET ORAL THREE TIMES A DAY
Qty: 0 | Refills: 0 | Status: DISCONTINUED | OUTPATIENT
Start: 2018-03-14 | End: 2018-03-19

## 2018-03-14 RX ORDER — LATANOPROST 0.05 MG/ML
1 SOLUTION/ DROPS OPHTHALMIC; TOPICAL
Qty: 0 | Refills: 0 | COMMUNITY

## 2018-03-14 RX ORDER — TIZANIDINE 4 MG/1
1 TABLET ORAL
Qty: 0 | Refills: 0 | COMMUNITY

## 2018-03-14 RX ORDER — DORZOLAMIDE HYDROCHLORIDE TIMOLOL MALEATE 20; 5 MG/ML; MG/ML
1 SOLUTION/ DROPS OPHTHALMIC
Qty: 0 | Refills: 0 | COMMUNITY

## 2018-03-14 RX ORDER — METOPROLOL TARTRATE 50 MG
50 TABLET ORAL
Qty: 0 | Refills: 0 | Status: DISCONTINUED | OUTPATIENT
Start: 2018-03-14 | End: 2018-03-19

## 2018-03-14 RX ORDER — OXYCODONE AND ACETAMINOPHEN 5; 325 MG/1; MG/1
1 TABLET ORAL EVERY 4 HOURS
Qty: 0 | Refills: 0 | Status: DISCONTINUED | OUTPATIENT
Start: 2018-03-14 | End: 2018-03-18

## 2018-03-14 RX ORDER — CHOLECALCIFEROL (VITAMIN D3) 125 MCG
1000 CAPSULE ORAL DAILY
Qty: 0 | Refills: 0 | Status: DISCONTINUED | OUTPATIENT
Start: 2018-03-14 | End: 2018-03-23

## 2018-03-14 RX ORDER — LEVOTHYROXINE SODIUM 125 MCG
1 TABLET ORAL
Qty: 0 | Refills: 0 | COMMUNITY

## 2018-03-14 RX ORDER — ROSUVASTATIN CALCIUM 5 MG/1
1 TABLET ORAL
Qty: 0 | Refills: 0 | COMMUNITY

## 2018-03-14 RX ORDER — GABAPENTIN 400 MG/1
300 CAPSULE ORAL AT BEDTIME
Qty: 0 | Refills: 0 | Status: DISCONTINUED | OUTPATIENT
Start: 2018-03-14 | End: 2018-03-23

## 2018-03-14 RX ORDER — TRAMADOL HYDROCHLORIDE 50 MG/1
1 TABLET ORAL
Qty: 0 | Refills: 0 | COMMUNITY

## 2018-03-14 RX ORDER — TRAMADOL HYDROCHLORIDE 50 MG/1
50 TABLET ORAL
Qty: 0 | Refills: 0 | Status: DISCONTINUED | OUTPATIENT
Start: 2018-03-14 | End: 2018-03-18

## 2018-03-14 RX ORDER — CHOLECALCIFEROL (VITAMIN D3) 125 MCG
1 CAPSULE ORAL
Qty: 0 | Refills: 0 | COMMUNITY

## 2018-03-14 RX ORDER — ACETAMINOPHEN 500 MG
2 TABLET ORAL
Qty: 0 | Refills: 0 | COMMUNITY

## 2018-03-14 RX ORDER — BUDESONIDE, MICRONIZED 100 %
0.5 POWDER (GRAM) MISCELLANEOUS
Qty: 0 | Refills: 0 | Status: DISCONTINUED | OUTPATIENT
Start: 2018-03-14 | End: 2018-03-23

## 2018-03-14 RX ORDER — LEVOTHYROXINE SODIUM 125 MCG
100 TABLET ORAL DAILY
Qty: 0 | Refills: 0 | Status: DISCONTINUED | OUTPATIENT
Start: 2018-03-14 | End: 2018-03-23

## 2018-03-14 RX ORDER — SODIUM CHLORIDE 9 MG/ML
1000 INJECTION INTRAMUSCULAR; INTRAVENOUS; SUBCUTANEOUS ONCE
Qty: 0 | Refills: 0 | Status: COMPLETED | OUTPATIENT
Start: 2018-03-14 | End: 2018-03-14

## 2018-03-14 RX ORDER — LIDOCAINE 4 G/100G
1 CREAM TOPICAL
Qty: 0 | Refills: 0 | Status: DISCONTINUED | OUTPATIENT
Start: 2018-03-14 | End: 2018-03-23

## 2018-03-14 RX ORDER — IPRATROPIUM/ALBUTEROL SULFATE 18-103MCG
3 AEROSOL WITH ADAPTER (GRAM) INHALATION ONCE
Qty: 0 | Refills: 0 | Status: COMPLETED | OUTPATIENT
Start: 2018-03-14 | End: 2018-03-14

## 2018-03-14 RX ORDER — SUCRALFATE 1 G
1 TABLET ORAL
Qty: 0 | Refills: 0 | COMMUNITY

## 2018-03-14 RX ORDER — SODIUM CHLORIDE 9 MG/ML
1000 INJECTION INTRAMUSCULAR; INTRAVENOUS; SUBCUTANEOUS ONCE
Qty: 0 | Refills: 0 | Status: COMPLETED | OUTPATIENT
Start: 2018-03-14 | End: 2018-03-15

## 2018-03-14 RX ADMIN — GABAPENTIN 300 MILLIGRAM(S): 400 CAPSULE ORAL at 23:16

## 2018-03-14 RX ADMIN — SODIUM CHLORIDE 1000 MILLILITER(S): 9 INJECTION INTRAMUSCULAR; INTRAVENOUS; SUBCUTANEOUS at 23:00

## 2018-03-14 RX ADMIN — Medication 0.5 MILLIGRAM(S): at 21:47

## 2018-03-14 RX ADMIN — AZITHROMYCIN 255 MILLIGRAM(S): 500 TABLET, FILM COATED ORAL at 19:38

## 2018-03-14 RX ADMIN — Medication 3 MILLILITER(S): at 16:57

## 2018-03-14 RX ADMIN — CEFTRIAXONE 100 GRAM(S): 500 INJECTION, POWDER, FOR SOLUTION INTRAMUSCULAR; INTRAVENOUS at 19:27

## 2018-03-14 RX ADMIN — ALBUTEROL 2.5 MILLIGRAM(S): 90 AEROSOL, METERED ORAL at 21:56

## 2018-03-14 RX ADMIN — ATORVASTATIN CALCIUM 40 MILLIGRAM(S): 80 TABLET, FILM COATED ORAL at 23:16

## 2018-03-14 NOTE — H&P ADULT - PROBLEM SELECTOR PLAN 5
Continue home dose metoprolol   Holding home dose hydralazine for now. New liver lesions on CT abdomen found on previous admission with high suspicion for metastases especially given the pathology from lung / LN biopsies of metastatic carcinoma. Pt refused MRI as inpatient due to severe claustrophobia and defers to outpt setting where she will try to schedule it in an open MRI center.  Continue to trend LFTS  Heme/Onc consult Dr. Salazar

## 2018-03-14 NOTE — H&P ADULT - NSHPOUTPATIENTPROVIDERS_GEN_ALL_CORE
Dr. Pradhan PMD  Dr. Becerril Cardio   Dr. Weil Pulm Dr. Kobren Gyn  Dr. Salazar Onco  Dr. Urrutia Endocrine Dr. Pradhan (PMD)  Dr. Becerril (Cardio)   Dr. Weil (Pulm)  Dr. Brandt (Gyn)  Dr. Salazar (Onc)  Dr. Urrutia (Endo)

## 2018-03-14 NOTE — H&P ADULT - ASSESSMENT
73 yo F with PMH of recently diagnosed metastatic carcinoma (3/2018), recent admission for multilobular PNA (2/2018), CAD, s/p AVR (bovine valve, 2011), s/p bilateral carotid endarterectomy (2002), Emphysema (on 3L home O2), C. diff, HTN, HLD, Hypothyroidism, Iron deficiency anemia, arthritis, glaucoma, sent to the ED by his Oncologist to begin chemotherapy for her recently diagnosed metastatic lung cancer. Admit to F for chemotherapy, elevated lactate. 71 yo F with PMH of recently diagnosed metastatic carcinoma (3/2018), recent admission for multilobular PNA (2/2018), CAD, s/p AVR (bovine valve, 2011), s/p bilateral carotid endarterectomy (2002), Emphysema (on 3L home O2), C. diff, HTN, HLD, Hypothyroidism, Iron deficiency anemia, arthritis, glaucoma, sent to the ED by his Oncologist to begin chemotherapy for her recently diagnosed metastatic lung cancer. Admit to F for chemotherapy for lung ca, elevated lactate, SOB, worsening large right pleural effusion.

## 2018-03-14 NOTE — PATIENT PROFILE ADULT. - VISION (WITH CORRECTIVE LENSES IF THE PATIENT USUALLY WEARS THEM):
Partially impaired: cannot see medication labels or newsprint, but can see obstacles in path, and the surrounding layout; can count fingers at arm's length/reading glasses/ contact lens

## 2018-03-14 NOTE — ED ADULT NURSE REASSESSMENT NOTE - NS ED NURSE REASSESS COMMENT FT1
received report from sonia plunkett.  patient awake and alert.  speaking to family.  admitted to hospital.  awaiting bed

## 2018-03-14 NOTE — ED ADULT NURSE NOTE - OBJECTIVE STATEMENT
Present to ER with c/o of shortness of breath. Pt was sent by oncologist for chemo therapy. Denies any chest pain, numbness or tingling. Pt has history of Lung CA

## 2018-03-14 NOTE — H&P ADULT - NSHPPHYSICALEXAM_GEN_ALL_CORE
Vital Signs Last 24 Hrs  T(C): 36 (14 Mar 2018 15:51), Max: 36 (14 Mar 2018 15:51)  T(F): 96.8 (14 Mar 2018 15:51), Max: 96.8 (14 Mar 2018 15:51)  HR: 86 (14 Mar 2018 15:51) (86 - 86)  BP: 121/69 (14 Mar 2018 15:51) (121/69 - 121/69)  BP(mean): --  RR: 26 (14 Mar 2018 15:51) (26 - 26)  SpO2: 85% (14 Mar 2018 15:51) (85% - 85%)    PHYSICAL EXAM:    GENERAL: NAD, well-groomed, well-developed  HEAD:  Atraumatic, Normocephalic  EYES: EOMI, PERRLA, conjunctiva and sclera clear  ENMT: No tonsillar erythema, exudates, or enlargement; Moist mucous membranes, Good dentition, No lesions  NECK: Supple, No JVD, Normal thyroid  NERVOUS SYSTEM:  Alert & Oriented X3, Good concentration; decreased strength in b/l LE  CHEST/LUNG: Decreased breath sounds b/l, No rales, rhonchi, wheezing, or rubs  HEART: Regular rate and rhythm; positive murmurs, no rubs, or gallops  ABDOMEN: Soft, Nontender, Nondistended; Bowel sounds present  EXTREMITIES:  b/l nonpitting edema in LE, 2+ Peripheral Pulses, No clubbing, cyanosis  SKIN: No rashes or lesions

## 2018-03-14 NOTE — H&P ADULT - PROBLEM SELECTOR PLAN 1
Admit to [facility] for chemotherapy.   Sent by Dr. Salazar ([facility]) f/u recs

## 2018-03-14 NOTE — CONSULT NOTE ADULT - SUBJECTIVE AND OBJECTIVE BOX
Patient is a 72y old  Female recently dx'd w/ SCCA sent to ED by Oncologist to start Chemotherapy.  Pt c/o SOB and desaturated to 70's% and placed on Bipap.  reports she is O2 dep 2-3 L NC at home and progressively dyspniec on exertion.  Denies fever, chills, chest pain.      PAST MEDICAL & SURGICAL HISTORY:  Liver lesion  Clostridium difficile colitis  Emphysema  Pneumonia: 2/2018  Generalized intra-abdominal and pelvic swelling, mass and lump  Glaucoma  Aortic valve prosthesis present  CAD (coronary artery disease)  Arthritis  Heart murmur  Other iron deficiency anemia  PVD (peripheral vascular disease)  Hyperlipemia  Hypothyroid  Hypertension  H/O foot surgery: (Right foot, 2010)  History of colonoscopy  S/P carpal tunnel release: (Right, 1/2017)  H/O carotid endarterectomy: Both sides 2002  Aortic valve replaced: 2011 with bovine  Carotid disease, bilateral  Cataract extraction status of left eye  Cataract extraction status of right eye      BRIEF HOSPITAL COURSE:    Review of Systems:       +BAZAN  +chronic cough  No chest pain  No wt loss  No N/V  No headache or dizziness                                                      ICU Vital Signs Last 24 Hrs  T(C): 36.6 (14 Mar 2018 22:50), Max: 36.6 (14 Mar 2018 22:50)  T(F): 97.9 (14 Mar 2018 22:50), Max: 97.9 (14 Mar 2018 22:50)  HR: 74 (14 Mar 2018 23:09) (74 - 86)  BP: 129/61 (14 Mar 2018 23:00) (113/58 - 136/67)  BP(mean): 88 (14 Mar 2018 23:00) (88 - 95)  ABP: --  ABP(mean): --  RR: 45 (14 Mar 2018 23:00) (24 - 45)  SpO2: 91% (14 Mar 2018 23:09) (85% - 94%)    Physical Examination:    General: on Bipap, alert, mild distress     HEENT: normacephalic, pupils equal and reactive     PULM: decreased BS on right, clear left no wheezes or rhoncis     CVS: s1s RRR    ABD: soft +BS nt nd     EXT: no edema     SKIN: no cyanosis     Neuro: alert,orienteX3, appropriate, follows commands     ABG - ( 14 Mar 2018 22:37 )  pH: 7.39  /  pCO2: 40    /  pO2: 77    / HCO3: 24    / Base Excess: -0.8  /  SaO2: 97                    LABS:                        11.4   10.2  )-----------( 286      ( 14 Mar 2018 17:04 )             32.8     03-14    133<L>  |  98  |  31<H>  ----------------------------<  224<H>  4.0   |  21<L>  |  0.93    Ca    8.6      14 Mar 2018 17:04    TPro  7.0  /  Alb  2.9<L>  /  TBili  0.4  /  DBili  x   /  AST  89<H>  /  ALT  42  /  AlkPhos  82  03-14          CAPILLARY BLOOD GLUCOSE          PT/INR - ( 14 Mar 2018 17:04 )   PT: 11.7 sec;   INR: 1.07 ratio         PTT - ( 14 Mar 2018 17:04 )  PTT:25.3 sec    CULTURES:      Medications:    metoprolol succinate ER 50 milliGRAM(s) Oral two times a day    ALBUTerol    0.083% 2.5 milliGRAM(s) Nebulizer every 8 hours  benzonatate 100 milliGRAM(s) Oral three times a day PRN  buDESOnide   0.5 milliGRAM(s) Respule 0.5 milliGRAM(s) Inhalation two times a day    acetaminophen   Tablet 650 milliGRAM(s) Oral every 6 hours PRN  acetaminophen   Tablet. 650 milliGRAM(s) Oral every 6 hours PRN  ALPRAZolam 0.25 milliGRAM(s) Oral three times a day PRN  gabapentin 300 milliGRAM(s) Oral at bedtime  ondansetron Injectable 4 milliGRAM(s) IV Push every 6 hours PRN  oxyCODONE    5 mG/acetaminophen 325 mG 1 Tablet(s) Oral every 4 hours PRN  traMADol 50 milliGRAM(s) Oral four times a day PRN      aspirin enteric coated 162 milliGRAM(s) Oral daily  enoxaparin Injectable 40 milliGRAM(s) SubCutaneous every 24 hours    docusate sodium 100 milliGRAM(s) Oral two times a day  pantoprazole    Tablet 40 milliGRAM(s) Oral before breakfast      atorvastatin 40 milliGRAM(s) Oral at bedtime  levothyroxine 100 MICROGram(s) Oral daily  predniSONE   Tablet 20 milliGRAM(s) Oral daily    cholecalciferol 1000 Unit(s) Oral daily  multivitamin 1 Tablet(s) Oral daily  pyridoxine 100 milliGRAM(s) Oral daily  sodium chloride 0.9% Bolus 1000 milliLiter(s) IV Bolus once      dorzolamide 2%/timolol 0.5% Ophthalmic Solution 1 Drop(s) Both EYES two times a day  latanoprost 0.005% Ophthalmic Solution 1 Drop(s) Both EYES at bedtime  lidocaine   Patch 1 Patch Transdermal every 72 hours PRN    omega-3-Acid Ethyl Esters 1 Gram(s) Oral two times a day      RADIOLOGY/IMAGING/ECHO  CXR rt sided moderate size effusion     Critical care point of care ultrasound:    Assessment/Plan:  72 yoF recently dx'd w/ SCCA, significant hx CAD/AVR-bovine, CDiff, emphysema, o2 dependent at home, former smoker, HTN, hypothyroid dz, PVD, and iron def anemia adm for hypoxic repsiratory failure.    CV-BP stable, cont metoprolol and ASA  Pulm-cont Bipap, CXR/ABG reviewed  GI-Po as tolerated, npo while on Bipap, on PPI  Renal-Cr stable   ID-hold abx, no evidence of active infection   Hem/Onc-plan for chemotx as per Onc   ICU-Gi/DVt prophylaxis, protonix/lovenox     D/W E-ICU        Critical Care time: 40min   (Reviewing data, imaging, discussing with multidisciplinary team, non inclusive of procedures, discussing goals of care with patient/family)

## 2018-03-14 NOTE — H&P ADULT - NSHPSOCIALHISTORY_GEN_ALL_CORE
Lives at home with .  Ambulates with walker at home.  Retired gift shop saleswoman  Previous tobacco smoker for >40 years (1.5 packs a day, quit in 2011)  Currently denies tobacco, alcohol, or drug use    Influenza vaccine: Sept 2017  Pneumovax: unsure  Colonoscopy: 2 years ago, normal results

## 2018-03-14 NOTE — H&P ADULT - PROBLEM SELECTOR PLAN 2
Pulmonology Dr. Forde consulted.  IR - US guided Thoracentesis tomorrow am. Worsening pleural effusion confirmed on chest xray  F/u CTA to r/o PE  Pulmonology Dr. Forde consulted.  IR - US guided Thoracentesis tomorrow am.  NPO after midnight   Hold lovenox after midnight. Can get dose this evening.  F/u procal level  Recent ECHO performed on Feb 2018 showed EF of 55%  ID Dr. Barrios consulted for possible infectious process opacity seen on CXR, may represent neoplasia.   CTA ordered by Pulm. need ICU eval  Dr Forde at bedside, case discussed, consult appreciated.   IR - US guided Thoracentesis tomorrow am.  NPO after midnight   Hold lovenox after midnight. Can get dose this evening.  F/u procal level  Recent ECHO performed on Feb 2018 showed EF of 55%  ID Dr. Barrios consulted for possible infectious process

## 2018-03-14 NOTE — H&P ADULT - PROBLEM SELECTOR PLAN 10
IMPROVE VTE Individual Risk Assessment          RISK                                                          Points    [  ] Previous VTE                                                3  [  ] Thrombophilia                                             2  [  ] Lower limb paralysis                                   2        (unable to hold up >15 seconds)    [ x ] Current Cancer                                            2         (within 6 months)  [  ] Immobilization > 24 hrs                              1  [  ] ICU/CCU stay > 24 hours                            1  [ x ] Age > 60                                                    1    IMPROVE VTE Score ___3______    DVT ppx: Lovenox 40 mg sub q daily. To be held after midnight due to thoracentesis in am    Problem 11: iron deficiency- Chronic. Heme/onc Dr. Salazar consulted. Continue to trend CBC

## 2018-03-14 NOTE — H&P ADULT - PROBLEM SELECTOR PLAN 3
Likely multifactorial etiology, 2/2 to emphysema, lung ca, and pleural effusions  Currently on 5L NC, sating between 88-90  Continue albuterol nebs  Continue home dose prednisone 20 mg daily  Continue pulmicort  Dr. Forde pulsebastian consulted Likely multifactorial etiology, 2/2 to emphysema, lung ca, and pleural effusions  Currently on 5L NC, satinithya between 88-90  Continue albuterol nebs  Continue home dose prednisone 20 mg daily  Continue pulmicort  as above

## 2018-03-14 NOTE — CONSULT NOTE ADULT - PROBLEM SELECTOR RECOMMENDATION 2
large right eff  IR - US guided Thoracentesis tomorrow am cxr read large effusion on the right, however, recent ct abd shows atelectasis and consolidation of RML and RLL  will check US chest in am tomorrow to assess size and volume of eff  may need Thoracentesis with IR if there is a significant fluid accumulation   IR - US guided Thoracentesis tomorrow am - possibly

## 2018-03-14 NOTE — H&P ADULT - NSHPREVIEWOFSYSTEMS_GEN_ALL_CORE
Constitutional: denies fever, chills, diaphoresis   HEENT: denies blurry vision, difficulty hearing  Respiratory: reports SOB, BAZAN, denies cough, sputum production, wheezing, hemoptysis  Cardiovascular: denies chest pain, palpitations  Gastrointestinal: denies nausea, vomiting, diarrhea, constipation, abdominal pain  Genitourinary: denies dysuria, frequency, urgency, hematuria   Skin/Breast: denies rash, itching  Musculoskeletal: denies myalgias, joint swelling, muscle weakness  Neurologic: denies headache, weakness, dizziness, paresthesias, numbness/tingling  Psychiatric: denies feeling anxious, depressed  Hematology/Oncology: denies bruising, tender or enlarged lymph nodes Constitutional: denies fever, chills, diaphoresis   HEENT: denies blurry vision, difficulty hearing  Respiratory: reports SOB and BAZAN, denies cough, sputum production, wheezing, hemoptysis  Cardiovascular: denies chest pain, palpitations  Gastrointestinal: denies nausea, vomiting, diarrhea, constipation, abdominal pain  Genitourinary: denies dysuria, frequency, urgency, hematuria   Skin/Breast: denies rash, itching  Musculoskeletal: admits to myalgias, back pain, denies joint swelling  Extremities: admits to increased swelling in b/l LE  Neurologic: denies headache, weakness, dizziness, paresthesias, numbness/tingling  Psychiatric: denies feeling anxious, depressed  Hematology/Oncology: denies bruising, tender or enlarged lymph nodes

## 2018-03-14 NOTE — H&P ADULT - PROBLEM SELECTOR PLAN 6
Continue home dose levothyroxine Continue home dose metoprolol   Holding home dose hydralazine for now.

## 2018-03-14 NOTE — CONSULT NOTE ADULT - SUBJECTIVE AND OBJECTIVE BOX
Date/Time Patient Seen:  		  Referring MD:   Data Reviewed	       Patient is a 72y old  Female who presents with a chief complaint of Chemotherapy (14 Mar 2018 18:20)      Subjective/HPI    pt reports that she is here for Chemo  pt has copd  recent dx of Mets ca    73 yo F with PMH of recently diagnosed metastatic carcinoma (3/2018), recent admission for multilobular PNA (2/2018), CAD, s/p AVR (bovine valve, 2011), s/p bilateral carotid endarterectomy (2002), Emphysema (on 3L home O2), C. diff, HTN, HLD, Hypothyroidism, Iron deficiency anemia, arthritis, glaucoma, sent to the ED by his Oncologist to begin chemotherapy for her recently diagnosed metastatic lung cancer.        PAST MEDICAL & SURGICAL HISTORY:  Liver lesion  Clostridium difficile colitis  Emphysema  Pneumonia: 2/2018  Generalized intra-abdominal and pelvic swelling, mass and lump  Glaucoma  Aortic valve prosthesis present  CAD (coronary artery disease)  Arthritis  Heart murmur  Other iron deficiency anemia  Carpal tunnel syndrome of right wrist  PVD (peripheral vascular disease)  Hyperlipemia  Colitis  Hypothyroid  Hypertension  COPD (chronic obstructive pulmonary disease)  H/O foot surgery: (Right foot, 2010)  History of colonoscopy  S/P carpal tunnel release: (Right, 1/2017)  H/O carotid endarterectomy: Both sides 2002  Aortic valve replaced: 2011 with bovine  Carotid disease, bilateral  Cataract extraction status of left eye  Cataract extraction status of right eye        Medication list         MEDICATIONS  (STANDING):  azithromycin  IVPB 500 milliGRAM(s) IV Intermittent once  cefTRIAXone   IVPB 1 Gram(s) IV Intermittent once  sodium chloride 0.9% Bolus 1000 milliLiter(s) IV Bolus once  sodium chloride 0.9% Bolus 1000 milliLiter(s) IV Bolus once    MEDICATIONS  (PRN):         Vitals log        ICU Vital Signs Last 24 Hrs  T(C): 36 (14 Mar 2018 15:51), Max: 36 (14 Mar 2018 15:51)  T(F): 96.8 (14 Mar 2018 15:51), Max: 96.8 (14 Mar 2018 15:51)  HR: 86 (14 Mar 2018 15:51) (86 - 86)  BP: 121/69 (14 Mar 2018 15:51) (121/69 - 121/69)  BP(mean): --  ABP: --  ABP(mean): --  RR: 26 (14 Mar 2018 15:51) (26 - 26)  SpO2: 85% (14 Mar 2018 15:51) (85% - 85%)           Input and Output:  I&O's Detail      Lab Data                        11.4   10.2  )-----------( 286      ( 14 Mar 2018 17:04 )             32.8     03-14    133<L>  |  98  |  31<H>  ----------------------------<  224<H>  4.0   |  21<L>  |  0.93    Ca    8.6      14 Mar 2018 17:04    TPro  7.0  /  Alb  2.9<L>  /  TBili  0.4  /  DBili  x   /  AST  89<H>  /  ALT  42  /  AlkPhos  82  03-14            Review of Systems	      Objective     Physical Examination    head at  heart s1s2  lung dec BS  abd soft  cn grossly int      Pertinent Lab findings & Imaging      Rangel:  NO   Adequate UO     I&O's Detail           Discussed with:     Cultures:	        Radiology

## 2018-03-14 NOTE — ED PROVIDER NOTE - OBJECTIVE STATEMENT
pt with hx lung ca sent to er for admission by his oncologist to start chemo for her lung ca. pt c/o chronic sob. no fevers, chills, ha, cp, abd pain, d/n/v.  pmd - [facility]  onc - kappel

## 2018-03-14 NOTE — H&P ADULT - PROBLEM SELECTOR PLAN 4
Possibly 2/2 to   Initial lactate elevated at 5.4  Received Zithromax 500mg IV x1, Rocephin 1 gram IV x1, NS Bolus 1L x2  F/u repeat lactate at 7pm  F/u blood cultures Possibly 2/2 to demand from increased work of breathing from pleural effusions   Initial lactate elevated at 5.4  Received Zithromax 500mg IV x1, Rocephin 1 gram IV x1, NS Bolus 1L x2  F/u repeat lactate at 9pm  F/u blood cultures Possibly 2/2 to demand from increased work of breathing from pleural effusions   Initial lactate elevated at 5.4  Received Zithromax 500mg IV x1, Rocephin 1 gram IV x1, NS Bolus 1L x2  F/u repeat lactate at 9pm  F/u blood cultures and CTA. recent CAP. if persists may warrant revision of abx. Dr Barrios ID consulted

## 2018-03-14 NOTE — CONSULT NOTE ADULT - PROBLEM SELECTOR RECOMMENDATION 3
mets ca  poss chemo as per Onc  pt reports that she was supposed to start chemo inpatient  prognosis guarded  need for ABX to be determined  Dr. Barrios knows this patient well - would call ID for eval  pall care eval for RICARDO discussion

## 2018-03-14 NOTE — H&P ADULT - HISTORY OF PRESENT ILLNESS
71 yo F with PMH of recently diagnosed metastatic carcinoma (3/2018), recent admission for multilobular PNA (2/2018), CAD, s/p AVR (bovine valve, 2011), s/p bilateral carotid endarterectomy (2002), Emphysema (on 3L home O2), C. diff, HTN, HLD, Hypothyroidism, Iron deficiency anemia, arthritis, glaucoma, sent to the ED by his Oncologist to begin chemotherapy for her recently diagnosed metastatic lung cancer.     In the ED, patient was hypoxemic (SpO2 of 85%), otherwise hemodynamically stable. CXR showed worsening of pleural effusion which is large and has a component of loculation. EKG showed _____. Labs significant for an elevated lactate of 5.4 (elevated during prior admission, range 2.7 -3.7, not found to be septic at that time), no leukocytosis (WBC 10.2) but with elevated Neut 90%, anemia (H/H 11.4/32.8), mild hyponatremia (Na 133), and hyperglycemia (Glu 224). Received Zithromax 500mg IV x1, Rocephin 1 gram IV x1, NS Bolus 1L x2, and Duoneb x1. 73 yo F with PMH of recently diagnosed metastatic carcinoma (3/2018), recent admission for multilobular PNA (2/2018), CAD, s/p AVR (bovine valve, 2011), s/p bilateral carotid endarterectomy (2002), Emphysema (on 3L home O2), C. diff, HTN, HLD, Hypothyroidism, Iron deficiency anemia, arthritis, glaucoma, and new liver lesion sent to the ED by his Oncologist to begin chemotherapy for her recently diagnosed metastatic lung cancer. Pt states that she has been more SOB, at rest and at exertion. Ambulates with walker at home. Also states that she has had increased swelling in her b/l feet that has been getting worse in the past week, unable to put on shoes this am. Denies fevers, chills, nausea, vomiting, CP, palpitations, abdominal pain, changes in bowel movements.    In the ED, patient was hypoxemic (SpO2 of 85%), otherwise hemodynamically stable. CXR showed worsening of pleural effusion which is large and has a component of loculation. EKG showed sinus rhythm at 82 with first degree AV block. Labs significant for an elevated lactate of 5.4 (elevated during prior admission, range 2.7 -3.7, not found to be septic at that time), no leukocytosis (WBC 10.2) but with elevated Neut 90%, anemia (H/H 11.4/32.8), mild hyponatremia (Na 133), and hyperglycemia (Glu 224). Received Zithromax 500mg IV x1, Rocephin 1 gram IV x1, NS Bolus 1L x2, and Duoneb x1.

## 2018-03-14 NOTE — H&P ADULT - PMH
Aortic valve prosthesis present    Arthritis    CAD (coronary artery disease)    Clostridium difficile colitis    Emphysema    Generalized intra-abdominal and pelvic swelling, mass and lump    Glaucoma    Heart murmur    Hyperlipemia    Hypertension    Hypothyroid    Other iron deficiency anemia    Pneumonia  2/2018  PVD (peripheral vascular disease) Aortic valve prosthesis present    Arthritis    CAD (coronary artery disease)    Clostridium difficile colitis    Emphysema    Generalized intra-abdominal and pelvic swelling, mass and lump    Glaucoma    Heart murmur    Hyperlipemia    Hypertension    Hypothyroid    Liver lesion    Other iron deficiency anemia    Pneumonia  2/2018  PVD (peripheral vascular disease)

## 2018-03-14 NOTE — ED ADULT NURSE REASSESSMENT NOTE - NS ED NURSE REASSESS COMMENT FT1
patient family c/o the patient sob.  patient family screaming, patient anxious.  ox sat 80.  patient reevaluated by md.  placed on bipap.

## 2018-03-15 DIAGNOSIS — R06.03 ACUTE RESPIRATORY DISTRESS: ICD-10-CM

## 2018-03-15 DIAGNOSIS — C34.91 MALIGNANT NEOPLASM OF UNSPECIFIED PART OF RIGHT BRONCHUS OR LUNG: ICD-10-CM

## 2018-03-15 LAB
ALBUMIN SERPL ELPH-MCNC: 2.4 G/DL — LOW (ref 3.3–5)
ALP SERPL-CCNC: 82 U/L — SIGNIFICANT CHANGE UP (ref 40–120)
ALT FLD-CCNC: 65 U/L — SIGNIFICANT CHANGE UP (ref 12–78)
ANION GAP SERPL CALC-SCNC: 7 MMOL/L — SIGNIFICANT CHANGE UP (ref 5–17)
ANION GAP SERPL CALC-SCNC: 8 MMOL/L — SIGNIFICANT CHANGE UP (ref 5–17)
AST SERPL-CCNC: 117 U/L — HIGH (ref 15–37)
BASOPHILS # BLD AUTO: 0.1 K/UL — SIGNIFICANT CHANGE UP (ref 0–0.2)
BASOPHILS NFR BLD AUTO: 0.8 % — SIGNIFICANT CHANGE UP (ref 0–2)
BILIRUB SERPL-MCNC: 0.3 MG/DL — SIGNIFICANT CHANGE UP (ref 0.2–1.2)
BUN SERPL-MCNC: 18 MG/DL — SIGNIFICANT CHANGE UP (ref 7–23)
BUN SERPL-MCNC: 21 MG/DL — SIGNIFICANT CHANGE UP (ref 7–23)
CALCIUM SERPL-MCNC: 7.7 MG/DL — LOW (ref 8.5–10.1)
CALCIUM SERPL-MCNC: 8 MG/DL — LOW (ref 8.5–10.1)
CHLORIDE SERPL-SCNC: 103 MMOL/L — SIGNIFICANT CHANGE UP (ref 96–108)
CHLORIDE SERPL-SCNC: 104 MMOL/L — SIGNIFICANT CHANGE UP (ref 96–108)
CO2 SERPL-SCNC: 25 MMOL/L — SIGNIFICANT CHANGE UP (ref 22–31)
CO2 SERPL-SCNC: 25 MMOL/L — SIGNIFICANT CHANGE UP (ref 22–31)
CREAT SERPL-MCNC: 0.61 MG/DL — SIGNIFICANT CHANGE UP (ref 0.5–1.3)
CREAT SERPL-MCNC: 0.62 MG/DL — SIGNIFICANT CHANGE UP (ref 0.5–1.3)
EOSINOPHIL # BLD AUTO: 0.2 K/UL — SIGNIFICANT CHANGE UP (ref 0–0.5)
EOSINOPHIL NFR BLD AUTO: 1.4 % — SIGNIFICANT CHANGE UP (ref 0–6)
GLUCOSE SERPL-MCNC: 102 MG/DL — HIGH (ref 70–99)
GLUCOSE SERPL-MCNC: 94 MG/DL — SIGNIFICANT CHANGE UP (ref 70–99)
HCT VFR BLD CALC: 31.9 % — LOW (ref 34.5–45)
HGB BLD-MCNC: 10.8 G/DL — LOW (ref 11.5–15.5)
LACTATE SERPL-SCNC: 3.6 MMOL/L — HIGH (ref 0.7–2)
LDH SERPL L TO P-CCNC: 716 U/L — HIGH (ref 50–242)
LYMPHOCYTES # BLD AUTO: 1.6 K/UL — SIGNIFICANT CHANGE UP (ref 1–3.3)
LYMPHOCYTES # BLD AUTO: 14.3 % — SIGNIFICANT CHANGE UP (ref 13–44)
MAGNESIUM SERPL-MCNC: 1.9 MG/DL — SIGNIFICANT CHANGE UP (ref 1.6–2.6)
MCHC RBC-ENTMCNC: 30.6 PG — SIGNIFICANT CHANGE UP (ref 27–34)
MCHC RBC-ENTMCNC: 33.9 GM/DL — SIGNIFICANT CHANGE UP (ref 32–36)
MCV RBC AUTO: 90.4 FL — SIGNIFICANT CHANGE UP (ref 80–100)
MONOCYTES # BLD AUTO: 0.5 K/UL — SIGNIFICANT CHANGE UP (ref 0–0.9)
MONOCYTES NFR BLD AUTO: 4.8 % — SIGNIFICANT CHANGE UP (ref 1–9)
NEUTROPHILS # BLD AUTO: 8.9 K/UL — HIGH (ref 1.8–7.4)
NEUTROPHILS NFR BLD AUTO: 78.7 % — HIGH (ref 43–77)
PHOSPHATE SERPL-MCNC: 2.7 MG/DL — SIGNIFICANT CHANGE UP (ref 2.5–4.5)
PLATELET # BLD AUTO: 272 K/UL — SIGNIFICANT CHANGE UP (ref 150–400)
POTASSIUM SERPL-MCNC: 3.7 MMOL/L — SIGNIFICANT CHANGE UP (ref 3.5–5.3)
POTASSIUM SERPL-MCNC: 5 MMOL/L — SIGNIFICANT CHANGE UP (ref 3.5–5.3)
POTASSIUM SERPL-SCNC: 3.7 MMOL/L — SIGNIFICANT CHANGE UP (ref 3.5–5.3)
POTASSIUM SERPL-SCNC: 5 MMOL/L — SIGNIFICANT CHANGE UP (ref 3.5–5.3)
PROCALCITONIN SERPL-MCNC: 0.2 NG/ML — HIGH (ref 0–0.04)
PROT SERPL-MCNC: 6 G/DL — SIGNIFICANT CHANGE UP (ref 6–8.3)
RBC # BLD: 3.54 M/UL — LOW (ref 3.8–5.2)
RBC # FLD: 13.8 % — SIGNIFICANT CHANGE UP (ref 10.3–14.5)
SODIUM SERPL-SCNC: 136 MMOL/L — SIGNIFICANT CHANGE UP (ref 135–145)
SODIUM SERPL-SCNC: 136 MMOL/L — SIGNIFICANT CHANGE UP (ref 135–145)
WBC # BLD: 11.3 K/UL — HIGH (ref 3.8–10.5)
WBC # FLD AUTO: 11.3 K/UL — HIGH (ref 3.8–10.5)

## 2018-03-15 PROCEDURE — 93970 EXTREMITY STUDY: CPT | Mod: 26

## 2018-03-15 PROCEDURE — 99291 CRITICAL CARE FIRST HOUR: CPT

## 2018-03-15 RX ORDER — ACETYLCYSTEINE 200 MG/ML
3 VIAL (ML) MISCELLANEOUS EVERY 8 HOURS
Qty: 0 | Refills: 0 | Status: DISCONTINUED | OUTPATIENT
Start: 2018-03-15 | End: 2018-03-17

## 2018-03-15 RX ORDER — DEXAMETHASONE 0.5 MG/5ML
10 ELIXIR ORAL ONCE
Qty: 0 | Refills: 0 | Status: COMPLETED | OUTPATIENT
Start: 2018-03-15 | End: 2018-03-15

## 2018-03-15 RX ORDER — ONDANSETRON 8 MG/1
10 TABLET, FILM COATED ORAL DAILY
Qty: 0 | Refills: 0 | Status: COMPLETED | OUTPATIENT
Start: 2018-03-16 | End: 2018-03-17

## 2018-03-15 RX ORDER — CARBOPLATIN 50 MG
500 VIAL (EA) INTRAVENOUS ONCE
Qty: 0 | Refills: 0 | Status: COMPLETED | OUTPATIENT
Start: 2018-03-15 | End: 2018-03-16

## 2018-03-15 RX ORDER — DRONABINOL 2.5 MG
2.5 CAPSULE ORAL DAILY
Qty: 0 | Refills: 0 | Status: DISCONTINUED | OUTPATIENT
Start: 2018-03-15 | End: 2018-03-19

## 2018-03-15 RX ORDER — POTASSIUM CHLORIDE 20 MEQ
40 PACKET (EA) ORAL ONCE
Qty: 0 | Refills: 0 | Status: COMPLETED | OUTPATIENT
Start: 2018-03-15 | End: 2018-03-15

## 2018-03-15 RX ORDER — ETOPOSIDE 20 MG/ML
170 VIAL (ML) INTRAVENOUS ONCE
Qty: 0 | Refills: 0 | Status: DISCONTINUED | OUTPATIENT
Start: 2018-03-15 | End: 2018-03-15

## 2018-03-15 RX ORDER — ETOPOSIDE 20 MG/ML
170 VIAL (ML) INTRAVENOUS DAILY
Qty: 0 | Refills: 0 | Status: COMPLETED | OUTPATIENT
Start: 2018-03-15 | End: 2018-03-17

## 2018-03-15 RX ORDER — PALONOSETRON HYDROCHLORIDE 0.25 MG/5ML
0.25 INJECTION, SOLUTION INTRAVENOUS ONCE
Qty: 0 | Refills: 0 | Status: COMPLETED | OUTPATIENT
Start: 2018-03-15 | End: 2018-03-15

## 2018-03-15 RX ADMIN — Medication 3 MILLILITER(S): at 23:22

## 2018-03-15 RX ADMIN — Medication 102 MILLIGRAM(S): at 16:48

## 2018-03-15 RX ADMIN — LATANOPROST 1 DROP(S): 0.05 SOLUTION/ DROPS OPHTHALMIC; TOPICAL at 01:03

## 2018-03-15 RX ADMIN — ALBUTEROL 2.5 MILLIGRAM(S): 90 AEROSOL, METERED ORAL at 23:22

## 2018-03-15 RX ADMIN — Medication 0.25 MILLIGRAM(S): at 09:34

## 2018-03-15 RX ADMIN — Medication 40 MILLIEQUIVALENT(S): at 09:18

## 2018-03-15 RX ADMIN — Medication 100 MILLIGRAM(S): at 05:25

## 2018-03-15 RX ADMIN — GABAPENTIN 300 MILLIGRAM(S): 400 CAPSULE ORAL at 21:55

## 2018-03-15 RX ADMIN — ENOXAPARIN SODIUM 40 MILLIGRAM(S): 100 INJECTION SUBCUTANEOUS at 12:41

## 2018-03-15 RX ADMIN — Medication 100 MICROGRAM(S): at 05:25

## 2018-03-15 RX ADMIN — Medication 0.5 MILLIGRAM(S): at 19:37

## 2018-03-15 RX ADMIN — Medication 162 MILLIGRAM(S): at 12:41

## 2018-03-15 RX ADMIN — Medication 2.5 MILLIGRAM(S): at 17:50

## 2018-03-15 RX ADMIN — TRAMADOL HYDROCHLORIDE 50 MILLIGRAM(S): 50 TABLET ORAL at 21:55

## 2018-03-15 RX ADMIN — Medication 1 TABLET(S): at 12:41

## 2018-03-15 RX ADMIN — Medication 3 MILLILITER(S): at 14:36

## 2018-03-15 RX ADMIN — TRAMADOL HYDROCHLORIDE 50 MILLIGRAM(S): 50 TABLET ORAL at 22:25

## 2018-03-15 RX ADMIN — Medication 50 MILLIGRAM(S): at 05:25

## 2018-03-15 RX ADMIN — Medication 20 MILLIGRAM(S): at 05:25

## 2018-03-15 RX ADMIN — ALBUTEROL 2.5 MILLIGRAM(S): 90 AEROSOL, METERED ORAL at 07:31

## 2018-03-15 RX ADMIN — Medication 100 MILLIGRAM(S): at 12:40

## 2018-03-15 RX ADMIN — Medication 100 MILLIGRAM(S): at 17:50

## 2018-03-15 RX ADMIN — Medication 0.5 MILLIGRAM(S): at 10:46

## 2018-03-15 RX ADMIN — ALBUTEROL 2.5 MILLIGRAM(S): 90 AEROSOL, METERED ORAL at 14:36

## 2018-03-15 RX ADMIN — Medication 1 GRAM(S): at 17:50

## 2018-03-15 RX ADMIN — LATANOPROST 1 DROP(S): 0.05 SOLUTION/ DROPS OPHTHALMIC; TOPICAL at 21:55

## 2018-03-15 RX ADMIN — SODIUM CHLORIDE 1000 MILLILITER(S): 9 INJECTION INTRAMUSCULAR; INTRAVENOUS; SUBCUTANEOUS at 00:08

## 2018-03-15 RX ADMIN — Medication 3 MILLILITER(S): at 07:31

## 2018-03-15 RX ADMIN — DORZOLAMIDE HYDROCHLORIDE TIMOLOL MALEATE 1 DROP(S): 20; 5 SOLUTION/ DROPS OPHTHALMIC at 05:25

## 2018-03-15 RX ADMIN — ATORVASTATIN CALCIUM 40 MILLIGRAM(S): 80 TABLET, FILM COATED ORAL at 21:55

## 2018-03-15 RX ADMIN — Medication 0.25 MILLIGRAM(S): at 00:08

## 2018-03-15 RX ADMIN — Medication 50 MILLIGRAM(S): at 17:50

## 2018-03-15 RX ADMIN — PANTOPRAZOLE SODIUM 40 MILLIGRAM(S): 20 TABLET, DELAYED RELEASE ORAL at 08:52

## 2018-03-15 RX ADMIN — DORZOLAMIDE HYDROCHLORIDE TIMOLOL MALEATE 1 DROP(S): 20; 5 SOLUTION/ DROPS OPHTHALMIC at 17:50

## 2018-03-15 RX ADMIN — PALONOSETRON HYDROCHLORIDE 0.25 MILLIGRAM(S): 0.25 INJECTION, SOLUTION INTRAVENOUS at 17:24

## 2018-03-15 RX ADMIN — Medication 1000 UNIT(S): at 12:41

## 2018-03-15 RX ADMIN — Medication 0.5 MILLIGRAM(S): at 07:31

## 2018-03-15 NOTE — DIETITIAN INITIAL EVALUATION ADULT. - PROBLEM SELECTOR PLAN 4
Possibly 2/2 to demand from increased work of breathing from pleural effusions   Initial lactate elevated at 5.4  Received Zithromax 500mg IV x1, Rocephin 1 gram IV x1, NS Bolus 1L x2  F/u repeat lactate at 9pm  F/u blood cultures and CTA. recent CAP. if persists may warrant revision of abx. Dr Barrios ID consulted

## 2018-03-15 NOTE — PROGRESS NOTE ADULT - SUBJECTIVE AND OBJECTIVE BOX
Date/Time Patient Seen:  		  Referring MD:   Data Reviewed	       Patient is a 72y old  Female who presents with a chief complaint of Came to hospital for chemotherapy. In ER SOB, desaturating. PNA, Rt. pleural effusion on CXR. (14 Mar 2018 23:36)  in bed  seen and examined  vs and meds reviewed  on high flow NC      Subjective/HPI     PAST MEDICAL & SURGICAL HISTORY:  Liver lesion  Clostridium difficile colitis  Emphysema  Pneumonia: 2/2018  Generalized intra-abdominal and pelvic swelling, mass and lump  Glaucoma  Aortic valve prosthesis present  CAD (coronary artery disease)  Arthritis  Heart murmur  Other iron deficiency anemia  Carpal tunnel syndrome of right wrist  PVD (peripheral vascular disease)  Hyperlipemia  Colitis  Hypothyroid  Hypertension  COPD (chronic obstructive pulmonary disease)  H/O foot surgery: (Right foot, 2010)  History of colonoscopy  S/P carpal tunnel release: (Right, 1/2017)  H/O carotid endarterectomy: Both sides 2002  Aortic valve replaced: 2011 with bovine  Carotid disease, bilateral  Cataract extraction status of left eye  Cataract extraction status of right eye        Medication list         MEDICATIONS  (STANDING):  acetylcysteine 10% Inhalation 3 milliLiter(s) Inhalation every 8 hours  ALBUTerol    0.083% 2.5 milliGRAM(s) Nebulizer every 8 hours  aspirin enteric coated 162 milliGRAM(s) Oral daily  atorvastatin 40 milliGRAM(s) Oral at bedtime  buDESOnide   0.5 milliGRAM(s) Respule 0.5 milliGRAM(s) Inhalation two times a day  cholecalciferol 1000 Unit(s) Oral daily  docusate sodium 100 milliGRAM(s) Oral two times a day  dorzolamide 2%/timolol 0.5% Ophthalmic Solution 1 Drop(s) Both EYES two times a day  enoxaparin Injectable 40 milliGRAM(s) SubCutaneous every 24 hours  gabapentin 300 milliGRAM(s) Oral at bedtime  latanoprost 0.005% Ophthalmic Solution 1 Drop(s) Both EYES at bedtime  levothyroxine 100 MICROGram(s) Oral daily  metoprolol succinate ER 50 milliGRAM(s) Oral two times a day  multivitamin 1 Tablet(s) Oral daily  omega-3-Acid Ethyl Esters 1 Gram(s) Oral two times a day  pantoprazole    Tablet 40 milliGRAM(s) Oral before breakfast  predniSONE   Tablet 20 milliGRAM(s) Oral daily  pyridoxine 100 milliGRAM(s) Oral daily    MEDICATIONS  (PRN):  acetaminophen   Tablet 650 milliGRAM(s) Oral every 6 hours PRN For Temp greater than 38 C (100.4 F)  acetaminophen   Tablet. 650 milliGRAM(s) Oral every 6 hours PRN Mild Pain (1 - 3)  ALPRAZolam 0.25 milliGRAM(s) Oral three times a day PRN Anxiety  benzonatate 100 milliGRAM(s) Oral three times a day PRN Cough  lidocaine   Patch 1 Patch Transdermal every 72 hours PRN Neck pain  ondansetron Injectable 4 milliGRAM(s) IV Push every 6 hours PRN Nausea and/or Vomiting  oxyCODONE    5 mG/acetaminophen 325 mG 1 Tablet(s) Oral every 4 hours PRN Severe Pain (7 - 10)  traMADol 50 milliGRAM(s) Oral four times a day PRN Moderate Pain (4 - 6)         Vitals log        ICU Vital Signs Last 24 Hrs  T(C): 36.6 (15 Mar 2018 04:02), Max: 36.6 (14 Mar 2018 22:50)  T(F): 97.8 (15 Mar 2018 04:02), Max: 97.9 (14 Mar 2018 22:50)  HR: 77 (15 Mar 2018 06:00) (74 - 86)  BP: 106/52 (15 Mar 2018 06:00) (104/54 - 172/72)  BP(mean): 75 (15 Mar 2018 06:00) (74 - 103)  ABP: --  ABP(mean): --  RR: 20 (15 Mar 2018 06:00) (14 - 45)  SpO2: 96% (15 Mar 2018 06:00) (84% - 96%)           Input and Output:  I&O's Detail    14 Mar 2018 07:01  -  15 Mar 2018 06:15  --------------------------------------------------------  IN:    Oral Fluid: 240 mL    Sodium Chloride 0.9% IV Bolus: 2000 mL  Total IN: 2240 mL    OUT:    Voided: 675 mL  Total OUT: 675 mL    Total NET: 1565 mL          Lab Data                        10.8   11.3  )-----------( 272      ( 15 Mar 2018 05:33 )             31.9     03-15    136  |  104  |  21  ----------------------------<  94  3.7   |  25  |  0.62    Ca    7.7<L>      15 Mar 2018 05:33    TPro  6.0  /  Alb  2.4<L>  /  TBili  0.3  /  DBili  x   /  AST  117<H>  /  ALT  65  /  AlkPhos  82  03-15    ABG - ( 14 Mar 2018 22:37 )  pH: 7.39  /  pCO2: 40    /  pO2: 77    / HCO3: 24    / Base Excess: -0.8  /  SaO2: 97                      Review of Systems	      Objective     Physical Examination    frail  anxious  heart s1s2  lung dec BS  abd soft  cn grossly int      Pertinent Lab findings & Imaging      Rangel:  NO   Adequate UO     I&O's Detail    14 Mar 2018 07:01  -  15 Mar 2018 06:15  --------------------------------------------------------  IN:    Oral Fluid: 240 mL    Sodium Chloride 0.9% IV Bolus: 2000 mL  Total IN: 2240 mL    OUT:    Voided: 675 mL  Total OUT: 675 mL    Total NET: 1565 mL               Discussed with:     Cultures:	        Radiology

## 2018-03-15 NOTE — DIETITIAN INITIAL EVALUATION ADULT. - PROBLEM SELECTOR PLAN 2
opacity seen on CXR, may represent neoplasia.   CTA ordered by Pulm. need ICU eval  Dr Forde at bedside, case discussed, consult appreciated.   IR - US guided Thoracentesis tomorrow am.  NPO after midnight   Hold lovenox after midnight. Can get dose this evening.  F/u procal level  Recent ECHO performed on Feb 2018 showed EF of 55%  ID Dr. Barrios consulted for possible infectious process

## 2018-03-15 NOTE — DIETITIAN INITIAL EVALUATION ADULT. - PROBLEM SELECTOR PLAN 5
New liver lesions on CT abdomen found on previous admission with high suspicion for metastases especially given the pathology from lung / LN biopsies of metastatic carcinoma. Pt refused MRI as inpatient due to severe claustrophobia and defers to outpt setting where she will try to schedule it in an open MRI center.  Continue to trend LFTS  Heme/Onc consult Dr. Salazar

## 2018-03-15 NOTE — DIETITIAN INITIAL EVALUATION ADULT. - ETIOLOGY
related to context of acute illness or injury related to inadequate protein energy intake in the setting of catabolic illness

## 2018-03-15 NOTE — DIETITIAN INITIAL EVALUATION ADULT. - PROBLEM SELECTOR PLAN 3
Likely multifactorial etiology, 2/2 to emphysema, lung ca, and pleural effusions  Currently on 5L NC, satinithya between 88-90  Continue albuterol nebs  Continue home dose prednisone 20 mg daily  Continue pulmicort  as above

## 2018-03-15 NOTE — DIETITIAN INITIAL EVALUATION ADULT. - SIGNS/SYMPTOMS
as evidenced by temporal wasting,<50% of energy requirements for>5 days and 12.8% wt loss in 5 weeks

## 2018-03-15 NOTE — DIETITIAN INITIAL EVALUATION ADULT. - OTHER INFO
Pt reports persistent lack of appetite; barely eating anything at home PTA; prior to getting sick ~5 weeks ago eating 3 full well-balanced meals (including salads, sandwich, soup, chicken and veggies), no difficulty chewing/swallowing, no nausea/vomiting/diarrhea/constipation, patient and  usually cook and shop together. Per EMR: BM 3/15, pt starting chemotherapy today, 2+ edema dependent, left leg, jennifer 21 and skin intact. Education provided to patient and  on increasing calories and protein. Food preferences taken. Patient presents temporal wasting. Recommend appetite stimulant. Pt reports persistent lack of appetite; barely eating anything at home PTA; prior to getting sick ~5 weeks ago eating 3 full well-balanced meals (including salads, sandwich, soup, chicken and veggies), no difficulty chewing/swallowing, no nausea/vomiting/diarrhea/constipation, patient and  usually cook and shop together. Per EMR: BM 3/15, pt starting chemotherapy today, 2+ edema dependent, left leg, jennifer 21 and skin intact. Education provided to patient and  on increasing calories and protein. Food preferences taken. Patient presents temporal wasting. Recommend appetite stimulant and Prosource. Pt reports she weighed 156# 5 weeks ago; persistent lack of appetite; barely eating anything at home PTA; prior to getting sick ~5 weeks ago eating 3 full well-balanced meals (including salads, sandwich, soup, chicken and veggies), no difficulty chewing/swallowing, no nausea/vomiting/diarrhea/constipation, patient and  usually cook and shop together. Per EMR: BM 3/15, pt starting chemotherapy today, 2+ edema dependent, left leg, jeninfer 21 and skin intact. Education provided to patient and  on increasing calories and protein. Food preferences taken. Patient presents temporal wasting. Discussed with team and recommend appetite stimulant and Prosource.

## 2018-03-15 NOTE — CONSULT NOTE ADULT - SUBJECTIVE AND OBJECTIVE BOX
Full note to follow.  Sent to hospital after 2nd opinion at [facility] for her lung cancer, with progressive dyspnea, to start urgent-emergent chemotherapy given rapid progressive deterioration.  Denies fevers, chills, or rigors. Only real complaints were dyspnea and weakness  Exam about the same as previously.  I do not find support for infection on the basis of history and exam  I see no role for antibiotics.  I have no objection to beginning chemotherapy.    Abdias Barrios MD  [phone]    --------------------------------------  [facility], Division of Infectious Diseases  Shantel Barrios, SHIRA Mcdaniel, TAMMIE HELMSEY, GENIE  72y, Female  [id number]    HPI--  *** insert HPI ***    PMH/PSH--  Liver lesion  Clostridium difficile colitis  Emphysema  Pneumonia  Generalized intra-abdominal and pelvic swelling, mass and lump  Glaucoma  Aortic valve prosthesis present  CAD (coronary artery disease)  Arthritis  Heart murmur  Other iron deficiency anemia  Carpal tunnel syndrome of right wrist  PVD (peripheral vascular disease)  Hyperlipemia  Colitis  Hypothyroid  Hypertension  COPD (chronic obstructive pulmonary disease)  H/O foot surgery  History of colonoscopy  S/P carpal tunnel release  H/O carotid endarterectomy  Aortic valve replaced  Carotid disease, bilateral  Cataract extraction status of left eye  Cataract extraction status of right eye      Allergies--      Medications--  Antibiotics:   Immunologic:   Other: acetaminophen   Tablet PRN  acetaminophen   Tablet. PRN  acetylcysteine 10% Inhalation  ALBUTerol    0.083%  ALPRAZolam PRN  aspirin enteric coated  atorvastatin  benzonatate PRN  buDESOnide   0.5 milliGRAM(s) Respule  CARBOplatin IVPB  cholecalciferol  dexamethasone  IVPB  docusate sodium  dorzolamide 2%/timolol 0.5% Ophthalmic Solution  enoxaparin Injectable  etoposide IVPB  gabapentin  latanoprost 0.005% Ophthalmic Solution  levothyroxine  lidocaine   Patch PRN  metoprolol succinate ER  multivitamin  omega-3-Acid Ethyl Esters  ondansetron Injectable PRN  oxyCODONE    5 mG/acetaminophen 325 mG PRN  palonosetron Injectable  pantoprazole    Tablet  predniSONE   Tablet  pyridoxine  traMADol PRN      Social History--  EtOH: denies ***  Tobacco: denies ***  Drug Use: denies ***    Family/Marital History--  No pertinent family history in first degree relatives    Remainder not relevant to clinical concern.    Travel/Environmental/Occupational History:  *** insert T/E/O Hx ***    Review of Systems:  A >=10-point review of systems was obtained.     Pertinent positives and negatives--  Constitutional: No fevers. No Chills. No Rigors.   Eyes:  ENMT:  Cardiovascular: No chest pain. No palpitations.  Respiratory: No shortness of breath. No cough.  Gastrointestinal: No nausea or vomiting. No diarrhea or constipation.   Genitourinary:  Musculoskeletal:  Skin:  Neurologic:  Psychiatric: Pleasant. Appropriate affect.  Endocrine:  Heme/Lymphatic:  Allergy/Immunologic:    Review of systems otherwise negative except as previously noted.    Physical Exam--  Vital Signs: T(F): 97.8 (03-15-18 @ 08:00), Max: 97.9 (03-14-18 @ 22:50)  HR: 77 (03-15-18 @ 07:37)  BP: 128/62 (03-15-18 @ 07:00)  RR: 24 (03-15-18 @ 07:00)  SpO2: 91% (03-15-18 @ 07:37)  Wt(kg): --  General: Nontoxic-appearing Female in no acute distress.  HEENT: AT/NC. PERRL. EOMI. Anicteric. Conjunctiva pink and moist. Oropharynx clear. Dentition fair.  Neck: Not rigid. No sense of mass.  Nodes: None palpable.  Lungs: Clear bilaterally without rales, wheezing or rhonchi  Heart: Regular rate and rhythm. No Murmur. No rub. No gallop. No palpable thrill.  Abdomen: Bowel sounds present and normoactive. Soft. Nondistended. Nontender. No sense of mass. No organomegaly.  Back: No spinal tenderness. No costovertebral angle tenderness.   Extremities: No cyanosis or clubbing. No edema.   Skin: Warm. Dry. Good turgor. No rash. No vasculitic stigmata.  Psychiatric: Appropriate affect and mood for situation.         Laboratory & Imaging Data--  CBC                        10.8   11.3  )-----------( 272      ( 15 Mar 2018 05:33 )             31.9       Chemistries  03-15    136  |  104  |  21  ----------------------------<  94  3.7   |  25  |  0.62    Ca    7.7<L>      15 Mar 2018 05:33    TPro  6.0  /  Alb  2.4<L>  /  TBili  0.3  /  DBili  x   /  AST  117<H>  /  ALT  65  /  AlkPhos  82  03-15          Culture Data          Assessment--      Suggestions--        Abdias Barrios MD  [phone]

## 2018-03-15 NOTE — PROGRESS NOTE ADULT - ATTENDING COMMENTS
72F PMH bioprosthetic AVR, CAD, COPD (on home O2), HTN, HLD, recently diagnosed metastatic SCC now admitted for acute hypoxic resp failure due to SCC and for chemotherapy.     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     -neuro and HD stable  -prn Percocet for pain  -on HFNC (35L, 100%) with sats low 90s, didn't tolerate NIV last night, will continue HFNC for now  -high risk for intubation  -no evidence of infection, monitor off abx  -b/l trace pleural effusion - not adequate for drainage  -continue prednisone, albuterol  -po diet  -to get carboplatin, etoposide today  -continue other home meds  -monitor LFTs on lipitor  -no LE DVT  -lovenox for DVT ppx  -prognosis poor, full code  -35mins critical care time spent

## 2018-03-15 NOTE — PROGRESS NOTE ADULT - ASSESSMENT
72 year old Female recently diagnosed with Small Cell Lung Cancer, significant hx CAD, AVR-bovine, hx CDiff, emphysema, O2 dependent at home, former smoker, HTN, hypothyroidism, PVD, and iron deficiency anemia, admitted for hypoxic respiratory failure.     Neuro - Stable. AAO x3  CV - BP stable, continue metoprolol and ASA  Pulm - removed from Bipap overnight, tolerating high flow NC, CXR/ABG reviewed. Bedside Lung US showed no pleural effusion, more likely right lung mass vs consolidation. Patient may go for chemotherapy as per Oncology for SC Lung Ca.   GI- Continue PPI for GI prophylaxis. May eat as tolerated, NPO while on Bipap.  Renal- Renal function stable stable   ID- Hold abx, no evidence of active infection   Hem/Onc - plan for chemo tx as per Onc . DVT ppx with Lovenox. 72 year old Female recently diagnosed with metastatic Small Cell Lung Cancer (03/18), recent admission for multilobular PNA (2/2018), CAD, s/p AVR (bovine valve, 2011), s/p bilateral carotid endarterectomy (2002), Emphysema (on 3L home O2), C. diff, HTN, HLD, Hypothyroidism, Iron deficiency anemia, arthritis, glaucoma, admitted for hypoxic respiratory failure, and for chemotherapy for Lung CA.     Neuro - Stable. AAO x3  CV - HTN, HLD, s/p AV replacement. BP stable, continue metoprolol 50 BID, Lipitor, Lovaza, ASA. s/p AVR bovine valve replacement.   Pulm - Acute hypoxic respiratory failure, SOB r/o PE. SOB likely 2/2 lung ca and chronic emphysema, vs r/o PE/DVT. Removed from Bipap overnight, tolerating high flow NC, CXR/ABG reviewed. Bedside Lung US showed no pleural effusion, more likely right lung mass vs consolidation. Patient may go for chemotherapy as per Oncology for SC Lung Ca. Continue Mucomyst, Duonebs, Albuterol, Pulmicort, Tessalon Pearles for cough, Prednisone. Continue BiPAP overnight. Consider CTA vs LE Dopplers if SOB persists.   Endo- continue synthroid for Hypothyroidism.   GI- Continue PPI for GI prophylaxis. May eat as tolerated, NPO while on Bipap.  Renal- Renal function stable stable   ID- Hold abx, no evidence of active infection   Hem/Onc - plan for chemo tx as per Onc . DVT ppx with Lovenox. 72 year old Female recently diagnosed with metastatic Small Cell Lung Cancer (03/18), recent admission for multilobular PNA (2/2018), CAD, s/p AVR (bovine valve, 2011), s/p bilateral carotid endarterectomy (2002), Emphysema (on 3L home O2), C. diff, HTN, HLD, Hypothyroidism, Iron deficiency anemia, arthritis, glaucoma, admitted for hypoxic respiratory failure, and for chemotherapy for Lung CA.     Neuro - Stable. AAO x3  CV - HTN, HLD, s/p AV replacement. BP stable, continue metoprolol 50 BID, Lipitor, Lovaza, ASA. s/p AVR bovine valve replacement.   Pulm - Acute hypoxic respiratory failure, SOB r/o PE. SOB likely 2/2 lung ca and chronic emphysema, vs r/o PE/DVT. Removed from Bipap overnight, tolerating high flow NC, CXR/ABG reviewed. Bedside Lung US showed no pleural effusion, more likely right lung mass vs consolidation. Patient may go for chemotherapy as per Oncology for SC Lung Ca. Continue Mucomyst, Duonebs, Albuterol, Pulmicort, Tessalon Pearles for cough, Prednisone. Continue BiPAP overnight. Consider CTA vs LE Dopplers if SOB persists.   Endo- continue synthroid for Hypothyroidism.   GI- Continue PPI for GI prophylaxis. May eat as tolerated, NPO while on Bipap.  Renal- Renal function stable stable   ID- Hold abx for now, no evidence of active infection. WBC today > 11. Procalcitonin 20. Afebrile. Follow up blood cultures.  WBC likely reactive 2/2 to acute illness.  Hem/Onc - plan for chemo tx as per Onc . DVT ppx with Lovenox. 72 year old Female recently diagnosed with metastatic Small Cell Lung Cancer (03/18), recent admission for multilobular PNA (2/2018), CAD, s/p AVR (bovine valve, 2011), s/p bilateral carotid endarterectomy (2002), Emphysema (on 3L home O2), C. diff, HTN, HLD, Hypothyroidism, Iron deficiency anemia, arthritis, glaucoma, admitted for hypoxic respiratory failure, and for chemotherapy for Lung CA.     Neuro - Stable. AAO x3. Xanax PRN anxiety  CV - HTN, HLD, s/p AV replacement. BP stable, continue metoprolol 50 BID, Lipitor, Lovaza, ASA. s/p AVR bovine valve replacement.   Pulm - Acute hypoxic respiratory failure, SOB r/o PE. SOB likely 2/2 lung ca and chronic emphysema, vs r/o PE/DVT. Removed from Bipap overnight, tolerating high flow NC, CXR/ABG reviewed. Bedside Lung US showed no pleural effusion, more likely right lung mass vs consolidation. Patient may go for chemotherapy as per Oncology for SC Lung Ca. Continue Mucomyst, Duonebs, Albuterol, Pulmicort, Tessalon Pearles for cough, Prednisone. Continue BiPAP overnight. Consider CTA vs LE Dopplers if SOB persists.   Endo- continue synthroid for Hypothyroidism.   GI- Continue PPI for GI prophylaxis. May eat as tolerated, NPO while on Bipap.  Renal- Renal function stable stable   ID- Hold abx for now, no evidence of active infection. WBC today > 11. Procalcitonin 20. Afebrile. Follow up blood cultures.  WBC likely reactive 2/2 to acute illness.  Hem/Onc - plan for chemo tx as per Onc . DVT ppx with Lovenox.  Musculoskeletal - Arthritis, continue tramadol, home dose Lidoderm patch, and home dose percocet. 72 year old Female recently diagnosed with metastatic Small Cell Lung Cancer (03/18), recent admission for multilobular PNA (2/2018), CAD, s/p AVR (bovine valve, 2011), s/p bilateral carotid endarterectomy (2002), Emphysema (on 3L home O2), C. diff, HTN, HLD, Hypothyroidism, Iron deficiency anemia, arthritis, glaucoma, admitted for hypoxic respiratory failure, and for chemotherapy for Lung CA.     --NEURO - Stable. AAO x3. Xanax PRN anxiety  --CV - HTN, HLD, s/p AV replacement. BP stable, continue metoprolol 50 BID, Lipitor, Lovaza, ASA. Hold home med Hydralazine for now. s/p AVR bovine valve replacement.   --RESP - Acute hypoxic respiratory failure, SOB r/o PE. SOB likely 2/2 lung ca and chronic emphysema, vs r/o PE/DVT. Removed from Bipap overnight, tolerating high flow NC, CXR/ABG reviewed. Bedside Lung US showed no pleural effusion, more likely right lung mass vs consolidation. Patient may go for chemotherapy as per Oncology for SC Lung Ca. Continue Mucomyst, Duonebs, Albuterol, Pulmicort, Tessalon Pearles for cough, Prednisone. Continue BiPAP overnight. Consider CTA vs LE Dopplers if SOB persists.   --ENDO- continue synthroid for Hypothyroidism.   --GI- Continue PPI for GI prophylaxis. May eat as tolerated, NPO while on Bipap.  --RENAL- Renal function stable stable   --ID- Hold abx for now, no evidence of active infection. WBC today > 11. Procalcitonin 20. Afebrile. Follow up blood cultures.  WBC likely reactive 2/2 to acute illness.  --HEME/ONC - plan for chemo tx as per Onc . DVT ppx with Lovenox.  --MSK - Arthritis, continue tramadol, home dose Lidoderm patch, and home dose percocet. 72 year old Female recently diagnosed with metastatic Small Cell Lung Cancer (03/18), recent admission for multilobular PNA (2/2018), CAD, s/p AVR (bovine valve, 2011), s/p bilateral carotid endarterectomy (2002), Emphysema (on 3L home O2), C. diff, HTN, HLD, Hypothyroidism, Iron deficiency anemia, arthritis, glaucoma, admitted for hypoxic respiratory failure, and for chemotherapy for Lung CA.     --NEURO - Stable. AAO x3. Xanax PRN anxiety  --CV - HTN, HLD, s/p AV replacement. BP stable, continue metoprolol 50 BID, Lipitor, Lovaza, ASA. Hold home med Hydralazine for now. s/p AVR bovine valve replacement.   --RESP - Acute hypoxic respiratory failure, SOB r/o PE. SOB likely 2/2 lung ca and chronic emphysema, vs r/o PE/DVT. Removed from Bipap overnight, tolerating high flow NC, CXR/ABG reviewed. Bedside Lung US showed no pleural effusion, more likely right lung mass vs consolidation. Patient may go for chemotherapy as per Oncology for SC Lung Ca. Continue Mucomyst, Duonebs, Albuterol, Pulmicort, Tessalon Pearles for cough, Prednisone. Continue BiPAP overnight. Low suspiscion for DVT/PE. LE Dopplers negative.   --ENDO- continue synthroid for Hypothyroidism.   --GI- Continue PPI for GI prophylaxis. May eat as tolerated, NPO while on Bipap.  --RENAL- Renal function stable stable   --ID- Hold abx for now, no evidence of active infection. WBC today > 11. Procalcitonin 20. Afebrile. Follow up blood cultures.  WBC likely reactive 2/2 to acute illness.  --HEME/ONC - plan for chemo tx as per Onc . DVT ppx with Lovenox.  --MSK - Arthritis, continue tramadol, home dose Lidoderm patch, and home dose percocet.

## 2018-03-15 NOTE — DIETITIAN INITIAL EVALUATION ADULT. - PROBLEM SELECTOR PLAN 1
Admit to [facility] for chemotherapy.   Sent by Dr. Salazar ([facility]) f/u recs

## 2018-03-15 NOTE — CONSULT NOTE ADULT - SUBJECTIVE AND OBJECTIVE BOX
Patient is a 72y old  Female who presents with a chief complaint of Came to hospital for chemotherapy. In ER SOB, desaturating. PNA, Rt. pleural effusion on CXR. (14 Mar 2018 23:36)      HPI:  71 yo F with PMH of recently diagnosed metastatic carcinoma (3/2018), recent admission for multilobular PNA (2/2018), CAD, s/p AVR (bovine valve, 2011), s/p bilateral carotid endarterectomy (2002), Emphysema (on 3L home O2), C. diff, HTN, HLD, Hypothyroidism, Iron deficiency anemia, arthritis, glaucoma, and new liver lesion sent to the ED by his Oncologist to begin chemotherapy for her recently diagnosed metastatic lung cancer. Pt states that she has been more SOB, at rest and at exertion. Ambulates with walker at home. Also states that she has had increased swelling in her b/l feet that has been getting worse in the past week, unable to put on shoes this am. Denies fevers, chills, nausea, vomiting, CP, palpitations, abdominal pain, changes in bowel movements.    In the ED, patient was hypoxemic (SpO2 of 85%), otherwise hemodynamically stable. CXR showed worsening of pleural effusion which is large and has a component of loculation. EKG showed sinus rhythm at 82 with first degree AV block. Labs significant for an elevated lactate of 5.4 (elevated during prior admission, range 2.7 -3.7, not found to be septic at that time), no leukocytosis (WBC 10.2) but with elevated Neut 90%, anemia (H/H 11.4/32.8), mild hyponatremia (Na 133), and hyperglycemia (Glu 224). Received Zithromax 500mg IV x1, Rocephin 1 gram IV x1, NS Bolus 1L x2, and Duoneb x1. (14 Mar 2018 18:20)       ROS:  eas scheduled to start treatment next week with VP_[id number] and carboplatin however [place] [facility] [place] and I was called that she was not doing well and was sent to the ER. US in ICU showed no fluid but worsening consolidation  Negative except for:    PAST MEDICAL & SURGICAL HISTORY:  Liver lesion  Clostridium difficile colitis  Emphysema  Pneumonia: 2/2018  Generalized intra-abdominal and pelvic swelling, mass and lump  Glaucoma  Aortic valve prosthesis present  CAD (coronary artery disease)  Arthritis  Heart murmur  Other iron deficiency anemia  PVD (peripheral vascular disease)  Hyperlipemia  Hypothyroid  Hypertension  H/O foot surgery: (Right foot, 2010)  History of colonoscopy  S/P carpal tunnel release: (Right, 1/2017)  H/O carotid endarterectomy: Both sides 2002  Aortic valve replaced: 2011 with bovine  Carotid disease, bilateral  Cataract extraction status of left eye  Cataract extraction status of right eye      SOCIAL HISTORY:    FAMILY HISTORY:  No pertinent family history in first degree relatives      MEDICATIONS  (STANDING):  acetylcysteine 10% Inhalation 3 milliLiter(s) Inhalation every 8 hours  ALBUTerol    0.083% 2.5 milliGRAM(s) Nebulizer every 8 hours  aspirin enteric coated 162 milliGRAM(s) Oral daily  atorvastatin 40 milliGRAM(s) Oral at bedtime  buDESOnide   0.5 milliGRAM(s) Respule 0.5 milliGRAM(s) Inhalation two times a day  CARBOplatin IVPB 500 milliGRAM(s) IV Intermittent once  cholecalciferol 1000 Unit(s) Oral daily  docusate sodium 100 milliGRAM(s) Oral two times a day  dorzolamide 2%/timolol 0.5% Ophthalmic Solution 1 Drop(s) Both EYES two times a day  dronabinol 2.5 milliGRAM(s) Oral daily  enoxaparin Injectable 40 milliGRAM(s) SubCutaneous every 24 hours  etoposide IVPB 170 milliGRAM(s) IV Intermittent daily  gabapentin 300 milliGRAM(s) Oral at bedtime  latanoprost 0.005% Ophthalmic Solution 1 Drop(s) Both EYES at bedtime  levothyroxine 100 MICROGram(s) Oral daily  metoprolol succinate ER 50 milliGRAM(s) Oral two times a day  multivitamin 1 Tablet(s) Oral daily  omega-3-Acid Ethyl Esters 1 Gram(s) Oral two times a day  pantoprazole    Tablet 40 milliGRAM(s) Oral before breakfast  predniSONE   Tablet 20 milliGRAM(s) Oral daily  pyridoxine 100 milliGRAM(s) Oral daily    MEDICATIONS  (PRN):  acetaminophen   Tablet 650 milliGRAM(s) Oral every 6 hours PRN For Temp greater than 38 C (100.4 F)  acetaminophen   Tablet. 650 milliGRAM(s) Oral every 6 hours PRN Mild Pain (1 - 3)  ALPRAZolam 0.25 milliGRAM(s) Oral three times a day PRN Anxiety  benzonatate 100 milliGRAM(s) Oral three times a day PRN Cough  lidocaine   Patch 1 Patch Transdermal every 72 hours PRN Neck pain  ondansetron Injectable 4 milliGRAM(s) IV Push every 6 hours PRN Nausea and/or Vomiting  oxyCODONE    5 mG/acetaminophen 325 mG 1 Tablet(s) Oral every 4 hours PRN Severe Pain (7 - 10)  traMADol 50 milliGRAM(s) Oral four times a day PRN Moderate Pain (4 - 6)      Allergies    No Known Allergies    Intolerances        Vital Signs Last 24 Hrs  T(C): 36.5 (15 Mar 2018 20:28), Max: 37.1 (15 Mar 2018 12:00)  T(F): 97.7 (15 Mar 2018 20:28), Max: 98.7 (15 Mar 2018 12:00)  HR: 80 (15 Mar 2018 23:00) (73 - 85)  BP: 125/57 (15 Mar 2018 23:00) (104/54 - 172/72)  BP(mean): 82 (15 Mar 2018 23:00) (68 - 103)  RR: 21 (15 Mar 2018 23:00) (14 - 39)  SpO2: 92% (15 Mar 2018 23:00) (84% - 96%)    PHYSICAL EXAM  General: chronically ill appearing, SOB with O2 in place  HEENT: clear oropharynx, anicteric sclera, pink conjunctivae  Neck: supple  CV: normal S1S2 with no murmur rubs or gallops  Lungs: clear to auscultation, no wheezes, no rhales  Abdomen: soft non-tender non-distended, no hepato/splenomegaly  Ext: no clubbing cyanosis or edema  Skin: no rashes and no petichiae  Neuro: alert and oriented X3 no focal deficits      LABS:    CBC Full  -  ( 15 Mar 2018 05:33 )  WBC Count : 11.3 K/uL  Hemoglobin : 10.8 g/dL  Hematocrit : 31.9 %  Platelet Count - Automated : 272 K/uL  Mean Cell Volume : 90.4 fl  Mean Cell Hemoglobin : 30.6 pg  Mean Cell Hemoglobin Concentration : 33.9 gm/dL  Auto Neutrophil # : 8.9 K/uL  Auto Lymphocyte # : 1.6 K/uL  Auto Monocyte # : 0.5 K/uL  Auto Eosinophil # : 0.2 K/uL  Auto Basophil # : 0.1 K/uL  Auto Neutrophil % : 78.7 %  Auto Lymphocyte % : 14.3 %  Auto Monocyte % : 4.8 %  Auto Eosinophil % : 1.4 %  Auto Basophil % : 0.8 %    03-15    136  |  103  |  18  ----------------------------<  102<H>  5.0   |  25  |  0.61    Ca    8.0<L>      15 Mar 2018 13:14  Phos  2.7     03-15  Mg     1.9     03-15    TPro  6.0  /  Alb  2.4<L>  /  TBili  0.3  /  DBili  x   /  AST  117<H>  /  ALT  65  /  AlkPhos  82  03-15    PT/INR - ( 14 Mar 2018 17:04 )   PT: 11.7 sec;   INR: 1.07 ratio         PTT - ( 14 Mar 2018 17:04 )  PTT:25.3 sec          BLOOD SMEAR INTERPRETATION:    RADIOLOGY & ADDITIONAL STUDIES:    < from: Xray Chest 1 View AP/PA (03.14.18 @ 16:31) >  EXAM:  XR CHEST AP OR PA 1V                            PROCEDURE DATE:  03/14/2018          INTERPRETATION:  History: Lung cancer with shortness of breath    Portable AP radiograph of the chest is performed. comparison is made to   3/5/2018.    There is been significant increase in the large right pleural effusion   which now extends into the upper chest with loculation along the lateral   chest wall. Sternal wires are again appreciated. Left lung is clear.   Surgical clips are again seen and neck grade osseous structures are   unremarkable    Impression: Worsening of pleural effusion which is large and has a   component of loculation.                WARREN STORY M.D.,ATTENDING RADIOLOGIST  This document has been electronically signed. Mar14 2018  4:35PM              < end of copied text >  < from: Xray Chest 1 View AP/PA (03.14.18 @ 16:31) >  EXAM:  XR CHEST AP OR PA 1V                            < from: US Duplex Venous Lower Ext Complete, Bilateral (03.15.18 @ 09:12) >    EXAM:  US DPLX LWR EXT VEINS COMPL BI                            PROCEDURE DATE:  03/15/2018          INTERPRETATION:  CLINICAL INFORMATION: Right pleural effusion. Right leg   swelling.    COMPARISON: None available.    TECHNIQUE: Duplex sonography of the BILATERAL LOWER extremities with   color and spectral Doppler, with and without compression.      FINDINGS:    There is normal compressibility of the bilateral common femoral, femoral   and popliteal veins. The right soleal vein was not visualized. The left   soleal vein in the left peroneal vein were not visualized. No calf vein   thrombosis is detected.    Doppler examination shows normal spontaneous and phasic flow.    IMPRESSION:     No evidence of bilateral lower extremity deep venous thrombosis in the   visualized venous segments. The right and left soleal veins are not   visualized. The left peroneal vein was not visualized.                    MAKENZIE RYAN M.D., ATTENDING RADIOLOGIST  This document has been electronically signed. Mar 15 2018 10:11AM                < end of copied text >

## 2018-03-15 NOTE — PROGRESS NOTE ADULT - SUBJECTIVE AND OBJECTIVE BOX
Patient is a 72y old  Female who presents with a chief complaint of Came to hospital for chemotherapy. In ER SOB, desaturating. PNA, Rt. pleural effusion on CXR. (14 Mar 2018 23:36)    24 hour events: Patient removed off bipap and placed on high flow nasal cannula 3-5 L overnight. Patient reports feeling better, SOB improved.     REVIEW OF SYSTEMS  Constitutional: No fever, chills, fatigue  Neuro: No headache, numbness, weakness  Resp: + SOB. No cough, wheezing.   CVS: No chest pain, palpitations, leg swelling  GI: No abdominal pain, nausea, vomiting, diarrhea   : No dysuria, frequency, incontinence  Skin: No itching, burning, rashes, or lesions   Msk: No joint pain or swelling  Psych: No depression, anxiety, mood swings    T(F): 97.8 (03-15-18 @ 04:02), Max: 97.9 (03-14-18 @ 22:50)  HR: 77 (03-15-18 @ 07:37) (74 - 86)  BP: 128/62 (03-15-18 @ 07:00) (104/54 - 172/72)  RR: 24 (03-15-18 @ 07:00) (14 - 45)  SpO2: 91% (03-15-18 @ 07:37) (84% - 96%)  Wt(kg): --        CAPILLARY BLOOD GLUCOSE      I&O's Summary    03-14 @ 07:01  -  03-15 @ 07:00  --------------------------------------------------------  IN: 2240 mL / OUT: 675 mL / NET: 1565 mL      PHYSICAL EXAM  General: AAO x3, on high flow NC  CNS: AAOx3   HEENT: NC AT  Resp: decreased BS on right, CTA on left, no wheezes or rhonchi   CVS: RRR +S1 +S2  Abd: soft, NT, ND  Ext: +2 peripheral pulses  Skin: no rashes or lesions    MEDICATIONS    metoprolol succinate ER Oral    atorvastatin Oral  levothyroxine Oral  predniSONE   Tablet Oral    acetylcysteine 10% Inhalation Inhalation  ALBUTerol    0.083% Nebulizer  benzonatate Oral PRN  buDESOnide   0.5 milliGRAM(s) Respule Inhalation    acetaminophen   Tablet Oral PRN  acetaminophen   Tablet. Oral PRN  ALPRAZolam Oral PRN  gabapentin Oral  ondansetron Injectable IV Push PRN  oxyCODONE    5 mG/acetaminophen 325 mG Oral PRN  traMADol Oral PRN      aspirin enteric coated Oral  enoxaparin Injectable SubCutaneous    docusate sodium Oral  pantoprazole    Tablet Oral      cholecalciferol Oral  multivitamin Oral  pyridoxine Oral      dorzolamide 2%/timolol 0.5% Ophthalmic Solution Both EYES  latanoprost 0.005% Ophthalmic Solution Both EYES  lidocaine   Patch Transdermal PRN    omega-3-Acid Ethyl Esters Oral                          10.8   11.3  )-----------( 272      ( 15 Mar 2018 05:33 )             31.9       03-15    136  |  104  |  21  ----------------------------<  94  3.7   |  25  |  0.62    Ca    7.7<L>      15 Mar 2018 05:33    TPro  6.0  /  Alb  2.4<L>  /  TBili  0.3  /  DBili  x   /  AST  117<H>  /  ALT  65  /  AlkPhos  82  03-15    Lactate 5.0           03-14 @ 21:23    Lactate 5.4           03-14 @ 17:04          PT/INR - ( 14 Mar 2018 17:04 )   PT: 11.7 sec;   INR: 1.07 ratio         PTT - ( 14 Mar 2018 17:04 )  PTT:25.3 sec          Radiology: ***  Bedside lung ultrasound: withough pleural effusion, Right Lung + for mass vs consolidation in lower lobe  Bedside ECHO: ***    CENTRAL LINE: N            NOLAN: N                          A-LINE: N                           GLOBAL ISSUE/BEST PRACTICE  Analgesia: Y  Sedation: N  CAM-ICU: Y  HOB elevation: yes  Stress ulcer prophylaxis: Y  VTE prophylaxis: Y   Glycemic control: N  Nutrition: Y    CODE STATUS: ***  [facility] discussion: Y Patient is a 72y old  Female who presents with a chief complaint of Came to hospital for chemotherapy. In ER SOB, desaturating. PNA, Rt. pleural effusion on CXR. (14 Mar 2018 23:36)    24 hour events: Patient removed off bipap and placed on high flow nasal cannula 35 L overnight. Patient reports feeling better, SOB improved.     REVIEW OF SYSTEMS  Constitutional: No fever, chills, fatigue  Neuro: No headache, numbness, weakness  Resp: + SOB. No cough, wheezing.   CVS: No chest pain, palpitations, leg swelling  GI: No abdominal pain, nausea, vomiting, diarrhea   : No dysuria, frequency  Skin: No itching    T(F): 97.8 (03-15-18 @ 04:02), Max: 97.9 (03-14-18 @ 22:50)  HR: 77 (03-15-18 @ 07:37) (74 - 86)  BP: 128/62 (03-15-18 @ 07:00) (104/54 - 172/72)  RR: 24 (03-15-18 @ 07:00) (14 - 45)  SpO2: 91% (03-15-18 @ 07:37) (84% - 96%)  Wt(kg): --        CAPILLARY BLOOD GLUCOSE      I&O's Summary    03-14 @ 07:01  -  03-15 @ 07:00  --------------------------------------------------------  IN: 2240 mL / OUT: 675 mL / NET: 1565 mL      PHYSICAL EXAM  General: AAO x3, on high flow NC  CNS: AAOx3   HEENT: NC AT  Resp: decreased BS on right, CTA on left, no wheezes or rhonchi   CVS: RRR +S1 +S2  Abd: soft, NT, ND  Ext: +2 peripheral pulses  Skin: no rashes or lesions    MEDICATIONS    metoprolol succinate ER Oral    atorvastatin Oral  levothyroxine Oral  predniSONE   Tablet Oral    acetylcysteine 10% Inhalation Inhalation  ALBUTerol    0.083% Nebulizer  benzonatate Oral PRN  buDESOnide   0.5 milliGRAM(s) Respule Inhalation    acetaminophen   Tablet Oral PRN  acetaminophen   Tablet. Oral PRN  ALPRAZolam Oral PRN  gabapentin Oral  ondansetron Injectable IV Push PRN  oxyCODONE    5 mG/acetaminophen 325 mG Oral PRN  traMADol Oral PRN      aspirin enteric coated Oral  enoxaparin Injectable SubCutaneous    docusate sodium Oral  pantoprazole    Tablet Oral      cholecalciferol Oral  multivitamin Oral  pyridoxine Oral      dorzolamide 2%/timolol 0.5% Ophthalmic Solution Both EYES  latanoprost 0.005% Ophthalmic Solution Both EYES  lidocaine   Patch Transdermal PRN    omega-3-Acid Ethyl Esters Oral                          10.8   11.3  )-----------( 272      ( 15 Mar 2018 05:33 )             31.9       03-15    136  |  104  |  21  ----------------------------<  94  3.7   |  25  |  0.62    Ca    7.7<L>      15 Mar 2018 05:33    TPro  6.0  /  Alb  2.4<L>  /  TBili  0.3  /  DBili  x   /  AST  117<H>  /  ALT  65  /  AlkPhos  82  03-15    Lactate 5.0           03-14 @ 21:23    Lactate 5.4           03-14 @ 17:04          PT/INR - ( 14 Mar 2018 17:04 )   PT: 11.7 sec;   INR: 1.07 ratio         PTT - ( 14 Mar 2018 17:04 )  PTT:25.3 sec          Radiology: ***  Bedside lung ultrasound: withough pleural effusion, Right Lung + for mass vs consolidation in lower lobe  Bedside ECHO: ***    CENTRAL LINE: N            NOLAN: N                          A-LINE: N                           GLOBAL ISSUE/BEST PRACTICE  Analgesia: Y  Sedation: N  CAM-ICU: Y  HOB elevation: yes  Stress ulcer prophylaxis: Y  VTE prophylaxis: Y   Glycemic control: N  Nutrition: Y    CODE STATUS: ***  [facility] discussion: Y Patient is a 72y old  Female who presents with a chief complaint of Came to hospital for chemotherapy. In ER SOB, desaturating. PNA, Rt. pleural effusion on CXR. (14 Mar 2018 23:36)    24 hour events: Patient removed off bipap and placed on high flow nasal cannula 35 L overnight. Patient reports feeling better, SOB improved.     REVIEW OF SYSTEMS  Constitutional: No fever, chills, fatigue  Neuro: No headache, numbness, weakness  Resp: + SOB. No cough, wheezing.   CVS: No chest pain, palpitations, leg swelling  GI: No abdominal pain, nausea, vomiting, diarrhea   : No dysuria, frequency  Skin: No itching    T(F): 97.8 (03-15-18 @ 04:02), Max: 97.9 (03-14-18 @ 22:50)  HR: 77 (03-15-18 @ 07:37) (74 - 86)  BP: 128/62 (03-15-18 @ 07:00) (104/54 - 172/72)  RR: 24 (03-15-18 @ 07:00) (14 - 45)  SpO2: 91% (03-15-18 @ 07:37) (84% - 96%)  Wt(kg): --        CAPILLARY BLOOD GLUCOSE      I&O's Summary    03-14 @ 07:01  -  03-15 @ 07:00  --------------------------------------------------------  IN: 2240 mL / OUT: 675 mL / NET: 1565 mL      PHYSICAL EXAM  General: AAO x3, on high flow NC  CNS: AAOx3   HEENT: NC AT  Resp: decreased BS on right, CTA on left, no wheezes or rhonchi   CVS: RRR +S1 +S2  Abd: soft, NT, ND  Ext: +2 peripheral pulses  Skin: no rashes or lesions    MEDICATIONS    metoprolol succinate ER Oral    atorvastatin Oral  levothyroxine Oral  predniSONE   Tablet Oral    acetylcysteine 10% Inhalation Inhalation  ALBUTerol    0.083% Nebulizer  benzonatate Oral PRN  buDESOnide   0.5 milliGRAM(s) Respule Inhalation    acetaminophen   Tablet Oral PRN  acetaminophen   Tablet. Oral PRN  ALPRAZolam Oral PRN  gabapentin Oral  ondansetron Injectable IV Push PRN  oxyCODONE    5 mG/acetaminophen 325 mG Oral PRN  traMADol Oral PRN      aspirin enteric coated Oral  enoxaparin Injectable SubCutaneous    docusate sodium Oral  pantoprazole    Tablet Oral      cholecalciferol Oral  multivitamin Oral  pyridoxine Oral      dorzolamide 2%/timolol 0.5% Ophthalmic Solution Both EYES  latanoprost 0.005% Ophthalmic Solution Both EYES  lidocaine   Patch Transdermal PRN    omega-3-Acid Ethyl Esters Oral                          10.8   11.3  )-----------( 272      ( 15 Mar 2018 05:33 )             31.9       03-15    136  |  104  |  21  ----------------------------<  94  3.7   |  25  |  0.62    Ca    7.7<L>      15 Mar 2018 05:33    TPro  6.0  /  Alb  2.4<L>  /  TBili  0.3  /  DBili  x   /  AST  117<H>  /  ALT  65  /  AlkPhos  82  03-15    Lactate 5.0           03-14 @ 21:23    Lactate 5.4           03-14 @ 17:04          PT/INR - ( 14 Mar 2018 17:04 )   PT: 11.7 sec;   INR: 1.07 ratio         PTT - ( 14 Mar 2018 17:04 )  PTT:25.3 sec          Radiology: Last known ECHO 02/2018 with Grossly normal LV Function, EV 55%  Bedside lung ultrasound: withough pleural effusion, Right Lung + for mass vs consolidation in lower lobe    CENTRAL LINE: N            NOLAN: N                          A-LINE: N                           GLOBAL ISSUE/BEST PRACTICE  Analgesia: Y  Sedation: N  CAM-ICU: Y  HOB elevation: yes  Stress ulcer prophylaxis: Y  VTE prophylaxis: Y   Glycemic control: N  Nutrition: Y    CODE STATUS: ***  [facility] discussion: Y

## 2018-03-15 NOTE — CONSULT NOTE ADULT - ASSESSMENT
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 Currently requiring ICU management because of oxygenation status. Best chance of improving pulmonary status is to start urgent chemotherapy with VP_16 and carboplatin    Recommendations  1.  to start chemotherapy with -16 and carboplatin today  2.  discussed with patient, family and ICU staff. to have venous access placed and start treatment.    3.  will need continued support including intubation if needed and would expect enough of response that patient can get off support quickly in 3-4 days.   4.  prognosis is guarded

## 2018-03-15 NOTE — CHART NOTE - NSCHARTNOTEFT_GEN_A_CORE
Upon Nutritional Assessment by the Registered Dietitian your patient was determined to meet criteria / has evidence of the following diagnosis/diagnoses:          [ ]  Mild Protein Calorie Malnutrition        [ ]  Moderate Protein Calorie Malnutrition        [x] Severe Protein Calorie Malnutrition        [ ] Unspecified Protein Calorie Malnutrition        [ ] Underweight / BMI <19        [ ] Morbid Obesity / BMI > 40      Findings as based on:  •  Comprehensive nutrition assessment and consultation  •  Calorie counts (nutrient intake analysis)  •  Food acceptance and intake status from observations by staff  •  Follow up  •  Patient education  •  Intervention secondary to interdisciplinary rounds  •   concerns  •  <50% estimated energy requirement for >5 days   •  12.8% weight loss in 5 weeks      Treatment:  Educated patient on ways to increase protein and caloric intake, addition of nutrition supplement  The following diet has been recommended: DASH/TLC, prosource tid       PROVIDER Section:     By signing this assessment you are acknowledging and agree with the diagnosis/diagnoses assigned by the Registered Dietitian    Comments: Upon Nutritional Assessment by the Registered Dietitian your patient was determined to meet criteria / has evidence of the following diagnosis/diagnoses:          [ ]  Mild Protein Calorie Malnutrition        [ ]  Moderate Protein Calorie Malnutrition        [ x ] Severe Protein Calorie Malnutrition        [ ] Unspecified Protein Calorie Malnutrition        [ ] Underweight / BMI <19        [ ] Morbid Obesity / BMI > 40      Findings as based on:  •  Comprehensive nutrition assessment and consultation  •  Calorie counts (nutrient intake analysis)  •  Food acceptance and intake status from observations by staff  •  Follow up  •  Patient education  •  Intervention secondary to interdisciplinary rounds  •   concerns  •  Caloric intake < 50% estimated requirements > 5 days   •  20 pound (12.8%) weight loss x 5 weeks      Treatment:  Educated patient on ways to increase protein and caloric intake; addition of nutrition supplements; addition of appetite stimulant  The following diet has been recommended: Low Sodium, Prosource three times daily      PROVIDER Section:     By signing this assessment you are acknowledging and agree with the diagnosis/diagnoses assigned by the Registered Dietitian    Comments:

## 2018-03-15 NOTE — CONSULT NOTE ADULT - PROBLEM SELECTOR RECOMMENDATION 9
copd  nebs  pulmicort  started on Prednisone by PMD  monitor sat  keep sat > 88 pct  support with ADL and monitor vs and HD and Sat
worsening respiratory distress related to underlying malignancy ie. small cell ca that would expect a rapid response to with treatment

## 2018-03-15 NOTE — DIETITIAN INITIAL EVALUATION ADULT. - ENERGY NEEDS
Height: 61 inches; Weight: 136.11 pounds; BMI:25.8 ; IBW: 105 pounds (+/- 10%); 130%IBW Height: 61 inches; Weight: 136.1 pounds; BMI:25.8 ; IBW: 105 pounds (+/- 10%); 130%IBW

## 2018-03-15 NOTE — PROGRESS NOTE ADULT - ASSESSMENT
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    -S/P carboplatin, etoposide. Monitor  -xanax prn anxiety  -Monitor HD's  -Continue HFNC. Low threshold for intubation. Check ABG in am. Titrate FiO2 to O2 saturation > 88%  -Continue steroids/nebs  -Oral diet. PPI  -Monitor renal function/uop  -DVT ppx  -Supportive care

## 2018-03-15 NOTE — CONSULT NOTE ADULT - PROBLEM SELECTOR RECOMMENDATION 2
small cell ca of lung  to start treatment with -16 and carboplatin today  continue present respiratory support including intubation if needed. feel with treatment could improve in several days and be extubated successfully  discussed with patient, family, ICU staff and Dr. Mujica

## 2018-03-15 NOTE — DIETITIAN INITIAL EVALUATION ADULT. - PERTINENT MEDS FT
Lovenox, Zofran IVPB, Decadron IVPB, Lipitor, Vit D3, Colace, Synthroid, Toprol XL, MVI, Lovaza, Zofran, Protonix DR, Deltasone, Vit B6

## 2018-03-15 NOTE — PROGRESS NOTE ADULT - SUBJECTIVE AND OBJECTIVE BOX
72F with extensive PMHx as below including   24 hour events: ***  PAST MEDICAL & SURGICAL HISTORY:  Liver lesion  Clostridium difficile colitis  Emphysema  Pneumonia: 2/2018  Generalized intra-abdominal and pelvic swelling, mass and lump  Glaucoma  Aortic valve prosthesis present  CAD (coronary artery disease)  Arthritis  Heart murmur  Other iron deficiency anemia  PVD (peripheral vascular disease)  Hyperlipemia  Hypothyroid  Hypertension  H/O foot surgery: (Right foot, 2010)  History of colonoscopy  S/P carpal tunnel release: (Right, 1/2017)  H/O carotid endarterectomy: Both sides 2002  Aortic valve replaced: 2011 with bovine  Carotid disease, bilateral  Cataract extraction status of left eye  Cataract extraction status of right eye      Review of Systems:  Constitutional: No fever, chills, fatigue  Neuro: No headache, numbness, weakness  Resp: No cough, wheezing, shortness of breath  CVS: No chest pain, palpitations, leg swelling  GI: No abdominal pain, nausea, vomiting, diarrhea   : No dysuria, frequency, incontinence  Skin: No itching, burning, rashes, or lesions   Msk: No joint pain or swelling  Psych: No depression, anxiety, mood swings    T(F): 97.7 (03-15-18 @ 20:28), Max: 98.7 (03-15-18 @ 12:00)  HR: 84 (03-15-18 @ 20:00) (73 - 85)  BP: 121/56 (03-15-18 @ 20:00) (104/54 - 172/72)  RR: 36 (03-15-18 @ 20:00) (14 - 45)  SpO2: 89% (03-15-18 @ 20:00) (84% - 96%)  Wt(kg): --        CAPILLARY BLOOD GLUCOSE          I&O's Summary    14 Mar 2018 07:01  -  15 Mar 2018 07:00  --------------------------------------------------------  IN: 2240 mL / OUT: 675 mL / NET: 1565 mL    15 Mar 2018 07:01  -  15 Mar 2018 20:35  --------------------------------------------------------  IN: 800 mL / OUT: 0 mL / NET: 800 mL        Physical Exam:     Gen:   Neuro: A&Ox3, non-focal  HEENT: NC/AT  Resp: CTA b/l  CVS: nl S1/S2, RRR  Abd: soft, nt, nd, +bs  Ext: no edema, +pulses  Skin: well perfused, warm    Meds:    metoprolol succinate ER Oral    atorvastatin Oral  levothyroxine Oral  predniSONE   Tablet Oral    acetylcysteine 10% Inhalation Inhalation  ALBUTerol    0.083% Nebulizer  benzonatate Oral PRN  buDESOnide   0.5 milliGRAM(s) Respule Inhalation    acetaminophen   Tablet Oral PRN  acetaminophen   Tablet. Oral PRN  ALPRAZolam Oral PRN  dronabinol Oral  gabapentin Oral  ondansetron Injectable IV Push PRN  oxyCODONE    5 mG/acetaminophen 325 mG Oral PRN  traMADol Oral PRN    CARBOplatin IVPB IV Intermittent  etoposide IVPB IV Intermittent    aspirin enteric coated Oral  enoxaparin Injectable SubCutaneous    docusate sodium Oral  pantoprazole    Tablet Oral      cholecalciferol Oral  multivitamin Oral  pyridoxine Oral      dorzolamide 2%/timolol 0.5% Ophthalmic Solution Both EYES  latanoprost 0.005% Ophthalmic Solution Both EYES  lidocaine   Patch Transdermal PRN    omega-3-Acid Ethyl Esters Oral                            10.8   11.3  )-----------( 272      ( 15 Mar 2018 05:33 )             31.9       03-15    136  |  103  |  18  ----------------------------<  102<H>  5.0   |  25  |  0.61    Ca    8.0<L>      15 Mar 2018 13:14  Phos  2.7     03-15  Mg     1.9     03-15    TPro  6.0  /  Alb  2.4<L>  /  TBili  0.3  /  DBili  x   /  AST  117<H>  /  ALT  65  /  AlkPhos  82  03-15    Lactate 3.6           03-15 @ 13:14    Lactate 5.0           03-14 @ 21:23          PT/INR - ( 14 Mar 2018 17:04 )   PT: 11.7 sec;   INR: 1.07 ratio         PTT - ( 14 Mar 2018 17:04 )  PTT:25.3 sec            Radiology: ***    Bedside lung ultrasound: ***    Bedside ECHO: ***    CENTRAL LINE: Y/N          DATE INSERTED:              REMOVE: Y/N    NOLAN: Y/N                        DATE INSERTED:              REMOVE: Y/N    A-LINE: Y/N                       DATE INSERTED:              REMOVE: Y/N    GLOBAL ISSUE/BEST PRACTICE:  Analgesia:  Sedation:  HOB elevation: yes  Stress ulcer prophylaxis:  VTE prophylaxis:  Glycemic control:  Nutrition:      CODE STATUS: ***  GOC discussion: Y  Critical care time spent (mins): ***  (Reviewing data, imaging, discussing with multidisciplinary team, non inclusive of procedures, discussing goals of care with patient/family) 72F with extensive PMHx as below including bioprosthetic AVR, cad, copd (on home O2), htn, hld, hypothyroidism, with recent diagnoses of 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    24 hour events: Pt placed on HFNC with O2 saturation 90-93%. Pt s/p first round of chemo (carboplatin, etoposide) tonight. Pt tolerated well.     PAST MEDICAL & SURGICAL HISTORY:  Liver lesion  Clostridium difficile colitis  Emphysema  Pneumonia: 2/2018  Generalized intra-abdominal and pelvic swelling, mass and lump  Glaucoma  Aortic valve prosthesis present  CAD (coronary artery disease)  Arthritis  Heart murmur  Other iron deficiency anemia  PVD (peripheral vascular disease)  Hyperlipemia  Hypothyroid  Hypertension  H/O foot surgery: (Right foot, 2010)  History of colonoscopy  S/P carpal tunnel release: (Right, 1/2017)  H/O carotid endarterectomy: Both sides 2002  Aortic valve replaced: 2011 with bovine  Carotid disease, bilateral  Cataract extraction status of left eye  Cataract extraction status of right eye      Review of Systems:  Constitutional: No fever, chills, fatigue  Neuro: No headache, numbness, weakness  Resp: No cough, wheezing, + shortness of breath  CVS: No chest pain, palpitations, leg swelling  GI: No abdominal pain, nausea, vomiting, diarrhea   : No dysuria, frequency, incontinence  Skin: No itching, burning, rashes, or lesions   Msk: No joint pain or swelling  Psych: No depression, anxiety, mood swings    T(F): 97.7 (03-15-18 @ 20:28), Max: 98.7 (03-15-18 @ 12:00)  HR: 84 (03-15-18 @ 20:00) (73 - 85)  BP: 121/56 (03-15-18 @ 20:00) (104/54 - 172/72)  RR: 36 (03-15-18 @ 20:00) (14 - 45)  SpO2: 89% (03-15-18 @ 20:00) (84% - 96%)      CAPILLARY BLOOD GLUCOSE      I&O's Summary    14 Mar 2018 07:01  -  15 Mar 2018 07:00  --------------------------------------------------------  IN: 2240 mL / OUT: 675 mL / NET: 1565 mL    15 Mar 2018 07:01  -  15 Mar 2018 20:35  --------------------------------------------------------  IN: 800 mL / OUT: 0 mL / NET: 800 mL        Physical Exam:     Gen: well nourished, sitting in chair  Neuro: A&Ox3, non-focal  HEENT: NC/AT  Resp: Dcreased BS right chest. CTA   CVS: nl S1/S2, RRR  Abd: soft, nt, nd, +bs  Ext: no edema, +pulses  Skin: well perfused, warm    Meds:    metoprolol succinate ER Oral    atorvastatin Oral  levothyroxine Oral  predniSONE   Tablet Oral    acetylcysteine 10% Inhalation Inhalation  ALBUTerol    0.083% Nebulizer  benzonatate Oral PRN  buDESOnide   0.5 milliGRAM(s) Respule Inhalation    acetaminophen   Tablet Oral PRN  acetaminophen   Tablet. Oral PRN  ALPRAZolam Oral PRN  dronabinol Oral  gabapentin Oral  ondansetron Injectable IV Push PRN  oxyCODONE    5 mG/acetaminophen 325 mG Oral PRN  traMADol Oral PRN    CARBOplatin IVPB IV Intermittent  etoposide IVPB IV Intermittent    aspirin enteric coated Oral  enoxaparin Injectable SubCutaneous    docusate sodium Oral  pantoprazole    Tablet Oral      cholecalciferol Oral  multivitamin Oral  pyridoxine Oral      dorzolamide 2%/timolol 0.5% Ophthalmic Solution Both EYES  latanoprost 0.005% Ophthalmic Solution Both EYES  lidocaine   Patch Transdermal PRN    omega-3-Acid Ethyl Esters Oral                            10.8   11.3  )-----------( 272      ( 15 Mar 2018 05:33 )             31.9       03-15    136  |  103  |  18  ----------------------------<  102<H>  5.0   |  25  |  0.61    Ca    8.0<L>      15 Mar 2018 13:14  Phos  2.7     03-15  Mg     1.9     03-15    TPro  6.0  /  Alb  2.4<L>  /  TBili  0.3  /  DBili  x   /  AST  117<H>  /  ALT  65  /  AlkPhos  82  03-15    Lactate 3.6           03-15 @ 13:14    Lactate 5.0           03-14 @ 21:23          PT/INR - ( 14 Mar 2018 17:04 )   PT: 11.7 sec;   INR: 1.07 ratio         PTT - ( 14 Mar 2018 17:04 )  PTT:25.3 sec            Radiology: ***    Bedside lung ultrasound: ***    Bedside ECHO: ***    CENTRAL LINE: Y/N          DATE INSERTED:              REMOVE: Y/N    NOLAN: Y/N                        DATE INSERTED:              REMOVE: Y/N    A-LINE: Y/N                       DATE INSERTED:              REMOVE: Y/N    GLOBAL ISSUE/BEST PRACTICE:  Analgesia:  Sedation:  HOB elevation: yes  Stress ulcer prophylaxis:  VTE prophylaxis:  Glycemic control:  Nutrition:      CODE STATUS: ***  GOC discussion: Y  Critical care time spent (mins): ***  (Reviewing data, imaging, discussing with multidisciplinary team, non inclusive of procedures, discussing goals of care with patient/family) 72F with extensive PMHx as below including bioprosthetic AVR, cad, copd (on home O2), htn, hld, hypothyroidism, with recent diagnoses of 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    24 hour events: Pt placed on HFNC with O2 saturation 90-93%. Pt s/p first round of chemo (carboplatin, etoposide) tonight. Pt tolerated well.     PAST MEDICAL & SURGICAL HISTORY:  Liver lesion  Clostridium difficile colitis  Emphysema  Pneumonia: 2/2018  Generalized intra-abdominal and pelvic swelling, mass and lump  Glaucoma  Aortic valve prosthesis present  CAD (coronary artery disease)  Arthritis  Heart murmur  Other iron deficiency anemia  PVD (peripheral vascular disease)  Hyperlipemia  Hypothyroid  Hypertension  H/O foot surgery: (Right foot, 2010)  History of colonoscopy  S/P carpal tunnel release: (Right, 1/2017)  H/O carotid endarterectomy: Both sides 2002  Aortic valve replaced: 2011 with bovine  Carotid disease, bilateral  Cataract extraction status of left eye  Cataract extraction status of right eye      Review of Systems:  Constitutional: No fever, chills, fatigue  Neuro: No headache, numbness, weakness  Resp: No cough, wheezing, + shortness of breath  CVS: No chest pain, palpitations, leg swelling  GI: No abdominal pain, nausea, vomiting, diarrhea   : No dysuria, frequency, incontinence  Skin: No itching, burning, rashes, or lesions   Msk: No joint pain or swelling  Psych: No depression, anxiety, mood swings    T(F): 97.7 (03-15-18 @ 20:28), Max: 98.7 (03-15-18 @ 12:00)  HR: 84 (03-15-18 @ 20:00) (73 - 85)  BP: 121/56 (03-15-18 @ 20:00) (104/54 - 172/72)  RR: 36 (03-15-18 @ 20:00) (14 - 45)  SpO2: 89% (03-15-18 @ 20:00) (84% - 96%)      CAPILLARY BLOOD GLUCOSE      I&O's Summary    14 Mar 2018 07:01  -  15 Mar 2018 07:00  --------------------------------------------------------  IN: 2240 mL / OUT: 675 mL / NET: 1565 mL    15 Mar 2018 07:01  -  15 Mar 2018 20:35  --------------------------------------------------------  IN: 800 mL / OUT: 0 mL / NET: 800 mL        Physical Exam:     Gen: well nourished, sitting in chair  Neuro: A&Ox3, non-focal  HEENT: NC/AT  Resp: Dcreased BS right chest. CTA on left  CVS: nl S1/S2, RRR  Abd: soft, nt, nd, +bs  Ext: no edema, +pulses  Skin: well perfused, warm    Meds:    metoprolol succinate ER Oral  atorvastatin Oral  levothyroxine Oral  predniSONE   Tablet Oral  acetylcysteine 10% Inhalation Inhalation  ALBUTerol    0.083% Nebulizer  benzonatate Oral PRN  buDESOnide   0.5 milliGRAM(s) Respule Inhalation  acetaminophen   Tablet Oral PRN  acetaminophen   Tablet. Oral PRN  ALPRAZolam Oral PRN  dronabinol Oral  gabapentin Oral  ondansetron Injectable IV Push PRN  oxyCODONE    5 mG/acetaminophen 325 mG Oral PRN  traMADol Oral PRN  CARBOplatin IVPB IV Intermittent  etoposide IVPB IV Intermittent  aspirin enteric coated Oral  enoxaparin Injectable SubCutaneous  docusate sodium Oral  pantoprazole    Tablet Oral  cholecalciferol Oral  multivitamin Oral  pyridoxine Oral  dorzolamide 2%/timolol 0.5% Ophthalmic Solution Both EYES  latanoprost 0.005% Ophthalmic Solution Both EYES  lidocaine   Patch Transdermal PRN  omega-3-Acid Ethyl Esters Oral                            10.8   11.3  )-----------( 272      ( 15 Mar 2018 05:33 )             31.9       03-15    136  |  103  |  18  ----------------------------<  102<H>  5.0   |  25  |  0.61    Ca    8.0<L>      15 Mar 2018 13:14  Phos  2.7     03-15  Mg     1.9     03-15    TPro  6.0  /  Alb  2.4<L>  /  TBili  0.3  /  DBili  x   /  AST  117<H>  /  ALT  65  /  AlkPhos  82  03-15    Lactate 3.6           03-15 @ 13:14    Lactate 5.0           03-14 @ 21:23          PT/INR - ( 14 Mar 2018 17:04 )   PT: 11.7 sec;   INR: 1.07 ratio         PTT - ( 14 Mar 2018 17:04 )  PTT:25.3 sec        LE venous duplex:  IMPRESSION:     No evidence of bilateral lower extremity deep venous thrombosis in the   visualized venous segments. The right and left soleal veins are not   visualized. The left peroneal vein was not visualized.    CXR:  There is been significant increase in the large right pleural effusion which   now extends into the upper chest with loculation along the lateral chest   wall. Sternal wires are again appreciated. Left lung is clear. Surgical   clips are again seen and neck grade osseous structures are unremarkable     Impression: Worsening of pleural effusion which is large and has a component   of loculation.     CENTRAL LINE: no    NOLAN: no    A-LINE: no    GLOBAL ISSUE/BEST PRACTICE:  Analgesia: yes  Sedation: no  HOB elevation: yes  Stress ulcer prophylaxis: yes  VTE prophylaxis: yes   Glycemic control: no  Nutrition: yes      CODE STATUS: Full  GOC discussion: Y  Critical care time spent (mins): 36  (Reviewing data, imaging, discussing with multidisciplinary team, non inclusive of procedures, discussing goals of care with patient/family)

## 2018-03-16 LAB
ALBUMIN SERPL ELPH-MCNC: 2.6 G/DL — LOW (ref 3.3–5)
ALP SERPL-CCNC: 84 U/L — SIGNIFICANT CHANGE UP (ref 40–120)
ALT FLD-CCNC: 56 U/L — SIGNIFICANT CHANGE UP (ref 12–78)
ANION GAP SERPL CALC-SCNC: 8 MMOL/L — SIGNIFICANT CHANGE UP (ref 5–17)
APTT BLD: 28.4 SEC — SIGNIFICANT CHANGE UP (ref 27.5–37.4)
AST SERPL-CCNC: 89 U/L — HIGH (ref 15–37)
BASE EXCESS BLDA CALC-SCNC: -1.8 MMOL/L — SIGNIFICANT CHANGE UP (ref -2–2)
BASOPHILS # BLD AUTO: 0 K/UL — SIGNIFICANT CHANGE UP (ref 0–0.2)
BASOPHILS NFR BLD AUTO: 0.3 % — SIGNIFICANT CHANGE UP (ref 0–2)
BILIRUB SERPL-MCNC: 0.6 MG/DL — SIGNIFICANT CHANGE UP (ref 0.2–1.2)
BLOOD GAS COMMENTS ARTERIAL: SIGNIFICANT CHANGE UP
BUN SERPL-MCNC: 15 MG/DL — SIGNIFICANT CHANGE UP (ref 7–23)
CALCIUM SERPL-MCNC: 7.9 MG/DL — LOW (ref 8.5–10.1)
CHLORIDE SERPL-SCNC: 103 MMOL/L — SIGNIFICANT CHANGE UP (ref 96–108)
CO2 SERPL-SCNC: 25 MMOL/L — SIGNIFICANT CHANGE UP (ref 22–31)
CREAT SERPL-MCNC: 0.55 MG/DL — SIGNIFICANT CHANGE UP (ref 0.5–1.3)
EOSINOPHIL # BLD AUTO: 0 K/UL — SIGNIFICANT CHANGE UP (ref 0–0.5)
EOSINOPHIL NFR BLD AUTO: 0 % — SIGNIFICANT CHANGE UP (ref 0–6)
GLUCOSE SERPL-MCNC: 105 MG/DL — HIGH (ref 70–99)
HCO3 BLDA-SCNC: 23 MMOL/L — SIGNIFICANT CHANGE UP (ref 23–27)
HCT VFR BLD CALC: 31.9 % — LOW (ref 34.5–45)
HGB BLD-MCNC: 11 G/DL — LOW (ref 11.5–15.5)
HOROWITZ INDEX BLDA+IHG-RTO: 100 — SIGNIFICANT CHANGE UP
INR BLD: 1.11 RATIO — SIGNIFICANT CHANGE UP (ref 0.88–1.16)
LDH SERPL L TO P-CCNC: 689 U/L — HIGH (ref 50–242)
LYMPHOCYTES # BLD AUTO: 0.6 K/UL — LOW (ref 1–3.3)
LYMPHOCYTES # BLD AUTO: 6.1 % — LOW (ref 13–44)
MAGNESIUM SERPL-MCNC: 2.1 MG/DL — SIGNIFICANT CHANGE UP (ref 1.6–2.6)
MCHC RBC-ENTMCNC: 31.1 PG — SIGNIFICANT CHANGE UP (ref 27–34)
MCHC RBC-ENTMCNC: 34.5 GM/DL — SIGNIFICANT CHANGE UP (ref 32–36)
MCV RBC AUTO: 90.1 FL — SIGNIFICANT CHANGE UP (ref 80–100)
MONOCYTES # BLD AUTO: 0.3 K/UL — SIGNIFICANT CHANGE UP (ref 0–0.9)
MONOCYTES NFR BLD AUTO: 3.4 % — SIGNIFICANT CHANGE UP (ref 1–9)
NEUTROPHILS # BLD AUTO: 9.2 K/UL — HIGH (ref 1.8–7.4)
NEUTROPHILS NFR BLD AUTO: 90.2 % — HIGH (ref 43–77)
PCO2 BLDA: 42 MMHG — SIGNIFICANT CHANGE UP (ref 32–46)
PH BLDA: 7.36 — SIGNIFICANT CHANGE UP (ref 7.35–7.45)
PHOSPHATE SERPL-MCNC: 2.6 MG/DL — SIGNIFICANT CHANGE UP (ref 2.5–4.5)
PLATELET # BLD AUTO: 268 K/UL — SIGNIFICANT CHANGE UP (ref 150–400)
PO2 BLDA: 61 MMHG — LOW (ref 74–108)
POTASSIUM SERPL-MCNC: 4.4 MMOL/L — SIGNIFICANT CHANGE UP (ref 3.5–5.3)
POTASSIUM SERPL-SCNC: 4.4 MMOL/L — SIGNIFICANT CHANGE UP (ref 3.5–5.3)
PROT SERPL-MCNC: 6.3 G/DL — SIGNIFICANT CHANGE UP (ref 6–8.3)
PROTHROM AB SERPL-ACNC: 12.1 SEC — SIGNIFICANT CHANGE UP (ref 9.8–12.7)
RBC # BLD: 3.54 M/UL — LOW (ref 3.8–5.2)
RBC # FLD: 13.9 % — SIGNIFICANT CHANGE UP (ref 10.3–14.5)
SAO2 % BLDA: 93 % — SIGNIFICANT CHANGE UP (ref 92–96)
SODIUM SERPL-SCNC: 136 MMOL/L — SIGNIFICANT CHANGE UP (ref 135–145)
URATE SERPL-MCNC: 7.6 MG/DL — HIGH (ref 2.5–7)
WBC # BLD: 10.2 K/UL — SIGNIFICANT CHANGE UP (ref 3.8–10.5)
WBC # FLD AUTO: 10.2 K/UL — SIGNIFICANT CHANGE UP (ref 3.8–10.5)

## 2018-03-16 PROCEDURE — 99291 CRITICAL CARE FIRST HOUR: CPT

## 2018-03-16 RX ORDER — POTASSIUM PHOSPHATE, MONOBASIC POTASSIUM PHOSPHATE, DIBASIC 236; 224 MG/ML; MG/ML
15 INJECTION, SOLUTION INTRAVENOUS ONCE
Qty: 0 | Refills: 0 | Status: COMPLETED | OUTPATIENT
Start: 2018-03-16 | End: 2018-03-16

## 2018-03-16 RX ORDER — FUROSEMIDE 40 MG
20 TABLET ORAL ONCE
Qty: 0 | Refills: 0 | Status: COMPLETED | OUTPATIENT
Start: 2018-03-16 | End: 2018-03-16

## 2018-03-16 RX ADMIN — Medication 1 GRAM(S): at 17:57

## 2018-03-16 RX ADMIN — TRAMADOL HYDROCHLORIDE 50 MILLIGRAM(S): 50 TABLET ORAL at 20:27

## 2018-03-16 RX ADMIN — Medication 650 MILLIGRAM(S): at 12:47

## 2018-03-16 RX ADMIN — Medication 50 MILLIGRAM(S): at 17:57

## 2018-03-16 RX ADMIN — Medication 0.5 MILLIGRAM(S): at 07:42

## 2018-03-16 RX ADMIN — ALBUTEROL 2.5 MILLIGRAM(S): 90 AEROSOL, METERED ORAL at 23:07

## 2018-03-16 RX ADMIN — Medication 100 MILLIGRAM(S): at 06:26

## 2018-03-16 RX ADMIN — Medication 20 MILLIGRAM(S): at 05:45

## 2018-03-16 RX ADMIN — LATANOPROST 1 DROP(S): 0.05 SOLUTION/ DROPS OPHTHALMIC; TOPICAL at 21:33

## 2018-03-16 RX ADMIN — Medication 20 MILLIGRAM(S): at 10:41

## 2018-03-16 RX ADMIN — ATORVASTATIN CALCIUM 40 MILLIGRAM(S): 80 TABLET, FILM COATED ORAL at 21:32

## 2018-03-16 RX ADMIN — Medication 100 MILLIGRAM(S): at 17:56

## 2018-03-16 RX ADMIN — Medication 1 TABLET(S): at 12:34

## 2018-03-16 RX ADMIN — Medication 1000 UNIT(S): at 12:33

## 2018-03-16 RX ADMIN — ONDANSETRON 110 MILLIGRAM(S): 8 TABLET, FILM COATED ORAL at 18:20

## 2018-03-16 RX ADMIN — Medication 3 MILLILITER(S): at 23:07

## 2018-03-16 RX ADMIN — DORZOLAMIDE HYDROCHLORIDE TIMOLOL MALEATE 1 DROP(S): 20; 5 SOLUTION/ DROPS OPHTHALMIC at 05:45

## 2018-03-16 RX ADMIN — POTASSIUM PHOSPHATE, MONOBASIC POTASSIUM PHOSPHATE, DIBASIC 62.5 MILLIMOLE(S): 236; 224 INJECTION, SOLUTION INTRAVENOUS at 09:09

## 2018-03-16 RX ADMIN — Medication 1 GRAM(S): at 05:46

## 2018-03-16 RX ADMIN — ENOXAPARIN SODIUM 40 MILLIGRAM(S): 100 INJECTION SUBCUTANEOUS at 12:33

## 2018-03-16 RX ADMIN — GABAPENTIN 300 MILLIGRAM(S): 400 CAPSULE ORAL at 21:32

## 2018-03-16 RX ADMIN — Medication 3 MILLILITER(S): at 07:42

## 2018-03-16 RX ADMIN — Medication 0.5 MILLIGRAM(S): at 20:32

## 2018-03-16 RX ADMIN — Medication 0.25 MILLIGRAM(S): at 21:32

## 2018-03-16 RX ADMIN — ALBUTEROL 2.5 MILLIGRAM(S): 90 AEROSOL, METERED ORAL at 07:42

## 2018-03-16 RX ADMIN — PANTOPRAZOLE SODIUM 40 MILLIGRAM(S): 20 TABLET, DELAYED RELEASE ORAL at 09:09

## 2018-03-16 RX ADMIN — DORZOLAMIDE HYDROCHLORIDE TIMOLOL MALEATE 1 DROP(S): 20; 5 SOLUTION/ DROPS OPHTHALMIC at 17:58

## 2018-03-16 RX ADMIN — Medication 162 MILLIGRAM(S): at 12:37

## 2018-03-16 RX ADMIN — TRAMADOL HYDROCHLORIDE 50 MILLIGRAM(S): 50 TABLET ORAL at 12:57

## 2018-03-16 RX ADMIN — Medication 100 MICROGRAM(S): at 05:45

## 2018-03-16 RX ADMIN — TRAMADOL HYDROCHLORIDE 50 MILLIGRAM(S): 50 TABLET ORAL at 21:30

## 2018-03-16 RX ADMIN — TRAMADOL HYDROCHLORIDE 50 MILLIGRAM(S): 50 TABLET ORAL at 13:15

## 2018-03-16 RX ADMIN — Medication 2.5 MILLIGRAM(S): at 12:57

## 2018-03-16 RX ADMIN — Medication 50 MILLIGRAM(S): at 05:45

## 2018-03-16 RX ADMIN — Medication 100 MILLIGRAM(S): at 05:45

## 2018-03-16 RX ADMIN — Medication 3 MILLILITER(S): at 15:11

## 2018-03-16 RX ADMIN — ALBUTEROL 2.5 MILLIGRAM(S): 90 AEROSOL, METERED ORAL at 15:11

## 2018-03-16 RX ADMIN — Medication 100 MILLIGRAM(S): at 12:33

## 2018-03-16 RX ADMIN — Medication 0.25 MILLIGRAM(S): at 06:04

## 2018-03-16 NOTE — PROGRESS NOTE ADULT - ATTENDING COMMENTS
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     -neuro and HD stable  -prn Percocet for pain  -her breathing feels worse today, on HFNC (45L, 100%), gave lasix 20 mg in am  -high risk for intubation  -no evidence of infection, monitor off abx  -b/l trace pleural effusion - not adequate for drainage  -continue prednisone, albuterol  -po diet  -started on chemo 3/15 (carboplatin, etoposide)  -continue other home meds  -LFTs stable on lipitor  -lovenox for DVT ppx  -prognosis poor, full code  -37mins critical care time spent

## 2018-03-16 NOTE — PROGRESS NOTE ADULT - ASSESSMENT
Suspect respiratopry status is primarily a manifestation of her malignancy and not on an infectious basis.  I do not see a role for antibiotics at this time      Suggestions--  Care per ICU/Oncology  I'll sign off at this time. Please recall PRN.    Abdias Barrios MD  [phone]

## 2018-03-16 NOTE — PROGRESS NOTE ADULT - ASSESSMENT
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 Currently requiring ICU management because of oxygenation status. Best chance of improving pulmonary status is to start urgent chemotherapy with VP_16 and carboplatin.   D1 03/15/18. Tolerated OK. No nausea.   Today D2.     RECOMMENDATIONS:  1.  Continue  chemotherapy with -16 and carboplatin today  2.  discussed with patient, family in detail today.   3.  will need continued support including intubation if needed and would expect enough of response that patient can get off support quickly in 3-4 days.   4.  prognosis is guarded.  5. DVT prophylaxis.  6. anti-emetics PRN.

## 2018-03-16 NOTE — PROGRESS NOTE ADULT - SUBJECTIVE AND OBJECTIVE BOX
[facility], Division of Infectious Diseases  SHIRA Hernandez A. Lee    Name: GENIE MOE  Age: 72y  Gender: Female  MRN: [id number]    Interval History--  Notes reviewed. Seen earlier this AM in ICU. Discussed with Dr. Salazar yesterday. Chemo iniitated. for SCLC.     Past Medical History--  Liver lesion  Clostridium difficile colitis  Emphysema  Pneumonia  Generalized intra-abdominal and pelvic swelling, mass and lump  Glaucoma  Aortic valve prosthesis present  CAD (coronary artery disease)  Arthritis  Heart murmur  Other iron deficiency anemia  Carpal tunnel syndrome of right wrist  PVD (peripheral vascular disease)  Hyperlipemia  Colitis  Hypothyroid  Hypertension  COPD (chronic obstructive pulmonary disease)  H/O foot surgery  History of colonoscopy  S/P carpal tunnel release  H/O carotid endarterectomy  Aortic valve replaced  Carotid disease, bilateral  Cataract extraction status of left eye  Cataract extraction status of right eye      For details regarding the patient's social history, family history, and other miscellaneous elements, please refer the initial infectious diseases consultation and/or the admitting history and physical examination for this admission.    Allergies    No Known Allergies    Intolerances        Medications--  Antibiotics:    Immunologic:    Other:  acetaminophen   Tablet PRN  acetaminophen   Tablet. PRN  acetylcysteine 10% Inhalation  ALBUTerol    0.083%  ALPRAZolam PRN  aspirin enteric coated  atorvastatin  benzonatate PRN  buDESOnide   0.5 milliGRAM(s) Respule  cholecalciferol  docusate sodium  dorzolamide 2%/timolol 0.5% Ophthalmic Solution  dronabinol  enoxaparin Injectable  etoposide IVPB  gabapentin  latanoprost 0.005% Ophthalmic Solution  levothyroxine  lidocaine   Patch PRN  metoprolol succinate ER  multivitamin  omega-3-Acid Ethyl Esters  ondansetron  IVPB  ondansetron Injectable PRN  oxyCODONE    5 mG/acetaminophen 325 mG PRN  pantoprazole    Tablet  predniSONE   Tablet  pyridoxine  traMADol PRN      Review of Systems--  A 10-point review of systems was obtained.     Pertinent positives and negatives--  Constitutional: No fevers. No Chills. No Rigors.   Cardiovascular: No chest pain. No palpitations.  Respiratory: No shortness of breath. No cough.  Gastrointestinal: No nausea or vomiting. No diarrhea or constipation.   Psychiatric: Pleasant. Appropriate affect.    Review of systems otherwise negative except as previously noted.    Physical Examination--  Vital Signs: T(F): 97.4 (03-16-18 @ 11:00), Max: 98.7 (03-15-18 @ 15:51)  HR: 77 (03-16-18 @ 13:00)  BP: 118/53 (03-16-18 @ 13:00)  RR: 21 (03-16-18 @ 13:00)  SpO2: 90% (03-16-18 @ 13:00)  Wt(kg): --  General: Nontoxic-appearing Female in no acute distress.  HEENT: AT/NC. Anicteric. Conjunctiva pink and moist.   Neck: Not rigid. No sense of mass.  Nodes: None palpable.  Lungs: Diminshed BS R > L.   Heart: Regular rate and rhythm. No Murmur. No rub. No gallop. No palpable thrill.  Abdomen: Bowel sounds present and normoactive. Soft. Nondistended. Nontender. No sense of mass. No organomegaly.  Back: Unable  Extremities: No cyanosis or clubbing. 1+edema.   Skin: Warm. Dry. Good turgor. No rash. No vasculitic stigmata.        Laboratory Studies--  CBC                        11.0   10.2  )-----------( 268      ( 16 Mar 2018 06:22 )             31.9       Chemistries  03-16    136  |  103  |  15  ----------------------------<  105<H>  4.4   |  25  |  0.55    Ca    7.9<L>      16 Mar 2018 06:22  Phos  2.6     03-16  Mg     2.1     03-16    TPro  6.3  /  Alb  2.6<L>  /  TBili  0.6  /  DBili  x   /  AST  89<H>  /  ALT  56  /  AlkPhos  84  03-16      Culture Data    Culture - Blood (collected 14 Mar 2018 21:18)  Source: .Blood Blood-Peripheral  Preliminary Report (15 Mar 2018 22:01):    No growth to date.    Culture - Blood (collected 14 Mar 2018 21:18)  Source: .Blood Blood-Peripheral  Preliminary Report (15 Mar 2018 22:01):    No growth to date.

## 2018-03-16 NOTE — PROGRESS NOTE ADULT - SUBJECTIVE AND OBJECTIVE BOX
Date/Time Patient Seen:  		  Referring MD:   Data Reviewed	       Patient is a 72y old  Female who presents with a chief complaint of Came to hospital for chemotherapy. In ER SOB, desaturating. PNA, Rt. pleural effusion on CXR. (14 Mar 2018 23:36)  in chair  seen and examined  frail and weak  s/p Chemo     Subjective/HPI     PAST MEDICAL & SURGICAL HISTORY:  Liver lesion  Clostridium difficile colitis  Emphysema  Pneumonia: 2/2018  Generalized intra-abdominal and pelvic swelling, mass and lump  Glaucoma  Aortic valve prosthesis present  CAD (coronary artery disease)  Arthritis  Heart murmur  Other iron deficiency anemia  Carpal tunnel syndrome of right wrist  PVD (peripheral vascular disease)  Hyperlipemia  Colitis  Hypothyroid  Hypertension  COPD (chronic obstructive pulmonary disease)  H/O foot surgery: (Right foot, 2010)  History of colonoscopy  S/P carpal tunnel release: (Right, 1/2017)  H/O carotid endarterectomy: Both sides 2002  Aortic valve replaced: 2011 with bovine  Carotid disease, bilateral  Cataract extraction status of left eye  Cataract extraction status of right eye        Medication list         MEDICATIONS  (STANDING):  acetylcysteine 10% Inhalation 3 milliLiter(s) Inhalation every 8 hours  ALBUTerol    0.083% 2.5 milliGRAM(s) Nebulizer every 8 hours  aspirin enteric coated 162 milliGRAM(s) Oral daily  atorvastatin 40 milliGRAM(s) Oral at bedtime  buDESOnide   0.5 milliGRAM(s) Respule 0.5 milliGRAM(s) Inhalation two times a day  CARBOplatin IVPB 500 milliGRAM(s) IV Intermittent once  cholecalciferol 1000 Unit(s) Oral daily  docusate sodium 100 milliGRAM(s) Oral two times a day  dorzolamide 2%/timolol 0.5% Ophthalmic Solution 1 Drop(s) Both EYES two times a day  dronabinol 2.5 milliGRAM(s) Oral daily  enoxaparin Injectable 40 milliGRAM(s) SubCutaneous every 24 hours  etoposide IVPB 170 milliGRAM(s) IV Intermittent daily  gabapentin 300 milliGRAM(s) Oral at bedtime  latanoprost 0.005% Ophthalmic Solution 1 Drop(s) Both EYES at bedtime  levothyroxine 100 MICROGram(s) Oral daily  metoprolol succinate ER 50 milliGRAM(s) Oral two times a day  multivitamin 1 Tablet(s) Oral daily  omega-3-Acid Ethyl Esters 1 Gram(s) Oral two times a day  ondansetron  IVPB 10 milliGRAM(s) IV Intermittent daily  pantoprazole    Tablet 40 milliGRAM(s) Oral before breakfast  predniSONE   Tablet 20 milliGRAM(s) Oral daily  pyridoxine 100 milliGRAM(s) Oral daily    MEDICATIONS  (PRN):  acetaminophen   Tablet 650 milliGRAM(s) Oral every 6 hours PRN For Temp greater than 38 C (100.4 F)  acetaminophen   Tablet. 650 milliGRAM(s) Oral every 6 hours PRN Mild Pain (1 - 3)  ALPRAZolam 0.25 milliGRAM(s) Oral three times a day PRN Anxiety  benzonatate 100 milliGRAM(s) Oral three times a day PRN Cough  lidocaine   Patch 1 Patch Transdermal every 72 hours PRN Neck pain  ondansetron Injectable 4 milliGRAM(s) IV Push every 6 hours PRN Nausea and/or Vomiting  oxyCODONE    5 mG/acetaminophen 325 mG 1 Tablet(s) Oral every 4 hours PRN Severe Pain (7 - 10)  traMADol 50 milliGRAM(s) Oral four times a day PRN Moderate Pain (4 - 6)         Vitals log        ICU Vital Signs Last 24 Hrs  T(C): 36.4 (16 Mar 2018 04:13), Max: 37.1 (15 Mar 2018 12:00)  T(F): 97.6 (16 Mar 2018 04:13), Max: 98.7 (15 Mar 2018 12:00)  HR: 87 (16 Mar 2018 05:00) (73 - 87)  BP: 133/65 (16 Mar 2018 05:00) (110/81 - 158/71)  BP(mean): 92 (16 Mar 2018 05:00) (68 - 102)  ABP: --  ABP(mean): --  RR: 29 (16 Mar 2018 05:00) (16 - 36)  SpO2: 92% (16 Mar 2018 05:00) (89% - 96%)           Input and Output:  I&O's Detail    14 Mar 2018 07:01  -  15 Mar 2018 07:00  --------------------------------------------------------  IN:    Oral Fluid: 240 mL    Sodium Chloride 0.9% IV Bolus: 2000 mL  Total IN: 2240 mL    OUT:    Voided: 675 mL  Total OUT: 675 mL    Total NET: 1565 mL      15 Mar 2018 07:01  -  16 Mar 2018 06:19  --------------------------------------------------------  IN:    Oral Fluid: 120 mL    Solution: 50 mL    Solution: 500 mL    Solution: 250 mL  Total IN: 920 mL    OUT:    Voided: 450 mL  Total OUT: 450 mL    Total NET: 470 mL          Lab Data                        10.8   11.3  )-----------( 272      ( 15 Mar 2018 05:33 )             31.9     03-15    136  |  103  |  18  ----------------------------<  102<H>  5.0   |  25  |  0.61    Ca    8.0<L>      15 Mar 2018 13:14  Phos  2.7     03-15  Mg     1.9     03-15    TPro  6.0  /  Alb  2.4<L>  /  TBili  0.3  /  DBili  x   /  AST  117<H>  /  ALT  65  /  AlkPhos  82  03-15    ABG - ( 16 Mar 2018 04:42 )  pH: 7.36  /  pCO2: 42    /  pO2: 61    / HCO3: 23    / Base Excess: -1.8  /  SaO2: 93                      Review of Systems	      Objective     Physical Examination    head at  heart s1s2  lung dec BS  abd soft      Pertinent Lab findings & Imaging      Rangel:  NO   Adequate UO     I&O's Detail    14 Mar 2018 07:01  -  15 Mar 2018 07:00  --------------------------------------------------------  IN:    Oral Fluid: 240 mL    Sodium Chloride 0.9% IV Bolus: 2000 mL  Total IN: 2240 mL    OUT:    Voided: 675 mL  Total OUT: 675 mL    Total NET: 1565 mL      15 Mar 2018 07:01  -  16 Mar 2018 06:19  --------------------------------------------------------  IN:    Oral Fluid: 120 mL    Solution: 50 mL    Solution: 500 mL    Solution: 250 mL  Total IN: 920 mL    OUT:    Voided: 450 mL  Total OUT: 450 mL    Total NET: 470 mL               Discussed with:     Cultures:	        Radiology

## 2018-03-16 NOTE — PROGRESS NOTE ADULT - SUBJECTIVE AND OBJECTIVE BOX
Patient is a 72y old  Female who presents with a chief complaint of Came to hospital for chemotherapy. In ER SOB, desaturating. PNA, Rt. pleural effusion on CXR. (14 Mar 2018 23:36)    24 hour events: Patient on HFNC with O2 saturation 90-93%. She is s/p first round of chemo (carboplatin, etoposide) last night, tolerated well.     REVIEW OF SYSTEMS  Constitutional: No fever, chills, fatigue  Neuro: No headache, numbness, weakness  Resp: + SOB. No cough, wheezing.  CVS: No chest pain, palpitations, leg swelling  GI: No abdominal pain, nausea, vomiting, diarrhea   : No dysuria, frequency  Skin: No itching    T(F): 97.6 (18 @ 04:13), Max: 98.7 (03-15-18 @ 12:00)  HR: 87 (18 @ 05:00) (73 - 87)  BP: 133/65 (18 @ 05:00) (110/81 - 158/71)  RR: 29 (18 @ 05:00) (16 - 36)  SpO2: 92% (18 @ 05:00) (89% - 96%)  Wt(kg): --        CAPILLARY BLOOD GLUCOSE      I&O's Summary    03-15 @ 07:01  -   @ 07:00  --------------------------------------------------------  IN: 920 mL / OUT: 450 mL / NET: 470 mL      PHYSICAL EXAM  Gen: no acute distress, resting in chair  Neuro: A&Ox3, non-focal  HEENT: NC/AT  Resp:  BS right chest. CTA on left  CVS: nl S1/S2, RRR  Abd: soft, nt, nd, +bs  Ext: no edema, +2 pulses bilaterally  Skin: well perfused, warm      MEDICATIONS    metoprolol succinate ER Oral    atorvastatin Oral  levothyroxine Oral  predniSONE   Tablet Oral    acetylcysteine 10% Inhalation Inhalation  ALBUTerol    0.083% Nebulizer  benzonatate Oral PRN  buDESOnide   0.5 milliGRAM(s) Respule Inhalation    acetaminophen   Tablet Oral PRN  acetaminophen   Tablet. Oral PRN  ALPRAZolam Oral PRN  dronabinol Oral  gabapentin Oral  ondansetron  IVPB IV Intermittent  ondansetron Injectable IV Push PRN  oxyCODONE    5 mG/acetaminophen 325 mG Oral PRN  traMADol Oral PRN    CARBOplatin IVPB IV Intermittent  etoposide IVPB IV Intermittent    aspirin enteric coated Oral  enoxaparin Injectable SubCutaneous    docusate sodium Oral  pantoprazole    Tablet Oral      cholecalciferol Oral  multivitamin Oral  potassium phosphate IVPB IV Intermittent  pyridoxine Oral      dorzolamide 2%/timolol 0.5% Ophthalmic Solution Both EYES  latanoprost 0.005% Ophthalmic Solution Both EYES  lidocaine   Patch Transdermal PRN    omega-3-Acid Ethyl Esters Oral                          11.0   10.2  )-----------( 268      ( 16 Mar 2018 06:22 )             31.9       03-16    136  |  103  |  15  ----------------------------<  105<H>  4.4   |  25  |  0.55    Ca    7.9<L>      16 Mar 2018 06:22  Phos  2.6     03-16  Mg     2.1     -16    TPro  6.3  /  Alb  2.6<L>  /  TBili  0.6  /  DBili  x   /  AST  89<H>  /  ALT  56  /  AlkPhos  84  -16    Lactate 3.6           03-15 @ 13:14          PT/INR - ( 16 Mar 2018 06:46 )   PT: 12.1 sec;   INR: 1.11 ratio         PTT - ( 16 Mar 2018 06:46 )  PTT:28.4 sec    .Blood Blood-Peripheral   No growth to date. --  @ 21:18          Radiology: Last known ECHO 2018 with Grossly normal LV Function, EV 55%  Bedside lung ultrasound: withough pleural effusion, Right Lung + for mass vs consolidation in lower lobe    CENTRAL LINE: N            NOLAN: N                          A-LINE: N                           GLOBAL ISSUE/BEST PRACTICE  Analgesia: Y  Sedation: N  CAM-ICU: Y  HOB elevation: yes  Stress ulcer prophylaxis: Y  VTE prophylaxis: Y   Glycemic control: N  Nutrition: Y    CODE STATUS: Full code  GOC discussion: Y Patient is a 72y old  Female who presents with a chief complaint of Came to hospital for chemotherapy. In ER SOB, desaturating. PNA, Rt. pleural effusion on CXR. (14 Mar 2018 23:36)    24 hour events: Patient on HFNC with O2 saturation 90-93%. She is s/p first round of chemo (carboplatin, etoposide) last night, tolerated well. Reports she still has SOB.     REVIEW OF SYSTEMS  Constitutional: No fever, chills, fatigue  Neuro: No headache, numbness, weakness  Resp: + SOB. No cough, wheezing.  CVS: No chest pain, palpitations, leg swelling  GI: No abdominal pain, nausea, vomiting, diarrhea   : No dysuria, frequency  Skin: No itching    T(F): 97.6 (18 @ 04:13), Max: 98.7 (03-15-18 @ 12:00)  HR: 87 (18 @ 05:00) (73 - 87)  BP: 133/65 (18 @ 05:00) (110/81 - 158/71)  RR: 29 (18 @ 05:00) (16 - 36)  SpO2: 92% (18 @ 05:00) (89% - 96%)  Wt(kg): --        CAPILLARY BLOOD GLUCOSE      I&O's Summary    03-15 @ 07:01  -   @ 07:00  --------------------------------------------------------  IN: 920 mL / OUT: 450 mL / NET: 470 mL      PHYSICAL EXAM  Gen: no acute distress, resting in chair  Neuro: A&Ox3, non-focal  HEENT: NC/AT  Resp:  BS right chest. CTA on left  CVS: nl S1/S2, RRR  Abd: soft, nt, nd, +bs  Ext: no edema, +2 pulses bilaterally  Skin: well perfused, warm      MEDICATIONS    metoprolol succinate ER Oral    atorvastatin Oral  levothyroxine Oral  predniSONE   Tablet Oral    acetylcysteine 10% Inhalation Inhalation  ALBUTerol    0.083% Nebulizer  benzonatate Oral PRN  buDESOnide   0.5 milliGRAM(s) Respule Inhalation    acetaminophen   Tablet Oral PRN  acetaminophen   Tablet. Oral PRN  ALPRAZolam Oral PRN  dronabinol Oral  gabapentin Oral  ondansetron  IVPB IV Intermittent  ondansetron Injectable IV Push PRN  oxyCODONE    5 mG/acetaminophen 325 mG Oral PRN  traMADol Oral PRN    CARBOplatin IVPB IV Intermittent  etoposide IVPB IV Intermittent    aspirin enteric coated Oral  enoxaparin Injectable SubCutaneous    docusate sodium Oral  pantoprazole    Tablet Oral      cholecalciferol Oral  multivitamin Oral  potassium phosphate IVPB IV Intermittent  pyridoxine Oral      dorzolamide 2%/timolol 0.5% Ophthalmic Solution Both EYES  latanoprost 0.005% Ophthalmic Solution Both EYES  lidocaine   Patch Transdermal PRN    omega-3-Acid Ethyl Esters Oral                          11.0   10.2  )-----------( 268      ( 16 Mar 2018 06:22 )             31.9       03-16    136  |  103  |  15  ----------------------------<  105<H>  4.4   |  25  |  0.55    Ca    7.9<L>      16 Mar 2018 06:22  Phos  2.6     03-16  Mg     2.1     -16    TPro  6.3  /  Alb  2.6<L>  /  TBili  0.6  /  DBili  x   /  AST  89<H>  /  ALT  56  /  AlkPhos  84  03-16    Lactate 3.6           03-15 @ 13:14          PT/INR - ( 16 Mar 2018 06:46 )   PT: 12.1 sec;   INR: 1.11 ratio         PTT - ( 16 Mar 2018 06:46 )  PTT:28.4 sec    .Blood Blood-Peripheral   No growth to date. -- - @ 21:18          Radiology: Last known ECHO 2018 with Grossly normal LV Function, EV 55%  Bedside lung ultrasound: withough pleural effusion, Right Lung + for mass vs consolidation in lower lobe    CENTRAL LINE: N            NOLAN: N                          A-LINE: N                           GLOBAL ISSUE/BEST PRACTICE  Analgesia: Y  Sedation: N  CAM-ICU: Y  HOB elevation: yes  Stress ulcer prophylaxis: Y  VTE prophylaxis: Y   Glycemic control: N  Nutrition: Y    CODE STATUS: Full code  GOC discussion: Y Patient is a 72y old  Female who presents with a chief complaint of Came to hospital for chemotherapy. In ER SOB, desaturating. PNA, Rt. pleural effusion on CXR. (14 Mar 2018 23:36)    24 hour events: Patient on HFNC with O2 saturation 90-93%. She is s/p first round of chemo (carboplatin, etoposide) last night, tolerated well. Reports she still has SOB, worsening.     REVIEW OF SYSTEMS  Constitutional: No fever, chills, fatigue  Neuro: No headache, numbness, weakness  Resp: + SOB. No cough, wheezing.  CVS: No chest pain, palpitations, leg swelling  GI: No abdominal pain, nausea, vomiting, diarrhea   : No dysuria, frequency  Skin: No itching    T(F): 97.6 (18 @ 04:13), Max: 98.7 (03-15-18 @ 12:00)  HR: 87 (18 @ 05:00) (73 - 87)  BP: 133/65 (18 @ 05:00) (110/81 - 158/71)  RR: 29 (18 @ 05:00) (16 - 36)  SpO2: 92% (18 @ 05:00) (89% - 96%)  Wt(kg): --        CAPILLARY BLOOD GLUCOSE      I&O's Summary    03-15 @ 07:01  -   @ 07:00  --------------------------------------------------------  IN: 920 mL / OUT: 450 mL / NET: 470 mL      PHYSICAL EXAM  Gen: no acute distress, resting in chair  Neuro: A&Ox3, non-focal  HEENT: NC/AT  Resp:  BS right chest. CTA on left  CVS: nl S1/S2, RRR  Abd: soft, nt, nd, +bs  Ext: 1-2 bilaterall pitting LE edema, +2 pulses bilaterally  Skin: well perfused, warm      MEDICATIONS    metoprolol succinate ER Oral    atorvastatin Oral  levothyroxine Oral  predniSONE   Tablet Oral    acetylcysteine 10% Inhalation Inhalation  ALBUTerol    0.083% Nebulizer  benzonatate Oral PRN  buDESOnide   0.5 milliGRAM(s) Respule Inhalation    acetaminophen   Tablet Oral PRN  acetaminophen   Tablet. Oral PRN  ALPRAZolam Oral PRN  dronabinol Oral  gabapentin Oral  ondansetron  IVPB IV Intermittent  ondansetron Injectable IV Push PRN  oxyCODONE    5 mG/acetaminophen 325 mG Oral PRN  traMADol Oral PRN    CARBOplatin IVPB IV Intermittent  etoposide IVPB IV Intermittent    aspirin enteric coated Oral  enoxaparin Injectable SubCutaneous    docusate sodium Oral  pantoprazole    Tablet Oral      cholecalciferol Oral  multivitamin Oral  potassium phosphate IVPB IV Intermittent  pyridoxine Oral      dorzolamide 2%/timolol 0.5% Ophthalmic Solution Both EYES  latanoprost 0.005% Ophthalmic Solution Both EYES  lidocaine   Patch Transdermal PRN    omega-3-Acid Ethyl Esters Oral                          11.0   10.2  )-----------( 268      ( 16 Mar 2018 06:22 )             31.9       03-16    136  |  103  |  15  ----------------------------<  105<H>  4.4   |  25  |  0.55    Ca    7.9<L>      16 Mar 2018 06:22  Phos  2.6     03-16  Mg     2.1     03-16    TPro  6.3  /  Alb  2.6<L>  /  TBili  0.6  /  DBili  x   /  AST  89<H>  /  ALT  56  /  AlkPhos  84  03-16    Lactate 3.6           03-15 @ 13:14          PT/INR - ( 16 Mar 2018 06:46 )   PT: 12.1 sec;   INR: 1.11 ratio         PTT - ( 16 Mar 2018 06:46 )  PTT:28.4 sec    .Blood Blood-Peripheral   No growth to date. -- - @ 21:18          Radiology: Last known ECHO 2018 with Grossly normal LV Function, EV 55%  Bedside lung ultrasound: withough pleural effusion, Right Lung + for mass vs consolidation in lower lobe    CENTRAL LINE: N            NOLAN: N                          A-LINE: N                           GLOBAL ISSUE/BEST PRACTICE  Analgesia: Y  Sedation: N  CAM-ICU: Y  HOB elevation: yes  Stress ulcer prophylaxis: Y  VTE prophylaxis: Y   Glycemic control: N  Nutrition: Y    CODE STATUS: Full code  GOC discussion: Y

## 2018-03-16 NOTE — PROGRESS NOTE ADULT - SUBJECTIVE AND OBJECTIVE BOX
INTERVAL HX:   no nausea, D2 chemo today. Breathing stable. On high flow O2.     Chart reviewed and events noted;     MEDICATIONS  (STANDING):  acetylcysteine 10% Inhalation 3 milliLiter(s) Inhalation every 8 hours  ALBUTerol    0.083% 2.5 milliGRAM(s) Nebulizer every 8 hours  aspirin enteric coated 162 milliGRAM(s) Oral daily  atorvastatin 40 milliGRAM(s) Oral at bedtime  buDESOnide   0.5 milliGRAM(s) Respule 0.5 milliGRAM(s) Inhalation two times a day  cholecalciferol 1000 Unit(s) Oral daily  docusate sodium 100 milliGRAM(s) Oral two times a day  dorzolamide 2%/timolol 0.5% Ophthalmic Solution 1 Drop(s) Both EYES two times a day  dronabinol 2.5 milliGRAM(s) Oral daily  enoxaparin Injectable 40 milliGRAM(s) SubCutaneous every 24 hours  etoposide IVPB 170 milliGRAM(s) IV Intermittent daily  gabapentin 300 milliGRAM(s) Oral at bedtime  latanoprost 0.005% Ophthalmic Solution 1 Drop(s) Both EYES at bedtime  levothyroxine 100 MICROGram(s) Oral daily  metoprolol succinate ER 50 milliGRAM(s) Oral two times a day  multivitamin 1 Tablet(s) Oral daily  omega-3-Acid Ethyl Esters 1 Gram(s) Oral two times a day  ondansetron  IVPB 10 milliGRAM(s) IV Intermittent daily  pantoprazole    Tablet 40 milliGRAM(s) Oral before breakfast  predniSONE   Tablet 20 milliGRAM(s) Oral daily  pyridoxine 100 milliGRAM(s) Oral daily    MEDICATIONS  (PRN):  acetaminophen   Tablet 650 milliGRAM(s) Oral every 6 hours PRN For Temp greater than 38 C (100.4 F)  acetaminophen   Tablet. 650 milliGRAM(s) Oral every 6 hours PRN Mild Pain (1 - 3)  ALPRAZolam 0.25 milliGRAM(s) Oral three times a day PRN Anxiety  benzonatate 100 milliGRAM(s) Oral three times a day PRN Cough  lidocaine   Patch 1 Patch Transdermal every 72 hours PRN Neck pain  ondansetron Injectable 4 milliGRAM(s) IV Push every 6 hours PRN Nausea and/or Vomiting  oxyCODONE    5 mG/acetaminophen 325 mG 1 Tablet(s) Oral every 4 hours PRN Severe Pain (7 - 10)  traMADol 50 milliGRAM(s) Oral four times a day PRN Moderate Pain (4 - 6)      Vital Signs Last 24 Hrs  T(C): 36.8 (16 Mar 2018 19:50), Max: 36.8 (16 Mar 2018 19:50)  T(F): 98.2 (16 Mar 2018 19:50), Max: 98.2 (16 Mar 2018 19:50)  HR: 82 (16 Mar 2018 19:00) (71 - 87)  BP: 124/58 (16 Mar 2018 19:00) (105/53 - 158/71)  BP(mean): 83 (16 Mar 2018 19:00) (77 - 102)  RR: 16 (16 Mar 2018 19:00) (16 - 52)  SpO2: 95% (16 Mar 2018 19:00) (87% - 96%)    PHYSICAL EXAM  General: WN WD adult in NAD  HEENT: clear oropharynx, anicteric sclera, pink conjunctivae  Neck: supple  CV: normal S1S2, RRR, no murmur, no rubs, no gallops  Lungs: dec BS R side. , no wheezes, no rales, no rhonchi  Abdomen: soft, non-tender, non-distended, no hepatosplenomegaly  Ext: no clubbing, no cyanosis, no edema  Skin: no rashes, no petichiae  Neuro: alert and oriented X3, no focal deficits      LABS:                        11.0   10.2  )-----------( 268      ( 16 Mar 2018 06:22 )             31.9     03-16    136  |  103  |  15  ----------------------------<  105<H>  4.4   |  25  |  0.55    Ca    7.9<L>      16 Mar 2018 06:22  Phos  2.6     03-16  Mg     2.1     03-16    TPro  6.3  /  Alb  2.6<L>  /  TBili  0.6  /  DBili  x   /  AST  89<H>  /  ALT  56  /  AlkPhos  84  03-16    PT/INR - ( 16 Mar 2018 06:46 )   PT: 12.1 sec;   INR: 1.11 ratio         PTT - ( 16 Mar 2018 06:46 )  PTT:28.4 sec      RADIOLOGY & ADDITIONAL STUDIES:    < from: Xray Chest 1 View AP/PA (03.14.18 @ 16:31) >  EXAM:  XR CHEST AP OR PA 1V                            PROCEDURE DATE:  03/14/2018          INTERPRETATION:  History: Lung cancer with shortness of breath    Portable AP radiograph of the chest is performed. comparison is made to   3/5/2018.    There is been significant increase in the large right pleural effusion   which now extends into the upper chest with loculation along the lateral   chest wall. Sternal wires are again appreciated. Left lung is clear.   Surgical clips are again seen and neck grade osseous structures are   unremarkable    Impression: Worsening of pleural effusion which is large and has a   component of loculation.                WARREN STORY M.D.,ATTENDING RADIOLOGIST  This document has been electronically signed. Mar14 2018  4:35PM        < end of copied text >

## 2018-03-16 NOTE — PROGRESS NOTE ADULT - ASSESSMENT
72 year old Female recently diagnosed with metastatic Small Cell Lung Cancer (03/18), recent admission for multilobular PNA (2/2018), CAD, s/p AVR (bovine valve, 2011), s/p bilateral carotid endarterectomy (2002), Emphysema (on 3L home O2), C. diff, HTN, HLD, Hypothyroidism, Iron deficiency anemia, arthritis, glaucoma, admitted for hypoxic respiratory failure, and for chemotherapy for Lung CA.     --NEURO - Stable. AAO x3. Xanax PRN anxiety. Percocet PRN pain.   --CV - HTN, HLD, s/p AV replacement. BP stable, continue metoprolol 50 BID, Lipitor, Lovaza, ASA. Hold home med Hydralazine for now. s/p AVR bovine valve replacement.   --RESP - Acute hypoxic respiratory failure, SOB r/o PE. SOB likely 2/2 lung ca and chronic emphysema, vs r/o PE/DVT. Tolerating HFNC, Continue Mucomyst, Duonebs, Albuterol, Pulmicort, Tessalon Pearle for cough, Prednisone. Continue BiPAP overnight. Low suspicion for DVT/PE. LE Dopplers negative. High risk for intubation  --ENDO- continue synthroid for Hypothyroidism.   --GI- Continue PPI for GI prophylaxis. May eat as tolerated, NPO while on Bipap.  --RENAL- Renal function stable. Replete Phosphorous with KPhos 15 mmol IVPB x 1.   --ID- Hold abx for now, no evidence of active infection. WBC today > 11. Procalcitonin 20. Afebrile. Follow up blood cultures.  WBC likely reactive 2/2 to acute illness.  --HEME/ONC - s/p Chemotherapy treatment #1 last night, as per Onc. Tolerated well. DVT ppx with Lovenox.  --MSK - Arthritis, continue tramadol, home dose Lidoderm patch, and home dose percocet. 72 year old Female recently diagnosed with metastatic Small Cell Lung Cancer (03/18), recent admission for multilobular PNA (2/2018), CAD, s/p AVR (bovine valve, 2011), s/p bilateral carotid endarterectomy (2002), Emphysema (on 3L home O2), C. diff, HTN, HLD, Hypothyroidism, Iron deficiency anemia, arthritis, glaucoma, admitted for hypoxic respiratory failure, and for chemotherapy for Lung CA.     --NEURO - Stable. AAO x3. Xanax PRN anxiety. Percocet PRN pain.   --CV - HTN, HLD, s/p AV replacement. BP stable, continue metoprolol 50 BID, Lipitor, Lovaza, ASA. Hold home med Hydralazine for now. s/p AVR bovine valve replacement.   --RESP - Acute hypoxic respiratory failure, SOB worsening, new LE edema, 2/2 lung ca and chronic emphysema. Tolerating HFNC, Give STAT 20 IVP Lasix x 1. Chemo scheudled for today again. Continue Mucomyst, Duoneb, Albuterol, Pulmicort, Tessalon Pearle for cough, Prednisone. Continue BiPAP overnight. High risk for intubation  --ENDO- continue synthroid for Hypothyroidism.   --GI- Continue PPI for GI prophylaxis. May eat as tolerated, NPO while on Bipap.  --RENAL- Renal function stable. Replete Phosphorous with KPhos 15 mmol IVPB x 1.   --ID- Hold abx for now, no evidence of active infection. WBC today > 11. Procalcitonin 20. Afebrile. Follow up blood cultures.  WBC likely reactive 2/2 to acute illness.  --HEME/ONC - s/p Chemotherapy treatment #1 last night, as per Onc. Tolerated well. DVT ppx with Lovenox.  --MSK - Arthritis, continue tramadol, home dose Lidoderm patch, and home dose percocet.

## 2018-03-17 DIAGNOSIS — I10 ESSENTIAL (PRIMARY) HYPERTENSION: ICD-10-CM

## 2018-03-17 LAB
ALBUMIN SERPL ELPH-MCNC: 2.5 G/DL — LOW (ref 3.3–5)
ALP SERPL-CCNC: 72 U/L — SIGNIFICANT CHANGE UP (ref 40–120)
ALT FLD-CCNC: 47 U/L — SIGNIFICANT CHANGE UP (ref 12–78)
ANION GAP SERPL CALC-SCNC: 5 MMOL/L — SIGNIFICANT CHANGE UP (ref 5–17)
APTT BLD: 27.2 SEC — LOW (ref 27.5–37.4)
AST SERPL-CCNC: 98 U/L — HIGH (ref 15–37)
BASOPHILS # BLD AUTO: 0 K/UL — SIGNIFICANT CHANGE UP (ref 0–0.2)
BASOPHILS NFR BLD AUTO: 0.4 % — SIGNIFICANT CHANGE UP (ref 0–2)
BILIRUB SERPL-MCNC: 0.5 MG/DL — SIGNIFICANT CHANGE UP (ref 0.2–1.2)
BUN SERPL-MCNC: 21 MG/DL — SIGNIFICANT CHANGE UP (ref 7–23)
CALCIUM SERPL-MCNC: 8 MG/DL — LOW (ref 8.5–10.1)
CHLORIDE SERPL-SCNC: 101 MMOL/L — SIGNIFICANT CHANGE UP (ref 96–108)
CO2 SERPL-SCNC: 29 MMOL/L — SIGNIFICANT CHANGE UP (ref 22–31)
CREAT SERPL-MCNC: 0.62 MG/DL — SIGNIFICANT CHANGE UP (ref 0.5–1.3)
EOSINOPHIL # BLD AUTO: 0.2 K/UL — SIGNIFICANT CHANGE UP (ref 0–0.5)
EOSINOPHIL NFR BLD AUTO: 1.5 % — SIGNIFICANT CHANGE UP (ref 0–6)
GLUCOSE SERPL-MCNC: 69 MG/DL — LOW (ref 70–99)
HCT VFR BLD CALC: 30.4 % — LOW (ref 34.5–45)
HGB BLD-MCNC: 10.5 G/DL — LOW (ref 11.5–15.5)
INR BLD: 1.09 RATIO — SIGNIFICANT CHANGE UP (ref 0.88–1.16)
LACTATE SERPL-SCNC: 2.9 MMOL/L — HIGH (ref 0.7–2)
LYMPHOCYTES # BLD AUTO: 1 K/UL — SIGNIFICANT CHANGE UP (ref 1–3.3)
LYMPHOCYTES # BLD AUTO: 9.8 % — LOW (ref 13–44)
MAGNESIUM SERPL-MCNC: 2 MG/DL — SIGNIFICANT CHANGE UP (ref 1.6–2.6)
MCHC RBC-ENTMCNC: 30.8 PG — SIGNIFICANT CHANGE UP (ref 27–34)
MCHC RBC-ENTMCNC: 34.5 GM/DL — SIGNIFICANT CHANGE UP (ref 32–36)
MCV RBC AUTO: 89.5 FL — SIGNIFICANT CHANGE UP (ref 80–100)
MONOCYTES # BLD AUTO: 0.3 K/UL — SIGNIFICANT CHANGE UP (ref 0–0.9)
MONOCYTES NFR BLD AUTO: 2.8 % — SIGNIFICANT CHANGE UP (ref 1–9)
NEUTROPHILS # BLD AUTO: 8.7 K/UL — HIGH (ref 1.8–7.4)
NEUTROPHILS NFR BLD AUTO: 85.4 % — HIGH (ref 43–77)
PHOSPHATE SERPL-MCNC: 2.8 MG/DL — SIGNIFICANT CHANGE UP (ref 2.5–4.5)
PLATELET # BLD AUTO: 269 K/UL — SIGNIFICANT CHANGE UP (ref 150–400)
POTASSIUM SERPL-MCNC: 4.4 MMOL/L — SIGNIFICANT CHANGE UP (ref 3.5–5.3)
POTASSIUM SERPL-SCNC: 4.4 MMOL/L — SIGNIFICANT CHANGE UP (ref 3.5–5.3)
PROT SERPL-MCNC: 6.1 G/DL — SIGNIFICANT CHANGE UP (ref 6–8.3)
PROTHROM AB SERPL-ACNC: 11.9 SEC — SIGNIFICANT CHANGE UP (ref 9.8–12.7)
RBC # BLD: 3.4 M/UL — LOW (ref 3.8–5.2)
RBC # FLD: 13.9 % — SIGNIFICANT CHANGE UP (ref 10.3–14.5)
SODIUM SERPL-SCNC: 135 MMOL/L — SIGNIFICANT CHANGE UP (ref 135–145)
WBC # BLD: 10.2 K/UL — SIGNIFICANT CHANGE UP (ref 3.8–10.5)
WBC # FLD AUTO: 10.2 K/UL — SIGNIFICANT CHANGE UP (ref 3.8–10.5)

## 2018-03-17 PROCEDURE — 99233 SBSQ HOSP IP/OBS HIGH 50: CPT

## 2018-03-17 PROCEDURE — 99291 CRITICAL CARE FIRST HOUR: CPT

## 2018-03-17 RX ORDER — IPRATROPIUM BROMIDE 0.2 MG/ML
4 SOLUTION, NON-ORAL INHALATION EVERY 6 HOURS
Qty: 0 | Refills: 0 | Status: DISCONTINUED | OUTPATIENT
Start: 2018-03-17 | End: 2018-03-19

## 2018-03-17 RX ORDER — ALBUTEROL 90 UG/1
4 AEROSOL, METERED ORAL EVERY 6 HOURS
Qty: 0 | Refills: 0 | Status: DISCONTINUED | OUTPATIENT
Start: 2018-03-17 | End: 2018-03-19

## 2018-03-17 RX ORDER — FILGRASTIM 480MCG/1.6
300 VIAL (ML) INJECTION
Qty: 0 | Refills: 0 | Status: DISCONTINUED | OUTPATIENT
Start: 2018-03-18 | End: 2018-03-21

## 2018-03-17 RX ORDER — NOREPINEPHRINE BITARTRATE/D5W 8 MG/250ML
0.03 PLASTIC BAG, INJECTION (ML) INTRAVENOUS
Qty: 8 | Refills: 0 | Status: DISCONTINUED | OUTPATIENT
Start: 2018-03-17 | End: 2018-03-17

## 2018-03-17 RX ADMIN — Medication 50 MILLIGRAM(S): at 17:26

## 2018-03-17 RX ADMIN — Medication 0.25 MILLIGRAM(S): at 12:22

## 2018-03-17 RX ADMIN — Medication 50 MILLIGRAM(S): at 06:41

## 2018-03-17 RX ADMIN — ALBUTEROL 4 PUFF(S): 90 AEROSOL, METERED ORAL at 15:28

## 2018-03-17 RX ADMIN — PANTOPRAZOLE SODIUM 40 MILLIGRAM(S): 20 TABLET, DELAYED RELEASE ORAL at 06:41

## 2018-03-17 RX ADMIN — Medication 4 PUFF(S): at 15:28

## 2018-03-17 RX ADMIN — TRAMADOL HYDROCHLORIDE 50 MILLIGRAM(S): 50 TABLET ORAL at 22:45

## 2018-03-17 RX ADMIN — Medication 0.25 MILLIGRAM(S): at 04:39

## 2018-03-17 RX ADMIN — Medication 20 MILLIGRAM(S): at 06:41

## 2018-03-17 RX ADMIN — ALBUTEROL 2.5 MILLIGRAM(S): 90 AEROSOL, METERED ORAL at 07:25

## 2018-03-17 RX ADMIN — Medication 100 MILLIGRAM(S): at 04:36

## 2018-03-17 RX ADMIN — ENOXAPARIN SODIUM 40 MILLIGRAM(S): 100 INJECTION SUBCUTANEOUS at 11:05

## 2018-03-17 RX ADMIN — Medication 1 GRAM(S): at 17:26

## 2018-03-17 RX ADMIN — ONDANSETRON 110 MILLIGRAM(S): 8 TABLET, FILM COATED ORAL at 17:27

## 2018-03-17 RX ADMIN — TRAMADOL HYDROCHLORIDE 50 MILLIGRAM(S): 50 TABLET ORAL at 18:30

## 2018-03-17 RX ADMIN — Medication 1 TABLET(S): at 11:04

## 2018-03-17 RX ADMIN — TRAMADOL HYDROCHLORIDE 50 MILLIGRAM(S): 50 TABLET ORAL at 17:26

## 2018-03-17 RX ADMIN — Medication 0.5 MILLIGRAM(S): at 07:25

## 2018-03-17 RX ADMIN — TRAMADOL HYDROCHLORIDE 50 MILLIGRAM(S): 50 TABLET ORAL at 03:32

## 2018-03-17 RX ADMIN — Medication 100 MILLIGRAM(S): at 17:26

## 2018-03-17 RX ADMIN — Medication 1000 UNIT(S): at 11:04

## 2018-03-17 RX ADMIN — TRAMADOL HYDROCHLORIDE 50 MILLIGRAM(S): 50 TABLET ORAL at 04:30

## 2018-03-17 RX ADMIN — GABAPENTIN 300 MILLIGRAM(S): 400 CAPSULE ORAL at 22:19

## 2018-03-17 RX ADMIN — DORZOLAMIDE HYDROCHLORIDE TIMOLOL MALEATE 1 DROP(S): 20; 5 SOLUTION/ DROPS OPHTHALMIC at 17:27

## 2018-03-17 RX ADMIN — Medication 100 MICROGRAM(S): at 06:41

## 2018-03-17 RX ADMIN — Medication 1 GRAM(S): at 06:41

## 2018-03-17 RX ADMIN — TRAMADOL HYDROCHLORIDE 50 MILLIGRAM(S): 50 TABLET ORAL at 22:25

## 2018-03-17 RX ADMIN — Medication 162 MILLIGRAM(S): at 11:04

## 2018-03-17 RX ADMIN — DORZOLAMIDE HYDROCHLORIDE TIMOLOL MALEATE 1 DROP(S): 20; 5 SOLUTION/ DROPS OPHTHALMIC at 06:41

## 2018-03-17 RX ADMIN — Medication 3 MILLILITER(S): at 07:25

## 2018-03-17 RX ADMIN — ATORVASTATIN CALCIUM 40 MILLIGRAM(S): 80 TABLET, FILM COATED ORAL at 22:19

## 2018-03-17 RX ADMIN — Medication 100 MILLIGRAM(S): at 06:41

## 2018-03-17 RX ADMIN — LATANOPROST 1 DROP(S): 0.05 SOLUTION/ DROPS OPHTHALMIC; TOPICAL at 22:19

## 2018-03-17 RX ADMIN — Medication 0.5 MILLIGRAM(S): at 19:56

## 2018-03-17 RX ADMIN — Medication 2.5 MILLIGRAM(S): at 11:04

## 2018-03-17 RX ADMIN — Medication 100 MILLIGRAM(S): at 11:04

## 2018-03-17 RX ADMIN — ONDANSETRON 4 MILLIGRAM(S): 8 TABLET, FILM COATED ORAL at 03:32

## 2018-03-17 NOTE — PROGRESS NOTE ADULT - SUBJECTIVE AND OBJECTIVE BOX
Patient is a 72y old  Female who presents with a chief complaint of Came to hospital for chemotherapy. In ER SOB, desaturating. PNA, Rt. pleural effusion on CXR. (14 Mar 2018 23:36)    24 hour events: feels the same  c/o cough, SOB    REVIEW OF SYSTEMS  Constitutional: No fever, chills, fatigue  Neuro: No headache, numbness, weakness  Resp: +cough, shortness of breath  CVS: No chest pain, palpitations, leg swelling  GI: No abdominal pain, nausea, vomiting, diarrhea   : No dysuria, frequency, incontinence  Skin: No itching, burning, rashes, or lesions   Msk: No joint pain or swelling  Psych: No depression, anxiety, mood swings    T(F): 97 (03-17-18 @ 08:00), Max: 98.2 (03-16-18 @ 19:50)  HR: 78 (03-17-18 @ 07:28) (71 - 86)  BP: 101/51 (03-17-18 @ 07:00) (98/55 - 130/58)  RR: 28 (03-17-18 @ 07:00) (14 - 29)  SpO2: 94% (03-17-18 @ 07:28) (90% - 98%)    I&O's Summary    03-16 @ 07:01  -  03-17 @ 07:00  --------------------------------------------------------  IN: 1648 mL / OUT: 1800 mL / NET: -152 mL    PHYSICAL EXAM  General: NAD  CNS: AAO x 3, no focal deficits  HEENT: PERRL  Resp: clear on left, no breath sounds on right  CVS: S1S2, regular  Abd: soft, NT, +BS, having BMs  Ext: 2+ edema  Skin: warm    MEDICATIONS  metoprolol succinate ER Oral  atorvastatin Oral  levothyroxine Oral  predniSONE   Tablet Oral  acetylcysteine 10% Inhalation Inhalation  ALBUTerol    0.083% Nebulizer  benzonatate Oral PRN  buDESOnide   0.5 milliGRAM(s) Respule Inhalation  acetaminophen   Tablet Oral PRN  acetaminophen   Tablet. Oral PRN  ALPRAZolam Oral PRN  dronabinol Oral  gabapentin Oral  ondansetron  IVPB IV Intermittent  ondansetron Injectable IV Push PRN  oxyCODONE    5 mG/acetaminophen 325 mG Oral PRN  traMADol Oral PRN  etoposide IVPB IV Intermittent  aspirin enteric coated Oral  enoxaparin Injectable SubCutaneous  docusate sodium Oral  pantoprazole    Tablet Oral  cholecalciferol Oral  multivitamin Oral  pyridoxine Oral  dorzolamide 2%/timolol 0.5% Ophthalmic Solution Both EYES  latanoprost 0.005% Ophthalmic Solution Both EYES  lidocaine   Patch Transdermal PRN  omega-3-Acid Ethyl Esters Oral                        10.5   10.2  )-----------( 269      ( 17 Mar 2018 05:45 )             30.4     03-17    135  |  101  |  21  ----------------------------<  69<L>  4.4   |  29  |  0.62    Ca    8.0<L>      17 Mar 2018 05:45  Phos  2.8     03-17  Mg     2.0     03-17    TPro  6.1  /  Alb  2.5<L>  /  TBili  0.5  /  DBili  x   /  AST  98<H>  /  ALT  47  /  AlkPhos  72  03-17    PT/INR - ( 17 Mar 2018 05:45 )   PT: 11.9 sec;   INR: 1.09 ratio       PTT - ( 17 Mar 2018 05:45 )  PTT:27.2 sec    .Blood Blood-Peripheral   No growth to date. -- 03-14 @ 21:18    CENTRAL LINE: N         NOLAN: N                       A-LINE: N                    GLOBAL ISSUE/BEST PRACTICE  Analgesia: Y  Sedation: NA  CAM-ICU: neg  HOB elevation: yes  Stress ulcer prophylaxis: N  VTE prophylaxis: Y  Glycemic control: Y  Nutrition: Y    CODE STATUS: full  GOC discussion: Y

## 2018-03-17 NOTE — PROGRESS NOTE ADULT - SUBJECTIVE AND OBJECTIVE BOX
Patient seen and examined;  Chart reviewed and events noted;   tolerated chemo yesterday without incident  feeling tired  denies any fevers;  at bedside      MEDICATIONS  (STANDING):  ALBUTerol    90 MICROgram(s) HFA Inhaler 4 Puff(s) Inhalation every 6 hours  aspirin enteric coated 162 milliGRAM(s) Oral daily  atorvastatin 40 milliGRAM(s) Oral at bedtime  buDESOnide   0.5 milliGRAM(s) Respule 0.5 milliGRAM(s) Inhalation two times a day  cholecalciferol 1000 Unit(s) Oral daily  docusate sodium 100 milliGRAM(s) Oral two times a day  dorzolamide 2%/timolol 0.5% Ophthalmic Solution 1 Drop(s) Both EYES two times a day  dronabinol 2.5 milliGRAM(s) Oral daily  enoxaparin Injectable 40 milliGRAM(s) SubCutaneous every 24 hours  etoposide IVPB 170 milliGRAM(s) IV Intermittent daily  gabapentin 300 milliGRAM(s) Oral at bedtime  ipratropium 17 MICROgram(s) HFA Inhaler 4 Puff(s) Inhalation every 6 hours  latanoprost 0.005% Ophthalmic Solution 1 Drop(s) Both EYES at bedtime  levothyroxine 100 MICROGram(s) Oral daily  metoprolol succinate ER 50 milliGRAM(s) Oral two times a day  multivitamin 1 Tablet(s) Oral daily  omega-3-Acid Ethyl Esters 1 Gram(s) Oral two times a day  ondansetron  IVPB 10 milliGRAM(s) IV Intermittent daily  pantoprazole    Tablet 40 milliGRAM(s) Oral before breakfast  pyridoxine 100 milliGRAM(s) Oral daily    MEDICATIONS  (PRN):  acetaminophen   Tablet 650 milliGRAM(s) Oral every 6 hours PRN For Temp greater than 38 C (100.4 F)  acetaminophen   Tablet. 650 milliGRAM(s) Oral every 6 hours PRN Mild Pain (1 - 3)  ALPRAZolam 0.25 milliGRAM(s) Oral three times a day PRN Anxiety  benzonatate 100 milliGRAM(s) Oral three times a day PRN Cough  lidocaine   Patch 1 Patch Transdermal every 72 hours PRN Neck pain  ondansetron Injectable 4 milliGRAM(s) IV Push every 6 hours PRN Nausea and/or Vomiting  oxyCODONE    5 mG/acetaminophen 325 mG 1 Tablet(s) Oral every 4 hours PRN Severe Pain (7 - 10)  traMADol 50 milliGRAM(s) Oral four times a day PRN Moderate Pain (4 - 6)      Vital Signs Last 24 Hrs  T(C): 36.6 (17 Mar 2018 12:00), Max: 36.8 (16 Mar 2018 19:50)  T(F): 97.9 (17 Mar 2018 12:00), Max: 98.2 (16 Mar 2018 19:50)  HR: 82 (17 Mar 2018 12:00) (71 - 86)  BP: 119/58 (17 Mar 2018 12:00) (98/55 - 126/58)  BP(mean): 84 (17 Mar 2018 12:00) (72 - 85)  RR: 21 (17 Mar 2018 12:00) (14 - 29)  SpO2: 93% (17 Mar 2018 12:00) (90% - 98%)    PHYSICAL EXAM  General: adult woman in NAD  HEENT: clear oropharynx, anicteric sclera, pink conjunctivae; high flow O2 - O2 sats 93%  Neck: supple  CV: normal S1S2 with no murmur rubs or gallops; mild tachycardia  Lungs: reduced breath sounds at right base  Abdomen: soft non-tender non-distended, no hepato/splenomegaly  Ext: no clubbing cyanosis or edema  Skin: no rashes and no petichiae  Neuro: alert and oriented X3 no focal deficits      LABS:                        10.5   10.2  )-----------( 269      ( 17 Mar 2018 05:45 )             30.4     03-17    135  |  101  |  21  ----------------------------<  69<L>  4.4   |  29  |  0.62    Ca    8.0<L>      17 Mar 2018 05:45  Phos  2.8     03-17  Mg     2.0     03-17    TPro  6.1  /  Alb  2.5<L>  /  TBili  0.5  /  DBili  x   /  AST  98<H>  /  ALT  47  /  AlkPhos  72  03-17    PT/INR - ( 17 Mar 2018 05:45 )   PT: 11.9 sec;   INR: 1.09 ratio    PTT - ( 17 Mar 2018 05:45 )  PTT:27.2 sec

## 2018-03-17 NOTE — PROGRESS NOTE ADULT - ASSESSMENT
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

## 2018-03-17 NOTE — PROGRESS NOTE ADULT - ATTENDING COMMENTS
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     -neuro and HD stable  -prn Percocet for pain  -breathing stable today, remains on HFNC (45L, 100%)  -high risk for intubation  -no evidence of infection, monitor off abx  -b/l trace pleural effusion - not adequate for drainage  -decrease prednisone, continue albuterol  -po diet  -started on chemo 3/15 (carboplatin, etoposide)  -continue other home meds  -lovenox for DVT ppx  -prognosis poor, full code  -32mins critical care time spent

## 2018-03-17 NOTE — PROGRESS NOTE ADULT - ASSESSMENT
73 yo woman diagnosed small cell ca of lung with progressive pulmonary involvement and symptoms. Currently requiring ICU management due to poor oxygenation. Started urgent chemotherapy with VP_16 and carboplatin on 03/15/18    RECOMMENDATIONS:  - Continue  chemotherapy with -16 and carboplatin today; day #3  - anti-emetics as needed  - will start growth factor support with Zarexio tomorrow afternoon (24 hours after completion of day #3)  - prognosis guarded but would expect response to chemotherapy and hopefully be able to reduce oxygen requirements.  - continue ICU care

## 2018-03-17 NOTE — PROGRESS NOTE ADULT - SUBJECTIVE AND OBJECTIVE BOX
Date/Time Patient Seen:  		  Referring MD:   Data Reviewed	       Patient is a 72y old  Female who presents with a chief complaint of Came to hospital for chemotherapy. In ER SOB, desaturating. PNA, Rt. pleural effusion on CXR. (14 Mar 2018 23:36)    in bed  seen and examined  vs and meds reviewed    Subjective/HPI     PAST MEDICAL & SURGICAL HISTORY:  Liver lesion  Clostridium difficile colitis  Emphysema  Pneumonia: 2/2018  Generalized intra-abdominal and pelvic swelling, mass and lump  Glaucoma  Aortic valve prosthesis present  CAD (coronary artery disease)  Arthritis  Heart murmur  Other iron deficiency anemia  Carpal tunnel syndrome of right wrist  PVD (peripheral vascular disease)  Hyperlipemia  Colitis  Hypothyroid  Hypertension  COPD (chronic obstructive pulmonary disease)  H/O foot surgery: (Right foot, 2010)  History of colonoscopy  S/P carpal tunnel release: (Right, 1/2017)  H/O carotid endarterectomy: Both sides 2002  Aortic valve replaced: 2011 with bovine  Carotid disease, bilateral  Cataract extraction status of left eye  Cataract extraction status of right eye        Medication list         MEDICATIONS  (STANDING):  acetylcysteine 10% Inhalation 3 milliLiter(s) Inhalation every 8 hours  ALBUTerol    0.083% 2.5 milliGRAM(s) Nebulizer every 8 hours  aspirin enteric coated 162 milliGRAM(s) Oral daily  atorvastatin 40 milliGRAM(s) Oral at bedtime  buDESOnide   0.5 milliGRAM(s) Respule 0.5 milliGRAM(s) Inhalation two times a day  cholecalciferol 1000 Unit(s) Oral daily  docusate sodium 100 milliGRAM(s) Oral two times a day  dorzolamide 2%/timolol 0.5% Ophthalmic Solution 1 Drop(s) Both EYES two times a day  dronabinol 2.5 milliGRAM(s) Oral daily  enoxaparin Injectable 40 milliGRAM(s) SubCutaneous every 24 hours  etoposide IVPB 170 milliGRAM(s) IV Intermittent daily  gabapentin 300 milliGRAM(s) Oral at bedtime  latanoprost 0.005% Ophthalmic Solution 1 Drop(s) Both EYES at bedtime  levothyroxine 100 MICROGram(s) Oral daily  metoprolol succinate ER 50 milliGRAM(s) Oral two times a day  multivitamin 1 Tablet(s) Oral daily  omega-3-Acid Ethyl Esters 1 Gram(s) Oral two times a day  ondansetron  IVPB 10 milliGRAM(s) IV Intermittent daily  pantoprazole    Tablet 40 milliGRAM(s) Oral before breakfast  predniSONE   Tablet 20 milliGRAM(s) Oral daily  pyridoxine 100 milliGRAM(s) Oral daily    MEDICATIONS  (PRN):  acetaminophen   Tablet 650 milliGRAM(s) Oral every 6 hours PRN For Temp greater than 38 C (100.4 F)  acetaminophen   Tablet. 650 milliGRAM(s) Oral every 6 hours PRN Mild Pain (1 - 3)  ALPRAZolam 0.25 milliGRAM(s) Oral three times a day PRN Anxiety  benzonatate 100 milliGRAM(s) Oral three times a day PRN Cough  lidocaine   Patch 1 Patch Transdermal every 72 hours PRN Neck pain  ondansetron Injectable 4 milliGRAM(s) IV Push every 6 hours PRN Nausea and/or Vomiting  oxyCODONE    5 mG/acetaminophen 325 mG 1 Tablet(s) Oral every 4 hours PRN Severe Pain (7 - 10)  traMADol 50 milliGRAM(s) Oral four times a day PRN Moderate Pain (4 - 6)         Vitals log        ICU Vital Signs Last 24 Hrs  T(C): 36.3 (17 Mar 2018 05:02), Max: 36.8 (16 Mar 2018 19:50)  T(F): 97.4 (17 Mar 2018 05:02), Max: 98.2 (16 Mar 2018 19:50)  HR: 79 (17 Mar 2018 06:00) (71 - 86)  BP: 98/55 (17 Mar 2018 06:00) (98/55 - 140/60)  BP(mean): 73 (17 Mar 2018 06:00) (73 - 89)  ABP: --  ABP(mean): --  RR: 27 (17 Mar 2018 06:00) (14 - 34)  SpO2: 97% (17 Mar 2018 06:00) (87% - 98%)           Input and Output:  I&O's Detail    15 Mar 2018 07:01  -  16 Mar 2018 07:00  --------------------------------------------------------  IN:    Oral Fluid: 120 mL    Solution: 50 mL    Solution: 500 mL    Solution: 250 mL  Total IN: 920 mL    OUT:    Voided: 450 mL  Total OUT: 450 mL    Total NET: 470 mL      16 Mar 2018 07:01  -  17 Mar 2018 06:12  --------------------------------------------------------  IN:    Oral Fluid: 1040 mL    Solution: 50 mL    Solution: 508 mL  Total IN: 1598 mL    OUT:    Voided: 1800 mL  Total OUT: 1800 mL    Total NET: -202 mL          Lab Data                        10.5   10.2  )-----------( 269      ( 17 Mar 2018 05:45 )             30.4     03-16    136  |  103  |  15  ----------------------------<  105<H>  4.4   |  25  |  0.55    Ca    7.9<L>      16 Mar 2018 06:22  Phos  2.6     03-16  Mg     2.1     03-16    TPro  6.3  /  Alb  2.6<L>  /  TBili  0.6  /  DBili  x   /  AST  89<H>  /  ALT  56  /  AlkPhos  84  03-16    ABG - ( 16 Mar 2018 04:42 )  pH: 7.36  /  pCO2: 42    /  pO2: 61    / HCO3: 23    / Base Excess: -1.8  /  SaO2: 93                      Review of Systems	      Objective     Physical Examination    head at  heart s1s2  lung dec BS  abd soft      Pertinent Lab findings & Imaging      Rangel:  NO   Adequate UO     I&O's Detail    15 Mar 2018 07:01  -  16 Mar 2018 07:00  --------------------------------------------------------  IN:    Oral Fluid: 120 mL    Solution: 50 mL    Solution: 500 mL    Solution: 250 mL  Total IN: 920 mL    OUT:    Voided: 450 mL  Total OUT: 450 mL    Total NET: 470 mL      16 Mar 2018 07:01  -  17 Mar 2018 06:12  --------------------------------------------------------  IN:    Oral Fluid: 1040 mL    Solution: 50 mL    Solution: 508 mL  Total IN: 1598 mL    OUT:    Voided: 1800 mL  Total OUT: 1800 mL    Total NET: -202 mL               Discussed with:     Cultures:	        Radiology

## 2018-03-17 NOTE — PROGRESS NOTE ADULT - SUBJECTIVE AND OBJECTIVE BOX
Patient is a 72y old  Female who presents with a chief complaint of Came to hospital for chemotherapy. In ER SOB, desaturating. PNA, Rt. pleural effusion on CXR. (14 Mar 2018 23:36)      INTERVAL HPI: Pt seen and examined. States she is feeling better,  and son at bedside. Denies any acute complaints at this time.    OVERNIGHT EVENTS: none noted  T(F): 98.4 (03-17-18 @ 15:00), Max: 98.4 (03-17-18 @ 15:00)  HR: 87 (03-17-18 @ 17:00) (71 - 87)  BP: 108/54 (03-17-18 @ 17:00) (98/55 - 126/58)  RR: 20 (03-17-18 @ 17:00) (14 - 37)  SpO2: 94% (03-17-18 @ 17:00) (90% - 98%)  I&O's Summary    16 Mar 2018 07:01  -  17 Mar 2018 07:00  --------------------------------------------------------  IN: 1648 mL / OUT: 1800 mL / NET: -152 mL    17 Mar 2018 07:01  -  17 Mar 2018 17:12  --------------------------------------------------------  IN: 420 mL / OUT: 0 mL / NET: 420 mL        REVIEW OF SYSTEMS:  12 systems noncontributory other than that stated in hpi    PHYSICAL EXAM:  GENERAL: NAD, elder, well groomed  HEAD:  Atraumatic, Normocephalic  EYES: EOMI, PERRLA, conjunctiva and sclera clear  ENMT: high flow nasal O2 device  in place  NECK: Supple, No JVD, Normal thyroid  NERVOUS SYSTEM:  Alert & Oriented X3, Good concentration; Motor Strength 4/5 B/L upper and lower extremities; DTRs 2+ intact and symmetric  CHEST/LUNG: diminshed R>L bs  HEART: Regular rate and rhythm; No murmurs, rubs, or gallops  ABDOMEN: Soft, Nontender, Nondistended; Bowel sounds present  EXTREMITIES:  2+ Peripheral Pulses, No clubbing, cyanosis, or edema  SKIN: No rashes or lesions    LABS:                        10.5   10.2  )-----------( 269      ( 17 Mar 2018 05:45 )             30.4     03-17    135  |  101  |  21  ----------------------------<  69<L>  4.4   |  29  |  0.62    Ca    8.0<L>      17 Mar 2018 05:45  Phos  2.8     03-17  Mg     2.0     03-17    TPro  6.1  /  Alb  2.5<L>  /  TBili  0.5  /  DBili  x   /  AST  98<H>  /  ALT  47  /  AlkPhos  72  03-17    PT/INR - ( 17 Mar 2018 05:45 )   PT: 11.9 sec;   INR: 1.09 ratio         PTT - ( 17 Mar 2018 05:45 )  PTT:27.2 sec    CAPILLARY BLOOD GLUCOSE        ABG - ( 16 Mar 2018 04:42 )  pH: 7.36  /  pCO2: 42    /  pO2: 61    / HCO3: 23    / Base Excess: -1.8  /  SaO2: 93                03-14 @ 21:18   No growth to date.  --  --          MEDICATIONS  (STANDING):  ALBUTerol    90 MICROgram(s) HFA Inhaler 4 Puff(s) Inhalation every 6 hours  aspirin enteric coated 162 milliGRAM(s) Oral daily  atorvastatin 40 milliGRAM(s) Oral at bedtime  buDESOnide   0.5 milliGRAM(s) Respule 0.5 milliGRAM(s) Inhalation two times a day  cholecalciferol 1000 Unit(s) Oral daily  docusate sodium 100 milliGRAM(s) Oral two times a day  dorzolamide 2%/timolol 0.5% Ophthalmic Solution 1 Drop(s) Both EYES two times a day  dronabinol 2.5 milliGRAM(s) Oral daily  enoxaparin Injectable 40 milliGRAM(s) SubCutaneous every 24 hours  etoposide IVPB 170 milliGRAM(s) IV Intermittent daily  gabapentin 300 milliGRAM(s) Oral at bedtime  ipratropium 17 MICROgram(s) HFA Inhaler 4 Puff(s) Inhalation every 6 hours  latanoprost 0.005% Ophthalmic Solution 1 Drop(s) Both EYES at bedtime  levothyroxine 100 MICROGram(s) Oral daily  metoprolol succinate ER 50 milliGRAM(s) Oral two times a day  multivitamin 1 Tablet(s) Oral daily  omega-3-Acid Ethyl Esters 1 Gram(s) Oral two times a day  ondansetron  IVPB 10 milliGRAM(s) IV Intermittent daily  pantoprazole    Tablet 40 milliGRAM(s) Oral before breakfast  pyridoxine 100 milliGRAM(s) Oral daily    MEDICATIONS  (PRN):  acetaminophen   Tablet 650 milliGRAM(s) Oral every 6 hours PRN For Temp greater than 38 C (100.4 F)  acetaminophen   Tablet. 650 milliGRAM(s) Oral every 6 hours PRN Mild Pain (1 - 3)  ALPRAZolam 0.25 milliGRAM(s) Oral three times a day PRN Anxiety  benzonatate 100 milliGRAM(s) Oral three times a day PRN Cough  lidocaine   Patch 1 Patch Transdermal every 72 hours PRN Neck pain  ondansetron Injectable 4 milliGRAM(s) IV Push every 6 hours PRN Nausea and/or Vomiting  oxyCODONE    5 mG/acetaminophen 325 mG 1 Tablet(s) Oral every 4 hours PRN Severe Pain (7 - 10)  traMADol 50 milliGRAM(s) Oral four times a day PRN Moderate Pain (4 - 6)

## 2018-03-18 ENCOUNTER — RESULT REVIEW (OUTPATIENT)
Age: 73
End: 2018-03-18

## 2018-03-18 DIAGNOSIS — J96.01 ACUTE RESPIRATORY FAILURE WITH HYPOXIA: ICD-10-CM

## 2018-03-18 DIAGNOSIS — J98.11 ATELECTASIS: ICD-10-CM

## 2018-03-18 DIAGNOSIS — J43.8 OTHER EMPHYSEMA: ICD-10-CM

## 2018-03-18 LAB
ALBUMIN SERPL ELPH-MCNC: 2.4 G/DL — LOW (ref 3.3–5)
ALP SERPL-CCNC: 75 U/L — SIGNIFICANT CHANGE UP (ref 40–120)
ALT FLD-CCNC: 38 U/L — SIGNIFICANT CHANGE UP (ref 12–78)
ANION GAP SERPL CALC-SCNC: 7 MMOL/L — SIGNIFICANT CHANGE UP (ref 5–17)
APTT BLD: 27.5 SEC — SIGNIFICANT CHANGE UP (ref 27.5–37.4)
AST SERPL-CCNC: 95 U/L — HIGH (ref 15–37)
B PERT IGG+IGM PNL SER: CLEAR — SIGNIFICANT CHANGE UP
BASE EXCESS BLDA CALC-SCNC: -0.7 MMOL/L — SIGNIFICANT CHANGE UP (ref -2–2)
BASE EXCESS BLDA CALC-SCNC: -1 MMOL/L — SIGNIFICANT CHANGE UP (ref -2–2)
BASOPHILS # BLD AUTO: 0 K/UL — SIGNIFICANT CHANGE UP (ref 0–0.2)
BASOPHILS NFR BLD AUTO: 0.3 % — SIGNIFICANT CHANGE UP (ref 0–2)
BILIRUB SERPL-MCNC: 0.5 MG/DL — SIGNIFICANT CHANGE UP (ref 0.2–1.2)
BLOOD GAS COMMENTS ARTERIAL: SIGNIFICANT CHANGE UP
BUN SERPL-MCNC: 22 MG/DL — SIGNIFICANT CHANGE UP (ref 7–23)
CALCIUM SERPL-MCNC: 8.3 MG/DL — LOW (ref 8.5–10.1)
CHLORIDE SERPL-SCNC: 100 MMOL/L — SIGNIFICANT CHANGE UP (ref 96–108)
CO2 SERPL-SCNC: 27 MMOL/L — SIGNIFICANT CHANGE UP (ref 22–31)
COLOR FLD: YELLOW — SIGNIFICANT CHANGE UP
CREAT SERPL-MCNC: 0.51 MG/DL — SIGNIFICANT CHANGE UP (ref 0.5–1.3)
EOSINOPHIL # BLD AUTO: 0.1 K/UL — SIGNIFICANT CHANGE UP (ref 0–0.5)
EOSINOPHIL NFR BLD AUTO: 1.3 % — SIGNIFICANT CHANGE UP (ref 0–6)
FLUID INTAKE SUBSTANCE CLASS: SIGNIFICANT CHANGE UP
FLUID SEGMENTED GRANULOCYTES: 16 % — SIGNIFICANT CHANGE UP
GLUCOSE FLD-MCNC: 70 MG/DL — SIGNIFICANT CHANGE UP
GLUCOSE SERPL-MCNC: 70 MG/DL — SIGNIFICANT CHANGE UP (ref 70–99)
HCO3 BLDA-SCNC: 23 MMOL/L — SIGNIFICANT CHANGE UP (ref 23–27)
HCO3 BLDA-SCNC: 24 MMOL/L — SIGNIFICANT CHANGE UP (ref 23–27)
HCT VFR BLD CALC: 31.5 % — LOW (ref 34.5–45)
HGB BLD-MCNC: 10.5 G/DL — LOW (ref 11.5–15.5)
HOROWITZ INDEX BLDA+IHG-RTO: 100 — SIGNIFICANT CHANGE UP
HOROWITZ INDEX BLDA+IHG-RTO: 100 — SIGNIFICANT CHANGE UP
INR BLD: 1.08 RATIO — SIGNIFICANT CHANGE UP (ref 0.88–1.16)
LDH SERPL L TO P-CCNC: 730 U/L — HIGH (ref 50–242)
LDH SERPL L TO P-CCNC: 969 U/L — SIGNIFICANT CHANGE UP
LYMPHOCYTES # BLD AUTO: 0.8 K/UL — LOW (ref 1–3.3)
LYMPHOCYTES # BLD AUTO: 8.7 % — LOW (ref 13–44)
LYMPHOCYTES # FLD: 71 % — SIGNIFICANT CHANGE UP
MAGNESIUM SERPL-MCNC: 1.9 MG/DL — SIGNIFICANT CHANGE UP (ref 1.6–2.6)
MCHC RBC-ENTMCNC: 30.2 PG — SIGNIFICANT CHANGE UP (ref 27–34)
MCHC RBC-ENTMCNC: 33.4 GM/DL — SIGNIFICANT CHANGE UP (ref 32–36)
MCV RBC AUTO: 90.4 FL — SIGNIFICANT CHANGE UP (ref 80–100)
MONOCYTES # BLD AUTO: 0.1 K/UL — SIGNIFICANT CHANGE UP (ref 0–0.9)
MONOCYTES NFR BLD AUTO: 1.3 % — SIGNIFICANT CHANGE UP (ref 1–9)
MONOS+MACROS # FLD: 13 % — SIGNIFICANT CHANGE UP
NEUTROPHILS # BLD AUTO: 7.9 K/UL — HIGH (ref 1.8–7.4)
NEUTROPHILS NFR BLD AUTO: 88.4 % — HIGH (ref 43–77)
PCO2 BLDA: 37 MMHG — SIGNIFICANT CHANGE UP (ref 32–46)
PCO2 BLDA: 46 MMHG — SIGNIFICANT CHANGE UP (ref 32–46)
PH BLDA: 7.34 — LOW (ref 7.35–7.45)
PH BLDA: 7.42 — SIGNIFICANT CHANGE UP (ref 7.35–7.45)
PH FLD: 7.32 — SIGNIFICANT CHANGE UP
PHOSPHATE SERPL-MCNC: 2.9 MG/DL — SIGNIFICANT CHANGE UP (ref 2.5–4.5)
PLATELET # BLD AUTO: 242 K/UL — SIGNIFICANT CHANGE UP (ref 150–400)
PO2 BLDA: 76 MMHG — SIGNIFICANT CHANGE UP (ref 74–108)
PO2 BLDA: 87 MMHG — SIGNIFICANT CHANGE UP (ref 74–108)
POTASSIUM SERPL-MCNC: 4.5 MMOL/L — SIGNIFICANT CHANGE UP (ref 3.5–5.3)
POTASSIUM SERPL-SCNC: 4.5 MMOL/L — SIGNIFICANT CHANGE UP (ref 3.5–5.3)
PROT FLD-MCNC: 3.6 G/DL — SIGNIFICANT CHANGE UP
PROT SERPL-MCNC: 6 G/DL — SIGNIFICANT CHANGE UP (ref 6–8.3)
PROTHROM AB SERPL-ACNC: 11.8 SEC — SIGNIFICANT CHANGE UP (ref 9.8–12.7)
RBC # BLD: 3.48 M/UL — LOW (ref 3.8–5.2)
RBC # FLD: 13.8 % — SIGNIFICANT CHANGE UP (ref 10.3–14.5)
RCV VOL RI: 3000 /UL — HIGH (ref 0–5)
SAO2 % BLDA: 96 % — SIGNIFICANT CHANGE UP (ref 92–96)
SAO2 % BLDA: 98 % — HIGH (ref 92–96)
SODIUM SERPL-SCNC: 134 MMOL/L — LOW (ref 135–145)
SPECIMEN SOURCE FLD: SIGNIFICANT CHANGE UP
TOTAL NUCLEATED CELL COUNT, BODY FLUID: 235 /UL — HIGH (ref 0–5)
TUBE TYPE: SIGNIFICANT CHANGE UP
WBC # BLD: 8.9 K/UL — SIGNIFICANT CHANGE UP (ref 3.8–10.5)
WBC # FLD AUTO: 8.9 K/UL — SIGNIFICANT CHANGE UP (ref 3.8–10.5)

## 2018-03-18 PROCEDURE — 88305 TISSUE EXAM BY PATHOLOGIST: CPT | Mod: 26

## 2018-03-18 PROCEDURE — 99233 SBSQ HOSP IP/OBS HIGH 50: CPT

## 2018-03-18 PROCEDURE — 99291 CRITICAL CARE FIRST HOUR: CPT | Mod: 25

## 2018-03-18 PROCEDURE — 32555 ASPIRATE PLEURA W/ IMAGING: CPT | Mod: GC

## 2018-03-18 PROCEDURE — 71045 X-RAY EXAM CHEST 1 VIEW: CPT | Mod: 26,77

## 2018-03-18 PROCEDURE — 71045 X-RAY EXAM CHEST 1 VIEW: CPT | Mod: 26,76

## 2018-03-18 PROCEDURE — 88108 CYTOPATH CONCENTRATE TECH: CPT | Mod: 26

## 2018-03-18 RX ORDER — FENTANYL CITRATE 50 UG/ML
50 INJECTION INTRAVENOUS ONCE
Qty: 0 | Refills: 0 | Status: DISCONTINUED | OUTPATIENT
Start: 2018-03-18 | End: 2018-03-18

## 2018-03-18 RX ORDER — SODIUM CHLORIDE 9 MG/ML
500 INJECTION INTRAMUSCULAR; INTRAVENOUS; SUBCUTANEOUS ONCE
Qty: 0 | Refills: 0 | Status: COMPLETED | OUTPATIENT
Start: 2018-03-18 | End: 2018-03-18

## 2018-03-18 RX ORDER — PROPOFOL 10 MG/ML
13.44 INJECTION, EMULSION INTRAVENOUS
Qty: 1000 | Refills: 0 | Status: DISCONTINUED | OUTPATIENT
Start: 2018-03-18 | End: 2018-03-19

## 2018-03-18 RX ORDER — CHLORHEXIDINE GLUCONATE 213 G/1000ML
15 SOLUTION TOPICAL
Qty: 0 | Refills: 0 | Status: DISCONTINUED | OUTPATIENT
Start: 2018-03-18 | End: 2018-03-23

## 2018-03-18 RX ORDER — FENTANYL CITRATE 50 UG/ML
25 INJECTION INTRAVENOUS EVERY 4 HOURS
Qty: 0 | Refills: 0 | Status: DISCONTINUED | OUTPATIENT
Start: 2018-03-18 | End: 2018-03-19

## 2018-03-18 RX ORDER — CHLORHEXIDINE GLUCONATE 213 G/1000ML
15 SOLUTION TOPICAL
Qty: 0 | Refills: 0 | Status: DISCONTINUED | OUTPATIENT
Start: 2018-03-18 | End: 2018-03-19

## 2018-03-18 RX ORDER — PANTOPRAZOLE SODIUM 20 MG/1
40 TABLET, DELAYED RELEASE ORAL DAILY
Qty: 0 | Refills: 0 | Status: DISCONTINUED | OUTPATIENT
Start: 2018-03-18 | End: 2018-03-19

## 2018-03-18 RX ADMIN — Medication 0.25 MILLIGRAM(S): at 01:31

## 2018-03-18 RX ADMIN — ALBUTEROL 4 PUFF(S): 90 AEROSOL, METERED ORAL at 15:31

## 2018-03-18 RX ADMIN — ENOXAPARIN SODIUM 40 MILLIGRAM(S): 100 INJECTION SUBCUTANEOUS at 11:28

## 2018-03-18 RX ADMIN — Medication 0.5 MILLIGRAM(S): at 08:02

## 2018-03-18 RX ADMIN — Medication 100 MICROGRAM(S): at 06:04

## 2018-03-18 RX ADMIN — SODIUM CHLORIDE 2000 MILLILITER(S): 9 INJECTION INTRAMUSCULAR; INTRAVENOUS; SUBCUTANEOUS at 23:10

## 2018-03-18 RX ADMIN — DORZOLAMIDE HYDROCHLORIDE TIMOLOL MALEATE 1 DROP(S): 20; 5 SOLUTION/ DROPS OPHTHALMIC at 06:04

## 2018-03-18 RX ADMIN — Medication 100 MILLIGRAM(S): at 06:03

## 2018-03-18 RX ADMIN — Medication 0.5 MILLIGRAM(S): at 20:25

## 2018-03-18 RX ADMIN — Medication 0.25 MILLIGRAM(S): at 19:57

## 2018-03-18 RX ADMIN — ALBUTEROL 4 PUFF(S): 90 AEROSOL, METERED ORAL at 08:31

## 2018-03-18 RX ADMIN — Medication 4 PUFF(S): at 15:32

## 2018-03-18 RX ADMIN — ATORVASTATIN CALCIUM 40 MILLIGRAM(S): 80 TABLET, FILM COATED ORAL at 23:50

## 2018-03-18 RX ADMIN — PROPOFOL 5 MICROGRAM(S)/KG/MIN: 10 INJECTION, EMULSION INTRAVENOUS at 22:15

## 2018-03-18 RX ADMIN — Medication 4 PUFF(S): at 08:31

## 2018-03-18 RX ADMIN — DORZOLAMIDE HYDROCHLORIDE TIMOLOL MALEATE 1 DROP(S): 20; 5 SOLUTION/ DROPS OPHTHALMIC at 17:40

## 2018-03-18 RX ADMIN — FENTANYL CITRATE 50 MICROGRAM(S): 50 INJECTION INTRAVENOUS at 22:25

## 2018-03-18 RX ADMIN — Medication 3 MILLIGRAM(S): at 23:58

## 2018-03-18 RX ADMIN — Medication 10 MILLIGRAM(S): at 06:03

## 2018-03-18 RX ADMIN — TRAMADOL HYDROCHLORIDE 50 MILLIGRAM(S): 50 TABLET ORAL at 06:15

## 2018-03-18 RX ADMIN — LATANOPROST 1 DROP(S): 0.05 SOLUTION/ DROPS OPHTHALMIC; TOPICAL at 23:51

## 2018-03-18 RX ADMIN — GABAPENTIN 300 MILLIGRAM(S): 400 CAPSULE ORAL at 23:50

## 2018-03-18 RX ADMIN — FENTANYL CITRATE 50 MICROGRAM(S): 50 INJECTION INTRAVENOUS at 22:55

## 2018-03-18 RX ADMIN — Medication 50 MILLIGRAM(S): at 06:05

## 2018-03-18 RX ADMIN — Medication 1 GRAM(S): at 06:04

## 2018-03-18 RX ADMIN — TRAMADOL HYDROCHLORIDE 50 MILLIGRAM(S): 50 TABLET ORAL at 06:03

## 2018-03-18 RX ADMIN — Medication 300 MICROGRAM(S): at 18:14

## 2018-03-18 NOTE — PROGRESS NOTE ADULT - ASSESSMENT
72F with PMHx of emphysema (former smoker), CAD, sp AVR (bioprosthetic), HTN, HLD who was recently dx'd with SCCA ([facility] by Dr Iraj SCOTT) with ? endobronchial lesion now intubated on sedation with:

## 2018-03-18 NOTE — PROCEDURE NOTE - ADDITIONAL PROCEDURE DETAILS
cloudy, serous fluid obtained from right pleural space, fluid turned bloody toward the end of the procedure

## 2018-03-18 NOTE — PROGRESS NOTE ADULT - ATTENDING COMMENTS
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 She has worsening resp status. She was found to have an anterior, loculated pleural effusion - drained bedside under US guidance.     -neuro and HD stable  -prn Percocet for pain  -will wean off bipap to HFNC if tolerates  -high risk for intubation  -no evidence of infection, monitor off abx  -f/u pleural fluid analysis, cyto  -continue prednisone, albuterol  -po diet  -s/p chemo (carboplatin, etoposide)  -continue other home meds  -lovenox for DVT ppx  -prognosis poor, full code  -discussed likelihood of intubation with patient and family and they are ok with it if indicated, I also asked them to discuss what to do if her condition doesn't improve over the next few days (as anticipated by Oncology), suggested to consider comfort measures then  -35mins critical care time spent

## 2018-03-18 NOTE — PROGRESS NOTE ADULT - SUBJECTIVE AND OBJECTIVE BOX
Patient is a 72y old  Female who presents with a chief complaint of Came to hospital for chemotherapy. In ER SOB, desaturating. PNA, Rt. pleural effusion on CXR. (14 Mar 2018 23:36)    24 hour events: worsening SOB - placed on bipap this am    REVIEW OF SYSTEMS  Constitutional: No fever, chills, fatigue  Neuro: No headache, numbness, weakness  Resp: +shortness of breath  CVS: No chest pain, palpitations, leg swelling  GI: No abdominal pain, nausea, vomiting, diarrhea   : No dysuria, frequency, incontinence  Skin: No itching, burning, rashes, or lesions   Msk: No joint pain or swelling  Psych: No depression, anxiety, mood swings    T(F): 97 (03-18-18 @ 11:25), Max: 98.4 (03-17-18 @ 15:00)  HR: 80 (03-18-18 @ 11:39) (74 - 88)  BP: 119/58 (03-18-18 @ 11:00) (96/53 - 163/70)  RR: 17 (03-18-18 @ 11:00) (17 - 37)  SpO2: 96% (03-18-18 @ 11:39) (88% - 99%)    I&O's Summary    03-17 @ 07:01  -  03-18 @ 07:00  --------------------------------------------------------  IN: 1268 mL / OUT: 300 mL / NET: 968 mL    PHYSICAL EXAM  General: mild to moderate resp distress  CNS: AAO x 3, no focal deficits  HEENT: PERRL  Resp: clear on left, no BS on right  CVS: S1S2, regular  Abd: soft, NT, +BS  Ext: 2+ edema  Skin: warm    MEDICATIONS  metoprolol succinate ER Oral  atorvastatin Oral  levothyroxine Oral  predniSONE   Tablet Oral  ALBUTerol    90 MICROgram(s) HFA Inhaler Inhalation  benzonatate Oral PRN  buDESOnide   0.5 milliGRAM(s) Respule Inhalation  ipratropium 17 MICROgram(s) HFA Inhaler Inhalation  acetaminophen   Tablet Oral PRN  acetaminophen   Tablet. Oral PRN  ALPRAZolam Oral PRN  dronabinol Oral  gabapentin Oral  ondansetron Injectable IV Push PRN  oxyCODONE    5 mG/acetaminophen 325 mG Oral PRN  traMADol Oral PRN  aspirin enteric coated Oral  enoxaparin Injectable SubCutaneous  docusate sodium Oral  pantoprazole    Tablet Oral  cholecalciferol Oral  multivitamin Oral  pyridoxine Oral  filgrastim-sndz Injectable SubCutaneous  dorzolamide 2%/timolol 0.5% Ophthalmic Solution Both EYES  latanoprost 0.005% Ophthalmic Solution Both EYES  lidocaine   Patch Transdermal PRN  omega-3-Acid Ethyl Esters Oral                        10.5   8.9   )-----------( 242      ( 18 Mar 2018 04:58 )             31.5     03-18    134<L>  |  100  |  22  ----------------------------<  70  4.5   |  27  |  0.51    Ca    8.3<L>      18 Mar 2018 04:58  Phos  2.9     03-18  Mg     1.9     03-18    TPro  6.0  /  Alb  2.4<L>  /  TBili  0.5  /  DBili  x   /  AST  95<H>  /  ALT  38  /  AlkPhos  75  03-18    Lactate 2.9           03-17 @ 21:58    PT/INR - ( 18 Mar 2018 04:58 )   PT: 11.8 sec;   INR: 1.08 ratio       PTT - ( 18 Mar 2018 04:58 )  PTT:27.5 sec    .Blood Blood-Peripheral   No growth to date. -- 03-14 @ 21:18    Bedside lung ultrasound: completely consolidated right lung, no right basal effusion however she has a moderate to large right apical effusion, left lung with scattered b-lines    CENTRAL LINE: N          NOLAN: N                      A-LINE: N                         GLOBAL ISSUE/BEST PRACTICE  Analgesia: Y  Sedation: NA  CAM-ICU: neg  HOB elevation: yes  Stress ulcer prophylaxis: NA  VTE prophylaxis: Y  Glycemic control: Y  Nutrition: Y    CODE STATUS: full  GOC discussion: Y

## 2018-03-18 NOTE — PROGRESS NOTE ADULT - SUBJECTIVE AND OBJECTIVE BOX
CC: SOB/hypoxia      BRIEF HOSPITAL COURSE: 72F with PMHx of emphysema (former smoker), CAD, sp AVR (bioprosthetic), HTN, HLD who was recently dx'd with SCCA ([facility] by Dr Iraj SCOTT) with ? endobronchial lesion who presented to ED for SOB and need for chemotherapy by oncology.  While in ED pt with progressive hypoxemia requiring the placement of HFNC.  Pt underwent chemotherapy with last treatment 3/17.      Events last 24 hours: Pt with worsening respiratory distress yesterday morning requiring the placement of NIPPV with maximal O2 requirements.  CXR revealed opacification of R side, bedside US revealed loculated pleural effusion.  R sided thoracentesis performed at bedside earlier today with removal of 950ml serous fluid. CXR showed improvement in aeration of R side, however pt continued to remain with significant Aa gradient and hypoxemia with maximal NIPPV settings.  Earlier this evening pt noted to have borderline sats to 88% on 100% FiO2. Clinical exam at bedside revealed very limited BS on right side. Repeat CXR revealed opacification again of right side with compressive atelectasis.  Pt was electively intubated after a long discussion with family to initiate pulmonary toileting and airway control.  Family at this time continues to express their wishes to have everything done.     PAST MEDICAL & SURGICAL HISTORY:  Liver lesion  Clostridium difficile colitis  Emphysema  Pneumonia: 2/2018  Generalized intra-abdominal and pelvic swelling, mass and lump  Glaucoma  Aortic valve prosthesis present  CAD (coronary artery disease)  Arthritis  Heart murmur  Other iron deficiency anemia  PVD (peripheral vascular disease)  Hyperlipemia  Hypothyroid  Hypertension  H/O foot surgery: (Right foot, 2010)  History of colonoscopy  S/P carpal tunnel release: (Right, 1/2017)  H/O carotid endarterectomy: Both sides 2002  Aortic valve replaced: 2011 with bovine  Carotid disease, bilateral  Cataract extraction status of left eye  Cataract extraction status of right eye      Review of Systems:  unable to obtain at this time, pt is currently sedated and intubated.      Medications:    metoprolol succinate ER 50 milliGRAM(s) Oral two times a day    ALBUTerol    90 MICROgram(s) HFA Inhaler 4 Puff(s) Inhalation every 6 hours  buDESOnide   0.5 milliGRAM(s) Respule 0.5 milliGRAM(s) Inhalation two times a day  ipratropium 17 MICROgram(s) HFA Inhaler 4 Puff(s) Inhalation every 6 hours    acetaminophen   Tablet 650 milliGRAM(s) Oral every 6 hours PRN  acetaminophen   Tablet. 650 milliGRAM(s) Oral every 6 hours PRN  ALPRAZolam 0.25 milliGRAM(s) Oral three times a day PRN  dronabinol 2.5 milliGRAM(s) Oral daily  fentaNYL    Injectable 25 MICROGram(s) IV Push every 4 hours PRN  fentaNYL    Injectable 50 MICROGram(s) IV Push once  gabapentin 300 milliGRAM(s) Oral at bedtime  ondansetron Injectable 4 milliGRAM(s) IV Push every 6 hours PRN  propofol Infusion 13.441 MICROgram(s)/kG/Min IV Continuous <Continuous>      aspirin enteric coated 162 milliGRAM(s) Oral daily  enoxaparin Injectable 40 milliGRAM(s) SubCutaneous every 24 hours    docusate sodium 100 milliGRAM(s) Oral two times a day  pantoprazole  Injectable 40 milliGRAM(s) IV Push daily      atorvastatin 40 milliGRAM(s) Oral at bedtime  levothyroxine 100 MICROGram(s) Oral daily  predniSONE   Tablet 10 milliGRAM(s) Oral daily    cholecalciferol 1000 Unit(s) Oral daily  multivitamin 1 Tablet(s) Oral daily  pyridoxine 100 milliGRAM(s) Oral daily    filgrastim-sndz Injectable 300 MICROGram(s) SubCutaneous <User Schedule>    chlorhexidine 0.12% Liquid 15 milliLiter(s) Swish and Spit two times a day  chlorhexidine 0.12% Liquid 15 milliLiter(s) Swish and Spit two times a day  dorzolamide 2%/timolol 0.5% Ophthalmic Solution 1 Drop(s) Both EYES two times a day  latanoprost 0.005% Ophthalmic Solution 1 Drop(s) Both EYES at bedtime  lidocaine   Patch 1 Patch Transdermal every 72 hours PRN    omega-3-Acid Ethyl Esters 1 Gram(s) Oral two times a day          ICU Vital Signs Last 24 Hrs  T(C): 37.2 (18 Mar 2018 20:06), Max: 37.2 (18 Mar 2018 16:15)  T(F): 98.9 (18 Mar 2018 20:06), Max: 99 (18 Mar 2018 16:15)  HR: 86 (18 Mar 2018 20:29) (77 - 88)  BP: 141/65 (18 Mar 2018 19:00) (96/53 - 163/70)  BP(mean): 93 (18 Mar 2018 19:00) (72 - 116)  ABP: --  ABP(mean): --  RR: 17 (18 Mar 2018 19:00) (16 - 30)  SpO2: 91% (18 Mar 2018 20:29) (87% - 99%)      ABG - ( 18 Mar 2018 20:48 )  pH: 7.42  /  pCO2: 37    /  pO2: 76    / HCO3: 24    / Base Excess: -0.7  /  SaO2: 96          LABS:                        10.5   8.9   )-----------( 242      ( 18 Mar 2018 04:58 )             31.5     03-18    134<L>  |  100  |  22  ----------------------------<  70  4.5   |  27  |  0.51    Ca    8.3<L>      18 Mar 2018 04:58  Phos  2.9     03-18  Mg     1.9     03-18    TPro  6.0  /  Alb  2.4<L>  /  TBili  0.5  /  DBili  x   /  AST  95<H>  /  ALT  38  /  AlkPhos  75  03-18    PT/INR - ( 18 Mar 2018 04:58 )   PT: 11.8 sec;   INR: 1.08 ratio         PTT - ( 18 Mar 2018 04:58 )  PTT:27.5 sec    CULTURES:  Culture Results:   No growth to date. (03-14 @ 21:18)  Culture Results:   No growth to date. (03-14 @ 21:18)      Physical Examination:    General: intubated, sedated, appears more comfortable now on full vent support    HEENT: Pupils equal, reactive to light. left pupil with removed cataract    PULM: Course BSlear to auscultation bilaterally, no significant sputum production    CVS: Regular rate and rhythm, no murmurs, rubs, or gallops    ABD: Soft, nondistended, nontender, normoactive bowel sounds, no masses    EXT: No edema, nontender    SKIN: Warm and well perfused, no rashes noted.    RADIOLOGY: CXR: improved aeration of RUL after intubation.  ETT at odilia, (pulled back after CXR), large dense opacification of RLL remains, no ptx noted.    CRITICAL CARE TIME SPENT: 60 minutes of critical care time spent evaluating/treating pt, reviewing charts/labs/films, discussing care plan with bedside team and family, noninclusive of procedural time.

## 2018-03-18 NOTE — PROGRESS NOTE ADULT - SUBJECTIVE AND OBJECTIVE BOX
Patient is a 72y old  Female who presents with a chief complaint of Came to hospital for chemotherapy. In ER SOB, desaturating. PNA, Rt. pleural effusion on CXR. (14 Mar 2018 23:36)        HPI:  73 yo F with PMH of recently diagnosed metastatic carcinoma (3/2018), recent admission for multilobular PNA (2/2018), CAD, s/p AVR (bovine valve, 2011), s/p bilateral carotid endarterectomy (2002), Emphysema (on 3L home O2), C. diff, HTN, HLD, Hypothyroidism, Iron deficiency anemia, arthritis, glaucoma, and new liver lesion sent to the ED by his Oncologist to begin chemotherapy for her recently diagnosed metastatic lung cancer. Pt states that she has been more SOB, at rest and at exertion. Ambulates with walker at home. Also states that she has had increased swelling in her b/l feet that has been getting worse in the past week, unable to put on shoes this am. Denies fevers, chills, nausea, vomiting, CP, palpitations, abdominal pain, changes in bowel movements.    In the ED, patient was hypoxemic (SpO2 of 85%), otherwise hemodynamically stable. CXR showed worsening of pleural effusion which is large and has a component of loculation. EKG showed sinus rhythm at 82 with first degree AV block. Labs significant for an elevated lactate of 5.4 (elevated during prior admission, range 2.7 -3.7, not found to be septic at that time), no leukocytosis (WBC 10.2) but with elevated Neut 90%, anemia (H/H 11.4/32.8), mild hyponatremia (Na 133), and hyperglycemia (Glu 224). Received Zithromax 500mg IV x1, Rocephin 1 gram IV x1, NS Bolus 1L x2, and Duoneb x1. (14 Mar 2018 18:20)      SUBJECTIVE & OBJECTIVE: Pt seen and examined at bedside. comfotable sitting on the chiar on bipap     PHYSICAL EXAM:  T(C): 36.7 (03-18-18 @ 07:40), Max: 36.9 (03-17-18 @ 15:00)  HR: 79 (03-18-18 @ 10:00) (74 - 88)  BP: 127/60 (03-18-18 @ 10:00) (96/53 - 163/70)  RR: 17 (03-18-18 @ 10:00) (17 - 37)  SpO2: 95% (03-18-18 @ 10:00) (88% - 99%)  Wt(kg): --   GENERAL: NAD, well-groomed, well-developed  HEAD:  Atraumatic, Normocephalic  EYES: EOMI, PERRLA, conjunctiva and sclera clear  ENMT: Moist mucous membranes  NECK: Supple, No JVD  NERVOUS SYSTEM:  Alert & Oriented X3, Motor Strength 5/5 B/L upper and lower extremities; DTRs 2+ intact and symmetric  CHEST/LUNG: Clear to auscultation bilaterally; No rales, rhonchi, wheezing, or rubs  HEART: Regular rate and rhythm; No murmurs, rubs, or gallops  ABDOMEN: Soft, Nontender, Nondistended; Bowel sounds present  EXTREMITIES:  2+ Peripheral Pulses, No clubbing, cyanosis, or edema        MEDICATIONS  (STANDING):  ALBUTerol    90 MICROgram(s) HFA Inhaler 4 Puff(s) Inhalation every 6 hours  aspirin enteric coated 162 milliGRAM(s) Oral daily  atorvastatin 40 milliGRAM(s) Oral at bedtime  buDESOnide   0.5 milliGRAM(s) Respule 0.5 milliGRAM(s) Inhalation two times a day  cholecalciferol 1000 Unit(s) Oral daily  docusate sodium 100 milliGRAM(s) Oral two times a day  dorzolamide 2%/timolol 0.5% Ophthalmic Solution 1 Drop(s) Both EYES two times a day  dronabinol 2.5 milliGRAM(s) Oral daily  enoxaparin Injectable 40 milliGRAM(s) SubCutaneous every 24 hours  filgrastim-sndz Injectable 300 MICROGram(s) SubCutaneous <User Schedule>  gabapentin 300 milliGRAM(s) Oral at bedtime  ipratropium 17 MICROgram(s) HFA Inhaler 4 Puff(s) Inhalation every 6 hours  latanoprost 0.005% Ophthalmic Solution 1 Drop(s) Both EYES at bedtime  levothyroxine 100 MICROGram(s) Oral daily  metoprolol succinate ER 50 milliGRAM(s) Oral two times a day  multivitamin 1 Tablet(s) Oral daily  omega-3-Acid Ethyl Esters 1 Gram(s) Oral two times a day  pantoprazole    Tablet 40 milliGRAM(s) Oral before breakfast  predniSONE   Tablet 10 milliGRAM(s) Oral daily  pyridoxine 100 milliGRAM(s) Oral daily    MEDICATIONS  (PRN):  acetaminophen   Tablet 650 milliGRAM(s) Oral every 6 hours PRN For Temp greater than 38 C (100.4 F)  acetaminophen   Tablet. 650 milliGRAM(s) Oral every 6 hours PRN Mild Pain (1 - 3)  ALPRAZolam 0.25 milliGRAM(s) Oral three times a day PRN Anxiety  benzonatate 100 milliGRAM(s) Oral three times a day PRN Cough  lidocaine   Patch 1 Patch Transdermal every 72 hours PRN Neck pain  ondansetron Injectable 4 milliGRAM(s) IV Push every 6 hours PRN Nausea and/or Vomiting  oxyCODONE    5 mG/acetaminophen 325 mG 1 Tablet(s) Oral every 4 hours PRN Severe Pain (7 - 10)  traMADol 50 milliGRAM(s) Oral four times a day PRN Moderate Pain (4 - 6)      LABS:                        10.5   8.9   )-----------( 242      ( 18 Mar 2018 04:58 )             31.5     03-18    134<L>  |  100  |  22  ----------------------------<  70  4.5   |  27  |  0.51    Ca    8.3<L>      18 Mar 2018 04:58  Phos  2.9     03-18  Mg     1.9     03-18    TPro  6.0  /  Alb  2.4<L>  /  TBili  0.5  /  DBili  x   /  AST  95<H>  /  ALT  38  /  AlkPhos  75  03-18    PT/INR - ( 18 Mar 2018 04:58 )   PT: 11.8 sec;   INR: 1.08 ratio         PTT - ( 18 Mar 2018 04:58 )  PTT:27.5 sec    Magnesium, Serum: 1.9 mg/dL (03-18 @ 04:58)    CAPILLARY BLOOD GLUCOSE          CAPILLARY BLOOD GLUCOSE        CAPILLARY BLOOD GLUCOSE                RECENT CULTURES:      RADIOLOGY & ADDITIONAL TESTS:                        DVT/GI ppx  Discussed with pt @ bedside

## 2018-03-18 NOTE — PROGRESS NOTE ADULT - SUBJECTIVE AND OBJECTIVE BOX
Patient seen and examined;  Chart reviewed and events noted;   requiring more oxygen today and changed to BiPAP mask to maintain O2 sats; feels very anxious with mask on   at bedside      MEDICATIONS  (STANDING):  ALBUTerol    90 MICROgram(s) HFA Inhaler 4 Puff(s) Inhalation every 6 hours  aspirin enteric coated 162 milliGRAM(s) Oral daily  atorvastatin 40 milliGRAM(s) Oral at bedtime  buDESOnide   0.5 milliGRAM(s) Respule 0.5 milliGRAM(s) Inhalation two times a day  cholecalciferol 1000 Unit(s) Oral daily  docusate sodium 100 milliGRAM(s) Oral two times a day  dorzolamide 2%/timolol 0.5% Ophthalmic Solution 1 Drop(s) Both EYES two times a day  dronabinol 2.5 milliGRAM(s) Oral daily  enoxaparin Injectable 40 milliGRAM(s) SubCutaneous every 24 hours  filgrastim-sndz Injectable 300 MICROGram(s) SubCutaneous <User Schedule>  gabapentin 300 milliGRAM(s) Oral at bedtime  ipratropium 17 MICROgram(s) HFA Inhaler 4 Puff(s) Inhalation every 6 hours  latanoprost 0.005% Ophthalmic Solution 1 Drop(s) Both EYES at bedtime  levothyroxine 100 MICROGram(s) Oral daily  metoprolol succinate ER 50 milliGRAM(s) Oral two times a day  multivitamin 1 Tablet(s) Oral daily  omega-3-Acid Ethyl Esters 1 Gram(s) Oral two times a day  pantoprazole    Tablet 40 milliGRAM(s) Oral before breakfast  predniSONE   Tablet 10 milliGRAM(s) Oral daily  pyridoxine 100 milliGRAM(s) Oral daily    MEDICATIONS  (PRN):  acetaminophen   Tablet 650 milliGRAM(s) Oral every 6 hours PRN For Temp greater than 38 C (100.4 F)  acetaminophen   Tablet. 650 milliGRAM(s) Oral every 6 hours PRN Mild Pain (1 - 3)  ALPRAZolam 0.25 milliGRAM(s) Oral three times a day PRN Anxiety  benzonatate 100 milliGRAM(s) Oral three times a day PRN Cough  lidocaine   Patch 1 Patch Transdermal every 72 hours PRN Neck pain  ondansetron Injectable 4 milliGRAM(s) IV Push every 6 hours PRN Nausea and/or Vomiting  oxyCODONE    5 mG/acetaminophen 325 mG 1 Tablet(s) Oral every 4 hours PRN Severe Pain (7 - 10)  traMADol 50 milliGRAM(s) Oral four times a day PRN Moderate Pain (4 - 6)      Vital Signs Last 24 Hrs  T(C): 36.1 (18 Mar 2018 11:25), Max: 36.9 (17 Mar 2018 15:00)  T(F): 97 (18 Mar 2018 11:25), Max: 98.4 (17 Mar 2018 15:00)  HR: 80 (18 Mar 2018 11:39) (74 - 88)  BP: 119/58 (18 Mar 2018 11:00) (96/53 - 163/70)  BP(mean): 83 (18 Mar 2018 11:00) (71 - 116)  RR: 17 (18 Mar 2018 11:00) (17 - 37)  SpO2: 96% (18 Mar 2018 11:39) (88% - 99%)    PHYSICAL EXAM  General: adult woman with BiPAP mask in place; feeling more anxious  HEENT: anicteric sclera, pink conjunctivae  Neck: supple  CV: normal S1S2 with no murmur rubs or gallops; + mild tachycardia  Lungs: reduced breath sounds in right lung  Abdomen: soft non-tender non-distended, no hepato/splenomegaly  Ext: no clubbing cyanosis or edema  Skin: no rashes and no petichiae  Neuro: alert; no focal deficits      LABS:                        10.5   8.9   )-----------( 242      ( 18 Mar 2018 04:58 )             31.5     Hemoglobin: 10.5 g/dL (03-18 @ 04:58)  Hemoglobin: 10.5 g/dL (03-17 @ 05:45)  Hemoglobin: 11.0 g/dL (03-16 @ 06:22)  Hemoglobin: 10.8 g/dL (03-15 @ 05:33)  Hemoglobin: 11.4 g/dL (03-14 @ 17:04)    03-18    134<L>  |  100  |  22  ----------------------------<  70  4.5   |  27  |  0.51    Ca    8.3<L>      18 Mar 2018 04:58  Phos  2.9     03-18  Mg     1.9     03-18    TPro  6.0  /  Alb  2.4<L>  /  TBili  0.5  /  DBili  x   /  AST  95<H>  /  ALT  38  /  AlkPhos  75  03-18    PT/INR - ( 18 Mar 2018 04:58 )   PT: 11.8 sec;   INR: 1.08 ratio    PTT - ( 18 Mar 2018 04:58 )  PTT:27.5 sec    < from: Xray Chest 1 View- PORTABLE-Urgent (03.18.18 @ 06:31) >    Impression: Worsening opacification of the right chest with no aerated   lung seen consistent with worsening pleural effusion.    < end of copied text >

## 2018-03-18 NOTE — PROGRESS NOTE ADULT - ASSESSMENT
71 yo woman diagnosed small cell ca of lung with progressive pulmonary involvement and symptoms. Currently requiring ICU management due to poor oxygenation. Started urgent chemotherapy with VP_16 and carboplatin on 03/15/18    RECOMMENDATIONS:  - completed cycle 1 of chemotherapy with -16 and carboplatin on 3/17/18  - anti-emetics as needed  - start growth factor support with Zarexio 300mcg daily X5 days today to prevent chemotherapy induced neutropenia - regimen is high risk for this  - prognosis guarded but would expect response to chemotherapy and hopefully be able to reduce oxygen requirements.  - for now oxygen requirements are increasing; advised patient and her  that she has a high change of requiring intubation  - continue ICU monitoring

## 2018-03-18 NOTE — PROCEDURE NOTE - NSTRACHPOSTINTU_RESP_A_CORE
Positive end tidal Co2 noted/Breath sounds equal/Appropriate capnography/Breath sounds bilateral/Chest excursion noted/Chest X-Ray

## 2018-03-18 NOTE — PROGRESS NOTE ADULT - SUBJECTIVE AND OBJECTIVE BOX
Date/Time Patient Seen:  		  Referring MD:   Data Reviewed	       Patient is a 72y old  Female who presents with a chief complaint of Came to hospital for chemotherapy. In ER SOB, desaturating. PNA, Rt. pleural effusion on CXR. (14 Mar 2018 23:36)  in chair  seen and examined  vs and meds reviewed  resp distress noted        Subjective/HPI     PAST MEDICAL & SURGICAL HISTORY:  Liver lesion  Clostridium difficile colitis  Emphysema  Pneumonia: 2/2018  Generalized intra-abdominal and pelvic swelling, mass and lump  Glaucoma  Aortic valve prosthesis present  CAD (coronary artery disease)  Arthritis  Heart murmur  Other iron deficiency anemia  Carpal tunnel syndrome of right wrist  PVD (peripheral vascular disease)  Hyperlipemia  Colitis  Hypothyroid  Hypertension  COPD (chronic obstructive pulmonary disease)  H/O foot surgery: (Right foot, 2010)  History of colonoscopy  S/P carpal tunnel release: (Right, 1/2017)  H/O carotid endarterectomy: Both sides 2002  Aortic valve replaced: 2011 with bovine  Carotid disease, bilateral  Cataract extraction status of left eye  Cataract extraction status of right eye        Medication list         MEDICATIONS  (STANDING):  ALBUTerol    90 MICROgram(s) HFA Inhaler 4 Puff(s) Inhalation every 6 hours  aspirin enteric coated 162 milliGRAM(s) Oral daily  atorvastatin 40 milliGRAM(s) Oral at bedtime  buDESOnide   0.5 milliGRAM(s) Respule 0.5 milliGRAM(s) Inhalation two times a day  cholecalciferol 1000 Unit(s) Oral daily  docusate sodium 100 milliGRAM(s) Oral two times a day  dorzolamide 2%/timolol 0.5% Ophthalmic Solution 1 Drop(s) Both EYES two times a day  dronabinol 2.5 milliGRAM(s) Oral daily  enoxaparin Injectable 40 milliGRAM(s) SubCutaneous every 24 hours  filgrastim-sndz Injectable 300 MICROGram(s) SubCutaneous <User Schedule>  gabapentin 300 milliGRAM(s) Oral at bedtime  ipratropium 17 MICROgram(s) HFA Inhaler 4 Puff(s) Inhalation every 6 hours  latanoprost 0.005% Ophthalmic Solution 1 Drop(s) Both EYES at bedtime  levothyroxine 100 MICROGram(s) Oral daily  metoprolol succinate ER 50 milliGRAM(s) Oral two times a day  multivitamin 1 Tablet(s) Oral daily  omega-3-Acid Ethyl Esters 1 Gram(s) Oral two times a day  pantoprazole    Tablet 40 milliGRAM(s) Oral before breakfast  predniSONE   Tablet 10 milliGRAM(s) Oral daily  pyridoxine 100 milliGRAM(s) Oral daily    MEDICATIONS  (PRN):  acetaminophen   Tablet 650 milliGRAM(s) Oral every 6 hours PRN For Temp greater than 38 C (100.4 F)  acetaminophen   Tablet. 650 milliGRAM(s) Oral every 6 hours PRN Mild Pain (1 - 3)  ALPRAZolam 0.25 milliGRAM(s) Oral three times a day PRN Anxiety  benzonatate 100 milliGRAM(s) Oral three times a day PRN Cough  lidocaine   Patch 1 Patch Transdermal every 72 hours PRN Neck pain  ondansetron Injectable 4 milliGRAM(s) IV Push every 6 hours PRN Nausea and/or Vomiting  oxyCODONE    5 mG/acetaminophen 325 mG 1 Tablet(s) Oral every 4 hours PRN Severe Pain (7 - 10)  traMADol 50 milliGRAM(s) Oral four times a day PRN Moderate Pain (4 - 6)         Vitals log        ICU Vital Signs Last 24 Hrs  T(C): 36.7 (18 Mar 2018 04:06), Max: 36.9 (17 Mar 2018 15:00)  T(F): 98.1 (18 Mar 2018 04:06), Max: 98.4 (17 Mar 2018 15:00)  HR: 82 (18 Mar 2018 04:00) (74 - 87)  BP: 108/52 (18 Mar 2018 04:00) (99/53 - 140/61)  BP(mean): 75 (18 Mar 2018 04:00) (71 - 88)  ABP: --  ABP(mean): --  RR: 22 (17 Mar 2018 19:00) (16 - 37)  SpO2: 94% (18 Mar 2018 04:00) (92% - 99%)           Input and Output:  I&O's Detail    16 Mar 2018 07:01  -  17 Mar 2018 07:00  --------------------------------------------------------  IN:    Oral Fluid: 1090 mL    Solution: 50 mL    Solution: 508 mL  Total IN: 1648 mL    OUT:    Voided: 1800 mL  Total OUT: 1800 mL    Total NET: -152 mL      17 Mar 2018 07:01  -  18 Mar 2018 06:06  --------------------------------------------------------  IN:    Oral Fluid: 660 mL    Solution: 100 mL    Solution: 508 mL  Total IN: 1268 mL    OUT:    Voided: 300 mL  Total OUT: 300 mL    Total NET: 968 mL          Lab Data                        10.5   8.9   )-----------( 242      ( 18 Mar 2018 04:58 )             31.5     03-18    134<L>  |  100  |  22  ----------------------------<  70  4.5   |  27  |  0.51    Ca    8.3<L>      18 Mar 2018 04:58  Phos  2.9     03-18  Mg     1.9     03-18    TPro  6.0  /  Alb  2.4<L>  /  TBili  0.5  /  DBili  x   /  AST  95<H>  /  ALT  38  /  AlkPhos  75  03-18            Review of Systems	      Objective     Physical Examination    head at  heart s1s2  lung dec BS  anxious  abd soft      Pertinent Lab findings & Imaging      Rangel:  NO   Adequate UO     I&O's Detail    16 Mar 2018 07:01  -  17 Mar 2018 07:00  --------------------------------------------------------  IN:    Oral Fluid: 1090 mL    Solution: 50 mL    Solution: 508 mL  Total IN: 1648 mL    OUT:    Voided: 1800 mL  Total OUT: 1800 mL    Total NET: -152 mL      17 Mar 2018 07:01  -  18 Mar 2018 06:06  --------------------------------------------------------  IN:    Oral Fluid: 660 mL    Solution: 100 mL    Solution: 508 mL  Total IN: 1268 mL    OUT:    Voided: 300 mL  Total OUT: 300 mL    Total NET: 968 mL               Discussed with:     Cultures:	        Radiology

## 2018-03-18 NOTE — PROCEDURE NOTE - PROCEDURE
<<-----Click on this checkbox to enter Procedure Thoracentesis with ultrasound guidance  03/18/2018    Active  [id number]

## 2018-03-19 DIAGNOSIS — J18.9 PNEUMONIA, UNSPECIFIED ORGANISM: ICD-10-CM

## 2018-03-19 DIAGNOSIS — D72.829 ELEVATED WHITE BLOOD CELL COUNT, UNSPECIFIED: ICD-10-CM

## 2018-03-19 LAB
ALBUMIN FLD-MCNC: 2.1 G/DL — SIGNIFICANT CHANGE UP
ALBUMIN SERPL ELPH-MCNC: 2.2 G/DL — LOW (ref 3.3–5)
ALP SERPL-CCNC: 74 U/L — SIGNIFICANT CHANGE UP (ref 40–120)
ALT FLD-CCNC: 32 U/L — SIGNIFICANT CHANGE UP (ref 12–78)
ANION GAP SERPL CALC-SCNC: 11 MMOL/L — SIGNIFICANT CHANGE UP (ref 5–17)
AST SERPL-CCNC: 133 U/L — HIGH (ref 15–37)
BASE EXCESS BLDA CALC-SCNC: -0.1 MMOL/L — SIGNIFICANT CHANGE UP (ref -2–2)
BILIRUB SERPL-MCNC: 0.7 MG/DL — SIGNIFICANT CHANGE UP (ref 0.2–1.2)
BLOOD GAS COMMENTS ARTERIAL: SIGNIFICANT CHANGE UP
BLOOD GAS COMMENTS ARTERIAL: SIGNIFICANT CHANGE UP
BUN SERPL-MCNC: 25 MG/DL — HIGH (ref 7–23)
CALCIUM SERPL-MCNC: 8.2 MG/DL — LOW (ref 8.5–10.1)
CHLORIDE SERPL-SCNC: 95 MMOL/L — LOW (ref 96–108)
CO2 SERPL-SCNC: 26 MMOL/L — SIGNIFICANT CHANGE UP (ref 22–31)
CREAT SERPL-MCNC: 0.58 MG/DL — SIGNIFICANT CHANGE UP (ref 0.5–1.3)
CULTURE RESULTS: SIGNIFICANT CHANGE UP
CULTURE RESULTS: SIGNIFICANT CHANGE UP
GLUCOSE SERPL-MCNC: 50 MG/DL — LOW (ref 70–99)
GRAM STN FLD: SIGNIFICANT CHANGE UP
GRAM STN FLD: SIGNIFICANT CHANGE UP
HCO3 BLDA-SCNC: 24 MMOL/L — SIGNIFICANT CHANGE UP (ref 23–27)
HCT VFR BLD CALC: 29.8 % — LOW (ref 34.5–45)
HGB BLD-MCNC: 10.1 G/DL — LOW (ref 11.5–15.5)
HOROWITZ INDEX BLDA+IHG-RTO: 70 — SIGNIFICANT CHANGE UP
LYMPHOCYTES # BLD AUTO: 6 % — LOW (ref 13–44)
MAGNESIUM SERPL-MCNC: 1.7 MG/DL — SIGNIFICANT CHANGE UP (ref 1.6–2.6)
MCHC RBC-ENTMCNC: 30.4 PG — SIGNIFICANT CHANGE UP (ref 27–34)
MCHC RBC-ENTMCNC: 33.9 GM/DL — SIGNIFICANT CHANGE UP (ref 32–36)
MCV RBC AUTO: 89.7 FL — SIGNIFICANT CHANGE UP (ref 80–100)
MONOCYTES NFR BLD AUTO: 1 % — SIGNIFICANT CHANGE UP (ref 1–9)
NEUTROPHILS NFR BLD AUTO: 85 % — HIGH (ref 43–77)
NIGHT BLUE STAIN TISS: SIGNIFICANT CHANGE UP
PCO2 BLDA: 39 MMHG — SIGNIFICANT CHANGE UP (ref 32–46)
PH BLDA: 7.41 — SIGNIFICANT CHANGE UP (ref 7.35–7.45)
PHOSPHATE SERPL-MCNC: 3.8 MG/DL — SIGNIFICANT CHANGE UP (ref 2.5–4.5)
PLATELET # BLD AUTO: 239 K/UL — SIGNIFICANT CHANGE UP (ref 150–400)
PO2 BLDA: 66 MMHG — LOW (ref 74–108)
POTASSIUM SERPL-MCNC: 4.6 MMOL/L — SIGNIFICANT CHANGE UP (ref 3.5–5.3)
POTASSIUM SERPL-SCNC: 4.6 MMOL/L — SIGNIFICANT CHANGE UP (ref 3.5–5.3)
PROT SERPL-MCNC: 5.7 G/DL — LOW (ref 6–8.3)
RBC # BLD: 3.32 M/UL — LOW (ref 3.8–5.2)
RBC # FLD: 13 % — SIGNIFICANT CHANGE UP (ref 10.3–14.5)
SAO2 % BLDA: 96 % — SIGNIFICANT CHANGE UP (ref 92–96)
SODIUM SERPL-SCNC: 132 MMOL/L — LOW (ref 135–145)
SPECIMEN SOURCE: SIGNIFICANT CHANGE UP
WBC # BLD: 20.4 K/UL — HIGH (ref 3.8–10.5)
WBC # FLD AUTO: 20.4 K/UL — HIGH (ref 3.8–10.5)

## 2018-03-19 PROCEDURE — 99291 CRITICAL CARE FIRST HOUR: CPT

## 2018-03-19 PROCEDURE — 99233 SBSQ HOSP IP/OBS HIGH 50: CPT

## 2018-03-19 PROCEDURE — 71045 X-RAY EXAM CHEST 1 VIEW: CPT | Mod: 26

## 2018-03-19 RX ORDER — PIPERACILLIN AND TAZOBACTAM 4; .5 G/20ML; G/20ML
3.38 INJECTION, POWDER, LYOPHILIZED, FOR SOLUTION INTRAVENOUS EVERY 8 HOURS
Qty: 0 | Refills: 0 | Status: DISCONTINUED | OUTPATIENT
Start: 2018-03-19 | End: 2018-03-22

## 2018-03-19 RX ORDER — ACETAMINOPHEN 500 MG
650 TABLET ORAL ONCE
Qty: 0 | Refills: 0 | Status: COMPLETED | OUTPATIENT
Start: 2018-03-19 | End: 2018-03-19

## 2018-03-19 RX ORDER — DEXTROSE 50 % IN WATER 50 %
25 SYRINGE (ML) INTRAVENOUS ONCE
Qty: 0 | Refills: 0 | Status: DISCONTINUED | OUTPATIENT
Start: 2018-03-19 | End: 2018-03-23

## 2018-03-19 RX ORDER — MIDAZOLAM HYDROCHLORIDE 1 MG/ML
2 INJECTION, SOLUTION INTRAMUSCULAR; INTRAVENOUS ONCE
Qty: 0 | Refills: 0 | Status: DISCONTINUED | OUTPATIENT
Start: 2018-03-19 | End: 2018-03-20

## 2018-03-19 RX ORDER — DEXMEDETOMIDINE HYDROCHLORIDE IN 0.9% SODIUM CHLORIDE 4 UG/ML
0.3 INJECTION INTRAVENOUS
Qty: 200 | Refills: 0 | Status: DISCONTINUED | OUTPATIENT
Start: 2018-03-19 | End: 2018-03-23

## 2018-03-19 RX ORDER — FENTANYL CITRATE 50 UG/ML
50 INJECTION INTRAVENOUS ONCE
Qty: 0 | Refills: 0 | Status: DISCONTINUED | OUTPATIENT
Start: 2018-03-19 | End: 2018-03-19

## 2018-03-19 RX ORDER — MIDAZOLAM HYDROCHLORIDE 1 MG/ML
2 INJECTION, SOLUTION INTRAMUSCULAR; INTRAVENOUS ONCE
Qty: 0 | Refills: 0 | Status: DISCONTINUED | OUTPATIENT
Start: 2018-03-19 | End: 2018-03-19

## 2018-03-19 RX ORDER — PANTOPRAZOLE SODIUM 20 MG/1
40 TABLET, DELAYED RELEASE ORAL
Qty: 0 | Refills: 0 | Status: DISCONTINUED | OUTPATIENT
Start: 2018-03-19 | End: 2018-03-23

## 2018-03-19 RX ORDER — ACETYLCYSTEINE 200 MG/ML
3 VIAL (ML) MISCELLANEOUS EVERY 6 HOURS
Qty: 0 | Refills: 0 | Status: DISCONTINUED | OUTPATIENT
Start: 2018-03-19 | End: 2018-03-23

## 2018-03-19 RX ORDER — SODIUM CHLORIDE 9 MG/ML
1000 INJECTION, SOLUTION INTRAVENOUS
Qty: 0 | Refills: 0 | Status: DISCONTINUED | OUTPATIENT
Start: 2018-03-19 | End: 2018-03-19

## 2018-03-19 RX ORDER — GLUCAGON INJECTION, SOLUTION 0.5 MG/.1ML
1 INJECTION, SOLUTION SUBCUTANEOUS ONCE
Qty: 0 | Refills: 0 | Status: DISCONTINUED | OUTPATIENT
Start: 2018-03-19 | End: 2018-03-23

## 2018-03-19 RX ORDER — MAGNESIUM SULFATE 500 MG/ML
2 VIAL (ML) INJECTION ONCE
Qty: 0 | Refills: 0 | Status: COMPLETED | OUTPATIENT
Start: 2018-03-19 | End: 2018-03-19

## 2018-03-19 RX ORDER — FENTANYL CITRATE 50 UG/ML
50 INJECTION INTRAVENOUS EVERY 4 HOURS
Qty: 0 | Refills: 0 | Status: DISCONTINUED | OUTPATIENT
Start: 2018-03-19 | End: 2018-03-21

## 2018-03-19 RX ORDER — VANCOMYCIN HCL 1 G
1000 VIAL (EA) INTRAVENOUS EVERY 12 HOURS
Qty: 0 | Refills: 0 | Status: DISCONTINUED | OUTPATIENT
Start: 2018-03-19 | End: 2018-03-20

## 2018-03-19 RX ORDER — PIPERACILLIN AND TAZOBACTAM 4; .5 G/20ML; G/20ML
3.38 INJECTION, POWDER, LYOPHILIZED, FOR SOLUTION INTRAVENOUS ONCE
Qty: 0 | Refills: 0 | Status: COMPLETED | OUTPATIENT
Start: 2018-03-19 | End: 2018-03-19

## 2018-03-19 RX ORDER — VANCOMYCIN HCL 1 G
1000 VIAL (EA) INTRAVENOUS ONCE
Qty: 0 | Refills: 0 | Status: COMPLETED | OUTPATIENT
Start: 2018-03-19 | End: 2018-03-19

## 2018-03-19 RX ORDER — DEXTROSE 50 % IN WATER 50 %
1 SYRINGE (ML) INTRAVENOUS ONCE
Qty: 0 | Refills: 0 | Status: DISCONTINUED | OUTPATIENT
Start: 2018-03-19 | End: 2018-03-23

## 2018-03-19 RX ORDER — MIDAZOLAM HYDROCHLORIDE 1 MG/ML
2 INJECTION, SOLUTION INTRAMUSCULAR; INTRAVENOUS
Qty: 0 | Refills: 0 | Status: DISCONTINUED | OUTPATIENT
Start: 2018-03-19 | End: 2018-03-20

## 2018-03-19 RX ORDER — MIDAZOLAM HYDROCHLORIDE 1 MG/ML
2 INJECTION, SOLUTION INTRAMUSCULAR; INTRAVENOUS EVERY 4 HOURS
Qty: 0 | Refills: 0 | Status: DISCONTINUED | OUTPATIENT
Start: 2018-03-19 | End: 2018-03-20

## 2018-03-19 RX ORDER — INSULIN LISPRO 100/ML
VIAL (ML) SUBCUTANEOUS EVERY 6 HOURS
Qty: 0 | Refills: 0 | Status: DISCONTINUED | OUTPATIENT
Start: 2018-03-19 | End: 2018-03-23

## 2018-03-19 RX ORDER — POLYETHYLENE GLYCOL 3350 17 G/17G
17 POWDER, FOR SOLUTION ORAL
Qty: 0 | Refills: 0 | Status: DISCONTINUED | OUTPATIENT
Start: 2018-03-19 | End: 2018-03-23

## 2018-03-19 RX ORDER — IPRATROPIUM/ALBUTEROL SULFATE 18-103MCG
3 AEROSOL WITH ADAPTER (GRAM) INHALATION EVERY 6 HOURS
Qty: 0 | Refills: 0 | Status: DISCONTINUED | OUTPATIENT
Start: 2018-03-19 | End: 2018-03-20

## 2018-03-19 RX ORDER — SODIUM CHLORIDE 9 MG/ML
1000 INJECTION, SOLUTION INTRAVENOUS
Qty: 0 | Refills: 0 | Status: DISCONTINUED | OUTPATIENT
Start: 2018-03-19 | End: 2018-03-21

## 2018-03-19 RX ORDER — SENNA PLUS 8.6 MG/1
2 TABLET ORAL AT BEDTIME
Qty: 0 | Refills: 0 | Status: DISCONTINUED | OUTPATIENT
Start: 2018-03-19 | End: 2018-03-23

## 2018-03-19 RX ORDER — DEXTROSE 50 % IN WATER 50 %
12.5 SYRINGE (ML) INTRAVENOUS ONCE
Qty: 0 | Refills: 0 | Status: DISCONTINUED | OUTPATIENT
Start: 2018-03-19 | End: 2018-03-23

## 2018-03-19 RX ORDER — VANCOMYCIN HCL 1 G
VIAL (EA) INTRAVENOUS
Qty: 0 | Refills: 0 | Status: DISCONTINUED | OUTPATIENT
Start: 2018-03-19 | End: 2018-03-20

## 2018-03-19 RX ADMIN — Medication 100 MILLIGRAM(S): at 12:43

## 2018-03-19 RX ADMIN — Medication 250 MILLIGRAM(S): at 23:08

## 2018-03-19 RX ADMIN — DORZOLAMIDE HYDROCHLORIDE TIMOLOL MALEATE 1 DROP(S): 20; 5 SOLUTION/ DROPS OPHTHALMIC at 18:26

## 2018-03-19 RX ADMIN — PIPERACILLIN AND TAZOBACTAM 25 GRAM(S): 4; .5 INJECTION, POWDER, LYOPHILIZED, FOR SOLUTION INTRAVENOUS at 23:09

## 2018-03-19 RX ADMIN — Medication 650 MILLIGRAM(S): at 07:05

## 2018-03-19 RX ADMIN — FENTANYL CITRATE 50 MICROGRAM(S): 50 INJECTION INTRAVENOUS at 15:35

## 2018-03-19 RX ADMIN — Medication 3 MILLILITER(S): at 14:42

## 2018-03-19 RX ADMIN — Medication 3 MILLILITER(S): at 14:41

## 2018-03-19 RX ADMIN — Medication 3 MILLILITER(S): at 07:38

## 2018-03-19 RX ADMIN — FENTANYL CITRATE 50 MICROGRAM(S): 50 INJECTION INTRAVENOUS at 09:58

## 2018-03-19 RX ADMIN — MIDAZOLAM HYDROCHLORIDE 2 MILLIGRAM(S): 1 INJECTION, SOLUTION INTRAMUSCULAR; INTRAVENOUS at 14:31

## 2018-03-19 RX ADMIN — Medication 0.5 MILLIGRAM(S): at 20:11

## 2018-03-19 RX ADMIN — Medication 300 MICROGRAM(S): at 17:30

## 2018-03-19 RX ADMIN — DEXMEDETOMIDINE HYDROCHLORIDE IN 0.9% SODIUM CHLORIDE 4.65 MICROGRAM(S)/KG/HR: 4 INJECTION INTRAVENOUS at 19:05

## 2018-03-19 RX ADMIN — FENTANYL CITRATE 50 MICROGRAM(S): 50 INJECTION INTRAVENOUS at 10:10

## 2018-03-19 RX ADMIN — CHLORHEXIDINE GLUCONATE 15 MILLILITER(S): 213 SOLUTION TOPICAL at 06:24

## 2018-03-19 RX ADMIN — MIDAZOLAM HYDROCHLORIDE 2 MILLIGRAM(S): 1 INJECTION, SOLUTION INTRAMUSCULAR; INTRAVENOUS at 19:05

## 2018-03-19 RX ADMIN — Medication 1000 UNIT(S): at 12:44

## 2018-03-19 RX ADMIN — MIDAZOLAM HYDROCHLORIDE 2 MILLIGRAM(S): 1 INJECTION, SOLUTION INTRAMUSCULAR; INTRAVENOUS at 10:02

## 2018-03-19 RX ADMIN — GABAPENTIN 300 MILLIGRAM(S): 400 CAPSULE ORAL at 23:07

## 2018-03-19 RX ADMIN — Medication 650 MILLIGRAM(S): at 06:25

## 2018-03-19 RX ADMIN — ATORVASTATIN CALCIUM 40 MILLIGRAM(S): 80 TABLET, FILM COATED ORAL at 23:06

## 2018-03-19 RX ADMIN — PIPERACILLIN AND TAZOBACTAM 200 GRAM(S): 4; .5 INJECTION, POWDER, LYOPHILIZED, FOR SOLUTION INTRAVENOUS at 14:03

## 2018-03-19 RX ADMIN — SODIUM CHLORIDE 50 MILLILITER(S): 9 INJECTION, SOLUTION INTRAVENOUS at 08:58

## 2018-03-19 RX ADMIN — FENTANYL CITRATE 50 MICROGRAM(S): 50 INJECTION INTRAVENOUS at 00:38

## 2018-03-19 RX ADMIN — SENNA PLUS 2 TABLET(S): 8.6 TABLET ORAL at 23:06

## 2018-03-19 RX ADMIN — Medication 1 TABLET(S): at 12:44

## 2018-03-19 RX ADMIN — Medication 162 MILLIGRAM(S): at 12:44

## 2018-03-19 RX ADMIN — FENTANYL CITRATE 25 MICROGRAM(S): 50 INJECTION INTRAVENOUS at 06:49

## 2018-03-19 RX ADMIN — Medication 100 MICROGRAM(S): at 06:23

## 2018-03-19 RX ADMIN — FENTANYL CITRATE 50 MICROGRAM(S): 50 INJECTION INTRAVENOUS at 16:00

## 2018-03-19 RX ADMIN — FENTANYL CITRATE 25 MICROGRAM(S): 50 INJECTION INTRAVENOUS at 07:05

## 2018-03-19 RX ADMIN — DEXMEDETOMIDINE HYDROCHLORIDE IN 0.9% SODIUM CHLORIDE 4.65 MICROGRAM(S)/KG/HR: 4 INJECTION INTRAVENOUS at 14:03

## 2018-03-19 RX ADMIN — ENOXAPARIN SODIUM 40 MILLIGRAM(S): 100 INJECTION SUBCUTANEOUS at 12:43

## 2018-03-19 RX ADMIN — POLYETHYLENE GLYCOL 3350 17 GRAM(S): 17 POWDER, FOR SOLUTION ORAL at 18:33

## 2018-03-19 RX ADMIN — Medication 10 MILLIGRAM(S): at 06:23

## 2018-03-19 RX ADMIN — FENTANYL CITRATE 50 MICROGRAM(S): 50 INJECTION INTRAVENOUS at 00:08

## 2018-03-19 RX ADMIN — MIDAZOLAM HYDROCHLORIDE 2 MILLIGRAM(S): 1 INJECTION, SOLUTION INTRAMUSCULAR; INTRAVENOUS at 23:06

## 2018-03-19 RX ADMIN — Medication 50 MILLIGRAM(S): at 06:23

## 2018-03-19 RX ADMIN — DORZOLAMIDE HYDROCHLORIDE TIMOLOL MALEATE 1 DROP(S): 20; 5 SOLUTION/ DROPS OPHTHALMIC at 06:26

## 2018-03-19 RX ADMIN — FENTANYL CITRATE 50 MICROGRAM(S): 50 INJECTION INTRAVENOUS at 10:25

## 2018-03-19 RX ADMIN — Medication 100 MILLIGRAM(S): at 06:23

## 2018-03-19 RX ADMIN — Medication 50 GRAM(S): at 09:22

## 2018-03-19 RX ADMIN — FENTANYL CITRATE 50 MICROGRAM(S): 50 INJECTION INTRAVENOUS at 08:58

## 2018-03-19 RX ADMIN — DEXMEDETOMIDINE HYDROCHLORIDE IN 0.9% SODIUM CHLORIDE 4.65 MICROGRAM(S)/KG/HR: 4 INJECTION INTRAVENOUS at 08:58

## 2018-03-19 RX ADMIN — Medication 3 MILLILITER(S): at 20:11

## 2018-03-19 RX ADMIN — FENTANYL CITRATE 50 MICROGRAM(S): 50 INJECTION INTRAVENOUS at 10:02

## 2018-03-19 RX ADMIN — LATANOPROST 1 DROP(S): 0.05 SOLUTION/ DROPS OPHTHALMIC; TOPICAL at 23:08

## 2018-03-19 RX ADMIN — Medication 1: at 23:32

## 2018-03-19 RX ADMIN — MIDAZOLAM HYDROCHLORIDE 2 MILLIGRAM(S): 1 INJECTION, SOLUTION INTRAMUSCULAR; INTRAVENOUS at 17:37

## 2018-03-19 RX ADMIN — PANTOPRAZOLE SODIUM 40 MILLIGRAM(S): 20 TABLET, DELAYED RELEASE ORAL at 12:43

## 2018-03-19 RX ADMIN — CHLORHEXIDINE GLUCONATE 15 MILLILITER(S): 213 SOLUTION TOPICAL at 18:33

## 2018-03-19 RX ADMIN — Medication 0.5 MILLIGRAM(S): at 07:38

## 2018-03-19 RX ADMIN — Medication 250 MILLIGRAM(S): at 11:00

## 2018-03-19 RX ADMIN — CHLORHEXIDINE GLUCONATE 15 MILLILITER(S): 213 SOLUTION TOPICAL at 06:25

## 2018-03-19 NOTE — PROGRESS NOTE ADULT - SUBJECTIVE AND OBJECTIVE BOX
Patient is a 72y old  Female who presents with a chief complaint of Came to hospital for chemotherapy. In ER SOB, desaturating. PNA, Rt. pleural effusion on CXR. (14 Mar 2018 23:36)      INTERVAL HPI: Pt seen and examined. Unable to obtain history as pt intubated, able to knod yes or on, denies any other acute complaints    OVERNIGHT EVENTS: fever, resp distress req intubation  T(F): 98.3 (03-19-18 @ 23:43), Max: 101.3 (03-19-18 @ 05:10)  HR: 88 (03-19-18 @ 20:22) (80 - 111)  BP: 111/53 (03-19-18 @ 20:00) (71/37 - 200/95)  RR: 30 (03-19-18 @ 20:00) (18 - 55)  SpO2: 96% (03-19-18 @ 20:22) (83% - 98%)  I&O's Summary    18 Mar 2018 07:01  -  19 Mar 2018 07:00  --------------------------------------------------------  IN: 70 mL / OUT: 701 mL / NET: -631 mL    19 Mar 2018 07:01  -  19 Mar 2018 23:46  --------------------------------------------------------  IN: 622 mL / OUT: 0 mL / NET: 622 mL        REVIEW OF SYSTEMS: unable as pt intubated    PHYSICAL EXAM:  GENERAL: NAD,intubated  HEAD:  Atraumatic, Normocephalic  EYES: EOMI, PERRLA, conjunctiva and sclera clear  ENMT: intubated  NECK: Supple, No JVD,  NERVOUS SYSTEM:  Alert & Oriented X3, Good concentration; Motor Strength 5/5 B/L upper and lower extremities; DTRs 2+ intact and symmetric  CHEST/LUNG: diminshed b/l R>L,   HEART: Regular rate and rhythm; No murmurs, rubs, or gallops  ABDOMEN: Soft, Nontender, Nondistended; Bowel sounds present  EXTREMITIES:  2+ Peripheral Pulses, No clubbing, cyanosis, or edema  LYMPH: No lymphadenopathy noted  SKIN: No rashes or lesions    LABS:                        10.1   20.4  )-----------( 239      ( 19 Mar 2018 06:35 )             29.8     03-19    132<L>  |  95<L>  |  25<H>  ----------------------------<  50<L>  4.6   |  26  |  0.58    Ca    8.2<L>      19 Mar 2018 06:35  Phos  3.8     03-19  Mg     1.7     03-19    TPro  5.7<L>  /  Alb  2.2<L>  /  TBili  0.7  /  DBili  x   /  AST  133<H>  /  ALT  32  /  AlkPhos  74  03-19    PT/INR - ( 18 Mar 2018 04:58 )   PT: 11.8 sec;   INR: 1.08 ratio         PTT - ( 18 Mar 2018 04:58 )  PTT:27.5 sec    CAPILLARY BLOOD GLUCOSE      POCT Blood Glucose.: 162 mg/dL (19 Mar 2018 23:29)  POCT Blood Glucose.: 141 mg/dL (19 Mar 2018 17:14)  POCT Blood Glucose.: 107 mg/dL (19 Mar 2018 13:22)  POCT Blood Glucose.: 90 mg/dL (19 Mar 2018 09:37)  POCT Blood Glucose.: 81 mg/dL (19 Mar 2018 08:46)    ABG - ( 19 Mar 2018 05:33 )  pH: 7.41  /  pCO2: 39    /  pO2: 66    / HCO3: 24    / Base Excess: -0.1  /  SaO2: 96                03-18 @ 22:14   No growth to date.  --  --          MEDICATIONS  (STANDING):  acetylcysteine 10% Inhalation 3 milliLiter(s) Inhalation every 6 hours  ALBUTerol/ipratropium for Nebulization 3 milliLiter(s) Nebulizer every 6 hours  aspirin enteric coated 162 milliGRAM(s) Oral daily  atorvastatin 40 milliGRAM(s) Oral at bedtime  buDESOnide   0.5 milliGRAM(s) Respule 0.5 milliGRAM(s) Inhalation two times a day  chlorhexidine 0.12% Liquid 15 milliLiter(s) Swish and Spit two times a day  cholecalciferol 1000 Unit(s) Oral daily  dexmedetomidine Infusion 0.3 MICROgram(s)/kG/Hr (4.65 mL/Hr) IV Continuous <Continuous>  dextrose 5%. 1000 milliLiter(s) (50 mL/Hr) IV Continuous <Continuous>  dextrose 50% Injectable 12.5 Gram(s) IV Push once  dextrose 50% Injectable 25 Gram(s) IV Push once  dextrose 50% Injectable 25 Gram(s) IV Push once  dorzolamide 2%/timolol 0.5% Ophthalmic Solution 1 Drop(s) Both EYES two times a day  enoxaparin Injectable 40 milliGRAM(s) SubCutaneous every 24 hours  filgrastim-sndz Injectable 300 MICROGram(s) SubCutaneous <User Schedule>  gabapentin 300 milliGRAM(s) Oral at bedtime  insulin lispro (HumaLOG) corrective regimen sliding scale   SubCutaneous every 6 hours  latanoprost 0.005% Ophthalmic Solution 1 Drop(s) Both EYES at bedtime  levothyroxine 100 MICROGram(s) Oral daily  midazolam Injectable 2 milliGRAM(s) IV Push once  multivitamin 1 Tablet(s) Oral daily  omega-3-Acid Ethyl Esters 1 Gram(s) Oral two times a day  pantoprazole   Suspension 40 milliGRAM(s) Oral before breakfast  piperacillin/tazobactam IVPB. 3.375 Gram(s) IV Intermittent every 8 hours  polyethylene glycol 3350 17 Gram(s) Oral two times a day  predniSONE   Tablet 10 milliGRAM(s) Oral daily  pyridoxine 100 milliGRAM(s) Oral daily  senna 2 Tablet(s) Oral at bedtime  vancomycin  IVPB 1000 milliGRAM(s) IV Intermittent every 12 hours  vancomycin  IVPB        MEDICATIONS  (PRN):  dextrose Gel 1 Dose(s) Oral once PRN Blood Glucose LESS THAN 70 milliGRAM(s)/deciliter  fentaNYL    Injectable 50 MICROGram(s) IV Push every 4 hours PRN Agitation or Moderate Pain (4-6)  glucagon  Injectable 1 milliGRAM(s) IntraMuscular once PRN Glucose LESS THAN 70 milligrams/deciliter  lidocaine   Patch 1 Patch Transdermal every 72 hours PRN Neck pain  midazolam Injectable 2 milliGRAM(s) IV Push every 4 hours PRN agitation, vent dyssynchrony  midazolam Injectable 2 milliGRAM(s) IV Push every 3 hours PRN Agitation  ondansetron Injectable 4 milliGRAM(s) IV Push every 6 hours PRN Nausea and/or Vomiting

## 2018-03-19 NOTE — PROGRESS NOTE ADULT - ASSESSMENT
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 She has worsening resp status. She was found to have an anterior, loculated pleural effusion s/p thoracentesis, desaturation now intubated and sedated with Acute respiratory failure with hypoxia.    -NEURO: sedated, continue sedation while intubated, PRN Percocet for pain  -CARDIO: Currently hypotensive, IVF hydration. Continue statin and Lovaza for HLD. On Metoprolol for HTN, hold for now.   -RESP: Acute respiratory failure with hypoxia, intubated. Continue full vent support, titrating O2 for sats>88-90%, continue PEEP for atelectatic right lung.  WBC elevated today, Fever this .3, ? infection/Pneumonia vs due to chemotherapy . Consider broad spectrum antibiotics, ID consult. Continue prednisone, albuterol. Continue Chemo (carboplatin, etoposide) as per heme/onc for metastatic SCLC.  -GI: NPO while intubated.   -ENDO: Continue Synthroid for hypothyroidism.   -RENAL: Replete Mg with 2 grams IV.  -HEME/ONC: Continue chemo per heme/onc for metastatic small cell lung cancer. Lovenox for DVT ppx  -Prognosis poor, full code 72F PMH bioprosthetic AVR, CAD, COPD (on home O2), HTN, HLD, hypothyroidism, recently diagnosed 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 She has worsening resp status. She was found to have an anterior, loculated pleural effusion s/p thoracentesis, desaturation now intubated and sedated with Acute respiratory failure with hypoxia.    -NEURO: sedated, continue sedation while intubated, PRN Percocet for pain  -CARDIO: Currently hypotensive, IVF hydration. Continue statin and Lovaza for HLD. On Metoprolol for HTN, hold for now.   -RESP: Acute respiratory failure with hypoxia, intubated. Continue full vent support, titrating O2 for sats>88-90%, continue PEEP for atelectatic right lung.  WBC elevated today, Fever this .3, ? infection/Pneumonia vs due to chemotherapy . Consider broad spectrum antibiotics, ID consult. Continue prednisone, albuterol. Continue Chemo (carboplatin, etoposide) as per heme/onc for metastatic SCLC.  -GI: NPO while intubated. NGT in place, continue PO meds via NGT.   -ENDO: Continue Synthroid for hypothyroidism.   -RENAL: Replete Mg with 2 grams IV.  -HEME/ONC: Continue chemo per heme/onc for metastatic small cell lung cancer. Lovenox for DVT ppx  -Prognosis poor, full code 72F PMH bioprosthetic AVR, CAD, COPD (on home O2), HTN, HLD, hypothyroidism, recently diagnosed 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 She has worsening resp status. She was found to have an anterior, loculated pleural effusion s/p thoracentesis, desaturation now intubated and sedated with Acute respiratory failure with hypoxia.    -NEURO: sedated, continue sedation while intubated, PRN Percocet for pain  -CARDIO: Currently hypotensive, IVF hydration. Continue statin and Lovaza for HLD. On Metoprolol for HTN, hold for now.   -RESP: Acute respiratory failure with hypoxia, intubated. Continue full vent support, titrating O2 for sats>88-90%, continue PEEP for atelectatic right lung. Now with desaturation and tachypnea, will add precedex and increase fentanyl to 50 to assist with anxiety and that should assist with respiratory status.   WBC elevated today, Fever this .3, ? infection/Pneumonia vs due to chemotherapy . Consider broad spectrum antibiotics, ID consult. Continue prednisone, albuterol. Continue Chemo (carboplatin, etoposide) as per heme/onc for metastatic SCLC.  -GI: NPO while intubated. NGT in place, continue PO meds via NGT.   -ENDO: Continue Synthroid for hypothyroidism.   -RENAL: Replete Mg with 2 grams IV.  -HEME/ONC: Continue chemo per heme/onc for metastatic small cell lung cancer. Lovenox for DVT ppx  -Prognosis poor, full code 72F PMH bioprosthetic AVR, CAD, COPD (on home O2), HTN, HLD, hypothyroidism, recently diagnosed 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 She has worsening resp status. She was found to have an anterior, loculated pleural effusion s/p thoracentesis, desaturation now intubated and sedated with Acute respiratory failure with hypoxia.    -NEURO: sedatedl while intubated, increase sedation, added Precedex, increase Fentanyl, PRN Percocet for pain.  -CARDIO: Was hypotensive, BP improved, IVF hydration. Continue statin and Lovaza for HLD. On Metoprolol for HTN, hold for now.   -RESP: Acute respiratory failure with hypoxia, intubated. Continue full vent support, titrating O2 for sats>88-90%, continue PEEP for atelectatic right lung. Now with desaturation and tachypnea, will add precedex and increase fentanyl to 50 to assist with anxiety and that should assist with respiratory status.   WBC elevated today, Fever this .3, ? infection/Pneumonia vs due to chemotherapy . Start broad spectrum antibiotics vanco/zosyn, ID consulted, recs appreciated. Continue prednisone, albuterol. Continue Chemo (carboplatin, etoposide) as per heme/onc for metastatic SCLC.  -GI: Start tube feeds via NGT Jevity at roughly 53 ccs / hr, continue PO meds via NGT.   -ENDO: Continue Synthroid for hypothyroidism.   -RENAL: Replete Mg with 2 grams IV.  -HEME/ONC: Continue chemo per heme/onc for metastatic small cell lung cancer. Lovenox for DVT ppx  -Prognosis poor, full code

## 2018-03-19 NOTE — PROGRESS NOTE ADULT - SUBJECTIVE AND OBJECTIVE BOX
Patient seen and examined;  Chart reviewed and events noted;   underwent thoracentesis of right pleural fluid with 950cc drained  unfortunately overnight respiratory status worsened and she required intubation    MEDICATIONS  (STANDING):  acetylcysteine 10% Inhalation 3 milliLiter(s) Inhalation every 6 hours  ALBUTerol/ipratropium for Nebulization 3 milliLiter(s) Nebulizer every 6 hours  aspirin enteric coated 162 milliGRAM(s) Oral daily  atorvastatin 40 milliGRAM(s) Oral at bedtime  buDESOnide   0.5 milliGRAM(s) Respule 0.5 milliGRAM(s) Inhalation two times a day  chlorhexidine 0.12% Liquid 15 milliLiter(s) Swish and Spit two times a day  cholecalciferol 1000 Unit(s) Oral daily  dexmedetomidine Infusion 0.3 MICROgram(s)/kG/Hr (4.65 mL/Hr) IV Continuous <Continuous>  dextrose 5% + lactated ringers. 1000 milliLiter(s) (50 mL/Hr) IV Continuous <Continuous>  docusate sodium 100 milliGRAM(s) Oral two times a day  dorzolamide 2%/timolol 0.5% Ophthalmic Solution 1 Drop(s) Both EYES two times a day  dronabinol 2.5 milliGRAM(s) Oral daily  enoxaparin Injectable 40 milliGRAM(s) SubCutaneous every 24 hours  fentaNYL    Injectable 50 MICROGram(s) IV Push once  filgrastim-sndz Injectable 300 MICROGram(s) SubCutaneous <User Schedule>  gabapentin 300 milliGRAM(s) Oral at bedtime  latanoprost 0.005% Ophthalmic Solution 1 Drop(s) Both EYES at bedtime  levothyroxine 100 MICROGram(s) Oral daily  midazolam Injectable 2 milliGRAM(s) IV Push once  multivitamin 1 Tablet(s) Oral daily  omega-3-Acid Ethyl Esters 1 Gram(s) Oral two times a day  pantoprazole  Injectable 40 milliGRAM(s) IV Push daily  piperacillin/tazobactam IVPB. 3.375 Gram(s) IV Intermittent once  piperacillin/tazobactam IVPB. 3.375 Gram(s) IV Intermittent every 8 hours  predniSONE   Tablet 10 milliGRAM(s) Oral daily  pyridoxine 100 milliGRAM(s) Oral daily  vancomycin  IVPB 1000 milliGRAM(s) IV Intermittent once  vancomycin  IVPB 1000 milliGRAM(s) IV Intermittent every 12 hours  vancomycin  IVPB        MEDICATIONS  (PRN):  ALPRAZolam 0.25 milliGRAM(s) Oral three times a day PRN Anxiety  fentaNYL    Injectable 50 MICROGram(s) IV Push every 4 hours PRN Agitation or Moderate Pain (4-6)  lidocaine   Patch 1 Patch Transdermal every 72 hours PRN Neck pain  ondansetron Injectable 4 milliGRAM(s) IV Push every 6 hours PRN Nausea and/or Vomiting      Vital Signs Last 24 Hrs  T(C): 38.5 (19 Mar 2018 05:10), Max: 38.5 (19 Mar 2018 05:10)  T(F): 101.3 (19 Mar 2018 05:10), Max: 101.3 (19 Mar 2018 05:10)  HR: 103 (19 Mar 2018 09:00) (80 - 106)  BP: 140/65 (19 Mar 2018 09:00) (74/43 - 204/91)  BP(mean): 93 (19 Mar 2018 09:00) (53 - 130)  RR: 53 (19 Mar 2018 09:00) (16 - 53)  SpO2: 90% (19 Mar 2018 09:00) (83% - 100%)    PHYSICAL EXAM  General: adult woman sedated and intubated  HEENT: + ET tube  Neck: supple  CV: normal S1S2 with no murmur rubs or gallops  Lungs: improved aeration of the right lung  Abdomen: soft non-tender non-distended, no hepato/splenomegaly  Ext: no clubbing cyanosis or edema  Skin: no rashes and no petichiae      LABS:                        10.1   20.4  )-----------( 239      ( 19 Mar 2018 06:35 )             29.8     WBC Count: 20.4 K/uL (03-19 @ 06:35)  WBC Count: 8.9 K/uL (03-18 @ 04:58)  WBC Count: 10.2 K/uL (03-17 @ 05:45)  WBC Count: 10.2 K/uL (03-16 @ 06:22)  WBC Count: 11.3 K/uL (03-15 @ 05:33)    03-19    132<L>  |  95<L>  |  25<H>  ----------------------------<  50<L>  4.6   |  26  |  0.58    Ca    8.2<L>      19 Mar 2018 06:35  Phos  3.8     03-19  Mg     1.7     03-19    TPro  5.7<L>  /  Alb  2.2<L>  /  TBili  0.7  /  DBili  x   /  AST  133<H>  /  ALT  32  /  AlkPhos  74  03-19    PT/INR - ( 18 Mar 2018 04:58 )   PT: 11.8 sec;   INR: 1.08 ratio   PTT - ( 18 Mar 2018 04:58 )  PTT:27.5 sec      RADIOLOGY STUDIES:  < from: Xray Chest 1 View- PORTABLE-Urgent (03.19.18 @ 08:44) >  Endotracheal tube nasogastric tube in satisfactory position. No   pneumothorax. No change airspace disease and effusion on the right. Left   costophrenic angle excluded.    < end of copied text >

## 2018-03-19 NOTE — PROGRESS NOTE ADULT - ASSESSMENT
71 yo woman diagnosed small cell ca of lung with progressive pulmonary involvement and symptoms. Currently requiring ICU management due to poor oxygenation. Started urgent chemotherapy with VP_16 and carboplatin on 03/15/18    RECOMMENDATIONS:  - completed cycle 1 of chemotherapy with -16 and carboplatin on 3/17/18  - started growth factor support with Zarexio 300mcg daily X5 days to prevent chemotherapy induced neutropenia; today is day #2; leukocytosis is multifactorial with use of growth factor support and progressive inflammatory response   - prognosis guarded but would expect response to chemotherapy and hopefully be able to reduce oxygen requirements and extubate.  - continue ICU monitoring

## 2018-03-19 NOTE — CHART NOTE - NSCHARTNOTEFT_GEN_A_CORE
Assessment: NPO, vented at present s/p resp distress. Glu 50 this am, on IV D5/LR @50ml/hr, RN monitoring glu, now 90. S/p thoracentisis with 1 L removed. Pt with OG in place. If remains on vent, consider TF with osmolite 1.5,  25ml/hr/18 hours, increase [id number] to goal 65ml/hr/18 hrs.    Factors impacting intake: [ ] none [ ] nausea  [ ] vomiting [ ] diarrhea [ ] constipation  [ ]chewing problems [ ] swallowing issues  [x ] other: intubated    Diet Presciption: Diet, NPO:   NPO for Procedure/Test     NPO Start Date: 18-Mar-2018,   NPO Start Time: 22:25 (03-18-18 @ 22:26)    Intake:     Current Weight: Weight (kg): 62 (03-14 @ 22:50)  % Weight Change    Pertinent Medications: MEDICATIONS  (STANDING):  acetylcysteine 10% Inhalation 3 milliLiter(s) Inhalation every 6 hours  ALBUTerol/ipratropium for Nebulization 3 milliLiter(s) Nebulizer every 6 hours  aspirin enteric coated 162 milliGRAM(s) Oral daily  atorvastatin 40 milliGRAM(s) Oral at bedtime  buDESOnide   0.5 milliGRAM(s) Respule 0.5 milliGRAM(s) Inhalation two times a day  chlorhexidine 0.12% Liquid 15 milliLiter(s) Swish and Spit two times a day  cholecalciferol 1000 Unit(s) Oral daily  dexmedetomidine Infusion 0.3 MICROgram(s)/kG/Hr (4.65 mL/Hr) IV Continuous <Continuous>  dextrose 5% + lactated ringers. 1000 milliLiter(s) (50 mL/Hr) IV Continuous <Continuous>  docusate sodium 100 milliGRAM(s) Oral two times a day  dorzolamide 2%/timolol 0.5% Ophthalmic Solution 1 Drop(s) Both EYES two times a day  dronabinol 2.5 milliGRAM(s) Oral daily  enoxaparin Injectable 40 milliGRAM(s) SubCutaneous every 24 hours  filgrastim-sndz Injectable 300 MICROGram(s) SubCutaneous <User Schedule>  gabapentin 300 milliGRAM(s) Oral at bedtime  latanoprost 0.005% Ophthalmic Solution 1 Drop(s) Both EYES at bedtime  levothyroxine 100 MICROGram(s) Oral daily  metoprolol succinate ER 50 milliGRAM(s) Oral two times a day  multivitamin 1 Tablet(s) Oral daily  omega-3-Acid Ethyl Esters 1 Gram(s) Oral two times a day  pantoprazole  Injectable 40 milliGRAM(s) IV Push daily  piperacillin/tazobactam IVPB. 3.375 Gram(s) IV Intermittent once  piperacillin/tazobactam IVPB. 3.375 Gram(s) IV Intermittent every 8 hours  predniSONE   Tablet 10 milliGRAM(s) Oral daily  propofol Infusion 13.441 MICROgram(s)/kG/Min (5 mL/Hr) IV Continuous <Continuous>  pyridoxine 100 milliGRAM(s) Oral daily  vancomycin  IVPB 1000 milliGRAM(s) IV Intermittent once  vancomycin  IVPB 1000 milliGRAM(s) IV Intermittent every 12 hours  vancomycin  IVPB        MEDICATIONS  (PRN):  ALPRAZolam 0.25 milliGRAM(s) Oral three times a day PRN Anxiety  fentaNYL    Injectable 50 MICROGram(s) IV Push every 4 hours PRN Agitation or Moderate Pain (4-6)  lidocaine   Patch 1 Patch Transdermal every 72 hours PRN Neck pain  ondansetron Injectable 4 milliGRAM(s) IV Push every 6 hours PRN Nausea and/or Vomiting    Pertinent Labs: 03-19 Na132 mmol/L<L> Glu 50 mg/dL<L> K+ 4.6 mmol/L Cr  0.58 mg/dL BUN 25 mg/dL<H> 03-19 Phos 3.8 mg/dL 03-19 Alb 2.2 g/dL<L> 03-07 [id number] 5.3 %     CAPILLARY BLOOD GLUCOSE      POCT Blood Glucose.: 90 mg/dL (19 Mar 2018 09:37)  POCT Blood Glucose.: 81 mg/dL (19 Mar 2018 08:46)    Skin:     Estimated Needs:   [ ] no change since previous assessment  [ ] recalculated:     Previous Nutrition Diagnosis:   [ ] Inadequate Energy Intake [ ]Inadequate Oral Intake [ ] Excessive Energy Intake   [ ] Underweight [ ] Increased Nutrient Needs [ ] Overweight/Obesity   [ ] Altered GI Function [ ] Unintended Weight Loss [ ] Food & Nutrition Related Knowledge Deficit [x ] Malnutrition     Nutrition Diagnosis is [x ] ongoing  [ ] resolved [ ] not applicable           Interventions:   Recommend  [ ] Change Diet To:  [ ] Nutrition Supplement  [x ] Nutrition Support- if remains on vent, rec OG feeds, see Rd rec in flowsheet  [ ] Other:     Monitoring and Evaluation:   [ ] PO intake [ x ] Tolerance to diet prescription [ x ] weights [ x ] labs[ x ] follow up per protocol  [ ] other:

## 2018-03-19 NOTE — PROGRESS NOTE ADULT - ATTENDING COMMENTS
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 She was found to have an anterior, loculated pleural effusion - drained bedside under US guidance 3/18. Course complicated by progressive resp failure likely due to HCAP, intubated overnight.      -she is dyssynchronous with the vent, became hypotensive on propofol, now on precedex and prn versed/fentanyl  -hold metoprolol due to hypotension  -pleural fluid exudative, f/u cx and cytology  -leucocytosis likely due to Neupogen vs infection however in view of high fever and purulent discharge from ETT, will place on vanc, zosyn for HCAP  -send sputum, BCx   -she is on AC vol 18/350/12/0.9, not a candidate for weaning, wean O2 as tolerated  -continue prednisone, albuterol, add chest PT  -start tube feeds  -s/p chemo (carboplatin, etoposide)  -continue other home meds  -lovenox for DVT ppx  -prognosis poor, full code  -46mins critical care time spent

## 2018-03-19 NOTE — PROGRESS NOTE ADULT - ASSESSMENT
71 yo female with Lung CA now with fever, leukocytosis, bandemia, purulent sputum and requiring mech ventilation

## 2018-03-19 NOTE — PROGRESS NOTE ADULT - SUBJECTIVE AND OBJECTIVE BOX
Patient is a 72y old  Female who presents with a chief complaint of Came to hospital for chemotherapy. In ER SOB, desaturating. PNA, Rt. pleural effusion on CXR. (14 Mar 2018 23:36)    24 hour events: Worsening respiratory distress yesterday AM requiring the placement of NIPPV with maximal O2 requirements, CXR revealed opacification of R side, bedside US revealed loculated pleural effusion, s/p Right sided thoracentesis performed 3/18 with removal of 950ml serous fluid. CXR showed improvement in aeration of R side, however continued hypoxemia with maximal NIPPV settings.  Yesterday evening patient desaturated, repeat CXR showed opacification again on right and compressive atelectasis. Patient was electively intubated after a long discussion with family. Now with fever this morning of 101.3.     REVIEW OF SYSTEMS  unable to obtain due to patient condition    T(F): 101.3 (03-19-18 @ 05:10), Max: 101.3 (03-19-18 @ 05:10)  HR: 90 (03-19-18 @ 07:54) (77 - 106)  BP: 80/44 (03-19-18 @ 07:00) (74/43 - 204/91)  RR: 42 (03-19-18 @ 07:00) (16 - 42)  SpO2: 98% (03-19-18 @ 07:54) (84% - 100%)  Wt(kg): --    Mode: AC/ CMV (Assist Control/ Continuous Mandatory Ventilation), RR (machine): 18, TV (machine): 350, FiO2: 60, PEEP: 8    CAPILLARY BLOOD GLUCOSE      I&O's Summary    03-18 @ 07:01  -  03-19 @ 07:00  --------------------------------------------------------  IN: 70 mL / OUT: 701 mL / NET: -631 mL      PHYSICAL EXAM  General: intubated, sedated, on full ventilator support  CNS: AAO x 3, no focal deficits  HEENT: PERRL  Resp: CTA bilaterally  CVS: S1S2, regular  Abd: soft, NT, +BS  Ext: 2+ edema  Skin: warm    MEDICATIONS    metoprolol succinate ER Oral    atorvastatin Oral  levothyroxine Oral  predniSONE   Tablet Oral    acetylcysteine 10% Inhalation Inhalation  ALBUTerol/ipratropium for Nebulization Nebulizer  buDESOnide   0.5 milliGRAM(s) Respule Inhalation    ALPRAZolam Oral PRN  dronabinol Oral  fentaNYL    Injectable IV Push PRN  gabapentin Oral  ondansetron Injectable IV Push PRN  propofol Infusion IV Continuous      aspirin enteric coated Oral  enoxaparin Injectable SubCutaneous    docusate sodium Oral  pantoprazole  Injectable IV Push      cholecalciferol Oral  magnesium sulfate  IVPB IV Intermittent  multivitamin Oral  pyridoxine Oral    filgrastim-sndz Injectable SubCutaneous    chlorhexidine 0.12% Liquid Swish and Spit  chlorhexidine 0.12% Liquid Swish and Spit  dorzolamide 2%/timolol 0.5% Ophthalmic Solution Both EYES  latanoprost 0.005% Ophthalmic Solution Both EYES  lidocaine   Patch Transdermal PRN    omega-3-Acid Ethyl Esters Oral                          10.1   20.4  )-----------( 239      ( 19 Mar 2018 06:35 )             29.8       03-19    132<L>  |  95<L>  |  25<H>  ----------------------------<  50<L>  4.6   |  26  |  0.58    Ca    8.2<L>      19 Mar 2018 06:35  Phos  3.8     03-19  Mg     1.7     03-19    TPro  5.7<L>  /  Alb  2.2<L>  /  TBili  0.7  /  DBili  x   /  AST  133<H>  /  ALT  32  /  AlkPhos  74  03-19          PT/INR - ( 18 Mar 2018 04:58 )   PT: 11.8 sec;   INR: 1.08 ratio         PTT - ( 18 Mar 2018 04:58 )  PTT:27.5 sec    .Body Fluid Pleural Fluid --   No polymorphonuclear cells seen  No organisms seen  by cytocentrifuge 03-18 @ 22:14  .Blood Blood-Peripheral   No growth to date. -- 03-14 @ 21:18        Radiology: ***  Bedside lung ultrasound: ***  Bedside ECHO: ***    CENTRAL LINE: N          NOLAN: N                      A-LINE: N                         GLOBAL ISSUE/BEST PRACTICE  Analgesia: Y  Sedation: Y  CAM-ICU: neg  HOB elevation: yes  Stress ulcer prophylaxis: NA  VTE prophylaxis: Y  Glycemic control: Y  Nutrition: Y    CODE STATUS: full  GOC discussion: Y Patient is a 72y old  Female who presents with a chief complaint of Came to hospital for chemotherapy. In ER SOB, desaturating. PNA, Rt. pleural effusion on CXR. (14 Mar 2018 23:36)    24 hour events: Worsening respiratory distress yesterday AM requiring the placement of NIPPV with maximal O2 requirements, CXR revealed opacification of R side, bedside US revealed loculated pleural effusion, s/p Right sided thoracentesis performed 3/18 with removal of 950ml serous fluid. CXR showed improvement in aeration of R side, however continued hypoxemia with maximal NIPPV settings.  Yesterday evening patient desaturated, repeat CXR showed opacification again on right and compressive atelectasis. Patient was electively intubated after a long discussion with family. Now with fever this morning of 101.3. Appears uncomforable in bed, currently desaturating to 82% and tachypnic.    REVIEW OF SYSTEMS  unable to obtain due to patient condition    T(F): 101.3 (03-19-18 @ 05:10), Max: 101.3 (03-19-18 @ 05:10)  HR: 90 (03-19-18 @ 07:54) (77 - 106)  BP: 80/44 (03-19-18 @ 07:00) (74/43 - 204/91)  RR: 42 (03-19-18 @ 07:00) (16 - 42)  SpO2: 98% (03-19-18 @ 07:54) (84% - 100%)  Wt(kg): --    Mode: AC/ CMV (Assist Control/ Continuous Mandatory Ventilation), RR (machine): 18, TV (machine): 350, FiO2: 60, PEEP: 8    CAPILLARY BLOOD GLUCOSE      I&O's Summary    03-18 @ 07:01  -  03-19 @ 07:00  --------------------------------------------------------  IN: 70 mL / OUT: 701 mL / NET: -631 mL      PHYSICAL EXAM  General: intubated, sedated, on full ventilator support  CNS: AAO x 3, no focal deficits  HEENT: PERRL  Resp: CTA bilaterally  CVS: S1S2, regular  Abd: soft, NT, +BS  Ext: 2+ edema  Skin: warm    MEDICATIONS    metoprolol succinate ER Oral    atorvastatin Oral  levothyroxine Oral  predniSONE   Tablet Oral    acetylcysteine 10% Inhalation Inhalation  ALBUTerol/ipratropium for Nebulization Nebulizer  buDESOnide   0.5 milliGRAM(s) Respule Inhalation    ALPRAZolam Oral PRN  dronabinol Oral  fentaNYL    Injectable IV Push PRN  gabapentin Oral  ondansetron Injectable IV Push PRN  propofol Infusion IV Continuous      aspirin enteric coated Oral  enoxaparin Injectable SubCutaneous    docusate sodium Oral  pantoprazole  Injectable IV Push      cholecalciferol Oral  magnesium sulfate  IVPB IV Intermittent  multivitamin Oral  pyridoxine Oral    filgrastim-sndz Injectable SubCutaneous    chlorhexidine 0.12% Liquid Swish and Spit  chlorhexidine 0.12% Liquid Swish and Spit  dorzolamide 2%/timolol 0.5% Ophthalmic Solution Both EYES  latanoprost 0.005% Ophthalmic Solution Both EYES  lidocaine   Patch Transdermal PRN    omega-3-Acid Ethyl Esters Oral                          10.1   20.4  )-----------( 239      ( 19 Mar 2018 06:35 )             29.8       03-19    132<L>  |  95<L>  |  25<H>  ----------------------------<  50<L>  4.6   |  26  |  0.58    Ca    8.2<L>      19 Mar 2018 06:35  Phos  3.8     03-19  Mg     1.7     03-19    TPro  5.7<L>  /  Alb  2.2<L>  /  TBili  0.7  /  DBili  x   /  AST  133<H>  /  ALT  32  /  AlkPhos  74  03-19          PT/INR - ( 18 Mar 2018 04:58 )   PT: 11.8 sec;   INR: 1.08 ratio         PTT - ( 18 Mar 2018 04:58 )  PTT:27.5 sec    .Body Fluid Pleural Fluid --   No polymorphonuclear cells seen  No organisms seen  by cytocentrifuge 03-18 @ 22:14  .Blood Blood-Peripheral   No growth to date. -- 03-14 @ 21:18        Radiology: ***  Bedside lung ultrasound: ***  Bedside ECHO: ***    CENTRAL LINE: N          NOLAN: N                      A-LINE: N                         GLOBAL ISSUE/BEST PRACTICE  Analgesia: Y  Sedation: Y  CAM-ICU: neg  HOB elevation: yes  Stress ulcer prophylaxis: NA  VTE prophylaxis: Y  Glycemic control: Y  Nutrition: Y    CODE STATUS: full  GOC discussion: Y Patient is a 72y old  Female who presents with a chief complaint of Came to hospital for chemotherapy. In ER SOB, desaturating. PNA, Rt. pleural effusion on CXR. (14 Mar 2018 23:36)    24 hour events: Worsening respiratory distress yesterday AM requiring the placement of NIPPV with maximal O2 requirements, CXR revealed opacification of R side, bedside US revealed loculated pleural effusion, s/p Right sided thoracentesis performed 3/18 with removal of 950ml serous fluid. CXR showed improvement in aeration of R side, however continued hypoxemia with maximal NIPPV settings.  Yesterday evening patient desaturated, repeat CXR showed opacification again on right and compressive atelectasis. Patient was electively intubated after a long discussion with family. Now with fever this morning of 101.3. Appears uncomforable in bed, currently desaturating to 82% and tachypnic.    REVIEW OF SYSTEMS  unable to obtain due to patient condition    T(F): 101.3 (03-19-18 @ 05:10), Max: 101.3 (03-19-18 @ 05:10)  HR: 90 (03-19-18 @ 07:54) (77 - 106)  BP: 80/44 (03-19-18 @ 07:00) (74/43 - 204/91)  RR: 42 (03-19-18 @ 07:00) (16 - 42)  SpO2: 98% (03-19-18 @ 07:54) (84% - 100%)  Wt(kg): --    Mode: AC/ CMV (Assist Control/ Continuous Mandatory Ventilation), RR (machine): 18, TV (machine): 350, FiO2: 60, PEEP: 8    CAPILLARY BLOOD GLUCOSE      I&O's Summary    03-18 @ 07:01  -  03-19 @ 07:00  --------------------------------------------------------  IN: 70 mL / OUT: 701 mL / NET: -631 mL      PHYSICAL EXAM  General: intubated, sedated, on full ventilator support  CNS: sedated, no focal deficits  HEENT: PERRL  Resp: wheezing bilaterally  CVS: S1S2, regular  Abd: soft, NT, +BS  Ext: 2+ edema  Skin: warm    MEDICATIONS    metoprolol succinate ER Oral    atorvastatin Oral  levothyroxine Oral  predniSONE   Tablet Oral    acetylcysteine 10% Inhalation Inhalation  ALBUTerol/ipratropium for Nebulization Nebulizer  buDESOnide   0.5 milliGRAM(s) Respule Inhalation    ALPRAZolam Oral PRN  dronabinol Oral  fentaNYL    Injectable IV Push PRN  gabapentin Oral  ondansetron Injectable IV Push PRN  propofol Infusion IV Continuous      aspirin enteric coated Oral  enoxaparin Injectable SubCutaneous    docusate sodium Oral  pantoprazole  Injectable IV Push      cholecalciferol Oral  magnesium sulfate  IVPB IV Intermittent  multivitamin Oral  pyridoxine Oral    filgrastim-sndz Injectable SubCutaneous    chlorhexidine 0.12% Liquid Swish and Spit  chlorhexidine 0.12% Liquid Swish and Spit  dorzolamide 2%/timolol 0.5% Ophthalmic Solution Both EYES  latanoprost 0.005% Ophthalmic Solution Both EYES  lidocaine   Patch Transdermal PRN    omega-3-Acid Ethyl Esters Oral                          10.1   20.4  )-----------( 239      ( 19 Mar 2018 06:35 )             29.8       03-19    132<L>  |  95<L>  |  25<H>  ----------------------------<  50<L>  4.6   |  26  |  0.58    Ca    8.2<L>      19 Mar 2018 06:35  Phos  3.8     03-19  Mg     1.7     03-19    TPro  5.7<L>  /  Alb  2.2<L>  /  TBili  0.7  /  DBili  x   /  AST  133<H>  /  ALT  32  /  AlkPhos  74  03-19          PT/INR - ( 18 Mar 2018 04:58 )   PT: 11.8 sec;   INR: 1.08 ratio         PTT - ( 18 Mar 2018 04:58 )  PTT:27.5 sec    .Body Fluid Pleural Fluid --   No polymorphonuclear cells seen  No organisms seen  by cytocentrifuge 03-18 @ 22:14  .Blood Blood-Peripheral   No growth to date. -- 03-14 @ 21:18        Radiology: ***  Bedside lung ultrasound: ***  Bedside ECHO: ***    CENTRAL LINE: N          NOLAN: N                      A-LINE: N                         GLOBAL ISSUE/BEST PRACTICE  Analgesia: Y  Sedation: Y  CAM-ICU: neg  HOB elevation: yes  Stress ulcer prophylaxis: NA  VTE prophylaxis: Y  Glycemic control: Y  Nutrition: Y    CODE STATUS: full  GOC discussion: Y

## 2018-03-19 NOTE — PROGRESS NOTE ADULT - SUBJECTIVE AND OBJECTIVE BOX
Date/Time Patient Seen:  		  Referring MD:   Data Reviewed	       Patient is a 72y old  Female who presents with a chief complaint of Came to hospital for chemotherapy. In ER SOB, desaturating. PNA, Rt. pleural effusion on CXR. (14 Mar 2018 23:36)  in bed  seen and examined        Subjective/HPI     PAST MEDICAL & SURGICAL HISTORY:  Liver lesion  Clostridium difficile colitis  Emphysema  Pneumonia: 2/2018  Generalized intra-abdominal and pelvic swelling, mass and lump  Glaucoma  Aortic valve prosthesis present  CAD (coronary artery disease)  Arthritis  Heart murmur  Other iron deficiency anemia  Carpal tunnel syndrome of right wrist  PVD (peripheral vascular disease)  Hyperlipemia  Colitis  Hypothyroid  Hypertension  COPD (chronic obstructive pulmonary disease)  H/O foot surgery: (Right foot, 2010)  History of colonoscopy  S/P carpal tunnel release: (Right, 1/2017)  H/O carotid endarterectomy: Both sides 2002  Aortic valve replaced: 2011 with bovine  Carotid disease, bilateral  Cataract extraction status of left eye  Cataract extraction status of right eye        Medication list         MEDICATIONS  (STANDING):  acetaminophen    Suspension. 650 milliGRAM(s) Enteral Tube once  acetylcysteine 10% Inhalation 3 milliLiter(s) Inhalation every 6 hours  ALBUTerol/ipratropium for Nebulization 3 milliLiter(s) Nebulizer every 6 hours  aspirin enteric coated 162 milliGRAM(s) Oral daily  atorvastatin 40 milliGRAM(s) Oral at bedtime  buDESOnide   0.5 milliGRAM(s) Respule 0.5 milliGRAM(s) Inhalation two times a day  chlorhexidine 0.12% Liquid 15 milliLiter(s) Swish and Spit two times a day  chlorhexidine 0.12% Liquid 15 milliLiter(s) Swish and Spit two times a day  cholecalciferol 1000 Unit(s) Oral daily  docusate sodium 100 milliGRAM(s) Oral two times a day  dorzolamide 2%/timolol 0.5% Ophthalmic Solution 1 Drop(s) Both EYES two times a day  dronabinol 2.5 milliGRAM(s) Oral daily  enoxaparin Injectable 40 milliGRAM(s) SubCutaneous every 24 hours  filgrastim-sndz Injectable 300 MICROGram(s) SubCutaneous <User Schedule>  gabapentin 300 milliGRAM(s) Oral at bedtime  latanoprost 0.005% Ophthalmic Solution 1 Drop(s) Both EYES at bedtime  levothyroxine 100 MICROGram(s) Oral daily  metoprolol succinate ER 50 milliGRAM(s) Oral two times a day  multivitamin 1 Tablet(s) Oral daily  omega-3-Acid Ethyl Esters 1 Gram(s) Oral two times a day  pantoprazole  Injectable 40 milliGRAM(s) IV Push daily  predniSONE   Tablet 10 milliGRAM(s) Oral daily  propofol Infusion 13.441 MICROgram(s)/kG/Min (5 mL/Hr) IV Continuous <Continuous>  pyridoxine 100 milliGRAM(s) Oral daily    MEDICATIONS  (PRN):  ALPRAZolam 0.25 milliGRAM(s) Oral three times a day PRN Anxiety  fentaNYL    Injectable 25 MICROGram(s) IV Push every 4 hours PRN pain/agitation  lidocaine   Patch 1 Patch Transdermal every 72 hours PRN Neck pain  ondansetron Injectable 4 milliGRAM(s) IV Push every 6 hours PRN Nausea and/or Vomiting         Vitals log        ICU Vital Signs Last 24 Hrs  T(C): 38.5 (19 Mar 2018 05:10), Max: 38.5 (19 Mar 2018 05:10)  T(F): 101.3 (19 Mar 2018 05:10), Max: 101.3 (19 Mar 2018 05:10)  HR: 94 (19 Mar 2018 05:00) (77 - 96)  BP: 125/59 (19 Mar 2018 05:00) (74/43 - 204/91)  BP(mean): 85 (19 Mar 2018 05:00) (53 - 130)  ABP: --  ABP(mean): --  RR: 29 (19 Mar 2018 05:00) (16 - 41)  SpO2: 93% (19 Mar 2018 05:00) (84% - 100%)       Mode: AC/ CMV (Assist Control/ Continuous Mandatory Ventilation)  RR (machine): 18  TV (machine): 350  FiO2: 70  PEEP: 8  ITime: 1  MAP: 14  PIP: 28      Input and Output:  I&O's Detail    17 Mar 2018 07:01  -  18 Mar 2018 07:00  --------------------------------------------------------  IN:    Oral Fluid: 660 mL    Solution: 100 mL    Solution: 508 mL  Total IN: 1268 mL    OUT:    Voided: 300 mL  Total OUT: 300 mL    Total NET: 968 mL      18 Mar 2018 07:01  -  19 Mar 2018 06:11  --------------------------------------------------------  IN:    propofol Infusion: 60 mL  Total IN: 60 mL    OUT:    Voided: 700 mL  Total OUT: 700 mL    Total NET: -640 mL          Lab Data                        10.5   8.9   )-----------( 242      ( 18 Mar 2018 04:58 )             31.5     03-18    134<L>  |  100  |  22  ----------------------------<  70  4.5   |  27  |  0.51    Ca    8.3<L>      18 Mar 2018 04:58  Phos  2.9     03-18  Mg     1.9     03-18    TPro  6.0  /  Alb  2.4<L>  /  TBili  0.5  /  DBili  x   /  AST  95<H>  /  ALT  38  /  AlkPhos  75  03-18    ABG - ( 19 Mar 2018 05:33 )  pH: 7.41  /  pCO2: 39    /  pO2: 66    / HCO3: 24    / Base Excess: -0.1  /  SaO2: 96                      Review of Systems	      Objective     Physical Examination    head at  heart s1s2  lung dec BS  abd soft      Pertinent Lab findings & Imaging      Rangel:  NO   Adequate UO     I&O's Detail    17 Mar 2018 07:01  -  18 Mar 2018 07:00  --------------------------------------------------------  IN:    Oral Fluid: 660 mL    Solution: 100 mL    Solution: 508 mL  Total IN: 1268 mL    OUT:    Voided: 300 mL  Total OUT: 300 mL    Total NET: 968 mL      18 Mar 2018 07:01  -  19 Mar 2018 06:11  --------------------------------------------------------  IN:    propofol Infusion: 60 mL  Total IN: 60 mL    OUT:    Voided: 700 mL  Total OUT: 700 mL    Total NET: -640 mL               Discussed with:     Cultures:	        Radiology

## 2018-03-19 NOTE — PROGRESS NOTE ADULT - SUBJECTIVE AND OBJECTIVE BOX
Patient noted to be hypotensive BP 80/44 on initial evaluation this AM, Propofol was decreased with improvement in BP. Subsequently developed worsening hypoxemia, SpO2 82%. Ventilator settings manipulated to augment oxygenation, titrated to PEEP 12 and FiO2 80%, PEEP recruitment maneuvers performed, however barely able to maintain SpO2 88%. Started on Precedex gtt and given Fentanyl 50 mcg IVP for vent desynchrony with improvement in oxygenation to 93%. Fentanyl and Versed PRNs added for agitation / vent desynchrony.    CC Time: 30 minutes

## 2018-03-19 NOTE — PROGRESS NOTE ADULT - SUBJECTIVE AND OBJECTIVE BOX
infectious diseases progress note:    GENIE MOE is a 72y y. o. Female patient    Patient sedated    Allergies    No Known Allergies    Intolerances        ANTIBIOTICS/RELEVANT:  antimicrobials  piperacillin/tazobactam IVPB. 3.375 Gram(s) IV Intermittent once  piperacillin/tazobactam IVPB. 3.375 Gram(s) IV Intermittent every 8 hours  vancomycin  IVPB        immunologic:  filgrastim-sndz Injectable 300 MICROGram(s) SubCutaneous <User Schedule>    OTHER:  acetylcysteine 10% Inhalation 3 milliLiter(s) Inhalation every 6 hours  ALBUTerol/ipratropium for Nebulization 3 milliLiter(s) Nebulizer every 6 hours  ALPRAZolam 0.25 milliGRAM(s) Oral three times a day PRN  aspirin enteric coated 162 milliGRAM(s) Oral daily  atorvastatin 40 milliGRAM(s) Oral at bedtime  buDESOnide   0.5 milliGRAM(s) Respule 0.5 milliGRAM(s) Inhalation two times a day  chlorhexidine 0.12% Liquid 15 milliLiter(s) Swish and Spit two times a day  cholecalciferol 1000 Unit(s) Oral daily  dexmedetomidine Infusion 0.3 MICROgram(s)/kG/Hr IV Continuous <Continuous>  dextrose 5% + lactated ringers. 1000 milliLiter(s) IV Continuous <Continuous>  docusate sodium 100 milliGRAM(s) Oral two times a day  dorzolamide 2%/timolol 0.5% Ophthalmic Solution 1 Drop(s) Both EYES two times a day  dronabinol 2.5 milliGRAM(s) Oral daily  enoxaparin Injectable 40 milliGRAM(s) SubCutaneous every 24 hours  fentaNYL    Injectable 50 MICROGram(s) IV Push every 4 hours PRN  gabapentin 300 milliGRAM(s) Oral at bedtime  latanoprost 0.005% Ophthalmic Solution 1 Drop(s) Both EYES at bedtime  levothyroxine 100 MICROGram(s) Oral daily  lidocaine   Patch 1 Patch Transdermal every 72 hours PRN  magnesium sulfate  IVPB 2 Gram(s) IV Intermittent once  metoprolol succinate ER 50 milliGRAM(s) Oral two times a day  multivitamin 1 Tablet(s) Oral daily  omega-3-Acid Ethyl Esters 1 Gram(s) Oral two times a day  ondansetron Injectable 4 milliGRAM(s) IV Push every 6 hours PRN  pantoprazole  Injectable 40 milliGRAM(s) IV Push daily  predniSONE   Tablet 10 milliGRAM(s) Oral daily  propofol Infusion 13.441 MICROgram(s)/kG/Min IV Continuous <Continuous>  pyridoxine 100 milliGRAM(s) Oral daily      Objective:  Last 24-Vital Signs Last 24 Hrs  T(C): 38.5 (19 Mar 2018 05:10), Max: 38.5 (19 Mar 2018 05:10)  T(F): 101.3 (19 Mar 2018 05:10), Max: 101.3 (19 Mar 2018 05:10)  HR: 90 (19 Mar 2018 07:54) (79 - 106)  BP: 80/44 (19 Mar 2018 07:00) (74/43 - 204/91)  BP(mean): 59 (19 Mar 2018 07:00) (53 - 130)  RR: 42 (19 Mar 2018 07:00) (16 - 42)  SpO2: 98% (19 Mar 2018 07:54) (84% - 100%)    T(C): 38.5 (03-19-18 @ 05:10), Max: 38.5 (03-19-18 @ 05:10)  T(F): 101.3 (03-19-18 @ 05:10), Max: 101.3 (03-19-18 @ 05:10)  T(C): 38.5 (03-19-18 @ 05:10), Max: 38.5 (03-19-18 @ 05:10)  T(F): 101.3 (03-19-18 @ 05:10), Max: 101.3 (03-19-18 @ 05:10)  T(C): 38.5 (03-19-18 @ 05:10), Max: 38.5 (03-19-18 @ 05:10)  T(F): 101.3 (03-19-18 @ 05:10), Max: 101.3 (03-19-18 @ 05:10)    PHYSICAL EXAM:  Constitutional: Well-developed, well nourished  Eyes: PERRLA, EOMI  Ear/Nose/Throat: pt intubated	  Neck: no JVD, no lymphadenopathy, supple  Respiratory: no accessory muscle use, decreased BS on right  Cardiovascular: RRR, normal S1, S2 no m/r/g  Gastrointestinal: soft, NT, no HSM, BS-normal  Extremities: no clubbing, no cyanosis, edema absent  Neuro: patient sedated  Skin: no sig lesions      LABS:                        10.1   20.4  )-----------( 239      ( 19 Mar 2018 06:35 )             29.8       WBC 20.4  03-19 @ 06:35  WBC 8.9  03-18 @ 04:58  WBC 10.2  03-17 @ 05:45  WBC 10.2  03-16 @ 06:22  WBC 11.3  03-15 @ 05:33  WBC 10.2  03-14 @ 17:04      03-19    132<L>  |  95<L>  |  25<H>  ----------------------------<  50<L>  4.6   |  26  |  0.58    Ca    8.2<L>      19 Mar 2018 06:35  Phos  3.8     03-19  Mg     1.7     03-19    TPro  5.7<L>  /  Alb  2.2<L>  /  TBili  0.7  /  DBili  x   /  AST  133<H>  /  ALT  32  /  AlkPhos  74  03-19      Creatinine, Serum: 0.58 mg/dL (03-19-18 @ 06:35)  Creatinine, Serum: 0.51 mg/dL (03-18-18 @ 04:58)  Creatinine, Serum: 0.62 mg/dL (03-17-18 @ 05:45)  Creatinine, Serum: 0.55 mg/dL (03-16-18 @ 06:22)  Creatinine, Serum: 0.61 mg/dL (03-15-18 @ 13:14)  Creatinine, Serum: 0.62 mg/dL (03-15-18 @ 05:33)  Creatinine, Serum: 0.93 mg/dL (03-14-18 @ 17:04)      PT/INR - ( 18 Mar 2018 04:58 )   PT: 11.8 sec;   INR: 1.08 ratio         PTT - ( 18 Mar 2018 04:58 )  PTT:27.5 sec          MICROBIOLOGY:              RADIOLOGY & ADDITIONAL STUDIES:

## 2018-03-20 LAB
ALBUMIN SERPL ELPH-MCNC: 2 G/DL — LOW (ref 3.3–5)
ALP SERPL-CCNC: 83 U/L — SIGNIFICANT CHANGE UP (ref 40–120)
ALT FLD-CCNC: 33 U/L — SIGNIFICANT CHANGE UP (ref 12–78)
ANION GAP SERPL CALC-SCNC: 11 MMOL/L — SIGNIFICANT CHANGE UP (ref 5–17)
AST SERPL-CCNC: 129 U/L — HIGH (ref 15–37)
BILIRUB SERPL-MCNC: 0.5 MG/DL — SIGNIFICANT CHANGE UP (ref 0.2–1.2)
BUN SERPL-MCNC: 35 MG/DL — HIGH (ref 7–23)
CALCIUM SERPL-MCNC: 7.7 MG/DL — LOW (ref 8.5–10.1)
CHLORIDE SERPL-SCNC: 99 MMOL/L — SIGNIFICANT CHANGE UP (ref 96–108)
CO2 SERPL-SCNC: 24 MMOL/L — SIGNIFICANT CHANGE UP (ref 22–31)
CREAT SERPL-MCNC: 0.63 MG/DL — SIGNIFICANT CHANGE UP (ref 0.5–1.3)
EOSINOPHIL NFR BLD AUTO: 1 % — SIGNIFICANT CHANGE UP (ref 0–6)
GLUCOSE SERPL-MCNC: 151 MG/DL — HIGH (ref 70–99)
HCT VFR BLD CALC: 23.3 % — LOW (ref 34.5–45)
HGB BLD-MCNC: 8.1 G/DL — LOW (ref 11.5–15.5)
LYMPHOCYTES # BLD AUTO: 4 % — LOW (ref 13–44)
MAGNESIUM SERPL-MCNC: 2.1 MG/DL — SIGNIFICANT CHANGE UP (ref 1.6–2.6)
MCHC RBC-ENTMCNC: 30.5 PG — SIGNIFICANT CHANGE UP (ref 27–34)
MCHC RBC-ENTMCNC: 34.6 GM/DL — SIGNIFICANT CHANGE UP (ref 32–36)
MCV RBC AUTO: 88.2 FL — SIGNIFICANT CHANGE UP (ref 80–100)
NEUTROPHILS NFR BLD AUTO: 87 % — HIGH (ref 43–77)
PHOSPHATE SERPL-MCNC: 3.8 MG/DL — SIGNIFICANT CHANGE UP (ref 2.5–4.5)
PLATELET # BLD AUTO: 194 K/UL — SIGNIFICANT CHANGE UP (ref 150–400)
POTASSIUM SERPL-MCNC: 4.2 MMOL/L — SIGNIFICANT CHANGE UP (ref 3.5–5.3)
POTASSIUM SERPL-SCNC: 4.2 MMOL/L — SIGNIFICANT CHANGE UP (ref 3.5–5.3)
PROT SERPL-MCNC: 5.3 G/DL — LOW (ref 6–8.3)
RBC # BLD: 2.65 M/UL — LOW (ref 3.8–5.2)
RBC # FLD: 13.1 % — SIGNIFICANT CHANGE UP (ref 10.3–14.5)
SODIUM SERPL-SCNC: 134 MMOL/L — LOW (ref 135–145)
WBC # BLD: 12.2 K/UL — HIGH (ref 3.8–10.5)
WBC # FLD AUTO: 12.2 K/UL — HIGH (ref 3.8–10.5)

## 2018-03-20 PROCEDURE — 99291 CRITICAL CARE FIRST HOUR: CPT

## 2018-03-20 PROCEDURE — 99233 SBSQ HOSP IP/OBS HIGH 50: CPT

## 2018-03-20 RX ORDER — MIDAZOLAM HYDROCHLORIDE 1 MG/ML
2 INJECTION, SOLUTION INTRAMUSCULAR; INTRAVENOUS
Qty: 0 | Refills: 0 | Status: DISCONTINUED | OUTPATIENT
Start: 2018-03-20 | End: 2018-03-23

## 2018-03-20 RX ORDER — ZINC OXIDE 200 MG/G
1 OINTMENT TOPICAL EVERY 6 HOURS
Qty: 0 | Refills: 0 | Status: DISCONTINUED | OUTPATIENT
Start: 2018-03-20 | End: 2018-03-23

## 2018-03-20 RX ORDER — ASPIRIN/CALCIUM CARB/MAGNESIUM 324 MG
81 TABLET ORAL DAILY
Qty: 0 | Refills: 0 | Status: DISCONTINUED | OUTPATIENT
Start: 2018-03-20 | End: 2018-03-23

## 2018-03-20 RX ADMIN — DEXMEDETOMIDINE HYDROCHLORIDE IN 0.9% SODIUM CHLORIDE 4.65 MICROGRAM(S)/KG/HR: 4 INJECTION INTRAVENOUS at 08:03

## 2018-03-20 RX ADMIN — Medication 3 MILLILITER(S): at 01:35

## 2018-03-20 RX ADMIN — Medication 100 MILLIGRAM(S): at 12:20

## 2018-03-20 RX ADMIN — POLYETHYLENE GLYCOL 3350 17 GRAM(S): 17 POWDER, FOR SOLUTION ORAL at 05:31

## 2018-03-20 RX ADMIN — Medication 3 MILLILITER(S): at 13:16

## 2018-03-20 RX ADMIN — PANTOPRAZOLE SODIUM 40 MILLIGRAM(S): 20 TABLET, DELAYED RELEASE ORAL at 06:31

## 2018-03-20 RX ADMIN — LATANOPROST 1 DROP(S): 0.05 SOLUTION/ DROPS OPHTHALMIC; TOPICAL at 22:16

## 2018-03-20 RX ADMIN — CHLORHEXIDINE GLUCONATE 15 MILLILITER(S): 213 SOLUTION TOPICAL at 17:41

## 2018-03-20 RX ADMIN — Medication 1 TABLET(S): at 12:20

## 2018-03-20 RX ADMIN — FENTANYL CITRATE 50 MICROGRAM(S): 50 INJECTION INTRAVENOUS at 23:41

## 2018-03-20 RX ADMIN — GABAPENTIN 300 MILLIGRAM(S): 400 CAPSULE ORAL at 22:13

## 2018-03-20 RX ADMIN — MIDAZOLAM HYDROCHLORIDE 2 MILLIGRAM(S): 1 INJECTION, SOLUTION INTRAMUSCULAR; INTRAVENOUS at 22:13

## 2018-03-20 RX ADMIN — MIDAZOLAM HYDROCHLORIDE 2 MILLIGRAM(S): 1 INJECTION, SOLUTION INTRAMUSCULAR; INTRAVENOUS at 04:50

## 2018-03-20 RX ADMIN — Medication 1000 UNIT(S): at 12:20

## 2018-03-20 RX ADMIN — FENTANYL CITRATE 50 MICROGRAM(S): 50 INJECTION INTRAVENOUS at 20:15

## 2018-03-20 RX ADMIN — Medication 10 MILLIGRAM(S): at 05:32

## 2018-03-20 RX ADMIN — Medication 2: at 12:48

## 2018-03-20 RX ADMIN — PIPERACILLIN AND TAZOBACTAM 25 GRAM(S): 4; .5 INJECTION, POWDER, LYOPHILIZED, FOR SOLUTION INTRAVENOUS at 05:31

## 2018-03-20 RX ADMIN — Medication 2: at 17:52

## 2018-03-20 RX ADMIN — Medication 250 MILLIGRAM(S): at 05:31

## 2018-03-20 RX ADMIN — MIDAZOLAM HYDROCHLORIDE 2 MILLIGRAM(S): 1 INJECTION, SOLUTION INTRAMUSCULAR; INTRAVENOUS at 09:52

## 2018-03-20 RX ADMIN — ZINC OXIDE 1 APPLICATION(S): 200 OINTMENT TOPICAL at 17:43

## 2018-03-20 RX ADMIN — DEXMEDETOMIDINE HYDROCHLORIDE IN 0.9% SODIUM CHLORIDE 4.65 MICROGRAM(S)/KG/HR: 4 INJECTION INTRAVENOUS at 04:33

## 2018-03-20 RX ADMIN — Medication 3 MILLILITER(S): at 07:54

## 2018-03-20 RX ADMIN — FENTANYL CITRATE 50 MICROGRAM(S): 50 INJECTION INTRAVENOUS at 09:10

## 2018-03-20 RX ADMIN — FENTANYL CITRATE 50 MICROGRAM(S): 50 INJECTION INTRAVENOUS at 12:45

## 2018-03-20 RX ADMIN — DEXMEDETOMIDINE HYDROCHLORIDE IN 0.9% SODIUM CHLORIDE 4.65 MICROGRAM(S)/KG/HR: 4 INJECTION INTRAVENOUS at 18:04

## 2018-03-20 RX ADMIN — DEXMEDETOMIDINE HYDROCHLORIDE IN 0.9% SODIUM CHLORIDE 4.65 MICROGRAM(S)/KG/HR: 4 INJECTION INTRAVENOUS at 22:12

## 2018-03-20 RX ADMIN — Medication 81 MILLIGRAM(S): at 12:20

## 2018-03-20 RX ADMIN — ATORVASTATIN CALCIUM 40 MILLIGRAM(S): 80 TABLET, FILM COATED ORAL at 22:13

## 2018-03-20 RX ADMIN — DORZOLAMIDE HYDROCHLORIDE TIMOLOL MALEATE 1 DROP(S): 20; 5 SOLUTION/ DROPS OPHTHALMIC at 05:32

## 2018-03-20 RX ADMIN — MIDAZOLAM HYDROCHLORIDE 2 MILLIGRAM(S): 1 INJECTION, SOLUTION INTRAMUSCULAR; INTRAVENOUS at 17:52

## 2018-03-20 RX ADMIN — PIPERACILLIN AND TAZOBACTAM 25 GRAM(S): 4; .5 INJECTION, POWDER, LYOPHILIZED, FOR SOLUTION INTRAVENOUS at 22:14

## 2018-03-20 RX ADMIN — MIDAZOLAM HYDROCHLORIDE 2 MILLIGRAM(S): 1 INJECTION, SOLUTION INTRAMUSCULAR; INTRAVENOUS at 13:30

## 2018-03-20 RX ADMIN — CHLORHEXIDINE GLUCONATE 15 MILLILITER(S): 213 SOLUTION TOPICAL at 05:34

## 2018-03-20 RX ADMIN — Medication 100 MICROGRAM(S): at 05:32

## 2018-03-20 RX ADMIN — DORZOLAMIDE HYDROCHLORIDE TIMOLOL MALEATE 1 DROP(S): 20; 5 SOLUTION/ DROPS OPHTHALMIC at 17:42

## 2018-03-20 RX ADMIN — Medication 300 MICROGRAM(S): at 17:41

## 2018-03-20 RX ADMIN — Medication 0.5 MILLIGRAM(S): at 08:04

## 2018-03-20 RX ADMIN — DEXMEDETOMIDINE HYDROCHLORIDE IN 0.9% SODIUM CHLORIDE 4.65 MICROGRAM(S)/KG/HR: 4 INJECTION INTRAVENOUS at 12:18

## 2018-03-20 RX ADMIN — ZINC OXIDE 1 APPLICATION(S): 200 OINTMENT TOPICAL at 23:47

## 2018-03-20 RX ADMIN — Medication 1 GRAM(S): at 05:32

## 2018-03-20 RX ADMIN — Medication 1: at 23:44

## 2018-03-20 RX ADMIN — PIPERACILLIN AND TAZOBACTAM 25 GRAM(S): 4; .5 INJECTION, POWDER, LYOPHILIZED, FOR SOLUTION INTRAVENOUS at 14:53

## 2018-03-20 RX ADMIN — FENTANYL CITRATE 50 MICROGRAM(S): 50 INJECTION INTRAVENOUS at 12:19

## 2018-03-20 RX ADMIN — POLYETHYLENE GLYCOL 3350 17 GRAM(S): 17 POWDER, FOR SOLUTION ORAL at 17:42

## 2018-03-20 RX ADMIN — Medication 1: at 06:51

## 2018-03-20 RX ADMIN — FENTANYL CITRATE 50 MICROGRAM(S): 50 INJECTION INTRAVENOUS at 08:56

## 2018-03-20 RX ADMIN — ENOXAPARIN SODIUM 40 MILLIGRAM(S): 100 INJECTION SUBCUTANEOUS at 12:18

## 2018-03-20 RX ADMIN — FENTANYL CITRATE 50 MICROGRAM(S): 50 INJECTION INTRAVENOUS at 19:35

## 2018-03-20 RX ADMIN — ZINC OXIDE 1 APPLICATION(S): 200 OINTMENT TOPICAL at 12:45

## 2018-03-20 NOTE — PROGRESS NOTE ADULT - SUBJECTIVE AND OBJECTIVE BOX
infectious diseases progress note:    GENIE MOE is a 72y y. o. Female patient    Patient more alert    Allergies    No Known Allergies    Intolerances      ANTIBIOTICS/RELEVANT:  antimicrobials  piperacillin/tazobactam IVPB. 3.375 Gram(s) IV Intermittent every 8 hours  vancomycin  IVPB 1000 milliGRAM(s) IV Intermittent every 12 hours  vancomycin  IVPB        immunologic:  filgrastim-sndz Injectable 300 MICROGram(s) SubCutaneous <User Schedule>    OTHER:  acetylcysteine 10% Inhalation 3 milliLiter(s) Inhalation every 6 hours  ALBUTerol/ipratropium for Nebulization 3 milliLiter(s) Nebulizer every 6 hours  aspirin enteric coated 162 milliGRAM(s) Oral daily  atorvastatin 40 milliGRAM(s) Oral at bedtime  buDESOnide   0.5 milliGRAM(s) Respule 0.5 milliGRAM(s) Inhalation two times a day  chlorhexidine 0.12% Liquid 15 milliLiter(s) Swish and Spit two times a day  cholecalciferol 1000 Unit(s) Oral daily  dexmedetomidine Infusion 0.3 MICROgram(s)/kG/Hr IV Continuous <Continuous>  dextrose 5%. 1000 milliLiter(s) IV Continuous <Continuous>  dextrose 50% Injectable 12.5 Gram(s) IV Push once  dextrose 50% Injectable 25 Gram(s) IV Push once  dextrose 50% Injectable 25 Gram(s) IV Push once  dextrose Gel 1 Dose(s) Oral once PRN  dorzolamide 2%/timolol 0.5% Ophthalmic Solution 1 Drop(s) Both EYES two times a day  enoxaparin Injectable 40 milliGRAM(s) SubCutaneous every 24 hours  fentaNYL    Injectable 50 MICROGram(s) IV Push every 4 hours PRN  gabapentin 300 milliGRAM(s) Oral at bedtime  glucagon  Injectable 1 milliGRAM(s) IntraMuscular once PRN  insulin lispro (HumaLOG) corrective regimen sliding scale   SubCutaneous every 6 hours  latanoprost 0.005% Ophthalmic Solution 1 Drop(s) Both EYES at bedtime  levothyroxine 100 MICROGram(s) Oral daily  lidocaine   Patch 1 Patch Transdermal every 72 hours PRN  midazolam Injectable 2 milliGRAM(s) IV Push once  midazolam Injectable 2 milliGRAM(s) IV Push every 4 hours PRN  midazolam Injectable 2 milliGRAM(s) IV Push every 3 hours PRN  multivitamin 1 Tablet(s) Oral daily  omega-3-Acid Ethyl Esters 1 Gram(s) Oral two times a day  ondansetron Injectable 4 milliGRAM(s) IV Push every 6 hours PRN  pantoprazole   Suspension 40 milliGRAM(s) Oral before breakfast  polyethylene glycol 3350 17 Gram(s) Oral two times a day  predniSONE   Tablet 10 milliGRAM(s) Oral daily  pyridoxine 100 milliGRAM(s) Oral daily  senna 2 Tablet(s) Oral at bedtime  zinc oxide 40%/lanolin Ointment 1 Application(s) Topical every 6 hours      Objective:  Last 24-Vital Signs Last 24 Hrs  T(C): 36.9 (20 Mar 2018 08:00), Max: 38.4 (19 Mar 2018 20:00)  T(F): 98.5 (20 Mar 2018 08:00), Max: 101.1 (19 Mar 2018 20:00)  HR: 79 (20 Mar 2018 08:36) (75 - 111)  BP: 111/55 (20 Mar 2018 07:00) (71/37 - 200/95)  BP(mean): 78 (20 Mar 2018 07:00) (49 - 137)  RR: 39 (20 Mar 2018 07:00) (18 - 55)  SpO2: 98% (20 Mar 2018 08:36) (87% - 98%)    T(C): 36.9 (03-20-18 @ 08:00), Max: 38.5 (03-19-18 @ 05:10)  T(F): 98.5 (03-20-18 @ 08:00), Max: 101.3 (03-19-18 @ 05:10)  T(C): 36.9 (03-20-18 @ 08:00), Max: 38.5 (03-19-18 @ 05:10)  T(F): 98.5 (03-20-18 @ 08:00), Max: 101.3 (03-19-18 @ 05:10)  T(C): 36.9 (03-20-18 @ 08:00), Max: 38.5 (03-19-18 @ 05:10)  T(F): 98.5 (03-20-18 @ 08:00), Max: 101.3 (03-19-18 @ 05:10)    PHYSICAL EXAM:  Constitutional: Well-developed, well nourished  Eyes: PERRLA, EOMI  Ear/Nose/Throat: intubated	  Neck: no JVD, no lymphadenopathy, supple  Lungs: decreased BS on right  Cardiovascular: RRR, normal S1, S2 no m/r/g  Gastrointestinal: soft, NT, no HSM, BS-normal  Extremities: no clubbing, no cyanosis, edema absent  Neuro: patient alert, oriented and appropriate  Skin: no sig lesions      LABS:                        8.1    12.2  )-----------( 194      ( 20 Mar 2018 06:05 )             23.3       WBC 12.2  03-20 @ 06:05  WBC 20.4  03-19 @ 06:35  WBC 8.9  03-18 @ 04:58  WBC 10.2  03-17 @ 05:45  WBC 10.2  03-16 @ 06:22  WBC 11.3  03-15 @ 05:33  WBC 10.2  03-14 @ 17:04      03-20    134<L>  |  99  |  35<H>  ----------------------------<  151<H>  4.2   |  24  |  0.63    Ca    7.7<L>      20 Mar 2018 06:05  Phos  3.8     03-20  Mg     2.1     03-20    TPro  5.3<L>  /  Alb  2.0<L>  /  TBili  0.5  /  DBili  x   /  AST  129<H>  /  ALT  33  /  AlkPhos  83  03-20      Creatinine, Serum: 0.63 mg/dL (03-20-18 @ 06:05)  Creatinine, Serum: 0.58 mg/dL (03-19-18 @ 06:35)  Creatinine, Serum: 0.51 mg/dL (03-18-18 @ 04:58)  Creatinine, Serum: 0.62 mg/dL (03-17-18 @ 05:45)  Creatinine, Serum: 0.55 mg/dL (03-16-18 @ 06:22)  Creatinine, Serum: 0.61 mg/dL (03-15-18 @ 13:14)  Creatinine, Serum: 0.62 mg/dL (03-15-18 @ 05:33)  Creatinine, Serum: 0.93 mg/dL (03-14-18 @ 17:04)        MICROBIOLOGY:  Culture Results:   Normal Respiratory Nancy present (03-19 @ 11:40)      RADIOLOGY & ADDITIONAL STUDIES:

## 2018-03-20 NOTE — PROGRESS NOTE ADULT - SUBJECTIVE AND OBJECTIVE BOX
All interim records and events noted.    remains intubated, does not respond  family present    MEDICATIONS  (STANDING):  acetylcysteine 10% Inhalation 3 milliLiter(s) Inhalation every 6 hours  ALBUTerol/ipratropium for Nebulization 3 milliLiter(s) Nebulizer every 6 hours  aspirin enteric coated 81 milliGRAM(s) Oral daily  atorvastatin 40 milliGRAM(s) Oral at bedtime  buDESOnide   0.5 milliGRAM(s) Respule 0.5 milliGRAM(s) Inhalation two times a day  chlorhexidine 0.12% Liquid 15 milliLiter(s) Swish and Spit two times a day  cholecalciferol 1000 Unit(s) Oral daily  dexmedetomidine Infusion 0.3 MICROgram(s)/kG/Hr (4.65 mL/Hr) IV Continuous <Continuous>  dextrose 5%. 1000 milliLiter(s) (50 mL/Hr) IV Continuous <Continuous>  dextrose 50% Injectable 12.5 Gram(s) IV Push once  dextrose 50% Injectable 25 Gram(s) IV Push once  dextrose 50% Injectable 25 Gram(s) IV Push once  dorzolamide 2%/timolol 0.5% Ophthalmic Solution 1 Drop(s) Both EYES two times a day  enoxaparin Injectable 40 milliGRAM(s) SubCutaneous every 24 hours  filgrastim-sndz Injectable 300 MICROGram(s) SubCutaneous <User Schedule>  gabapentin 300 milliGRAM(s) Oral at bedtime  insulin lispro (HumaLOG) corrective regimen sliding scale   SubCutaneous every 6 hours  latanoprost 0.005% Ophthalmic Solution 1 Drop(s) Both EYES at bedtime  levothyroxine 100 MICROGram(s) Oral daily  multivitamin 1 Tablet(s) Oral daily  omega-3-Acid Ethyl Esters 1 Gram(s) Oral two times a day  pantoprazole   Suspension 40 milliGRAM(s) Oral before breakfast  piperacillin/tazobactam IVPB. 3.375 Gram(s) IV Intermittent every 8 hours  polyethylene glycol 3350 17 Gram(s) Oral two times a day  predniSONE   Tablet 10 milliGRAM(s) Oral daily  pyridoxine 100 milliGRAM(s) Oral daily  senna 2 Tablet(s) Oral at bedtime  zinc oxide 40%/lanolin Ointment 1 Application(s) Topical every 6 hours    MEDICATIONS  (PRN):  dextrose Gel 1 Dose(s) Oral once PRN Blood Glucose LESS THAN 70 milliGRAM(s)/deciliter  fentaNYL    Injectable 50 MICROGram(s) IV Push every 4 hours PRN Agitation or Moderate Pain (4-6)  glucagon  Injectable 1 milliGRAM(s) IntraMuscular once PRN Glucose LESS THAN 70 milligrams/deciliter  lidocaine   Patch 1 Patch Transdermal every 72 hours PRN Neck pain  midazolam Injectable 2 milliGRAM(s) IV Push every 3 hours PRN agitation and/or vent dysynchrony  ondansetron Injectable 4 milliGRAM(s) IV Push every 6 hours PRN Nausea and/or Vomiting      Vital Signs Last 24 Hrs  T(C): 36.9 (20 Mar 2018 08:00), Max: 38.4 (19 Mar 2018 20:00)  T(F): 98.5 (20 Mar 2018 08:00), Max: 101.1 (19 Mar 2018 20:00)  HR: 79 (20 Mar 2018 11:17) (75 - 109)  BP: 113/54 (20 Mar 2018 10:00) (71/37 - 200/95)  BP(mean): 78 (20 Mar 2018 10:00) (49 - 137)  RR: 28 (20 Mar 2018 10:00) (18 - 55)  SpO2: 88% (20 Mar 2018 11:17) (87% - 98%)    PHYSICAL EXAM  General: frail elderly ill appearing woman, intubated, in bed, not in acute distress  Head: atraumatic, normocephalic  Neck: supple  CV: S1 S2, regular rate and rhythm  Lungs: coarse breath sounds bilaterally  Abdomen: soft, nontender, bowel sounds present, no palpable masses, pouchy  Extrem: no sig edema  Skin: no significant ecchymosis/petechiae        LABS:             8.1    12.2  )-----------( 194      ( 03-20 @ 06:05 )             23.3                10.1   20.4  )-----------( 239      ( 03-19 @ 06:35 )             29.8                10.5   8.9   )-----------( 242      ( 03-18 @ 04:58 )             31.5       03-20    134<L>  |  99  |  35<H>  ----------------------------<  151<H>  4.2   |  24  |  0.63    Ca    7.7<L>      20 Mar 2018 06:05  Phos  3.8     03-20  Mg     2.1     03-20    TPro  5.3<L>  /  Alb  2.0<L>  /  TBili  0.5  /  DBili  x   /  AST  129<H>  /  ALT  33  /  AlkPhos  83  03-20    03-18 @ 04:58  PT11.8 INR1.08  PTT27.5  03-17 @ 05:45  PT11.9 INR1.09  PTT27.2  03-16 @ 06:46  PT12.1 INR1.11  PTT28.4      RADIOLOGY & ADDITIONAL STUDIES:    IMPRESSION/RECOMMENDATIONS:

## 2018-03-20 NOTE — PROGRESS NOTE ADULT - ATTENDING COMMENTS
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  Progressive resp failure likely due to HCAP, and continues to have high oxygen requirements.     --high sedation requirements, continue precedex, versed prn, fentanyl prn  --now hemodynamically stable, continue to hold lopressor  --acute hypoxemic respiratory failure in setting of SCLCa of R lung, COPD and HCAP  s/p thorocentesis with lymphocyte predominant exudate  wean FiO2 as tolerates  --COPD, continue prednisone, bronchdilators, inhaled steroids  --continue TF  --renal function stable  --HCAP, continue zosyn pending Cx data  --SCLCa, s/p etoposide and carboplatin  continue 5d course filgrastim  --pt critically ill. CC time 50min

## 2018-03-20 NOTE — PROGRESS NOTE ADULT - ASSESSMENT
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 She was found to have an anterior, loculated pleural effusion - drained bedside under US guidance 3/18, course complicated by progressive resp failure likely due to HCAP, intubated 3/18.      -NEURO: sedated while intubated, continue Precedex, increase Fentanyl, PRN Percocet for pain.  -CARDIO: Was hypotensive, BP improved, s/p IVF hydration. Continue statin and Lovaza for HLD. Holding Metoprolol in setting of hypotension.   -RESP: Acute respiratory failure with hypoxia, intubated. Continue full vent support, titrating O2 for sats>88-90%, continue PEEP for atelectatic right lung. Continue precedex and fentanyl to 50 to assist with vent synchrony. Pleural fluid exudate, culture and cytology pending. Continue prednisone, albuterol. Continue Chemo (carboplatin, etoposide) as per heme/onc for metastatic SCLC.  -GI: Continue tube feeds via NGT Jevity, continue PO meds via NGT.   -ENDO: Continue Synthroid for hypothyroidism.   -RENAL: stable  -ID: Leucocytosis likely 2/2 to Neupogen vs infection however given high fevers and purulent discharge from ETT, continue on vanc, zosyn for HCAP. WBC trending down.   -HEME/ONC: Continue chemo per heme/onc for metastatic small cell lung cancer. Lovenox for DVT ppx. Hgb 8.1 today, was 10.1 yesterday, likely dilutional after IVF yesterday.    -Prognosis poor, full code 72F PMH bioprosthetic AVR, CAD, COPD (on home O2), HTN, HLD, hypothyroidism, recently diagnosed 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 She was found to have an anterior, loculated pleural effusion - drained bedside under US guidance 3/18, course complicated by progressive resp failure likely due to HCAP, intubated 3/18.      -NEURO: sedated while intubated, continue Precedex, increase Fentanyl, PRN Percocet for pain.  -CARDIO: Was hypotensive, BP improved, s/p IVF hydration. Continue statin and Lovaza for HLD. Holding Metoprolol in setting of hypotension.   -RESP: Acute respiratory failure with hypoxia, intubated. Continue full vent support, titrating O2 for sats>88-90%, continue PEEP for atelectatic right lung. Continue precedex and fentanyl to 50 to assist with vent synchrony. Pleural fluid exudate, culture and cytology pending. Continue prednisone, albuterol. Continue Chemo (carboplatin, etoposide) as per heme/onc for metastatic SCLC.  -GI: Continue tube feeds via NGT Jevity, continue PO meds via NGT.   -ENDO: Continue Synthroid for hypothyroidism.   -RENAL: stable  -ID: Leucocytosis likely 2/2 to Neupogen vs infection however given high fevers and purulent discharge from ETT, continue on vanc, zosyn for HCAP. WBC trending down. Heme/onc team discontinued Nupogen.  -HEME/ONC: Continue chemo per heme/onc for metastatic small cell lung cancer. Heme/onc team discontinued Nupogen. Lovenox for DVT ppx. Hgb 8.1 today, was 10.1 yesterday, likely dilutional after IVF yesterday.    -Prognosis poor, full code 72F PMH bioprosthetic AVR, CAD, COPD (on home O2), HTN, HLD, hypothyroidism, recently diagnosed 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 She was found to have an anterior, loculated pleural effusion - drained bedside under US guidance 3/18, course complicated by progressive resp failure likely due to HCAP, intubated 3/18.      -NEURO: sedated while intubated, keep midazolam q3 - titrate to VIELKA -2 and/or vent synchrony. Continue Precedex, increase Fentanyl.  -CARDIO: Was hypotensive, BP improved, s/p IVF hydration. Bioprosthetic AVR, CAD- Continue statin and Lovaza for HLD. Change ASA to 81 mg Holding Metoprolol in setting of hypotension.   -RESP: Acute respiratory failure with hypoxia, intubated. Continue full vent support, titrating O2 for sats>88-90%, continue PEEP for atelectatic right lung. Try to decrease FiO2 as tolerated. Pleural fluid exudate, culture and cytology pending. Continue prednisone, albuterol. s/p Chemo 3/17 (carboplatin, etoposide) as per heme/onc for metastatic SCLC.  -GI: Continue tube feeds via NGT Jevity, continue PO meds via NGT.   -ENDO: Continue Synthroid for hypothyroidism.   -RENAL: stable  -ID: Leucocytosis likely 2/2 to Neupogen vs infection however given high fevers and purulent discharge from ETT, continue on vanc, zosyn for HCAP. WBC trending down. Heme/onc team discontinued Nupogen.  -HEME/ONC: Continue chemo per heme/onc for metastatic small cell lung cancer. Heme/onc team discontinued Nupogen. Lovenox for DVT ppx. Hgb 8.1 today, was 10.1 yesterday, likely dilutional after IVF yesterday.    -Prognosis poor, full code 72F PMH bioprosthetic AVR, CAD, COPD (on home O2), HTN, HLD, hypothyroidism, recently diagnosed 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 She was found to have an anterior, loculated pleural effusion - drained bedside under US guidance 3/18, course complicated by progressive resp failure likely due to HCAP, intubated 3/18.      -NEURO: sedated while intubated, keep midazolam q3 - titrate to VIELKA -2 and/or vent synchrony. Continue Precedex, increase Fentanyl.  -CARDIO: Was hypotensive, BP improved, s/p IVF hydration. Bioprosthetic AVR, CAD- Continue statin and Lovaza for HLD. Change ASA to 81 mg Holding Metoprolol in setting of hypotension.   -RESP: Acute respiratory failure with hypoxia, intubated. Continue full vent support, titrating O2 for sats>88-90%, continue PEEP for atelectatic right lung. Try to decrease FiO2 as tolerated. Pleural fluid exudate, culture and cytology pending. Continue prednisone, albuterol. s/p Chemo 3/17 (carboplatin, etoposide) as per heme/onc for metastatic SCLC, on growth factor support with Zarexio for a total of 5 days, today is day 3.  -GI: Continue tube feeds via NGT Jevity, continue PO meds via NGT.   -ENDO: Continue Synthroid for hypothyroidism.   -RENAL: stable  -ID: Leucocytosis likely 2/2 to Neupogen vs infection however given high fevers and purulent discharge from ETT, continue Zosyn, discontinue Vanco for HCAP. WBC trending down. Heme/onc team discontinued Nupogen.  -HEME/ONC: Continue chemo per heme/onc for metastatic small cell lung cancer: s/p Chemo 3/17 (carboplatin, etoposide) as per heme/onc for metastatic SCLC, on growth factor support with Zarexio for a total of 5 days, today is day 3.. Heme/onc team discontinued Nupogen. Lovenox for DVT ppx. Hgb 8.1 today, was 10.1 yesterday, likely dilutional after IVF yesterday.    -Prognosis poor, full code

## 2018-03-20 NOTE — PROGRESS NOTE ADULT - SUBJECTIVE AND OBJECTIVE BOX
Patient is a 72y old  Female who presents with a chief complaint of Came to hospital for chemotherapy. In ER SOB, desaturating. PNA, Rt. pleural effusion on CXR. (14 Mar 2018 23:36)      INTERVAL HPI: Pt seen and examined. Unable to obtain history as pt is intubated.  at bedside, pt able to follow commands.     OVERNIGHT EVENTS: none noted  T(F): 99.1 (03-20-18 @ 20:16), Max: 99.1 (03-20-18 @ 16:44)  HR: 91 (03-20-18 @ 20:15) (75 - 101)  BP: 152/70 (03-20-18 @ 19:00) (102/50 - 185/76)  RR: 34 (03-20-18 @ 19:00) (22 - 57)  SpO2: 90% (03-20-18 @ 20:15) (87% - 98%)  I&O's Summary    19 Mar 2018 07:01  -  20 Mar 2018 07:00  --------------------------------------------------------  IN: 1616 mL / OUT: 0 mL / NET: 1616 mL    20 Mar 2018 07:01  -  20 Mar 2018 22:52  --------------------------------------------------------  IN: 594.6 mL / OUT: 0 mL / NET: 594.6 mL        REVIEW OF SYSTEMS: unable as pt currently intubated    PHYSICAL EXAM:  GENERAL: NAD, elder, intubated  HEAD:  Atraumatic, Normocephalic  EYES: EOMI, PERRLA, conjunctiva and sclera clear  ENMT: intubated  NECK: Supple, No JVD,   NERVOUS SYSTEM:  Alert & Oriented X3, Good concentration; Motor Strength 5/5 B/L upper and lower extremities; DTRs 2+ intact and symmetric  CHEST/LUNG: intubated  HEART: Regular rate and rhythm; No murmurs, rubs, or gallops  ABDOMEN: Soft, Nontender, Nondistended; Bowel sounds present  EXTREMITIES:  2+ Peripheral Pulses, No clubbing, cyanosis, or edema  SKIN: No rashes or lesions    LABS:                        8.1    12.2  )-----------( 194      ( 20 Mar 2018 06:05 )             23.3     03-20    134<L>  |  99  |  35<H>  ----------------------------<  151<H>  4.2   |  24  |  0.63    Ca    7.7<L>      20 Mar 2018 06:05  Phos  3.8     03-20  Mg     2.1     03-20    TPro  5.3<L>  /  Alb  2.0<L>  /  TBili  0.5  /  DBili  x   /  AST  129<H>  /  ALT  33  /  AlkPhos  83  03-20        CAPILLARY BLOOD GLUCOSE      POCT Blood Glucose.: 207 mg/dL (20 Mar 2018 17:46)  POCT Blood Glucose.: 230 mg/dL (20 Mar 2018 12:35)  POCT Blood Glucose.: 162 mg/dL (20 Mar 2018 06:48)  POCT Blood Glucose.: 162 mg/dL (19 Mar 2018 23:29)    ABG - ( 19 Mar 2018 05:33 )  pH: 7.41  /  pCO2: 39    /  pO2: 66    / HCO3: 24    / Base Excess: -0.1  /  SaO2: 96                03-19 @ 11:57   No growth to date.  --  --  03-19 @ 11:40   Normal Respiratory Nancy present  --  --  03-19 @ 09:28   No growth to date.  --  --  03-18 @ 22:14   No growth to date.  --  --          MEDICATIONS  (STANDING):  acetylcysteine 10% Inhalation 3 milliLiter(s) Inhalation every 6 hours  aspirin enteric coated 81 milliGRAM(s) Oral daily  atorvastatin 40 milliGRAM(s) Oral at bedtime  buDESOnide   0.5 milliGRAM(s) Respule 0.5 milliGRAM(s) Inhalation two times a day  chlorhexidine 0.12% Liquid 15 milliLiter(s) Swish and Spit two times a day  cholecalciferol 1000 Unit(s) Oral daily  dexmedetomidine Infusion 0.3 MICROgram(s)/kG/Hr (4.65 mL/Hr) IV Continuous <Continuous>  dextrose 5%. 1000 milliLiter(s) (50 mL/Hr) IV Continuous <Continuous>  dextrose 50% Injectable 12.5 Gram(s) IV Push once  dextrose 50% Injectable 25 Gram(s) IV Push once  dextrose 50% Injectable 25 Gram(s) IV Push once  dorzolamide 2%/timolol 0.5% Ophthalmic Solution 1 Drop(s) Both EYES two times a day  enoxaparin Injectable 40 milliGRAM(s) SubCutaneous every 24 hours  filgrastim-sndz Injectable 300 MICROGram(s) SubCutaneous <User Schedule>  gabapentin 300 milliGRAM(s) Oral at bedtime  insulin lispro (HumaLOG) corrective regimen sliding scale   SubCutaneous every 6 hours  latanoprost 0.005% Ophthalmic Solution 1 Drop(s) Both EYES at bedtime  levothyroxine 100 MICROGram(s) Oral daily  multivitamin 1 Tablet(s) Oral daily  omega-3-Acid Ethyl Esters 1 Gram(s) Oral two times a day  pantoprazole   Suspension 40 milliGRAM(s) Oral before breakfast  piperacillin/tazobactam IVPB. 3.375 Gram(s) IV Intermittent every 8 hours  polyethylene glycol 3350 17 Gram(s) Oral two times a day  predniSONE   Tablet 10 milliGRAM(s) Oral daily  pyridoxine 100 milliGRAM(s) Oral daily  senna 2 Tablet(s) Oral at bedtime  zinc oxide 40%/lanolin Ointment 1 Application(s) Topical every 6 hours    MEDICATIONS  (PRN):  dextrose Gel 1 Dose(s) Oral once PRN Blood Glucose LESS THAN 70 milliGRAM(s)/deciliter  fentaNYL    Injectable 50 MICROGram(s) IV Push every 4 hours PRN Agitation or Moderate Pain (4-6)  glucagon  Injectable 1 milliGRAM(s) IntraMuscular once PRN Glucose LESS THAN 70 milligrams/deciliter  lidocaine   Patch 1 Patch Transdermal every 72 hours PRN Neck pain  midazolam Injectable 2 milliGRAM(s) IV Push every 3 hours PRN agitation and/or vent dysynchrony  ondansetron Injectable 4 milliGRAM(s) IV Push every 6 hours PRN Nausea and/or Vomiting

## 2018-03-20 NOTE — PROGRESS NOTE ADULT - ASSESSMENT
71 yo woman diagnosed small cell ca of lung with progressive pulmonary involvement. Started urgent chemotherapy with VP_16 and carboplatin on 03/15/18, Last dose 3/17/18  - remains intubated  - started growth factor support with Zarexio 300mcg daily X5 days for high risk chemo induced neutropenia prophylaxis  - prognosis remains guarded but may show response to chemo over next week  - continue ICU care  - discussed w family, support provided

## 2018-03-20 NOTE — PROGRESS NOTE ADULT - SUBJECTIVE AND OBJECTIVE BOX
Patient is a 72y old  Female who presents with a chief complaint of Came to hospital for chemotherapy. In ER SOB, desaturating. PNA, Rt. pleural effusion on CXR. (14 Mar 2018 23:36)    24 hour events: Pt underwent chemotherapy with last treatment 3/17, intubated 3/18 for worsening hypoxemia.       REVIEW OF SYSTEMS  unable to obtain      T(F): 98 (03-20-18 @ 04:25), Max: 101.1 (03-19-18 @ 20:00)  HR: 75 (03-20-18 @ 07:00) (75 - 111)  BP: 111/55 (03-20-18 @ 07:00) (71/37 - 200/95)  RR: 39 (03-20-18 @ 07:00) (18 - 55)  SpO2: 93% (03-20-18 @ 07:00) (83% - 97%)  Wt(kg): --    Mode: AC/ CMV (Assist Control/ Continuous Mandatory Ventilation), RR (machine): 18, TV (machine): 350, FiO2: 100, PEEP: 12    CAPILLARY BLOOD GLUCOSE      I&O's Summary    03-19 @ 07:01  -  03-20 @ 07:00  --------------------------------------------------------  IN: 1616 mL / OUT: 0 mL / NET: 1616 mL      PHYSICAL EXAM  General: intubated, sedated, on full ventilator support  CNS: sedated, no focal deficits  HEENT: PERRL  Resp: wheezing bilaterally  CVS: S1S2, regular  Abd: soft, NT, +BS  Ext: 2+ edema  Skin: warm    MEDICATIONS  piperacillin/tazobactam IVPB. IV Intermittent  vancomycin  IVPB IV Intermittent  vancomycin  IVPB       atorvastatin Oral  dextrose 50% Injectable IV Push  dextrose 50% Injectable IV Push  dextrose 50% Injectable IV Push  dextrose Gel Oral PRN  glucagon  Injectable IntraMuscular PRN  insulin lispro (HumaLOG) corrective regimen sliding scale SubCutaneous  levothyroxine Oral  predniSONE   Tablet Oral    acetylcysteine 10% Inhalation Inhalation  ALBUTerol/ipratropium for Nebulization Nebulizer  buDESOnide   0.5 milliGRAM(s) Respule Inhalation    dexmedetomidine Infusion IV Continuous  fentaNYL    Injectable IV Push PRN  gabapentin Oral  midazolam Injectable IV Push  midazolam Injectable IV Push PRN  midazolam Injectable IV Push PRN  ondansetron Injectable IV Push PRN      aspirin enteric coated Oral  enoxaparin Injectable SubCutaneous    pantoprazole   Suspension Oral  polyethylene glycol 3350 Oral  senna Oral      cholecalciferol Oral  dextrose 5%. IV Continuous  multivitamin Oral  pyridoxine Oral    filgrastim-sndz Injectable SubCutaneous    chlorhexidine 0.12% Liquid Swish and Spit  dorzolamide 2%/timolol 0.5% Ophthalmic Solution Both EYES  latanoprost 0.005% Ophthalmic Solution Both EYES  lidocaine   Patch Transdermal PRN  zinc oxide 40%/lanolin Ointment Topical    omega-3-Acid Ethyl Esters Oral                          8.1    12.2  )-----------( 194      ( 20 Mar 2018 06:05 )             23.3       03-20    134<L>  |  99  |  35<H>  ----------------------------<  151<H>  4.2   |  24  |  0.63    Ca    7.7<L>      20 Mar 2018 06:05  Phos  3.8     03-20  Mg     2.1     03-20    TPro  5.3<L>  /  Alb  2.0<L>  /  TBili  0.5  /  DBili  x   /  AST  129<H>  /  ALT  33  /  AlkPhos  83  03-20              .Sputum Sputum, trap --   No polymorphonuclear leukocytes per low power field  No Squamous epithelial cells per low power field  No organisms seen per oil power field 03-19 @ 11:40  .Body Fluid Pleural Fluid   No growth to date.   No polymorphonuclear cells seen  No organisms seen  by cytocentrifuge 03-18 @ 22:14        Radiology: ***  Bedside lung ultrasound: ***  Bedside ECHO: ***    CENTRAL LINE: N          NOLAN: N                      A-LINE: N                         GLOBAL ISSUE/BEST PRACTICE  Analgesia: Y  Sedation: Y  CAM-ICU: neg  HOB elevation: yes  Stress ulcer prophylaxis: NA  VTE prophylaxis: Y  Glycemic control: Y  Nutrition: Y    CODE STATUS: full  [facility] discussion: Y Patient is a 72y old  Female who presents with a chief complaint of Came to hospital for chemotherapy. In ER SOB, desaturating. PNA, Rt. pleural effusion on CXR. (14 Mar 2018 23:36)    24 hour events: Pt underwent chemotherapy with last treatment 3/17, intubated 3/18 for worsening hypoxemia. Last temp 101 at 8 PM last night.      REVIEW OF SYSTEMS  unable to obtain      T(F): 98 (03-20-18 @ 04:25), Max: 101.1 (03-19-18 @ 20:00)  HR: 75 (03-20-18 @ 07:00) (75 - 111)  BP: 111/55 (03-20-18 @ 07:00) (71/37 - 200/95)  RR: 39 (03-20-18 @ 07:00) (18 - 55)  SpO2: 93% (03-20-18 @ 07:00) (83% - 97%)  Wt(kg): --    Mode: AC/ CMV (Assist Control/ Continuous Mandatory Ventilation), RR (machine): 18, TV (machine): 350, FiO2: 100, PEEP: 12    CAPILLARY BLOOD GLUCOSE      I&O's Summary    03-19 @ 07:01  -  03-20 @ 07:00  --------------------------------------------------------  IN: 1616 mL / OUT: 0 mL / NET: 1616 mL      PHYSICAL EXAM  General: intubated, sedated, on full ventilator support  CNS: sedated, no focal deficits  HEENT: PERRL  Resp: wheezing bilaterally  CVS: S1S2, regular  Abd: soft, NT, +BS  Ext: 2+ edema  Skin: warm    MEDICATIONS  piperacillin/tazobactam IVPB. IV Intermittent  vancomycin  IVPB IV Intermittent  vancomycin  IVPB       atorvastatin Oral  dextrose 50% Injectable IV Push  dextrose 50% Injectable IV Push  dextrose 50% Injectable IV Push  dextrose Gel Oral PRN  glucagon  Injectable IntraMuscular PRN  insulin lispro (HumaLOG) corrective regimen sliding scale SubCutaneous  levothyroxine Oral  predniSONE   Tablet Oral    acetylcysteine 10% Inhalation Inhalation  ALBUTerol/ipratropium for Nebulization Nebulizer  buDESOnide   0.5 milliGRAM(s) Respule Inhalation    dexmedetomidine Infusion IV Continuous  fentaNYL    Injectable IV Push PRN  gabapentin Oral  midazolam Injectable IV Push  midazolam Injectable IV Push PRN  midazolam Injectable IV Push PRN  ondansetron Injectable IV Push PRN      aspirin enteric coated Oral  enoxaparin Injectable SubCutaneous    pantoprazole   Suspension Oral  polyethylene glycol 3350 Oral  senna Oral      cholecalciferol Oral  dextrose 5%. IV Continuous  multivitamin Oral  pyridoxine Oral    filgrastim-sndz Injectable SubCutaneous    chlorhexidine 0.12% Liquid Swish and Spit  dorzolamide 2%/timolol 0.5% Ophthalmic Solution Both EYES  latanoprost 0.005% Ophthalmic Solution Both EYES  lidocaine   Patch Transdermal PRN  zinc oxide 40%/lanolin Ointment Topical    omega-3-Acid Ethyl Esters Oral                          8.1    12.2  )-----------( 194      ( 20 Mar 2018 06:05 )             23.3       03-20    134<L>  |  99  |  35<H>  ----------------------------<  151<H>  4.2   |  24  |  0.63    Ca    7.7<L>      20 Mar 2018 06:05  Phos  3.8     03-20  Mg     2.1     03-20    TPro  5.3<L>  /  Alb  2.0<L>  /  TBili  0.5  /  DBili  x   /  AST  129<H>  /  ALT  33  /  AlkPhos  83  03-20              .Sputum Sputum, trap --   No polymorphonuclear leukocytes per low power field  No Squamous epithelial cells per low power field  No organisms seen per oil power field 03-19 @ 11:40  .Body Fluid Pleural Fluid   No growth to date.   No polymorphonuclear cells seen  No organisms seen  by cytocentrifuge 03-18 @ 22:14        Radiology: ***  Bedside lung ultrasound: 3/19 right sided consolidation/mass, left trace pleural effusion  Bedside ECHO: ***    CENTRAL LINE: N          NOLAN: N                      A-LINE: N                         GLOBAL ISSUE/BEST PRACTICE  Analgesia: Y  Sedation: Y  CAM-ICU: neg  HOB elevation: yes  Stress ulcer prophylaxis: NA  VTE prophylaxis: Y  Glycemic control: Y  Nutrition: Y    CODE STATUS: full  [facility] discussion: Y Patient is a 72y old  Female who presents with a chief complaint of Came to hospital for chemotherapy. In ER SOB, desaturating. PNA, Rt. pleural effusion on CXR. (14 Mar 2018 23:36)    24 hour events: Last temp 101 at 8 PM last night.      REVIEW OF SYSTEMS  unable to obtain      T(F): 98 (03-20-18 @ 04:25), Max: 101.1 (03-19-18 @ 20:00)  HR: 75 (03-20-18 @ 07:00) (75 - 111)  BP: 111/55 (03-20-18 @ 07:00) (71/37 - 200/95)  RR: 39 (03-20-18 @ 07:00) (18 - 55)  SpO2: 93% (03-20-18 @ 07:00) (83% - 97%)  Wt(kg): --    Mode: AC/ CMV (Assist Control/ Continuous Mandatory Ventilation), RR (machine): 18, TV (machine): 350, FiO2: 100, PEEP: 12    CAPILLARY BLOOD GLUCOSE      I&O's Summary    03-19 @ 07:01  -  03-20 @ 07:00  --------------------------------------------------------  IN: 1616 mL / OUT: 0 mL / NET: 1616 mL      PHYSICAL EXAM  General: intubated, sedated, on full ventilator support  CNS: sedated, no focal deficits  HEENT: PERRL  Resp: wheezing bilaterally  CVS: S1S2, regular  Abd: soft, NT, +BS  Ext: 2+ edema  Skin: warm    MEDICATIONS  piperacillin/tazobactam IVPB. IV Intermittent  vancomycin  IVPB IV Intermittent  vancomycin  IVPB       atorvastatin Oral  dextrose 50% Injectable IV Push  dextrose 50% Injectable IV Push  dextrose 50% Injectable IV Push  dextrose Gel Oral PRN  glucagon  Injectable IntraMuscular PRN  insulin lispro (HumaLOG) corrective regimen sliding scale SubCutaneous  levothyroxine Oral  predniSONE   Tablet Oral    acetylcysteine 10% Inhalation Inhalation  ALBUTerol/ipratropium for Nebulization Nebulizer  buDESOnide   0.5 milliGRAM(s) Respule Inhalation    dexmedetomidine Infusion IV Continuous  fentaNYL    Injectable IV Push PRN  gabapentin Oral  midazolam Injectable IV Push  midazolam Injectable IV Push PRN  midazolam Injectable IV Push PRN  ondansetron Injectable IV Push PRN      aspirin enteric coated Oral  enoxaparin Injectable SubCutaneous    pantoprazole   Suspension Oral  polyethylene glycol 3350 Oral  senna Oral      cholecalciferol Oral  dextrose 5%. IV Continuous  multivitamin Oral  pyridoxine Oral    filgrastim-sndz Injectable SubCutaneous    chlorhexidine 0.12% Liquid Swish and Spit  dorzolamide 2%/timolol 0.5% Ophthalmic Solution Both EYES  latanoprost 0.005% Ophthalmic Solution Both EYES  lidocaine   Patch Transdermal PRN  zinc oxide 40%/lanolin Ointment Topical    omega-3-Acid Ethyl Esters Oral                          8.1    12.2  )-----------( 194      ( 20 Mar 2018 06:05 )             23.3       03-20    134<L>  |  99  |  35<H>  ----------------------------<  151<H>  4.2   |  24  |  0.63    Ca    7.7<L>      20 Mar 2018 06:05  Phos  3.8     03-20  Mg     2.1     03-20    TPro  5.3<L>  /  Alb  2.0<L>  /  TBili  0.5  /  DBili  x   /  AST  129<H>  /  ALT  33  /  AlkPhos  83  03-20        .Sputum Sputum, trap --   No polymorphonuclear leukocytes per low power field  No Squamous epithelial cells per low power field  No organisms seen per oil power field 03-19 @ 11:40    .Body Fluid Pleural Fluid   No growth to date.   No polymorphonuclear cells seen  No organisms seen  by cytocentrifuge 03-18 @ 22:14        CENTRAL LINE: N          NOLAN: N                      A-LINE: N                         GLOBAL ISSUE/BEST PRACTICE  Analgesia: Y  Sedation: Y  CAM-ICU: neg  HOB elevation: yes  Stress ulcer prophylaxis: NA  VTE prophylaxis: Y  Glycemic control: Y  Nutrition: Y    CODE STATUS: full

## 2018-03-20 NOTE — PROGRESS NOTE ADULT - SUBJECTIVE AND OBJECTIVE BOX
Patient is a 72y old  Female who presents with a chief complaint of Came to hospital for chemotherapy. In ER SOB, desaturating. PNA, Rt. pleural effusion on CXR. (14 Mar 2018 23:36)    PAST MEDICAL & SURGICAL HISTORY:  Liver lesion  Clostridium difficile colitis  Emphysema  Pneumonia: 2/2018  Generalized intra-abdominal and pelvic swelling, mass and lump  Glaucoma  Aortic valve prosthesis present  CAD (coronary artery disease)  Arthritis  Heart murmur  Other iron deficiency anemia  PVD (peripheral vascular disease)  Hyperlipemia  Hypothyroid  Hypertension  H/O foot surgery: (Right foot, 2010)  History of colonoscopy  S/P carpal tunnel release: (Right, 1/2017)  H/O carotid endarterectomy: Both sides 2002  Aortic valve replaced: 2011 with bovine  Carotid disease, bilateral  Cataract extraction status of left eye  Cataract extraction status of right eye    GENIE MOE   72y    Female    BRIEF HOSPITAL COURSE:    72F with PMHx of emphysema ex smoker  CAD, sp AVR (bioprosthetic), HTN, HLD who was recently dx'd with SCCA ([facility] by Dr Galo EB) with ? endobronchial lesion who presented to ED for SOB and need for chemotherapy by oncology.  While in ED pt with progressive hypoxemia requiring the placement of HFNC.  Pt underwent chemotherapy with last treatment 3/17.      intubated 3/18 for worsening hypoxemia         Review of Systems:    UATO vented sedated                ICU Vital Signs Last 24 Hrs  T(C): 36.8 (19 Mar 2018 23:43), Max: 38.5 (19 Mar 2018 05:10)  T(F): 98.3 (19 Mar 2018 2:43), Max: 101.3 (19 Mar 2018 05:10)  HR: 82 (20 Mar 2018 00:00) (80 - 111)  BP: 109/52 (20 Mar 201 00:00) (71/37 - 200/95)  BP(mean): 75 (20 Mar 2018 00:00) (49 - 137)  ABP: --  ABP(mean): --  RR: 23 (20 Mar 2018 00:00) (18 - 55)  SpO2: 93% (20 Mar 2018 00:00) (83% - 98%)    Physical Examination:    General: Calm not on vent dyssynchronous earlier.     HEENT: no JVD    PULM: bilateral BS     CVS:  s1 s2 reg    ABD:  soft nt    EXT: + edema     SKIN:  warm    Neuro: calm moves 4    ABG - ( 19 Mar 2018 05:33 )  pH: 7.41  /  pCO2: 39    /  pO2: 66    / HCO3: 24    / Base Excess: -0.1  /  SaO2: 96                Mode: AC/ CMV (Assist Control/ Continuous Mandatory Ventilation)  RR (machine): 18  TV (machine): 350  FiO2: 90  PEEP: 12  ITime: 1  MAP: 10  PIP: 18    Mode: AC/ CMV (Assist Control/ Continuous Mandatory Ventilation), RR (machine): 18, TV (machine): 350, FiO2: 90, PEEP: 12, ITime: 1, MAP: 10, PIP: 18  LABS:                        10.1   20.4  )-----------( 239      ( 19 Mar 2018 06:35 )             29.8     03-19    132<L>  |  95<L>  |  25<H>  ----------------------------<  50<L>  4.6   |  26  |  0.58    Ca    8.2<L>      19 Mar 2018 06:35  Phos  3.8     03-19  Mg     1.7     03-19    TPro  5.7<L>  /  Alb  2.2<L>  /  TBili  0.7  /  DBili  x   /  AST  133<H>  /  ALT  32  /  AlkPhos  74  03-19          CAPILLARY BLOOD GLUCOSE      POCT Blood Glucose.: 162 mg/dL (19 Mar 2018 23:29)      PT/INR - ( 18 Mar 2018 04:58 )   PT: 11.8 sec;   INR: 1.08 ratio         PTT - ( 18 Mar 2018 04:58 )  PTT:27.5 sec    CULTURES:  Culture Results:   No growth to date. (03-18 @ 22:14)  Culture Results:   No growth at 5 days. (03-14 @ 21:18)  Culture Results:   No growth at 5 days. (03-14 @ 21:18)      Medications:  piperacillin/tazobactam IVPB. 3.375 Gram(s) IV Intermittent every 8 hours  vancomycin  IVPB 1000 milliGRAM(s) IV Intermittent every 12 hours  vancomycin  IVPB      acetylcysteine 10% Inhalation 3 milliLiter(s) Inhalation every 6 hours  ALBUTerol/ipratropium for Nebulization 3 milliLiter(s) Nebulizer every 6 hours  buDESOnide   0.5 milliGRAM(s) Respule 0.5 milliGRAM(s) Inhalation two times a day  dexmedetomidine Infusion 0.3 MICROgram(s)/kG/Hr IV Continuous <Continuous>  fentaNYL    Injectable 50 MICROGram(s) IV Push every 4 hours PRN  gabapentin 300 milliGRAM(s) Oral at bedtime  midazolam Injectable 2 milliGRAM(s) IV Push once  midazolam Injectable 2 milliGRAM(s) IV Push every 4 hours PRN  midazolam Injectable 2 milliGRAM(s) IV Push every 3 hours PRN  ondansetron Injectable 4 milliGRAM(s) IV Push every 6 hours PRN  aspirin enteric coated 162 milliGRAM(s) Oral daily  enoxaparin Injectable 40 milliGRAM(s) SubCutaneous every 24 hours  pantoprazole   Suspension 40 milliGRAM(s) Oral before breakfast  polyethylene glycol 3350 17 Gram(s) Oral two times a day  senna 2 Tablet(s) Oral at bedtime  atorvastatin 40 milliGRAM(s) Oral at bedtime  dextrose 50% Injectable 12.5 Gram(s) IV Push once  dextrose 50% Injectable 25 Gram(s) IV Push once  dextrose 50% Injectable 25 Gram(s) IV Push once  dextrose Gel 1 Dose(s) Oral once PRN  glucagon  Injectable 1 milliGRAM(s) IntraMuscular once PRN  insulin lispro (HumaLOG) corrective regimen sliding scale   SubCutaneous every 6 hours  levothyroxine 100 MICROGram(s) Oral daily  predniSONE   Tablet 10 milliGRAM(s) Oral daily  cholecalciferol 1000 Unit(s) Oral daily  dextrose 5%. 1000 milliLiter(s) IV Continuous <Continuous>  multivitamin 1 Tablet(s) Oral daily  pyridoxine 100 milliGRAM(s) Oral daily  filgrastim-sndz Injectable 300 MICROGram(s) SubCutaneous <User Schedule>  chlorhexidine 0.12% Liquid 15 milliLiter(s) Swish and Spit two times a day  dorzolamide 2%/timolol 0.5% Ophthalmic Solution 1 Drop(s) Both EYES two times a day  latanoprost 0.005% Ophthalmic Solution 1 Drop(s) Both EYES at bedtime  lidocaine   Patch 1 Patch Transdermal every 72 hours PRN  omega-3-Acid Ethyl Esters 1 Gram(s) Oral two times a day          03-18 @ 07:01  -  03-19 @ 07:00  --------------------------------------------------------  IN: 70 mL / OUT: 701 mL / NET: -631 mL    03-19 @ 07:01  -  03-20 @ 02:09  --------------------------------------------------------  IN: 622 mL / OUT: 0 mL / NET: 622 mL        RADIOLOGY/IMAGING/ECHO  < from: CT Chest No Cont (02.06.18 @ 09:55) >  Impression:    Multifocal pneumonia right lung. Small right pleural effusion. Please   image to resolution.  Mediastinal and probable bilateral hilar adenopathy.          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 She was found to have an anterior, loculated pleural effusion - drained bedside under US guidance 3/18 exudate  Worsening hypoxeic resp failure,  Failed BIPAP intubated 3/18.    Hypotensive earlier.  Sedation related.  Rx for sepsis as bandemic.  HCAP      Neuro  Calm now on precedex and PRN benzo/narcotic  Cor   HD stable now  Pulm  significant AA gradient 90% 12 peep/   LLTV  no weaning for now.  Follow pleural cytology.    Gi  feed to goal   Renal  no issues.    Heme/DVT  Lovenox  ID  was febrile on ABX for HCAP  post obstructive PNA?   Vanco zosyn.   WBC > 20 d/c Neupogen  ?? onc to decide   Endo.  Glucose OK    prednisone   Lines/tubes PIV      -PX poor d/w patient  earlier.  For now active care.  If no progressive improvement,  Compassionate extubation.         Minutes of Critical Care time: 33  (Reviewing data, imaging, discussing with multidisciplinary team, non inclusive of procedures, discussing goals of care with patient/family)

## 2018-03-20 NOTE — PROGRESS NOTE ADULT - ASSESSMENT
73 yo female with Lung CA now with fever, leukocytosis, bandemia, purulent sputum and requiring mech ventilation

## 2018-03-21 LAB
ALBUMIN SERPL ELPH-MCNC: 1.9 G/DL — LOW (ref 3.3–5)
ALP SERPL-CCNC: 93 U/L — SIGNIFICANT CHANGE UP (ref 40–120)
ALT FLD-CCNC: 32 U/L — SIGNIFICANT CHANGE UP (ref 12–78)
ANION GAP SERPL CALC-SCNC: 12 MMOL/L — SIGNIFICANT CHANGE UP (ref 5–17)
AST SERPL-CCNC: 100 U/L — HIGH (ref 15–37)
BILIRUB SERPL-MCNC: 0.4 MG/DL — SIGNIFICANT CHANGE UP (ref 0.2–1.2)
BUN SERPL-MCNC: 44 MG/DL — HIGH (ref 7–23)
CALCIUM SERPL-MCNC: 7.6 MG/DL — LOW (ref 8.5–10.1)
CHLORIDE SERPL-SCNC: 102 MMOL/L — SIGNIFICANT CHANGE UP (ref 96–108)
CO2 SERPL-SCNC: 23 MMOL/L — SIGNIFICANT CHANGE UP (ref 22–31)
CREAT SERPL-MCNC: 0.79 MG/DL — SIGNIFICANT CHANGE UP (ref 0.5–1.3)
CULTURE RESULTS: SIGNIFICANT CHANGE UP
EOSINOPHIL NFR BLD AUTO: 1 % — SIGNIFICANT CHANGE UP (ref 0–6)
GLUCOSE SERPL-MCNC: 160 MG/DL — HIGH (ref 70–99)
HCT VFR BLD CALC: 24.1 % — LOW (ref 34.5–45)
HGB BLD-MCNC: 8 G/DL — LOW (ref 11.5–15.5)
LYMPHOCYTES # BLD AUTO: 6 % — LOW (ref 13–44)
MAGNESIUM SERPL-MCNC: 2.4 MG/DL — SIGNIFICANT CHANGE UP (ref 1.6–2.6)
MCHC RBC-ENTMCNC: 29.9 PG — SIGNIFICANT CHANGE UP (ref 27–34)
MCHC RBC-ENTMCNC: 33.3 GM/DL — SIGNIFICANT CHANGE UP (ref 32–36)
MCV RBC AUTO: 89.8 FL — SIGNIFICANT CHANGE UP (ref 80–100)
NEUTROPHILS NFR BLD AUTO: 87 % — HIGH (ref 43–77)
PHOSPHATE SERPL-MCNC: 4.5 MG/DL — SIGNIFICANT CHANGE UP (ref 2.5–4.5)
PLATELET # BLD AUTO: 173 K/UL — SIGNIFICANT CHANGE UP (ref 150–400)
POTASSIUM SERPL-MCNC: 4.2 MMOL/L — SIGNIFICANT CHANGE UP (ref 3.5–5.3)
POTASSIUM SERPL-SCNC: 4.2 MMOL/L — SIGNIFICANT CHANGE UP (ref 3.5–5.3)
PROT SERPL-MCNC: 5.3 G/DL — LOW (ref 6–8.3)
RBC # BLD: 2.69 M/UL — LOW (ref 3.8–5.2)
RBC # FLD: 13.4 % — SIGNIFICANT CHANGE UP (ref 10.3–14.5)
SODIUM SERPL-SCNC: 137 MMOL/L — SIGNIFICANT CHANGE UP (ref 135–145)
SPECIMEN SOURCE: SIGNIFICANT CHANGE UP
WBC # BLD: 7.6 K/UL — SIGNIFICANT CHANGE UP (ref 3.8–10.5)
WBC # FLD AUTO: 7.6 K/UL — SIGNIFICANT CHANGE UP (ref 3.8–10.5)

## 2018-03-21 PROCEDURE — 71045 X-RAY EXAM CHEST 1 VIEW: CPT | Mod: 26

## 2018-03-21 PROCEDURE — 99291 CRITICAL CARE FIRST HOUR: CPT

## 2018-03-21 PROCEDURE — 99292 CRITICAL CARE ADDL 30 MIN: CPT

## 2018-03-21 PROCEDURE — 99233 SBSQ HOSP IP/OBS HIGH 50: CPT

## 2018-03-21 RX ORDER — ALBUTEROL 90 UG/1
4 AEROSOL, METERED ORAL EVERY 6 HOURS
Qty: 0 | Refills: 0 | Status: DISCONTINUED | OUTPATIENT
Start: 2018-03-21 | End: 2018-03-21

## 2018-03-21 RX ORDER — PHENYLEPHRINE HYDROCHLORIDE 10 MG/ML
2 INJECTION INTRAVENOUS
Qty: 80 | Refills: 0 | Status: DISCONTINUED | OUTPATIENT
Start: 2018-03-21 | End: 2018-03-23

## 2018-03-21 RX ORDER — FENTANYL CITRATE 50 UG/ML
50 INJECTION INTRAVENOUS ONCE
Qty: 0 | Refills: 0 | Status: DISCONTINUED | OUTPATIENT
Start: 2018-03-21 | End: 2018-03-21

## 2018-03-21 RX ORDER — FENTANYL CITRATE 50 UG/ML
1 INJECTION INTRAVENOUS
Qty: 2500 | Refills: 0 | Status: DISCONTINUED | OUTPATIENT
Start: 2018-03-21 | End: 2018-03-21

## 2018-03-21 RX ORDER — HYDROCORTISONE 20 MG
100 TABLET ORAL EVERY 8 HOURS
Qty: 0 | Refills: 0 | Status: DISCONTINUED | OUTPATIENT
Start: 2018-03-21 | End: 2018-03-23

## 2018-03-21 RX ORDER — FUROSEMIDE 40 MG
40 TABLET ORAL ONCE
Qty: 0 | Refills: 0 | Status: COMPLETED | OUTPATIENT
Start: 2018-03-21 | End: 2018-03-21

## 2018-03-21 RX ORDER — FENTANYL CITRATE 50 UG/ML
1 INJECTION INTRAVENOUS
Qty: 2500 | Refills: 0 | Status: DISCONTINUED | OUTPATIENT
Start: 2018-03-21 | End: 2018-03-23

## 2018-03-21 RX ORDER — FILGRASTIM 480MCG/1.6
300 VIAL (ML) INJECTION
Qty: 0 | Refills: 0 | Status: DISCONTINUED | OUTPATIENT
Start: 2018-03-21 | End: 2018-03-21

## 2018-03-21 RX ORDER — SODIUM CHLORIDE 9 MG/ML
1000 INJECTION, SOLUTION INTRAVENOUS
Qty: 0 | Refills: 0 | Status: DISCONTINUED | OUTPATIENT
Start: 2018-03-21 | End: 2018-03-21

## 2018-03-21 RX ORDER — IPRATROPIUM/ALBUTEROL SULFATE 18-103MCG
3 AEROSOL WITH ADAPTER (GRAM) INHALATION EVERY 6 HOURS
Qty: 0 | Refills: 0 | Status: DISCONTINUED | OUTPATIENT
Start: 2018-03-21 | End: 2018-03-23

## 2018-03-21 RX ORDER — SODIUM CHLORIDE 9 MG/ML
1000 INJECTION, SOLUTION INTRAVENOUS
Qty: 0 | Refills: 0 | Status: DISCONTINUED | OUTPATIENT
Start: 2018-03-21 | End: 2018-03-23

## 2018-03-21 RX ADMIN — DORZOLAMIDE HYDROCHLORIDE TIMOLOL MALEATE 1 DROP(S): 20; 5 SOLUTION/ DROPS OPHTHALMIC at 05:11

## 2018-03-21 RX ADMIN — ZINC OXIDE 1 APPLICATION(S): 200 OINTMENT TOPICAL at 12:20

## 2018-03-21 RX ADMIN — MIDAZOLAM HYDROCHLORIDE 2 MILLIGRAM(S): 1 INJECTION, SOLUTION INTRAMUSCULAR; INTRAVENOUS at 15:21

## 2018-03-21 RX ADMIN — Medication 100 MILLIGRAM(S): at 22:53

## 2018-03-21 RX ADMIN — SENNA PLUS 2 TABLET(S): 8.6 TABLET ORAL at 22:06

## 2018-03-21 RX ADMIN — Medication 0.5 MILLIGRAM(S): at 07:51

## 2018-03-21 RX ADMIN — Medication 3 MILLILITER(S): at 02:19

## 2018-03-21 RX ADMIN — ZINC OXIDE 1 APPLICATION(S): 200 OINTMENT TOPICAL at 17:55

## 2018-03-21 RX ADMIN — PIPERACILLIN AND TAZOBACTAM 25 GRAM(S): 4; .5 INJECTION, POWDER, LYOPHILIZED, FOR SOLUTION INTRAVENOUS at 05:12

## 2018-03-21 RX ADMIN — FENTANYL CITRATE 50 MICROGRAM(S): 50 INJECTION INTRAVENOUS at 03:10

## 2018-03-21 RX ADMIN — Medication 81 MILLIGRAM(S): at 12:55

## 2018-03-21 RX ADMIN — LATANOPROST 1 DROP(S): 0.05 SOLUTION/ DROPS OPHTHALMIC; TOPICAL at 22:34

## 2018-03-21 RX ADMIN — DEXMEDETOMIDINE HYDROCHLORIDE IN 0.9% SODIUM CHLORIDE 4.65 MICROGRAM(S)/KG/HR: 4 INJECTION INTRAVENOUS at 14:09

## 2018-03-21 RX ADMIN — Medication 3 MILLILITER(S): at 19:19

## 2018-03-21 RX ADMIN — DORZOLAMIDE HYDROCHLORIDE TIMOLOL MALEATE 1 DROP(S): 20; 5 SOLUTION/ DROPS OPHTHALMIC at 17:56

## 2018-03-21 RX ADMIN — Medication 3 MILLILITER(S): at 14:10

## 2018-03-21 RX ADMIN — Medication 40 MILLIGRAM(S): at 15:21

## 2018-03-21 RX ADMIN — ATORVASTATIN CALCIUM 40 MILLIGRAM(S): 80 TABLET, FILM COATED ORAL at 22:06

## 2018-03-21 RX ADMIN — PIPERACILLIN AND TAZOBACTAM 25 GRAM(S): 4; .5 INJECTION, POWDER, LYOPHILIZED, FOR SOLUTION INTRAVENOUS at 22:08

## 2018-03-21 RX ADMIN — Medication 3: at 23:37

## 2018-03-21 RX ADMIN — Medication 100 MICROGRAM(S): at 05:17

## 2018-03-21 RX ADMIN — CHLORHEXIDINE GLUCONATE 15 MILLILITER(S): 213 SOLUTION TOPICAL at 17:54

## 2018-03-21 RX ADMIN — DEXMEDETOMIDINE HYDROCHLORIDE IN 0.9% SODIUM CHLORIDE 4.65 MICROGRAM(S)/KG/HR: 4 INJECTION INTRAVENOUS at 18:15

## 2018-03-21 RX ADMIN — MIDAZOLAM HYDROCHLORIDE 2 MILLIGRAM(S): 1 INJECTION, SOLUTION INTRAMUSCULAR; INTRAVENOUS at 01:30

## 2018-03-21 RX ADMIN — Medication 0.5 MILLIGRAM(S): at 19:19

## 2018-03-21 RX ADMIN — ZINC OXIDE 1 APPLICATION(S): 200 OINTMENT TOPICAL at 05:12

## 2018-03-21 RX ADMIN — CHLORHEXIDINE GLUCONATE 15 MILLILITER(S): 213 SOLUTION TOPICAL at 05:11

## 2018-03-21 RX ADMIN — ALBUTEROL 4 PUFF(S): 90 AEROSOL, METERED ORAL at 02:19

## 2018-03-21 RX ADMIN — Medication 1: at 06:29

## 2018-03-21 RX ADMIN — ENOXAPARIN SODIUM 40 MILLIGRAM(S): 100 INJECTION SUBCUTANEOUS at 12:54

## 2018-03-21 RX ADMIN — ALBUTEROL 4 PUFF(S): 90 AEROSOL, METERED ORAL at 07:52

## 2018-03-21 RX ADMIN — Medication 1: at 18:23

## 2018-03-21 RX ADMIN — PHENYLEPHRINE HYDROCHLORIDE 23.25 MICROGRAM(S)/KG/MIN: 10 INJECTION INTRAVENOUS at 09:01

## 2018-03-21 RX ADMIN — Medication 1 GRAM(S): at 05:18

## 2018-03-21 RX ADMIN — Medication 1 TABLET(S): at 12:55

## 2018-03-21 RX ADMIN — FENTANYL CITRATE 6.2 MICROGRAM(S)/KG/HR: 50 INJECTION INTRAVENOUS at 21:25

## 2018-03-21 RX ADMIN — FENTANYL CITRATE 50 MICROGRAM(S): 50 INJECTION INTRAVENOUS at 03:25

## 2018-03-21 RX ADMIN — Medication 100 MILLIGRAM(S): at 12:55

## 2018-03-21 RX ADMIN — DEXMEDETOMIDINE HYDROCHLORIDE IN 0.9% SODIUM CHLORIDE 4.65 MICROGRAM(S)/KG/HR: 4 INJECTION INTRAVENOUS at 05:11

## 2018-03-21 RX ADMIN — PANTOPRAZOLE SODIUM 40 MILLIGRAM(S): 20 TABLET, DELAYED RELEASE ORAL at 06:26

## 2018-03-21 RX ADMIN — Medication 10 MILLIGRAM(S): at 05:17

## 2018-03-21 RX ADMIN — Medication 1000 UNIT(S): at 12:55

## 2018-03-21 RX ADMIN — Medication 3 MILLILITER(S): at 07:52

## 2018-03-21 RX ADMIN — Medication 100 MILLIGRAM(S): at 10:56

## 2018-03-21 RX ADMIN — ZINC OXIDE 1 APPLICATION(S): 200 OINTMENT TOPICAL at 22:36

## 2018-03-21 RX ADMIN — FENTANYL CITRATE 50 MICROGRAM(S): 50 INJECTION INTRAVENOUS at 00:15

## 2018-03-21 RX ADMIN — POLYETHYLENE GLYCOL 3350 17 GRAM(S): 17 POWDER, FOR SOLUTION ORAL at 17:54

## 2018-03-21 RX ADMIN — MIDAZOLAM HYDROCHLORIDE 2 MILLIGRAM(S): 1 INJECTION, SOLUTION INTRAMUSCULAR; INTRAVENOUS at 04:43

## 2018-03-21 RX ADMIN — GABAPENTIN 300 MILLIGRAM(S): 400 CAPSULE ORAL at 22:06

## 2018-03-21 RX ADMIN — PIPERACILLIN AND TAZOBACTAM 25 GRAM(S): 4; .5 INJECTION, POWDER, LYOPHILIZED, FOR SOLUTION INTRAVENOUS at 14:09

## 2018-03-21 NOTE — PROGRESS NOTE ADULT - ATTENDING COMMENTS
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  Progressive resp failure likely due to HCAP, and continues to require maximal support from vent.     --high sedation requirements, continue fentanyl gtt, precedex gtt and versed prn  --shock, ?septic v adrenal   on low dose phenylephrine  stress dose steroids  --CAD, continue ASA, statin, fish oil  --acute hypoxemic respiratory failure in setting of SCLCa of R lung, COPD and HCAP  s/p R thorocentesis with lymphocyte predominant exudate  requiring maximal support from vent, on FiO2 100, not responsive to trial of high dose PEEP (20) so lowered to 7  unable to do thorocentesis of L pleural fluid due to relatively small size, no adequate window despite repositioning and overall high risk  trial of lasix in case pulm edema contributing to hypoxemia  --COPD, continue bronchdilators, inhaled steroids  --continue TF  --renal function stable  --HCAP, continue zosyn for 5d course  --SCLCa, s/p etoposide and carboplatin, no need for filgrastim now, will d/c  at risk for tumor lysis, check daily tumor lysis labs  --discussed extensively with family regarding prognosis, rationale for not attempting L thorocentesis.  I recommend DNR and family is considering.  Discussed that if she continues to deteriorate we will need to consider palliative care.    --pt critically ill. CC time 90min

## 2018-03-21 NOTE — PROGRESS NOTE ADULT - SUBJECTIVE AND OBJECTIVE BOX
Patient is a 72y old  Female who presents with a chief complaint of Came to hospital for chemotherapy. In ER SOB, desaturating. PNA, Rt. pleural effusion on CXR. (14 Mar 2018 23:36)    PAST MEDICAL & SURGICAL HISTORY:  Liver lesion  Clostridium difficile colitis  Emphysema  Pneumonia: 2/2018  Generalized intra-abdominal and pelvic swelling, mass and lump  Glaucoma  Aortic valve prosthesis present  CAD (coronary artery disease)  Arthritis  Heart murmur  Other iron deficiency anemia  PVD (peripheral vascular disease)  Hyperlipemia  Hypothyroid  Hypertension  H/O foot surgery: (Right foot, 2010)  History of colonoscopy  S/P carpal tunnel release: (Right, 1/2017)  H/O carotid endarterectomy: Both sides 2002  Aortic valve replaced: 2011 with bovine  Carotid disease, bilateral  Cataract extraction status of left eye  Cataract extraction status of right eye    GENIE MOE   72y    Female    BRIEF HOSPITAL COURSE:    Review of Systems:     UATO sedate                  ll other ROS are negative.    ICU Vital Signs Last 24 Hrs  T(C): 37 (21 Mar 2018 00:40), Max: 37.3 (20 Mar 2018 16:44)  T(F): 98.6 (21 Mar 2018 00:40), Max: 99.1 (20 Mar 2018 16:44)  HR: 85 (21 Mar 2018 02:31) (75 - 101)  BP: 94/51 (21 Mar 2018 02:00) (94/51 - 185/76)  BP(mean): 67 (21 Mar 2018 02:00) (67 - 114)  ABP: --  ABP(mean): --  RR: 39 (21 Mar 2018 02:00) (22 - 57)  SpO2: 88% (21 Mar 2018 02:31) (87% - 98%)    Physical Examination:    General:     HEENT:     PULM:     CVS:     ABD:     EXT:     SKIN:     Neuro:    ABG - ( 19 Mar 2018 05:33 )  pH: 7.41  /  pCO2: 39    /  pO2: 66    / HCO3: 24    / Base Excess: -0.1  /  SaO2: 96                Mode: AC/ CMV (Assist Control/ Continuous Mandatory Ventilation)  RR (machine): 18  TV (machine): 350  FiO2: 100  PEEP: 12  ITime: 1  MAP: 16  PIP: 22    Mode: AC/ CMV (Assist Control/ Continuous Mandatory Ventilation), RR (machine): 18, TV (machine): 350, FiO2: 100, PEEP: 12, ITime: 1, MAP: 16, PIP: 22  LABS:                        8.1    12.2  )-----------( 194      ( 20 Mar 2018 06:05 )             23.3     03-20    134<L>  |  99  |  35<H>  ----------------------------<  151<H>  4.2   |  24  |  0.63    Ca    7.7<L>      20 Mar 2018 06:05  Phos  3.8     03-20  Mg     2.1     03-20    TPro  5.3<L>  /  Alb  2.0<L>  /  TBili  0.5  /  DBili  x   /  AST  129<H>  /  ALT  33  /  AlkPhos  83  03-20          CAPILLARY BLOOD GLUCOSE      POCT Blood Glucose.: 163 mg/dL (20 Mar 2018 23:32)          CULTURES:  Culture Results:   No growth to date. (03-19 @ 11:57)  Culture Results:   Normal Respiratory Nancy present (03-19 @ 11:40)  Culture Results:   No growth to date. (03-19 @ 09:28)  Culture Results:   No growth to date. (03-18 @ 22:14)  Culture Results:   Testing in progress (03-18 @ 22:14)  Culture Results:   No growth at 5 days. (03-14 @ 21:18)  Culture Results:   No growth at 5 days. (03-14 @ 21:18)      Medications:  piperacillin/tazobactam IVPB. 3.375 Gram(s) IV Intermittent every 8 hours  acetylcysteine 10% Inhalation 3 milliLiter(s) Inhalation every 6 hours  ALBUTerol    90 MICROgram(s) HFA Inhaler 4 Puff(s) Inhalation every 6 hours  buDESOnide   0.5 milliGRAM(s) Respule 0.5 milliGRAM(s) Inhalation two times a day  dexmedetomidine Infusion 0.3 MICROgram(s)/kG/Hr IV Continuous <Continuous>  fentaNYL    Injectable 50 MICROGram(s) IV Push every 4 hours PRN  gabapentin 300 milliGRAM(s) Oral at bedtime  midazolam Injectable 2 milliGRAM(s) IV Push every 3 hours PRN  ondansetron Injectable 4 milliGRAM(s) IV Push every 6 hours PRN  aspirin enteric coated 81 milliGRAM(s) Oral daily  enoxaparin Injectable 40 milliGRAM(s) SubCutaneous every 24 hours  pantoprazole   Suspension 40 milliGRAM(s) Oral before breakfast  polyethylene glycol 3350 17 Gram(s) Oral two times a day  senna 2 Tablet(s) Oral at bedtime  atorvastatin 40 milliGRAM(s) Oral at bedtime  dextrose 50% Injectable 12.5 Gram(s) IV Push once  dextrose 50% Injectable 25 Gram(s) IV Push once  dextrose 50% Injectable 25 Gram(s) IV Push once  dextrose Gel 1 Dose(s) Oral once PRN  glucagon  Injectable 1 milliGRAM(s) IntraMuscular once PRN  insulin lispro (HumaLOG) corrective regimen sliding scale   SubCutaneous every 6 hours  levothyroxine 100 MICROGram(s) Oral daily  predniSONE   Tablet 10 milliGRAM(s) Oral daily  cholecalciferol 1000 Unit(s) Oral daily  dextrose 5%. 1000 milliLiter(s) IV Continuous <Continuous>  multivitamin 1 Tablet(s) Oral daily  pyridoxine 100 milliGRAM(s) Oral daily  filgrastim-sndz Injectable 300 MICROGram(s) SubCutaneous <User Schedule>  chlorhexidine 0.12% Liquid 15 milliLiter(s) Swish and Spit two times a day  dorzolamide 2%/timolol 0.5% Ophthalmic Solution 1 Drop(s) Both EYES two times a day  latanoprost 0.005% Ophthalmic Solution 1 Drop(s) Both EYES at bedtime  lidocaine   Patch 1 Patch Transdermal every 72 hours PRN  zinc oxide 40%/lanolin Ointment 1 Application(s) Topical every 6 hours    omega-3-Acid Ethyl Esters 1 Gram(s) Oral two times a day          03-19 @ 07:01 - 03-20 @ 07:00  --------------------------------------------------------  IN: 1616 mL / OUT: 0 mL / NET: 1616 mL    03-20 @ 07:01 - 03-21 @ 02:49  --------------------------------------------------------  IN: 594.6 mL / OUT: 0 mL / NET: 594.6 mL        RADIOLOGY/IMAGING/ECHO      < from: Xray Chest 1 View- PORTABLE-Urgent (03.19.18 @ 08:44) >  PROCEDURE DATE:  03/19/2018          INTERPRETATION:  ET tube position.    AP chest. Prior 3/18/2018.    Endotracheal tube nasogastric tube in satisfactory position. No   pneumothorax. No change airspace disease and effusion on the right. Left   costophrenic angle excluded.    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  Progressive hypoxemic resp failure likely due to HCAP, and continues to have high oxygen requirements.  Maxed out on vent.  Did not respond to peep recruitment.  O2 sat drop to low 80's  on 100% fi02 with PEEP 12.    Neuro discoordinated with vent and desaturating.  Awake on Precedex versed.  Will place on fentanyl drip    Cor  HD stable   Pulm no weaning  no proning ECMO etc.  LLTV  Gi feed to goal   Renal  no issues  Heme/DVT  Chemo related pancytopenia  on neupogen  ID   zosyn s/p vanco for HCAP  Endo  POC glucose acceptabl    PX poor    Minutes of Critical Care time: 42  (Reviewing data, imaging, discussing with multidisciplinary team, non inclusive of procedures, discussing goals of care with patient/family)

## 2018-03-21 NOTE — PROGRESS NOTE ADULT - ASSESSMENT
73 yo woman diagnosed small cell ca of lung with progressive pulmonary involvement. Started urgent chemotherapy with VP_16 and carboplatin on 03/15/18, Last dose 3/17/18      - remains intubated  - started growth factor support with Zarexio 300mcg daily X5 days for high risk chemo induced neutropenia prophylaxis  - prognosis remains guarded but may show response to chemo over next week  - continue ICU care  - discussed w family, support provided 73 yo woman diagnosed small cell ca of lung with progressive pulmonary involvement  Liver lesion on CT scan is suggestive of metastatic disease.   Started urgent chemotherapy with VP_16 and carboplatin  03/15/18- 3/17/18   s/p Zarexio 300mcg daily, stopped today , continue to monitor daily CBC with diff    restart if develops neutropenia   remains intubated , respiratory failure , requires 100% oxygen ,new left sided effusion    pleural tap is being considered, but may not improved respiratory function     goals of care d/w family at the bedside     prognosis is poor   most likely extensive stage and cure is not possible( liver lesion is suspicious for mets)  recommended DNR  if respiratory status does not improve in next 24-48 hours will discuss comfort measures   monitor for TLS : daily LDH, uric acid, lytes including K, phos

## 2018-03-21 NOTE — PROGRESS NOTE ADULT - SUBJECTIVE AND OBJECTIVE BOX
Patient is a 72y old  Female who presents with a chief complaint of Came to hospital for chemotherapy. In ER SOB, desaturating. PNA, Rt. pleural effusion on CXR. (14 Mar 2018 23:36)    24 hour events: Patient has been hypotensive overnight, placed on fentanyl drip. She remains maxed out on the ventilator, did not respond to peep recruitment. O2 sat drop to low 80's  on 100% fi02 with PEEP 12. Now on PEEP of 7.    REVIEW OF SYSTEMS  unable to obtain    T(F): 97.3 (03-21-18 @ 04:01), Max: 99.1 (03-20-18 @ 16:44)  HR: 101 (03-21-18 @ 07:30) (75 - 111)  BP: 83/44 (03-21-18 @ 07:30) (80/41 - 185/76)  RR: 23 (03-21-18 @ 07:30) (16 - 57)  SpO2: 84% (03-21-18 @ 07:30) (79% - 98%)  Wt(kg): --    Mode: AC/ CMV (Assist Control/ Continuous Mandatory Ventilation), RR (machine): 18, TV (machine): 400, FiO2: 100, PEEP: 12    CAPILLARY BLOOD GLUCOSE      I&O's Summary    03-20 @ 07:01  -  03-21 @ 07:00  --------------------------------------------------------  IN: 1558.6 mL / OUT: 0 mL / NET: 1558.6 mL      PHYSICAL EXAM  General: intubated, sedated, on full ventilator support  CNS: sedated, no focal deficits  HEENT: PERRL  Resp: rhonchi in Left Upper and Lower lung fields, decreased BS on right  CVS: S1S2, regular  Abd: soft, NT, +BS  Ext: 2+ edema  Skin: warm    MEDICATIONS  piperacillin/tazobactam IVPB. IV Intermittent      atorvastatin Oral  dextrose 50% Injectable IV Push  dextrose 50% Injectable IV Push  dextrose 50% Injectable IV Push  dextrose Gel Oral PRN  glucagon  Injectable IntraMuscular PRN  insulin lispro (HumaLOG) corrective regimen sliding scale SubCutaneous  levothyroxine Oral  predniSONE   Tablet Oral    acetylcysteine 10% Inhalation Inhalation  ALBUTerol    90 MICROgram(s) HFA Inhaler Inhalation  buDESOnide   0.5 milliGRAM(s) Respule Inhalation    dexmedetomidine Infusion IV Continuous  fentaNYL    Injectable IV Push PRN  fentaNYL   Infusion IV Continuous  gabapentin Oral  midazolam Injectable IV Push PRN  ondansetron Injectable IV Push PRN      aspirin enteric coated Oral  enoxaparin Injectable SubCutaneous    pantoprazole   Suspension Oral  polyethylene glycol 3350 Oral  senna Oral      cholecalciferol Oral  dextrose 5%. IV Continuous  multivitamin Oral  pyridoxine Oral    filgrastim-sndz Injectable SubCutaneous    chlorhexidine 0.12% Liquid Swish and Spit  dorzolamide 2%/timolol 0.5% Ophthalmic Solution Both EYES  latanoprost 0.005% Ophthalmic Solution Both EYES  lidocaine   Patch Transdermal PRN  zinc oxide 40%/lanolin Ointment Topical    omega-3-Acid Ethyl Esters Oral                          8.0    7.6   )-----------( 173      ( 21 Mar 2018 06:36 )             24.1       03-21    137  |  102  |  44<H>  ----------------------------<  160<H>  4.2   |  23  |  0.79    Ca    7.6<L>      21 Mar 2018 06:36  Phos  4.5     03-21  Mg     2.4     03-21    TPro  5.3<L>  /  Alb  1.9<L>  /  TBili  0.4  /  DBili  x   /  AST  100<H>  /  ALT  32  /  AlkPhos  93  03-21              .Blood Blood-Peripheral   No growth to date. -- 03-19 @ 11:57  .Sputum Sputum, trap   Normal Respiratory Nancy present   No polymorphonuclear leukocytes per low power field  No Squamous epithelial cells per low power field  No organisms seen per oil power field 03-19 @ 11:40  .Blood Blood-Peripheral   No growth to date. -- 03-19 @ 09:28  .Body Fluid Pleural Fluid   No growth to date.   No polymorphonuclear cells seen  No organisms seen  by cytocentrifuge 03-18 @ 22:14        CENTRAL LINE: N          NOLAN: N                      A-LINE: N                         GLOBAL ISSUE/BEST PRACTICE  Analgesia: Y  Sedation: Y  CAM-ICU: neg  HOB elevation: yes  Stress ulcer prophylaxis: NA  VTE prophylaxis: Y  Glycemic control: Y  Nutrition: Y    CODE STATUS: full Patient is a 72y old  Female who presents with a chief complaint of Came to hospital for chemotherapy. In ER SOB, desaturating. PNA, Rt. pleural effusion on CXR. (14 Mar 2018 23:36)    24 hour events: Patient has been hypotensive overnight, placed on fentanyl drip. She remains maxed out on the ventilator, did not respond to peep recruitment. O2 sat drop to low 80's  on 100% fi02 with PEEP 12. Now on PEEP of 7.    REVIEW OF SYSTEMS  unable to obtain    T(F): 97.3 (03-21-18 @ 04:01), Max: 99.1 (03-20-18 @ 16:44)  HR: 101 (03-21-18 @ 07:30) (75 - 111)  BP: 83/44 (03-21-18 @ 07:30) (80/41 - 185/76)  RR: 23 (03-21-18 @ 07:30) (16 - 57)  SpO2: 84% (03-21-18 @ 07:30) (79% - 98%)  Wt(kg): --    Mode: AC/ CMV (Assist Control/ Continuous Mandatory Ventilation), RR (machine): 18, TV (machine): 400, FiO2: 100, PEEP: 12    CAPILLARY BLOOD GLUCOSE      I&O's Summary    03-20 @ 07:01  -  03-21 @ 07:00  --------------------------------------------------------  IN: 1558.6 mL / OUT: 0 mL / NET: 1558.6 mL      PHYSICAL EXAM  General: intubated, sedated, on full ventilator support  CNS: sedated, no focal deficits  HEENT: bilateral surgical pupils  Resp: rhonchi in Left Upper and Lower lung fields, decreased BS on right  CVS: S1S2, regular  Abd: soft, NT, +BS  Ext: 2+ edema  Skin: warm    MEDICATIONS  piperacillin/tazobactam IVPB. IV Intermittent      atorvastatin Oral  dextrose 50% Injectable IV Push  dextrose 50% Injectable IV Push  dextrose 50% Injectable IV Push  dextrose Gel Oral PRN  glucagon  Injectable IntraMuscular PRN  insulin lispro (HumaLOG) corrective regimen sliding scale SubCutaneous  levothyroxine Oral  predniSONE   Tablet Oral    acetylcysteine 10% Inhalation Inhalation  ALBUTerol    90 MICROgram(s) HFA Inhaler Inhalation  buDESOnide   0.5 milliGRAM(s) Respule Inhalation    dexmedetomidine Infusion IV Continuous  fentaNYL    Injectable IV Push PRN  fentaNYL   Infusion IV Continuous  gabapentin Oral  midazolam Injectable IV Push PRN  ondansetron Injectable IV Push PRN      aspirin enteric coated Oral  enoxaparin Injectable SubCutaneous    pantoprazole   Suspension Oral  polyethylene glycol 3350 Oral  senna Oral      cholecalciferol Oral  dextrose 5%. IV Continuous  multivitamin Oral  pyridoxine Oral    filgrastim-sndz Injectable SubCutaneous    chlorhexidine 0.12% Liquid Swish and Spit  dorzolamide 2%/timolol 0.5% Ophthalmic Solution Both EYES  latanoprost 0.005% Ophthalmic Solution Both EYES  lidocaine   Patch Transdermal PRN  zinc oxide 40%/lanolin Ointment Topical    omega-3-Acid Ethyl Esters Oral                          8.0    7.6   )-----------( 173      ( 21 Mar 2018 06:36 )             24.1       03-21    137  |  102  |  44<H>  ----------------------------<  160<H>  4.2   |  23  |  0.79    Ca    7.6<L>      21 Mar 2018 06:36  Phos  4.5     03-21  Mg     2.4     03-21    TPro  5.3<L>  /  Alb  1.9<L>  /  TBili  0.4  /  DBili  x   /  AST  100<H>  /  ALT  32  /  AlkPhos  93  03-21              .Blood Blood-Peripheral   No growth to date. -- 03-19 @ 11:57  .Sputum Sputum, trap   Normal Respiratory Nancy present   No polymorphonuclear leukocytes per low power field  No Squamous epithelial cells per low power field  No organisms seen per oil power field 03-19 @ 11:40  .Blood Blood-Peripheral   No growth to date. -- 03-19 @ 09:28  .Body Fluid Pleural Fluid   No growth to date.   No polymorphonuclear cells seen  No organisms seen  by cytocentrifuge 03-18 @ 22:14    Radiology:    Bedside US: IVC 1.4 no variations. B Lines in right lung, left lung with increased pleural fluid.      CENTRAL LINE: N          NOLAN: N                      A-LINE: N                         GLOBAL ISSUE/BEST PRACTICE  Analgesia: Y  Sedation: Y  CAM-ICU: neg  HOB elevation: yes  Stress ulcer prophylaxis: NA  VTE prophylaxis: Y  Glycemic control: Y  Nutrition: Y    CODE STATUS: full Patient is a 72y old  Female who presents with a chief complaint of Came to hospital for chemotherapy. In ER SOB, desaturating. PNA, Rt. pleural effusion on CXR. (14 Mar 2018 23:36)    24 hour events: Patient has been hypotensive overnight, placed on fentanyl drip. She remains maxed out on the ventilator, did not respond to peep recruitment. O2 sat drop to low 80's  on 100% fi02 with PEEP 12. Now on PEEP of 7.    REVIEW OF SYSTEMS  unable to obtain    T(F): 97.3 (03-21-18 @ 04:01), Max: 99.1 (03-20-18 @ 16:44)  HR: 101 (03-21-18 @ 07:30) (75 - 111)  BP: 83/44 (03-21-18 @ 07:30) (80/41 - 185/76)  RR: 23 (03-21-18 @ 07:30) (16 - 57)  SpO2: 84% (03-21-18 @ 07:30) (79% - 98%)  Wt(kg): --    Mode: AC/ CMV (Assist Control/ Continuous Mandatory Ventilation), RR (machine): 18, TV (machine): 400, FiO2: 100, PEEP: 12    CAPILLARY BLOOD GLUCOSE      I&O's Summary    03-20 @ 07:01  -  03-21 @ 07:00  --------------------------------------------------------  IN: 1558.6 mL / OUT: 0 mL / NET: 1558.6 mL      PHYSICAL EXAM  General: intubated, sedated, on full ventilator support  CNS: sedated, no focal deficits  HEENT: bilateral surgical pupils  Resp: rhonchi in Left Upper and Lower lung fields, decreased BS on right  CVS: S1S2, regular  Abd: soft, NT, +BS  Ext: 2+ edema  Skin: warm    MEDICATIONS  piperacillin/tazobactam IVPB. IV Intermittent      atorvastatin Oral  dextrose 50% Injectable IV Push  dextrose 50% Injectable IV Push  dextrose 50% Injectable IV Push  dextrose Gel Oral PRN  glucagon  Injectable IntraMuscular PRN  insulin lispro (HumaLOG) corrective regimen sliding scale SubCutaneous  levothyroxine Oral  predniSONE   Tablet Oral    acetylcysteine 10% Inhalation Inhalation  ALBUTerol    90 MICROgram(s) HFA Inhaler Inhalation  buDESOnide   0.5 milliGRAM(s) Respule Inhalation    dexmedetomidine Infusion IV Continuous  fentaNYL    Injectable IV Push PRN  fentaNYL   Infusion IV Continuous  gabapentin Oral  midazolam Injectable IV Push PRN  ondansetron Injectable IV Push PRN      aspirin enteric coated Oral  enoxaparin Injectable SubCutaneous    pantoprazole   Suspension Oral  polyethylene glycol 3350 Oral  senna Oral      cholecalciferol Oral  dextrose 5%. IV Continuous  multivitamin Oral  pyridoxine Oral    filgrastim-sndz Injectable SubCutaneous    chlorhexidine 0.12% Liquid Swish and Spit  dorzolamide 2%/timolol 0.5% Ophthalmic Solution Both EYES  latanoprost 0.005% Ophthalmic Solution Both EYES  lidocaine   Patch Transdermal PRN  zinc oxide 40%/lanolin Ointment Topical    omega-3-Acid Ethyl Esters Oral                          8.0    7.6   )-----------( 173      ( 21 Mar 2018 06:36 )             24.1       03-21    137  |  102  |  44<H>  ----------------------------<  160<H>  4.2   |  23  |  0.79    Ca    7.6<L>      21 Mar 2018 06:36  Phos  4.5     03-21  Mg     2.4     03-21    TPro  5.3<L>  /  Alb  1.9<L>  /  TBili  0.4  /  DBili  x   /  AST  100<H>  /  ALT  32  /  AlkPhos  93  03-21      .Blood Blood-Peripheral   No growth to date. -- 03-19 @ 11:57    .Sputum Sputum, trap   Normal Respiratory Nancy present   No polymorphonuclear leukocytes per low power field  No Squamous epithelial cells per low power field  No organisms seen per oil power field 03-19 @ 11:40    .Blood Blood-Peripheral   No growth to date. -- 03-19 @ 09:28    .Body Fluid Pleural Fluid   No growth to date.   No polymorphonuclear cells seen  No organisms seen  by cytocentrifuge 03-18 @ 22:14    Radiology:    Bedside US: IVC 1.4 no variations. B Lines b/l , small-mod L pleural effusion    CENTRAL LINE: N          NOLAN: N                      A-LINE: N                         GLOBAL ISSUE/BEST PRACTICE  Analgesia: Y  Sedation: Y  HOB elevation: yes  Stress ulcer prophylaxis: NA  VTE prophylaxis: Y  Glycemic control: Y  Nutrition: Y    CODE STATUS: full

## 2018-03-21 NOTE — PROGRESS NOTE ADULT - SUBJECTIVE AND OBJECTIVE BOX
Patient is a 72y old  Female who presents with a chief complaint of Came to hospital for chemotherapy. In ER SOB, desaturating. PNA, Rt. pleural effusion on CXR. (14 Mar 2018 23:36)      INTERVAL HPI: Pt seen and examined. Both sons at bedside, pt intubated  OVERNIGHT EVENTS:  T(F): 97.5 (03-21-18 @ 23:29), Max: 99 (03-21-18 @ 12:35)  HR: 93 (03-21-18 @ 23:16) (72 - 124)  BP: 149/68 (03-21-18 @ 18:30) (80/41 - 194/83)  RR: 21 (03-21-18 @ 19:00) (16 - 49)  SpO2: 86% (03-21-18 @ 23:16) (75% - 91%)  I&O's Summary    20 Mar 2018 07:01  -  21 Mar 2018 07:00  --------------------------------------------------------  IN: 1558.6 mL / OUT: 0 mL / NET: 1558.6 mL    21 Mar 2018 07:01  -  22 Mar 2018 00:03  --------------------------------------------------------  IN: 736.1 mL / OUT: 0 mL / NET: 736.1 mL        REVIEW OF SYSTEMS: unable as intubated    PHYSICAL EXAM:  GENERAL: intubated  HEAD:  Atraumatic, Normocephalic  EYES: EOMI, PERRLA, conjunctiva and sclera clear  ENMT: intubated  NECK: Supple, No JVD, Normal thyroid  NERVOUS SYSTEM:  intubated  CHEST/LUNG: intubated  HEART: Regular rate and rhythm; No murmurs, rubs, or gallops  ABDOMEN: Soft, Nontender, Nondistended; Bowel sounds present  EXTREMITIES:  2+ Peripheral Pulses, No clubbing, cyanosis, or edema  LYMPH: No lymphadenopathy noted  SKIN: No rashes or lesions    LABS:                        8.0    7.6   )-----------( 173      ( 21 Mar 2018 06:36 )             24.1     03-21    137  |  102  |  44<H>  ----------------------------<  160<H>  4.2   |  23  |  0.79    Ca    7.6<L>      21 Mar 2018 06:36  Phos  4.5     03-21  Mg     2.4     03-21    TPro  5.3<L>  /  Alb  1.9<L>  /  TBili  0.4  /  DBili  x   /  AST  100<H>  /  ALT  32  /  AlkPhos  93  03-21        CAPILLARY BLOOD GLUCOSE      POCT Blood Glucose.: 299 mg/dL (21 Mar 2018 23:34)  POCT Blood Glucose.: 164 mg/dL (21 Mar 2018 18:17)  POCT Blood Glucose.: 128 mg/dL (21 Mar 2018 12:53)  POCT Blood Glucose.: 185 mg/dL (21 Mar 2018 06:28)      03-19 @ 11:57   No growth to date.  --  --  03-19 @ 11:40   Normal Respiratory Nancy present  --  --  03-19 @ 09:28   No growth to date.  --  --  03-18 @ 22:14   No growth to date.  --  --          MEDICATIONS  (STANDING):  acetylcysteine 10% Inhalation 3 milliLiter(s) Inhalation every 6 hours  ALBUTerol/ipratropium for Nebulization 3 milliLiter(s) Nebulizer every 6 hours  aspirin enteric coated 81 milliGRAM(s) Oral daily  atorvastatin 40 milliGRAM(s) Oral at bedtime  buDESOnide   0.5 milliGRAM(s) Respule 0.5 milliGRAM(s) Inhalation two times a day  chlorhexidine 0.12% Liquid 15 milliLiter(s) Swish and Spit two times a day  cholecalciferol 1000 Unit(s) Oral daily  dexmedetomidine Infusion 0.3 MICROgram(s)/kG/Hr (4.65 mL/Hr) IV Continuous <Continuous>  dextrose 5%. 1000 milliLiter(s) (50 mL/Hr) IV Continuous <Continuous>  dextrose 50% Injectable 12.5 Gram(s) IV Push once  dextrose 50% Injectable 25 Gram(s) IV Push once  dextrose 50% Injectable 25 Gram(s) IV Push once  dorzolamide 2%/timolol 0.5% Ophthalmic Solution 1 Drop(s) Both EYES two times a day  enoxaparin Injectable 40 milliGRAM(s) SubCutaneous every 24 hours  fentaNYL   Infusion 1 MICROgram(s)/kG/Hr (6.2 mL/Hr) IV Continuous <Continuous>  gabapentin 300 milliGRAM(s) Oral at bedtime  hydrocortisone sodium succinate Injectable 100 milliGRAM(s) IV Push every 8 hours  insulin lispro (HumaLOG) corrective regimen sliding scale   SubCutaneous every 6 hours  latanoprost 0.005% Ophthalmic Solution 1 Drop(s) Both EYES at bedtime  levothyroxine 100 MICROGram(s) Oral daily  multivitamin 1 Tablet(s) Oral daily  omega-3-Acid Ethyl Esters 1 Gram(s) Oral two times a day  pantoprazole   Suspension 40 milliGRAM(s) Oral before breakfast  phenylephrine    Infusion 2 MICROgram(s)/kG/Min (23.25 mL/Hr) IV Continuous <Continuous>  piperacillin/tazobactam IVPB. 3.375 Gram(s) IV Intermittent every 8 hours  polyethylene glycol 3350 17 Gram(s) Oral two times a day  pyridoxine 100 milliGRAM(s) Oral daily  senna 2 Tablet(s) Oral at bedtime  zinc oxide 40%/lanolin Ointment 1 Application(s) Topical every 6 hours    MEDICATIONS  (PRN):  dextrose Gel 1 Dose(s) Oral once PRN Blood Glucose LESS THAN 70 milliGRAM(s)/deciliter  glucagon  Injectable 1 milliGRAM(s) IntraMuscular once PRN Glucose LESS THAN 70 milligrams/deciliter  lidocaine   Patch 1 Patch Transdermal every 72 hours PRN Neck pain  midazolam Injectable 2 milliGRAM(s) IV Push every 3 hours PRN agitation and/or vent dysynchrony  ondansetron Injectable 4 milliGRAM(s) IV Push every 6 hours PRN Nausea and/or Vomiting

## 2018-03-21 NOTE — PROGRESS NOTE ADULT - SUBJECTIVE AND OBJECTIVE BOX
All interim records and events noted.    remains intubated, does not respond  family present    MEDICATIONS  reviewed    Vital Signs Last 24 Hrs    PHYSICAL EXAM  General: frail elderly ill appearing woman, intubated, in bed, not in acute distress  Head: atraumatic, normocephalic  Neck: supple  CV: S1 S2, regular rate and rhythm  Lungs: coarse breath sounds bilaterally  Abdomen: soft, nontender, bowel sounds present, no palpable masses, pouchy  Extrem: no sig edema  Skin: no significant ecchymosis/petechiae        LABS:                  LABS:    CBC Full  -  ( 21 Mar 2018 06:36 )  WBC Count : 7.6 K/uL  Hemoglobin : 8.0 g/dL  Hematocrit : 24.1 %  Platelet Count - Automated : 173 K/uL  Mean Cell Volume : 89.8 fl  Mean Cell Hemoglobin : 29.9 pg  Mean Cell Hemoglobin Concentration : 33.3 gm/dL      03-21    137  |  102  |  44<H>  ----------------------------<  160<H>  4.2   |  23  |  0.79    Ca    7.6<L>      21 Mar 2018 06:36  Phos  4.5     03-21  Mg     2.4     03-21    TPro  5.3<L>  /  Alb  1.9<L>  /  TBili  0.4  /  DBili  x   /  AST  100<H>  /  ALT  32  /  AlkPhos  93  03-21            RADIOLOGY & ADDITIONAL STUDIES:    IMPRESSION/RECOMMENDATIONS: All interim records and events noted.    remains intubated, requires 100% oxygen   family present    MEDICATIONS  reviewed    Vital Signs Last 24 Hrs    PHYSICAL EXAM  General: frail elderly ill appearing woman, intubated, in bed, sedated on precedex  Head: atraumatic, normocephalic  Neck: supple  CV: S1 S2, regular rate and rhythm  Lungs: coarse breath sounds bilaterally, decreased BS on left   Abdomen: soft, nontender, bowel sounds present, no palpable masses, pouchy  Extrem: no sig edema  Skin: no significant ecchymosis/petechiae, bruises from IV insertion         LABS:    CBC Full  -  ( 21 Mar 2018 06:36 )  WBC Count : 7.6 K/uL  Hemoglobin : 8.0 g/dL  Hematocrit : 24.1 %  Platelet Count - Automated : 173 K/uL  Mean Cell Volume : 89.8 fl  Mean Cell Hemoglobin : 29.9 pg  Mean Cell Hemoglobin Concentration : 33.3 gm/dL      03-21    137  |  102  |  44<H>  ----------------------------<  160<H>  4.2   |  23  |  0.79    Ca    7.6<L>      21 Mar 2018 06:36  Phos  4.5     03-21  Mg     2.4     03-21    TPro  5.3<L>  /  Alb  1.9<L>  /  TBili  0.4  /  DBili  x   /  AST  100<H>  /  ALT  32  /  AlkPhos  93  03-21    < from: CT Abdomen and Pelvis w/ Oral Cont (03.06.18 @ 02:24) >    EXAM:  CT ABDOMEN AND PELVIS OC                            PROCEDURE DATE:  03/06/2018          INTERPRETATION:  CT ABDOMEN AND PELVIS WITHOUT CONTRAST    INDICATION: Abdominal pain and diarrhea.    TECHNIQUE: Abdominopelvic CT without intravenous contrast.    COMPARISON: Prior CT 11/14/2012. CT of the chest 2/6/2018.    FINDINGS:    ABSENCE OF INTRAVENOUS CONTRAST LIMITS EVALUATION FOR FOCAL LESIONS,   NEOPLASM, AND VASCULAR PATHOLOGY.    Lower Thorax: There is dense consolidation of the rightmiddle and right   lower lobes, likely with a small pleural effusion.    Liver: New 3.4 cm hypodense lesion in the posterior right hepatic lobe.   New 1.9 cm hypodense lesion in the inferior right hepatic lobe.  Biliary: No dilatation.      Spleen: No suspicious lesions.      Pancreas: No inflammatory changes or ductal dilatation.      Adrenals: Normal.      Kidneys: No hydronephrosis or solid mass.      Vessels: Normal caliber.        GI tract: No evidence of small bowel obstruction. No wall thickening or   inflammatory changes.        Peritoneum/retroperitoneum and mesentery: No free air. No organized fluid   collection. No adenopathy.        Pelvic organs/Bladder: No pelvic masses. Bladder is normal.        Abdominal wall: Unremarkable.  Bones and soft tissues: No destructive lesion.        IMPRESSION:    Dense consolidation of the right middle lower lobes, progressing from   prior exam 2/6/2018 likely reflecting pneumonia. Follow-up to resolution   is advised. Necrotizing pneumonia isnot excluded. Possibility of   underlying neoplasm is raised given the progression from prior exam.    New ill-defined hypodense lesions in the liver, suspicious for   possibility of metastatic disease. Follow-up evaluation is recommended   with contrast enhanced MRI.                < end of copied text >

## 2018-03-21 NOTE — PROGRESS NOTE ADULT - SUBJECTIVE AND OBJECTIVE BOX
infectious diseases progress note:    GENIE MOE is a 72y y. o. Female patient    Patient is intubated and nonverbal    Allergies    No Known Allergies    Intolerances        ANTIBIOTICS/RELEVANT:  antimicrobials  piperacillin/tazobactam IVPB. 3.375 Gram(s) IV Intermittent every 8 hours    immunologic:  filgrastim-sndz Injectable 300 MICROGram(s) SubCutaneous <User Schedule>    OTHER:  acetylcysteine 10% Inhalation 3 milliLiter(s) Inhalation every 6 hours  ALBUTerol    90 MICROgram(s) HFA Inhaler 4 Puff(s) Inhalation every 6 hours  aspirin enteric coated 81 milliGRAM(s) Oral daily  atorvastatin 40 milliGRAM(s) Oral at bedtime  buDESOnide   0.5 milliGRAM(s) Respule 0.5 milliGRAM(s) Inhalation two times a day  chlorhexidine 0.12% Liquid 15 milliLiter(s) Swish and Spit two times a day  cholecalciferol 1000 Unit(s) Oral daily  dexmedetomidine Infusion 0.3 MICROgram(s)/kG/Hr IV Continuous <Continuous>  dextrose 5%. 1000 milliLiter(s) IV Continuous <Continuous>  dextrose 50% Injectable 12.5 Gram(s) IV Push once  dextrose 50% Injectable 25 Gram(s) IV Push once  dextrose 50% Injectable 25 Gram(s) IV Push once  dextrose Gel 1 Dose(s) Oral once PRN  dorzolamide 2%/timolol 0.5% Ophthalmic Solution 1 Drop(s) Both EYES two times a day  enoxaparin Injectable 40 milliGRAM(s) SubCutaneous every 24 hours  fentaNYL    Injectable 50 MICROGram(s) IV Push every 4 hours PRN  fentaNYL   Infusion 1 MICROgram(s)/kG/Hr IV Continuous <Continuous>  gabapentin 300 milliGRAM(s) Oral at bedtime  glucagon  Injectable 1 milliGRAM(s) IntraMuscular once PRN  insulin lispro (HumaLOG) corrective regimen sliding scale   SubCutaneous every 6 hours  latanoprost 0.005% Ophthalmic Solution 1 Drop(s) Both EYES at bedtime  levothyroxine 100 MICROGram(s) Oral daily  lidocaine   Patch 1 Patch Transdermal every 72 hours PRN  midazolam Injectable 2 milliGRAM(s) IV Push every 3 hours PRN  multivitamin 1 Tablet(s) Oral daily  omega-3-Acid Ethyl Esters 1 Gram(s) Oral two times a day  ondansetron Injectable 4 milliGRAM(s) IV Push every 6 hours PRN  pantoprazole   Suspension 40 milliGRAM(s) Oral before breakfast  phenylephrine    Infusion 2 MICROgram(s)/kG/Min IV Continuous <Continuous>  polyethylene glycol 3350 17 Gram(s) Oral two times a day  predniSONE   Tablet 10 milliGRAM(s) Oral daily  pyridoxine 100 milliGRAM(s) Oral daily  senna 2 Tablet(s) Oral at bedtime  zinc oxide 40%/lanolin Ointment 1 Application(s) Topical every 6 hours      Objective:  Last 24-Vital Signs Last 24 Hrs  T(C): 36.3 (21 Mar 2018 08:00), Max: 37.3 (20 Mar 2018 16:44)  T(F): 97.4 (21 Mar 2018 08:00), Max: 99.1 (20 Mar 2018 16:44)  HR: 123 (21 Mar 2018 08:45) (75 - 123)  BP: 87/52 (21 Mar 2018 08:45) (80/41 - 185/76)  BP(mean): 63 (21 Mar 2018 08:45) (54 - 114)  RR: 21 (21 Mar 2018 08:45) (16 - 57)  SpO2: 83% (21 Mar 2018 08:45) (79% - 95%)    T(C): 36.3 (03-21-18 @ 08:00), Max: 38.4 (03-19-18 @ 20:00)  T(F): 97.4 (03-21-18 @ 08:00), Max: 101.1 (03-19-18 @ 20:00)  T(C): 36.3 (03-21-18 @ 08:00), Max: 38.5 (03-19-18 @ 05:10)  T(F): 97.4 (03-21-18 @ 08:00), Max: 101.3 (03-19-18 @ 05:10)  T(C): 36.3 (03-21-18 @ 08:00), Max: 38.5 (03-19-18 @ 05:10)  T(F): 97.4 (03-21-18 @ 08:00), Max: 101.3 (03-19-18 @ 05:10)    PHYSICAL EXAM:  Constitutional: Well-developed, well nourished  Eyes: PERRLA, EOMI  Ear/Nose/Throat: intubated	  Neck: no JVD, no lymphadenopathy, supple  Respiratory: no accessory muscle use, lung fields bilaterally with crackles  Cardiovascular: RRR, normal S1, S2 no m/r/g  Gastrointestinal: soft, NT, no HSM, BS-normal  Extremities: no clubbing, no cyanosis, edema absent  Neuro: sedated  Skin: no sig lesions      LABS:                        8.0    7.6   )-----------( 173      ( 21 Mar 2018 06:36 )             24.1       WBC 7.6  03-21 @ 06:36  WBC 12.2  03-20 @ 06:05  WBC 20.4  03-19 @ 06:35  WBC 8.9  03-18 @ 04:58  WBC 10.2  03-17 @ 05:45  WBC 10.2  03-16 @ 06:22  WBC 11.3  03-15 @ 05:33  WBC 10.2  03-14 @ 17:04      03-21    137  |  102  |  44<H>  ----------------------------<  160<H>  4.2   |  23  |  0.79    Ca    7.6<L>      21 Mar 2018 06:36  Phos  4.5     03-21  Mg     2.4     03-21    TPro  5.3<L>  /  Alb  1.9<L>  /  TBili  0.4  /  DBili  x   /  AST  100<H>  /  ALT  32  /  AlkPhos  93  03-21      Creatinine, Serum: 0.79 mg/dL (03-21-18 @ 06:36)  Creatinine, Serum: 0.63 mg/dL (03-20-18 @ 06:05)  Creatinine, Serum: 0.58 mg/dL (03-19-18 @ 06:35)  Creatinine, Serum: 0.51 mg/dL (03-18-18 @ 04:58)  Creatinine, Serum: 0.62 mg/dL (03-17-18 @ 05:45)  Creatinine, Serum: 0.55 mg/dL (03-16-18 @ 06:22)  Creatinine, Serum: 0.61 mg/dL (03-15-18 @ 13:14)  Creatinine, Serum: 0.62 mg/dL (03-15-18 @ 05:33)  Creatinine, Serum: 0.93 mg/dL (03-14-18 @ 17:04)      MICROBIOLOGY:        RADIOLOGY & ADDITIONAL STUDIES:

## 2018-03-21 NOTE — GOALS OF CARE CONVERSATION - PERSONAL ADVANCE DIRECTIVE - CONVERSATION DETAILS
met pt w , pt has no hcp, pt completed hcp,  Jered is hcp and alt hcp son, Vidal. educated on role of hcp, pt has not discussed at any length her wishes, will talk to family at later time.. pt receiving first chemo today, unable to discuss further. contact # given.
discussed DNR, family will consider  if deteriorates will need to consider palliative care

## 2018-03-21 NOTE — PROGRESS NOTE ADULT - ASSESSMENT
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 Patient with progressive respiratory failure likely due to HCAP, and continues to have high oxygen requirements.     -NEURO: sedated while intubated, continue Precedex, versed PRN, now on Fentanyl drip, for vent synchrony.   -CARDIO: Was hypotensive, PEEP lowered. Bioprosthetic AVR, CAD- Continue ASA, statin and Lovaza for HLD.  Holding Metoprolol in setting of hypotension.   -RESP: Acute respiratory failure with hypoxia in the setting of SCLCa of right lung, COPD, HCAP, s/p thoracentesis, intubated. Continue full vent support, titrating O2 for sats>88-90%, lowered PEEP as patient can not tolerate and became hypoxic. Pleural fluid exudate, culture and cytology pending. Continue prednisone, albuterol. s/p Chemo 3/17 (carboplatin, etoposide) as per heme/onc for metastatic SCLC, on growth factor support with Zarexio for a total of 5 days, today is day 4.  -GI: Continue tube feeds via NGT Jevity, continue PO meds via NGT.   -ENDO: Continue Synthroid for hypothyroidism.   -RENAL: stable  -ID: Leucocytosis likely 2/2 to HCAP, continue Zosyn for HCAP. WBC trending down.   -HEME/ONC: Continue chemo per heme/onc for metastatic small cell lung cancer: s/p Chemo 3/17 (carboplatin, etoposide) as per heme/onc for metastatic SCLC, on growth factor support with Zarexio for a total of 5 days, today is day 4. Lovenox for DVT ppx. Hgb 8.0 today, likely dilutional/secondary to acute illness.    -Prognosis poor, full code 72F PMH bioprosthetic AVR, CAD, COPD (on home O2), HTN, HLD, hypothyroidism, recently diagnosed 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 Patient with progressive respiratory failure likely due to HCAP and SCLCa, and continues to have high oxygen requirements.     -NEURO: sedated while intubated, continue Precedex, versed PRN, now on Fentanyl drip, for vent synchrony.   -CARDIO: Septic shock, possibly 2/2 to adrenal cause - on pressors, will DC prednisone, add stress dose steroids with Hydrocortisone 100 IV q8hrs. Bioprosthetic AVR, CAD- Continue ASA, statin and Lovaza for HLD.  Holding Metoprolol in setting of hypotension.   -RESP: Acute respiratory failure with hypoxia in the setting of SCLCa of right lung, COPD, HCAP, s/p thoracentesis, intubated. Bedside US showed Left pleural effusion, get STAT CXR, consider thoracentesis, although patient is very high risk for complications. Need to R/O PTX. Continue full vent support, titrating O2 for sats>88-90%, lowered PEEP as patient can not tolerate and became hypoxic. Pleural fluid exudate, culture negative, cytology pending. Change albuterol to Duonebs.  -GI: Continue tube feeds via NGT Jevity, continue PO meds via NGT.   -ENDO: Continue Synthroid for hypothyroidism.   -RENAL: stable  -ID: Leucocytosis resolved. Continue Zosyn for HCAP.  -HEME/ONC: Continue chemo per heme/onc for metastatic small cell lung cancer: s/p Chemo 3/17 (carboplatin, etoposide) as per heme/onc for metastatic SCLC, on growth factor support with Zarexio for a total of 5 days, today is day 4. Check uric acid and LDH levels daily to monitor for Tumor Lysis. Lovenox for DVT ppx. Hgb 8.0 today, likely dilutional/secondary to acute illness.    -Prognosis poor, full code 72F PMH bioprosthetic AVR, CAD, COPD (on home O2), HTN, HLD, hypothyroidism, recently diagnosed 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 Patient with progressive respiratory failure likely due to HCAP and SCLCa, and continues to have high oxygen requirements.     -NEURO: sedated while intubated, continue Precedex, versed PRN, now on Fentanyl drip, for vent synchrony.   -CARDIO: Septic shock, possibly 2/2 to adrenal cause - on pressors, will DC prednisone, add stress dose steroids with Hydrocortisone 100 IV q8hrs. Need to avoic IVF for septic shock as increase in fluids will exacerbate her respiratory status. Bioprosthetic AVR, CAD- Continue ASA, statin and Lovaza for HLD.  Holding Metoprolol in setting of hypotension.   -RESP: Acute respiratory failure with hypoxia in the setting of SCLCa of right lung, COPD, HCAP, s/p thoracentesis, intubated. Bedside US showed Left pleural effusion, get STAT CXR, consider thoracentesis, although patient is very high risk for complications. Need to R/O PTX. Continue full vent support, titrating O2 for sats>88-90%, lowered PEEP as patient can not tolerate and became hypoxic. Pleural fluid exudate, culture negative, cytology pending. Change albuterol to Duonebs.  -GI: Continue tube feeds via NGT Jevity, continue PO meds via NGT.   -ENDO: Continue Synthroid for hypothyroidism.   -RENAL: stable  -ID: Leucocytosis resolved. Continue Zosyn for HCAP.  -HEME/ONC: Continue chemo per heme/onc for metastatic small cell lung cancer: s/p Chemo 3/17 (carboplatin, etoposide) as per heme/onc for metastatic SCLC, on growth factor support with Zarexio for a total of 5 days, today is day 4. Check uric acid and LDH levels daily to monitor for Tumor Lysis. Lovenox for DVT ppx. Hgb 8.0 today, likely dilutional/secondary to acute illness.    -Prognosis poor, full code

## 2018-03-22 LAB
ALBUMIN SERPL ELPH-MCNC: 2 G/DL — LOW (ref 3.3–5)
ALP SERPL-CCNC: 117 U/L — SIGNIFICANT CHANGE UP (ref 40–120)
ALT FLD-CCNC: 69 U/L — SIGNIFICANT CHANGE UP (ref 12–78)
ANION GAP SERPL CALC-SCNC: 9 MMOL/L — SIGNIFICANT CHANGE UP (ref 5–17)
AST SERPL-CCNC: 106 U/L — HIGH (ref 15–37)
BASE EXCESS BLDA CALC-SCNC: 0.8 MMOL/L — SIGNIFICANT CHANGE UP (ref -2–2)
BASE EXCESS BLDA CALC-SCNC: 1 MMOL/L — SIGNIFICANT CHANGE UP (ref -2–2)
BILIRUB SERPL-MCNC: 0.3 MG/DL — SIGNIFICANT CHANGE UP (ref 0.2–1.2)
BLOOD GAS COMMENTS ARTERIAL: SIGNIFICANT CHANGE UP
BUN SERPL-MCNC: 51 MG/DL — HIGH (ref 7–23)
CALCIUM SERPL-MCNC: 8 MG/DL — LOW (ref 8.5–10.1)
CHLORIDE SERPL-SCNC: 101 MMOL/L — SIGNIFICANT CHANGE UP (ref 96–108)
CO2 SERPL-SCNC: 30 MMOL/L — SIGNIFICANT CHANGE UP (ref 22–31)
CREAT SERPL-MCNC: 0.89 MG/DL — SIGNIFICANT CHANGE UP (ref 0.5–1.3)
EOSINOPHIL NFR BLD AUTO: 1 % — SIGNIFICANT CHANGE UP (ref 0–6)
GLUCOSE SERPL-MCNC: 142 MG/DL — HIGH (ref 70–99)
HCO3 BLDA-SCNC: 24 MMOL/L — SIGNIFICANT CHANGE UP (ref 23–27)
HCO3 BLDA-SCNC: 24 MMOL/L — SIGNIFICANT CHANGE UP (ref 23–27)
HCT VFR BLD CALC: 29.8 % — LOW (ref 34.5–45)
HGB BLD-MCNC: 9.8 G/DL — LOW (ref 11.5–15.5)
HOROWITZ INDEX BLDA+IHG-RTO: 100 — SIGNIFICANT CHANGE UP
HOROWITZ INDEX BLDA+IHG-RTO: 100 — SIGNIFICANT CHANGE UP
LDH SERPL L TO P-CCNC: 1054 U/L — HIGH (ref 50–242)
LYMPHOCYTES # BLD AUTO: 4 % — LOW (ref 13–44)
MAGNESIUM SERPL-MCNC: 2.2 MG/DL — SIGNIFICANT CHANGE UP (ref 1.6–2.6)
MCHC RBC-ENTMCNC: 29.7 PG — SIGNIFICANT CHANGE UP (ref 27–34)
MCHC RBC-ENTMCNC: 32.9 GM/DL — SIGNIFICANT CHANGE UP (ref 32–36)
MCV RBC AUTO: 90.4 FL — SIGNIFICANT CHANGE UP (ref 80–100)
MONOCYTES NFR BLD AUTO: 1 % — SIGNIFICANT CHANGE UP (ref 1–9)
NEUTROPHILS NFR BLD AUTO: 87 % — HIGH (ref 43–77)
PCO2 BLDA: 61 MMHG — HIGH (ref 32–46)
PCO2 BLDA: 69 MMHG — HIGH (ref 32–46)
PH BLDA: 7.23 — LOW (ref 7.35–7.45)
PH BLDA: 7.27 — LOW (ref 7.35–7.45)
PHOSPHATE SERPL-MCNC: 4.1 MG/DL — SIGNIFICANT CHANGE UP (ref 2.5–4.5)
PLATELET # BLD AUTO: 197 K/UL — SIGNIFICANT CHANGE UP (ref 150–400)
PO2 BLDA: 40 MMHG — CRITICAL LOW (ref 74–108)
PO2 BLDA: 41 MMHG — CRITICAL LOW (ref 74–108)
POTASSIUM SERPL-MCNC: 3.7 MMOL/L — SIGNIFICANT CHANGE UP (ref 3.5–5.3)
POTASSIUM SERPL-SCNC: 3.7 MMOL/L — SIGNIFICANT CHANGE UP (ref 3.5–5.3)
PROT SERPL-MCNC: 6 G/DL — SIGNIFICANT CHANGE UP (ref 6–8.3)
RBC # BLD: 3.29 M/UL — LOW (ref 3.8–5.2)
RBC # FLD: 13.7 % — SIGNIFICANT CHANGE UP (ref 10.3–14.5)
SAO2 % BLDA: 64 % — LOW (ref 92–96)
SAO2 % BLDA: 68 % — LOW (ref 92–96)
SODIUM SERPL-SCNC: 140 MMOL/L — SIGNIFICANT CHANGE UP (ref 135–145)
URATE SERPL-MCNC: 7.9 MG/DL — HIGH (ref 2.5–7)
WBC # BLD: 5.6 K/UL — SIGNIFICANT CHANGE UP (ref 3.8–10.5)
WBC # FLD AUTO: 5.6 K/UL — SIGNIFICANT CHANGE UP (ref 3.8–10.5)

## 2018-03-22 PROCEDURE — 99291 CRITICAL CARE FIRST HOUR: CPT

## 2018-03-22 PROCEDURE — 99292 CRITICAL CARE ADDL 30 MIN: CPT

## 2018-03-22 PROCEDURE — 99233 SBSQ HOSP IP/OBS HIGH 50: CPT

## 2018-03-22 PROCEDURE — 71045 X-RAY EXAM CHEST 1 VIEW: CPT | Mod: 26

## 2018-03-22 PROCEDURE — 71045 X-RAY EXAM CHEST 1 VIEW: CPT | Mod: 26,77

## 2018-03-22 RX ORDER — ENOXAPARIN SODIUM 100 MG/ML
60 INJECTION SUBCUTANEOUS
Qty: 0 | Refills: 0 | Status: DISCONTINUED | OUTPATIENT
Start: 2018-03-22 | End: 2018-03-23

## 2018-03-22 RX ORDER — ALLOPURINOL 300 MG
300 TABLET ORAL DAILY
Qty: 0 | Refills: 0 | Status: DISCONTINUED | OUTPATIENT
Start: 2018-03-22 | End: 2018-03-23

## 2018-03-22 RX ORDER — MEROPENEM 1 G/30ML
1000 INJECTION INTRAVENOUS EVERY 8 HOURS
Qty: 0 | Refills: 0 | Status: DISCONTINUED | OUTPATIENT
Start: 2018-03-22 | End: 2018-03-23

## 2018-03-22 RX ORDER — MEROPENEM 1 G/30ML
INJECTION INTRAVENOUS
Qty: 0 | Refills: 0 | Status: DISCONTINUED | OUTPATIENT
Start: 2018-03-22 | End: 2018-03-23

## 2018-03-22 RX ORDER — MEROPENEM 1 G/30ML
1000 INJECTION INTRAVENOUS ONCE
Qty: 0 | Refills: 0 | Status: COMPLETED | OUTPATIENT
Start: 2018-03-22 | End: 2018-03-22

## 2018-03-22 RX ORDER — FLUCONAZOLE 150 MG/1
400 TABLET ORAL ONCE
Qty: 0 | Refills: 0 | Status: COMPLETED | OUTPATIENT
Start: 2018-03-22 | End: 2018-03-22

## 2018-03-22 RX ORDER — FUROSEMIDE 40 MG
40 TABLET ORAL ONCE
Qty: 0 | Refills: 0 | Status: COMPLETED | OUTPATIENT
Start: 2018-03-22 | End: 2018-03-22

## 2018-03-22 RX ORDER — POTASSIUM CHLORIDE 20 MEQ
40 PACKET (EA) ORAL ONCE
Qty: 0 | Refills: 0 | Status: COMPLETED | OUTPATIENT
Start: 2018-03-22 | End: 2018-03-22

## 2018-03-22 RX ADMIN — Medication 3 MILLILITER(S): at 02:21

## 2018-03-22 RX ADMIN — Medication 1000 UNIT(S): at 12:06

## 2018-03-22 RX ADMIN — Medication 1 TABLET(S): at 12:06

## 2018-03-22 RX ADMIN — Medication 3 MILLILITER(S): at 19:53

## 2018-03-22 RX ADMIN — Medication 1 GRAM(S): at 18:35

## 2018-03-22 RX ADMIN — DORZOLAMIDE HYDROCHLORIDE TIMOLOL MALEATE 1 DROP(S): 20; 5 SOLUTION/ DROPS OPHTHALMIC at 18:39

## 2018-03-22 RX ADMIN — Medication 81 MILLIGRAM(S): at 12:06

## 2018-03-22 RX ADMIN — Medication 100 MILLIGRAM(S): at 22:25

## 2018-03-22 RX ADMIN — DORZOLAMIDE HYDROCHLORIDE TIMOLOL MALEATE 1 DROP(S): 20; 5 SOLUTION/ DROPS OPHTHALMIC at 06:37

## 2018-03-22 RX ADMIN — ENOXAPARIN SODIUM 60 MILLIGRAM(S): 100 INJECTION SUBCUTANEOUS at 18:35

## 2018-03-22 RX ADMIN — ZINC OXIDE 1 APPLICATION(S): 200 OINTMENT TOPICAL at 12:47

## 2018-03-22 RX ADMIN — Medication 100 MILLIGRAM(S): at 15:05

## 2018-03-22 RX ADMIN — Medication 3 MILLILITER(S): at 07:55

## 2018-03-22 RX ADMIN — PIPERACILLIN AND TAZOBACTAM 25 GRAM(S): 4; .5 INJECTION, POWDER, LYOPHILIZED, FOR SOLUTION INTRAVENOUS at 06:34

## 2018-03-22 RX ADMIN — Medication 100 MICROGRAM(S): at 06:36

## 2018-03-22 RX ADMIN — LATANOPROST 1 DROP(S): 0.05 SOLUTION/ DROPS OPHTHALMIC; TOPICAL at 22:26

## 2018-03-22 RX ADMIN — Medication 100 MILLIGRAM(S): at 12:06

## 2018-03-22 RX ADMIN — Medication 300 MILLIGRAM(S): at 12:06

## 2018-03-22 RX ADMIN — Medication 2: at 18:34

## 2018-03-22 RX ADMIN — PANTOPRAZOLE SODIUM 40 MILLIGRAM(S): 20 TABLET, DELAYED RELEASE ORAL at 06:37

## 2018-03-22 RX ADMIN — MEROPENEM 100 MILLIGRAM(S): 1 INJECTION INTRAVENOUS at 12:07

## 2018-03-22 RX ADMIN — Medication 3 MILLILITER(S): at 14:18

## 2018-03-22 RX ADMIN — POLYETHYLENE GLYCOL 3350 17 GRAM(S): 17 POWDER, FOR SOLUTION ORAL at 18:38

## 2018-03-22 RX ADMIN — ZINC OXIDE 1 APPLICATION(S): 200 OINTMENT TOPICAL at 06:35

## 2018-03-22 RX ADMIN — ATORVASTATIN CALCIUM 40 MILLIGRAM(S): 80 TABLET, FILM COATED ORAL at 22:25

## 2018-03-22 RX ADMIN — CHLORHEXIDINE GLUCONATE 15 MILLILITER(S): 213 SOLUTION TOPICAL at 06:34

## 2018-03-22 RX ADMIN — Medication 0.5 MILLIGRAM(S): at 07:55

## 2018-03-22 RX ADMIN — ZINC OXIDE 1 APPLICATION(S): 200 OINTMENT TOPICAL at 18:39

## 2018-03-22 RX ADMIN — Medication 40 MILLIGRAM(S): at 12:07

## 2018-03-22 RX ADMIN — DEXMEDETOMIDINE HYDROCHLORIDE IN 0.9% SODIUM CHLORIDE 4.65 MICROGRAM(S)/KG/HR: 4 INJECTION INTRAVENOUS at 22:24

## 2018-03-22 RX ADMIN — Medication 3: at 12:05

## 2018-03-22 RX ADMIN — Medication 40 MILLIEQUIVALENT(S): at 08:27

## 2018-03-22 RX ADMIN — Medication 1 GRAM(S): at 06:36

## 2018-03-22 RX ADMIN — GABAPENTIN 300 MILLIGRAM(S): 400 CAPSULE ORAL at 22:25

## 2018-03-22 RX ADMIN — ENOXAPARIN SODIUM 60 MILLIGRAM(S): 100 INJECTION SUBCUTANEOUS at 12:08

## 2018-03-22 RX ADMIN — Medication 3 MILLILITER(S): at 19:52

## 2018-03-22 RX ADMIN — MEROPENEM 100 MILLIGRAM(S): 1 INJECTION INTRAVENOUS at 22:25

## 2018-03-22 RX ADMIN — Medication 1: at 06:36

## 2018-03-22 RX ADMIN — FENTANYL CITRATE 6.2 MICROGRAM(S)/KG/HR: 50 INJECTION INTRAVENOUS at 22:23

## 2018-03-22 RX ADMIN — SENNA PLUS 2 TABLET(S): 8.6 TABLET ORAL at 22:25

## 2018-03-22 RX ADMIN — CHLORHEXIDINE GLUCONATE 15 MILLILITER(S): 213 SOLUTION TOPICAL at 18:36

## 2018-03-22 RX ADMIN — Medication 0.5 MILLIGRAM(S): at 19:52

## 2018-03-22 RX ADMIN — Medication 100 MILLIGRAM(S): at 06:38

## 2018-03-22 RX ADMIN — FLUCONAZOLE 100 MILLIGRAM(S): 150 TABLET ORAL at 12:07

## 2018-03-22 NOTE — PROGRESS NOTE ADULT - SUBJECTIVE AND OBJECTIVE BOX
Patient is a 72y old  Female who presents with a chief complaint of Came to hospital for chemotherapy. In ER SOB, desaturating. PNA, Rt. pleural effusion on CXR. (14 Mar 2018 23:36)      INTERVAL HPI: Pt seen and examined. Able to respond to voice commands, but still intubated, both sons and father at bedside. No other acute complaints.     OVERNIGHT EVENTS: none noted  T(F): 97.5 (03-22-18 @ 13:00), Max: 98.8 (03-21-18 @ 16:00)  HR: 90 (03-22-18 @ 14:20) (74 - 103)  BP: 130/63 (03-22-18 @ 12:30) (102/54 - 174/99)  RR: 18 (03-22-18 @ 12:30) (14 - 29)  SpO2: 84% (03-22-18 @ 14:20) (79% - 89%)  I&O's Summary    21 Mar 2018 07:01  -  22 Mar 2018 07:00  --------------------------------------------------------  IN: 1639.8 mL / OUT: 0 mL / NET: 1639.8 mL    22 Mar 2018 07:01  -  22 Mar 2018 15:34  --------------------------------------------------------  IN: 847 mL / OUT: 750 mL / NET: 97 mL        REVIEW OF SYSTEMS: pt still intubated, unable to obtain    PHYSICAL EXAM:  GENERAL: NAD, well-groomed, well-developed, intubated, able to hear and follow commands, opens eyes  HEAD:  Atraumatic, Normocephalic  EYES: EOMI, PERRLA, conjunctiva and sclera clear  ENMT: intubated  NECK: Supple, No JVD,   NERVOUS SYSTEM:  intubated, strength 4/5 b/l  CHEST/LUNG: intubated, vent sounds transmitted, diminished b/l  HEART: Regular rate and rhythm; No murmurs, rubs, or gallops  ABDOMEN: Soft, Nontender, Nondistended; Bowel sounds present  EXTREMITIES:  2+ Peripheral Pulses, No clubbing, cyanosis, or edema  SKIN: No rashes or lesions    LABS:                        9.8    5.6   )-----------( 197      ( 22 Mar 2018 06:46 )             29.8     03-22    140  |  101  |  51<H>  ----------------------------<  142<H>  3.7   |  30  |  0.89    Ca    8.0<L>      22 Mar 2018 06:46  Phos  4.1     03-22  Mg     2.2     03-22    TPro  6.0  /  Alb  2.0<L>  /  TBili  0.3  /  DBili  x   /  AST  106<H>  /  ALT  69  /  AlkPhos  117  03-22        CAPILLARY BLOOD GLUCOSE      POCT Blood Glucose.: 283 mg/dL (22 Mar 2018 11:54)  POCT Blood Glucose.: 157 mg/dL (22 Mar 2018 06:00)  POCT Blood Glucose.: 299 mg/dL (21 Mar 2018 23:34)  POCT Blood Glucose.: 164 mg/dL (21 Mar 2018 18:17)      03-19 @ 11:57   No growth to date.  --  --  03-19 @ 11:40   Normal Respiratory Nancy present  --  --  03-19 @ 09:28   No growth to date.  --  --  03-18 @ 22:14   No growth to date.  --  --          MEDICATIONS  (STANDING):  acetylcysteine 10% Inhalation 3 milliLiter(s) Inhalation every 6 hours  ALBUTerol/ipratropium for Nebulization 3 milliLiter(s) Nebulizer every 6 hours  allopurinol 300 milliGRAM(s) Oral daily  aspirin enteric coated 81 milliGRAM(s) Oral daily  atorvastatin 40 milliGRAM(s) Oral at bedtime  buDESOnide   0.5 milliGRAM(s) Respule 0.5 milliGRAM(s) Inhalation two times a day  chlorhexidine 0.12% Liquid 15 milliLiter(s) Swish and Spit two times a day  cholecalciferol 1000 Unit(s) Oral daily  dexmedetomidine Infusion 0.3 MICROgram(s)/kG/Hr (4.65 mL/Hr) IV Continuous <Continuous>  dextrose 5%. 1000 milliLiter(s) (50 mL/Hr) IV Continuous <Continuous>  dextrose 50% Injectable 12.5 Gram(s) IV Push once  dextrose 50% Injectable 25 Gram(s) IV Push once  dextrose 50% Injectable 25 Gram(s) IV Push once  dorzolamide 2%/timolol 0.5% Ophthalmic Solution 1 Drop(s) Both EYES two times a day  enoxaparin Injectable 60 milliGRAM(s) SubCutaneous two times a day  fentaNYL   Infusion 1 MICROgram(s)/kG/Hr (6.2 mL/Hr) IV Continuous <Continuous>  gabapentin 300 milliGRAM(s) Oral at bedtime  hydrocortisone sodium succinate Injectable 100 milliGRAM(s) IV Push every 8 hours  insulin lispro (HumaLOG) corrective regimen sliding scale   SubCutaneous every 6 hours  latanoprost 0.005% Ophthalmic Solution 1 Drop(s) Both EYES at bedtime  levothyroxine 100 MICROGram(s) Oral daily  meropenem  IVPB      meropenem  IVPB 1000 milliGRAM(s) IV Intermittent every 8 hours  multivitamin 1 Tablet(s) Oral daily  omega-3-Acid Ethyl Esters 1 Gram(s) Oral two times a day  pantoprazole   Suspension 40 milliGRAM(s) Oral before breakfast  phenylephrine    Infusion 2 MICROgram(s)/kG/Min (23.25 mL/Hr) IV Continuous <Continuous>  polyethylene glycol 3350 17 Gram(s) Oral two times a day  pyridoxine 100 milliGRAM(s) Oral daily  senna 2 Tablet(s) Oral at bedtime  zinc oxide 40%/lanolin Ointment 1 Application(s) Topical every 6 hours    MEDICATIONS  (PRN):  dextrose Gel 1 Dose(s) Oral once PRN Blood Glucose LESS THAN 70 milliGRAM(s)/deciliter  glucagon  Injectable 1 milliGRAM(s) IntraMuscular once PRN Glucose LESS THAN 70 milligrams/deciliter  lidocaine   Patch 1 Patch Transdermal every 72 hours PRN Neck pain  midazolam Injectable 2 milliGRAM(s) IV Push every 3 hours PRN agitation and/or vent dysynchrony  ondansetron Injectable 4 milliGRAM(s) IV Push every 6 hours PRN Nausea and/or Vomiting

## 2018-03-22 NOTE — PROGRESS NOTE ADULT - ATTENDING COMMENTS
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  Progressive resp failure likely due to HCAP and SCLCa, and continues to require maximal support from vent.     --high sedation requirements, continue fentanyl gtt, precedex gtt and versed prn  --shock, ?septic v adrenal v distributive from meds  remains on low dose phenylephrine  stress dose steroids  --CAD, continue ASA, statin, fish oil  --acute hypoxemic respiratory failure in setting of SCLCa of R lung, COPD and HCAP  SpO2 80s despite maximal support from vent, on FiO2 100, non responsive to high PEEP  ABG with profound hypoxemia and hypercapnia, increase ventilation  empiric lovenox for possible contribution of PE  aggressive diuresis  --COPD, continue bronchdilators, inhaled steroids  --continue TF  --renal function stable  --HCAP, escalate Abx to meropenem in case of resistant organism, though suspect this is unlikely  fluconazole x 1 for ?thrush  --SCLCa, s/p etoposide and carboplatin,   at risk for tumor lysis, check daily tumor lysis labs  continue allopurinol  --discussed extensively with family regarding prognosis, pt has very slim chance of surviving this acute illness.  We will continue aggressive care with these escalated measures for next 24h then reassess.  If decompensates in the interm will need to consider removal of life support then.    --pt critically ill. CC time 90min 72F PMH bioprosthetic AVR, CAD, COPD (on home O2), HTN, HLD, hypothyroidism, recently diagnosed 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  Progressive resp failure likely due to HCAP and SCLCa, and continues to require maximal support from vent.     --high sedation requirements, continue fentanyl gtt, precedex gtt and versed prn  --shock, ?septic v adrenal v distributive from meds  remains on low dose phenylephrine  stress dose steroids  --CAD, continue ASA, statin, fish oil  --acute hypoxemic respiratory failure in setting of SCLCa of R lung, COPD and HCAP  SpO2 80s despite maximal support from vent, on FiO2 100, non responsive to high PEEP  ABG with profound hypoxemia and hypercapnia, increase ventilation  tried PCV with 1:1 I:E ratio but was dyssynchronous despite heavy sedation and no improvement in SpO2  empiric lovenox for possible contribution of PE  aggressive diuresis  --COPD, continue bronchdilators, inhaled steroids  --continue TF  --renal function stable  --HCAP, escalate Abx to meropenem in case of resistant organism, though suspect this is unlikely  fluconazole x 1 for ?thrush  --SCLCa, s/p etoposide and carboplatin,   at risk for tumor lysis, check daily tumor lysis labs  continue allopurinol  --discussed extensively with family regarding prognosis, pt has very slim chance of surviving this acute illness.  We will continue aggressive care with these escalated measures for next 24h then reassess.  If decompensates in the interm will need to consider removal of life support then.    --pt critically ill. CC time 90min

## 2018-03-22 NOTE — PROGRESS NOTE ADULT - ASSESSMENT
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 Pt now intubated for progressive respiratory failure, HCAP, Shock      -S/P carboplatin, etoposide. Monitor for Tumor Lysis Syndrome  -Continue sedation for vent synchrony and agitation  -Titrate Phenylephrine to MAP > 65. Monitor HD's. Continue stress dose steroids  -Continue lung protective ventilation. Unstable for SBT given high vent support requirements.   -Continue steroids/nebs  -TF's. PPI  -Continue abx  -Monitor renal function/uop  -DVT ppx  -Supportive care    -Poor prognosis

## 2018-03-22 NOTE — PROGRESS NOTE ADULT - ASSESSMENT
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 Patient with progressive respiratory failure likely due to HCAP and SCLCa, now DNR/DNI.     -NEURO: sedated while intubated, continue Precedex gtt, Fentanyl gtt, versed PRN, for vent synchrony.   -CARDIO: Septic shock, possibly 2/2 to adrenal cause - on low dose Phenylephrine and stress steroids. Bioprosthetic AVR, CAD- Continue ASA, statin and Lovaza for HLD.  Holding Metoprolol in setting of hypotension.   -RESP: Acute respiratory failure with hypoxia in the setting of SCLCa of right lung, COPD, HCAP, s/p thoracentesis, intubated. Continue full vent support, titrating O2 for sats>88-90%. Pleural fluid exudate, culture negative, cytology pending. Continue Duonebs.  -GI: Continue tube feeds via NGT Jevity, continue PO meds via NGT.   -ENDO: Continue Synthroid for hypothyroidism.   -RENAL: Replete K with KCl 40 meq x 1. Renal function stable  -ID: Continue Zosyn for HCAP for total of 5 days.  -HEME/ONC: Continue chemo per heme/onc for metastatic small cell lung cancer: s/p Chemo 3/17 (carboplatin, etoposide) as per heme/onc for metastatic SCLC, on growth factor support with Zarexio for a total of 5 days, today is day 5. Check uric acid and LDH levels daily to monitor for Tumor Lysis. Lovenox for DVT ppx.   -Prognosis poor, patient now DNR/DNI, further plan to be discussed with family. 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 Patient with progressive respiratory failure likely due to HCAP and SCLCa, now DNR/DNI.     -NEURO: sedated while intubated, continue Precedex gtt, Fentanyl gtt, versed PRN, for vent synchrony. Increase fentantyl drip to 4.   -CARDIO: Septic shock, possibly 2/2 to adrenal cause - on low dose Phenylephrine and stress steroids. Bioprosthetic AVR, CAD- Continue ASA, statin and Lovaza for HLD.  Holding Metoprolol in setting of hypotension.   -RESP: Acute respiratory failure with hypoxia in the setting of SCLCa of right lung with persistent hypoxeia, COPD, HCAP, s/p thoracentesis, intubated. Continue full vent support, titrating O2 for sats>88-90%. Pleural fluid exudate, culture negative, cytology pending. Continue Duonebs.  -GI: Continue tube feeds via NGT Jevity, continue PO meds via NGT.   -ENDO: Continue Synthroid for hypothyroidism.   -RENAL: Replete K with KCl 40 meq x 1. Renal function stable  -ID: Continue Zosyn for HCAP for total of 5 days.  -HEME/ONC: Continue chemo per heme/onc for metastatic small cell lung cancer: s/p Chemo 3/17 (carboplatin, etoposide) as per heme/onc for metastatic SCLC, on growth factor support with Zarexio for a total of 5 days, today is day 5. Check uric acid and LDH levels daily to monitor for Tumor Lysis. Lovenox for DVT ppx.   -Prognosis poor, patient now DNR/DNI, further plan to be discussed with family. 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 Patient with progressive respiratory failure likely due to HCAP and SCLCa, now DNR/DNI.     -NEURO: sedated while intubated, continue Precedex gtt, Fentanyl gtt, versed PRN, for vent synchrony. Increase fentantyl drip to 4.   -CARDIO: Septic shock, possibly 2/2 to adrenal cause - on low dose Phenylephrine and stress steroids. Bioprosthetic AVR, CAD- Continue ASA, statin and Lovaza for HLD.  Holding Metoprolol in setting of hypotension.   -RESP: Acute respiratory failure hypoxia in the setting of SCLCa of right lung, COPD, HCAP with persistent hypoxia, on full vent support. Consider PE given persistent hypoxia, patient can not tolerate transfer to [facility] and therefore will treat empirically with FD Lovenox, first dose STAT. Will get STAT Chest X Ray, will give 40 IV P x 1 Lasix for Moderate left pleural effusion, still not good candidate for thoracentesis given high risk. Continue full vent support, titrating O2 for sats>88-90%. Pleural fluid exudate, culture negative, cytology negative. Continue Duonebs.   -GI: Continue tube feeds via NGT Jevity, continue PO meds via NGT.   -ENDO: Continue Synthroid for hypothyroidism.   -RENAL: Replete K with KCl 40 meq x 1. Renal function stable. Will place Multani.   -ID: On Zosyn for HCAP, as per ID will switch to Meropenem and give 1 dose Fluconazole to potentially improve hypoxia if 2/2 to pneumonia.  -HEME/ONC: Continue chemo per heme/onc for metastatic small cell lung cancer: s/p Chemo 3/17 (carboplatin, etoposide) as per heme/onc for metastatic SCLC, on growth factor support with Zarexio for a total of 5 days, today is day 5. Check uric acid and LDH levels daily to monitor for Tumor Lysis. Lovenox for DVT ppx.   -Prognosis poor, patient now DNR/DNI, further plan to be discussed with family.

## 2018-03-22 NOTE — PROCEDURE NOTE - NSSITEPREP_SKIN_A_CORE
Reason for Call:  Other call back    Detailed comments: Patient wanting to know if you have kidney disease is it ok to take benadryl?     Phone Number Patient can be reached at: Home number on file [phone] (home)    Best Time: Any    Can we leave a detailed message on this number? YES    Call taken on 1/11/2018 at 5:28 PM by Mendy Haro        chlorhexidine

## 2018-03-22 NOTE — PROGRESS NOTE ADULT - PROBLEM SELECTOR PLAN 6
chronic, stable  cont home meds levothyroxine

## 2018-03-22 NOTE — PROGRESS NOTE ADULT - SUBJECTIVE AND OBJECTIVE BOX
All interim records and events noted.    remains intubated, low sats despite 100% O2  c/w pain in throat from the tube   afebrile   family present    MEDICATIONS  reviewed    Vital Signs Last 24 Hrs    PHYSICAL EXAM  General: frail elderly ill appearing woman, intubated, in bed,   Head: atraumatic, normocephalic  Neck: supple  CV: S1 S2, regular rate and rhythm  Lungs: coarse breath sounds bilaterally, decreased BS on left   Abdomen: soft, nontender, bowel sounds present, no palpable masses, pouchy  Extrem: no sig edema  Skin: no significant ecchymosis/petechiae, bruises from IV insertion   Neuro:  alert , answers Qs appropriately       LABS:    CBC Full  -  ( 21 Mar 2018 06:36 )  WBC Count : 7.6 K/uL  Hemoglobin : 8.0 g/dL  Hematocrit : 24.1 %  Platelet Count - Automated : 173 K/uL  Mean Cell Volume : 89.8 fl  Mean Cell Hemoglobin : 29.9 pg  Mean Cell Hemoglobin Concentration : 33.3 gm/dL      03-21    137  |  102  |  44<H>  ----------------------------<  160<H>  4.2   |  23  |  0.79    Ca    7.6<L>      21 Mar 2018 06:36  Phos  4.5     03-21  Mg     2.4     03-21    TPro  5.3<L>  /  Alb  1.9<L>  /  TBili  0.4  /  DBili  x   /  AST  100<H>  /  ALT  32  /  AlkPhos  93  03-21    < from: CT Abdomen and Pelvis w/ Oral Cont (03.06.18 @ 02:24) >    EXAM:  CT ABDOMEN AND PELVIS OC                            PROCEDURE DATE:  03/06/2018          INTERPRETATION:  CT ABDOMEN AND PELVIS WITHOUT CONTRAST    INDICATION: Abdominal pain and diarrhea.    TECHNIQUE: Abdominopelvic CT without intravenous contrast.    COMPARISON: Prior CT 11/14/2012. CT of the chest 2/6/2018.    FINDINGS:    ABSENCE OF INTRAVENOUS CONTRAST LIMITS EVALUATION FOR FOCAL LESIONS,   NEOPLASM, AND VASCULAR PATHOLOGY.    Lower Thorax: There is dense consolidation of the right middle and right   lower lobes, likely with a small pleural effusion.    Liver: New 3.4 cm hypodense lesion in the posterior right hepatic lobe.   New 1.9 cm hypodense lesion in the inferior right hepatic lobe.  Biliary: No dilatation.      Spleen: No suspicious lesions.      Pancreas: No inflammatory changes or ductal dilatation.      Adrenals: Normal.      Kidneys: No hydronephrosis or solid mass.      Vessels: Normal caliber.        GI tract: No evidence of small bowel obstruction. No wall thickening or   inflammatory changes.        Peritoneum/retroperitoneum and mesentery: No free air. No organized fluid   collection. No adenopathy.        Pelvic organs/Bladder: No pelvic masses. Bladder is normal.        Abdominal wall: Unremarkable.  Bones and soft tissues: No destructive lesion.        IMPRESSION:    Dense consolidation of the right middle lower lobes, progressing from   prior exam 2/6/2018 likely reflecting pneumonia. Follow-up to resolution   is advised. Necrotizing pneumonia is not excluded. Possibility of   underlying neoplasm is raised given the progression from prior exam.    New ill-defined hypodense lesions in the liver, suspicious for   possibility of metastatic disease. Follow-up evaluation is recommended   with contrast enhanced MRI.                < end of copied text >

## 2018-03-22 NOTE — PROCEDURE NOTE - NSINDICATIONS_GEN_A_CORE
airway protection/respiratory failure
pleural effusion/respiratory failure
venous access/critical illness
intubated/feeds

## 2018-03-22 NOTE — PROCEDURE NOTE - NSPROCDETAILS_GEN_ALL_CORE
ultrasound assessment of effusion (localization)/location identified, draped/prepped, sterile technique used, needle inserted/introduced
patient pre-oxygenated, tube inserted, placement confirmed/connected to ventilator
lumen(s) aspirated and flushed/sterile dressing applied/sterile technique, catheter placed/ultrasound guidance/guidewire recovered
orogastric/connected to suction/audible air bolus/bowel sounds present to 4 quadrants/placement confirmed by auscultation/gastric secretions aspirated, placement confirmed

## 2018-03-22 NOTE — PROGRESS NOTE ADULT - PROBLEM SELECTOR PLAN 7
lovenox for dvt ppx

## 2018-03-22 NOTE — CHART NOTE - NSCHARTNOTEFT_GEN_A_CORE
Assessment: Pt seen but unable to interview as pt asleep and unable to speak. Pt on OG tube feed Osmolite 1.3 @ 65mL/hr for 18hrs/day with total volume 1170ml/day. Per tube feed pump, TF running at goal rate of 65mL/hr; pt received 570 mL over last 24hrs (855kcal) and 1130mL over last 48hrs (1695kcal); approximately 49% of goal rate for 24 hrs. Per EMR: wt (3/22): 142.8#, but questionable due to noted 6# wt gain since yesterday; BM: 3/21; no edema; jennifer 9 with surgical wound on chest.     Factors impacting intake: [ ] none [ ] nausea  [ ] vomiting [ ] diarrhea [ ] constipation  [ ]chewing problems [ ] swallowing issues  [x] other: intubated      Diet Presciption: Diet, NPO with Tube Feed:   Osmolite 1.5 Alexx    Tube Feeding Modality: Orogastric  Total Volume for 24 Hours (mL): 1170  Continuous  Starting Tube Feed Rate {mL per Hour}: 25  Increase Tube Feed Rate by (mL): 10     Every 8 hours  Until Goal Tube Feed Rate (mL per Hour): 65  Tube Feed Duration (in Hours): 18  Tube Feed Start Time: 13:30 (03-19-18 @ 13:26)    Intake: Per tube feed pump, pt received 570 mL over last 24hrs (855kcal) and 1130mL over last 48hrs (1695kcal); approximately 49% of goal rate for 24 hrs.    Current Weight: (3/22): 142.8# (questionable)       Pertinent Medications: MEDICATIONS  (STANDING):  acetylcysteine 10% Inhalation 3 milliLiter(s) Inhalation every 6 hours  ALBUTerol/ipratropium for Nebulization 3 milliLiter(s) Nebulizer every 6 hours  allopurinol 300 milliGRAM(s) Oral daily  aspirin enteric coated 81 milliGRAM(s) Oral daily  atorvastatin 40 milliGRAM(s) Oral at bedtime  buDESOnide   0.5 milliGRAM(s) Respule 0.5 milliGRAM(s) Inhalation two times a day  chlorhexidine 0.12% Liquid 15 milliLiter(s) Swish and Spit two times a day  cholecalciferol 1000 Unit(s) Oral daily  dexmedetomidine Infusion 0.3 MICROgram(s)/kG/Hr (4.65 mL/Hr) IV Continuous <Continuous>  dextrose 5%. 1000 milliLiter(s) (50 mL/Hr) IV Continuous <Continuous>  dextrose 50% Injectable 12.5 Gram(s) IV Push once  dextrose 50% Injectable 25 Gram(s) IV Push once  dextrose 50% Injectable 25 Gram(s) IV Push once  dorzolamide 2%/timolol 0.5% Ophthalmic Solution 1 Drop(s) Both EYES two times a day  enoxaparin Injectable 40 milliGRAM(s) SubCutaneous every 24 hours  fentaNYL   Infusion 1 MICROgram(s)/kG/Hr (6.2 mL/Hr) IV Continuous <Continuous>  gabapentin 300 milliGRAM(s) Oral at bedtime  hydrocortisone sodium succinate Injectable 100 milliGRAM(s) IV Push every 8 hours  insulin lispro (HumaLOG) corrective regimen sliding scale   SubCutaneous every 6 hours  latanoprost 0.005% Ophthalmic Solution 1 Drop(s) Both EYES at bedtime  levothyroxine 100 MICROGram(s) Oral daily  multivitamin 1 Tablet(s) Oral daily  omega-3-Acid Ethyl Esters 1 Gram(s) Oral two times a day  pantoprazole   Suspension 40 milliGRAM(s) Oral before breakfast  phenylephrine    Infusion 2 MICROgram(s)/kG/Min (23.25 mL/Hr) IV Continuous <Continuous>  piperacillin/tazobactam IVPB. 3.375 Gram(s) IV Intermittent every 8 hours  polyethylene glycol 3350 17 Gram(s) Oral two times a day  pyridoxine 100 milliGRAM(s) Oral daily  senna 2 Tablet(s) Oral at bedtime  zinc oxide 40%/lanolin Ointment 1 Application(s) Topical every 6 hours    MEDICATIONS  (PRN):  dextrose Gel 1 Dose(s) Oral once PRN Blood Glucose LESS THAN 70 milliGRAM(s)/deciliter  glucagon  Injectable 1 milliGRAM(s) IntraMuscular once PRN Glucose LESS THAN 70 milligrams/deciliter  lidocaine   Patch 1 Patch Transdermal every 72 hours PRN Neck pain  midazolam Injectable 2 milliGRAM(s) IV Push every 3 hours PRN agitation and/or vent dysynchrony  ondansetron Injectable 4 milliGRAM(s) IV Push every 6 hours PRN Nausea and/or Vomiting    Pertinent Labs: 03-22 Na140 mmol/L Glu 142 mg/dL<H> K+ 3.7 mmol/L Cr  0.89 mg/dL BUN 51 mg/dL<H> 03-22 Phos 4.1 mg/dL 03-22 Alb 2.0 g/dL<L> 03-07 [id number] 5.3 %     CAPILLARY BLOOD GLUCOSE      POCT Blood Glucose.: 157 mg/dL (22 Mar 2018 06:00)  POCT Blood Glucose.: 299 mg/dL (21 Mar 2018 23:34)  POCT Blood Glucose.: 164 mg/dL (21 Mar 2018 18:17)  POCT Blood Glucose.: 128 mg/dL (21 Mar 2018 12:53)    Skin: no edema; jennifer 9 with surgical wound on chest    Estimated Needs:   [x] no change since previous assessment  [ ] recalculated:     Previous Nutrition Diagnosis:   [x] Malnutrition     Nutrition Diagnosis is [x] ongoing  [ ] resolved [ ] not applicable     New Nutrition Diagnosis: [x] not applicable       Interventions: speak with RN regarding holding TF and importance of running TF to meet energy needs   Recommend  [ ] Change Diet To:  [ ] Nutrition Supplement  [x] Nutrition Support: Continue running Osmolite 1.5 @ goal rate of 65mL/hr for 18 hrs  [ ] Other:     Monitoring and Evaluation:   [ ] PO intake [ x ] Tolerance to diet prescription [ x ] weights [ x ] labs[ x ] follow up per protocol  [ ] other: Assessment: Pt seen but unable to interview as pt asleep and unable to speak. Pt on OG tube feed Osmolite 1.3 @ 65mL/hr for 18hrs/day with total volume 1170ml/day via OG tube. Per tube feed pump, TF running at goal rate of 65mL/hr; pt received 570 mL over last 24hrs (855kcal) and 1130mL over last 48hrs (1695kcal); approximately 49% of goal rate for 24 hrs. Per EMR: wt (3/22): 142.8#, but questionable due to noted 6# wt gain since yesterday; BM: 3/21; no edema; jennifer 9 with surgical wound on chest.     Factors impacting intake: [ ] none [ ] nausea  [ ] vomiting [ ] diarrhea [ ] constipation  [ ]chewing problems [ ] swallowing issues  [x] other: intubated      Diet Presciption: Diet, NPO with Tube Feed:   Osmolite 1.5 Alexx    Tube Feeding Modality: Orogastric  Total Volume for 24 Hours (mL): 1170  Continuous  Starting Tube Feed Rate {mL per Hour}: 25  Increase Tube Feed Rate by (mL): 10     Every 8 hours  Until Goal Tube Feed Rate (mL per Hour): 65  Tube Feed Duration (in Hours): 18  Tube Feed Start Time: 13:30 (03-19-18 @ 13:26)    Intake: Per tube feed pump, pt received 570 mL over last 24hrs (855kcal) and 1130mL over last 48hrs (1695kcal); approximately 49% of goal rate for 24 hrs.    Current Weight: (3/22): 142.8# (questionable)       Pertinent Medications: MEDICATIONS  (STANDING):  acetylcysteine 10% Inhalation 3 milliLiter(s) Inhalation every 6 hours  ALBUTerol/ipratropium for Nebulization 3 milliLiter(s) Nebulizer every 6 hours  allopurinol 300 milliGRAM(s) Oral daily  aspirin enteric coated 81 milliGRAM(s) Oral daily  atorvastatin 40 milliGRAM(s) Oral at bedtime  buDESOnide   0.5 milliGRAM(s) Respule 0.5 milliGRAM(s) Inhalation two times a day  chlorhexidine 0.12% Liquid 15 milliLiter(s) Swish and Spit two times a day  cholecalciferol 1000 Unit(s) Oral daily  dexmedetomidine Infusion 0.3 MICROgram(s)/kG/Hr (4.65 mL/Hr) IV Continuous <Continuous>  dextrose 5%. 1000 milliLiter(s) (50 mL/Hr) IV Continuous <Continuous>  dextrose 50% Injectable 12.5 Gram(s) IV Push once  dextrose 50% Injectable 25 Gram(s) IV Push once  dextrose 50% Injectable 25 Gram(s) IV Push once  dorzolamide 2%/timolol 0.5% Ophthalmic Solution 1 Drop(s) Both EYES two times a day  enoxaparin Injectable 40 milliGRAM(s) SubCutaneous every 24 hours  fentaNYL   Infusion 1 MICROgram(s)/kG/Hr (6.2 mL/Hr) IV Continuous <Continuous>  gabapentin 300 milliGRAM(s) Oral at bedtime  hydrocortisone sodium succinate Injectable 100 milliGRAM(s) IV Push every 8 hours  insulin lispro (HumaLOG) corrective regimen sliding scale   SubCutaneous every 6 hours  latanoprost 0.005% Ophthalmic Solution 1 Drop(s) Both EYES at bedtime  levothyroxine 100 MICROGram(s) Oral daily  multivitamin 1 Tablet(s) Oral daily  omega-3-Acid Ethyl Esters 1 Gram(s) Oral two times a day  pantoprazole   Suspension 40 milliGRAM(s) Oral before breakfast  phenylephrine    Infusion 2 MICROgram(s)/kG/Min (23.25 mL/Hr) IV Continuous <Continuous>  piperacillin/tazobactam IVPB. 3.375 Gram(s) IV Intermittent every 8 hours  polyethylene glycol 3350 17 Gram(s) Oral two times a day  pyridoxine 100 milliGRAM(s) Oral daily  senna 2 Tablet(s) Oral at bedtime  zinc oxide 40%/lanolin Ointment 1 Application(s) Topical every 6 hours    MEDICATIONS  (PRN):  dextrose Gel 1 Dose(s) Oral once PRN Blood Glucose LESS THAN 70 milliGRAM(s)/deciliter  glucagon  Injectable 1 milliGRAM(s) IntraMuscular once PRN Glucose LESS THAN 70 milligrams/deciliter  lidocaine   Patch 1 Patch Transdermal every 72 hours PRN Neck pain  midazolam Injectable 2 milliGRAM(s) IV Push every 3 hours PRN agitation and/or vent dysynchrony  ondansetron Injectable 4 milliGRAM(s) IV Push every 6 hours PRN Nausea and/or Vomiting    Pertinent Labs: 03-22 Na140 mmol/L Glu 142 mg/dL<H> K+ 3.7 mmol/L Cr  0.89 mg/dL BUN 51 mg/dL<H> 03-22 Phos 4.1 mg/dL 03-22 Alb 2.0 g/dL<L> 03-07 [id number] 5.3 %     CAPILLARY BLOOD GLUCOSE      POCT Blood Glucose.: 157 mg/dL (22 Mar 2018 06:00)  POCT Blood Glucose.: 299 mg/dL (21 Mar 2018 23:34)  POCT Blood Glucose.: 164 mg/dL (21 Mar 2018 18:17)  POCT Blood Glucose.: 128 mg/dL (21 Mar 2018 12:53)    Skin: no edema; jennifer 9 with surgical wound on chest    Estimated Needs:   [x] no change since previous assessment  [ ] recalculated:     Previous Nutrition Diagnosis:   [x] Malnutrition     Nutrition Diagnosis is [x] ongoing  [ ] resolved [ ] not applicable     New Nutrition Diagnosis: [x] not applicable       Interventions: speak with RN regarding holding TF and importance of running TF to meet energy needs   Recommend  [ ] Change Diet To:  [ ] Nutrition Supplement  [x] Nutrition Support: Continue running Osmolite 1.5 @ goal rate of 65mL/hr for 18 hrs  [ ] Other:     Monitoring and Evaluation:   [ ] PO intake [ x ] Tolerance to diet prescription [ x ] weights [ x ] labs[ x ] follow up per protocol  [ ] other: Assessment: Pt seen but unable to interview as pt intubated/sedated. Pt on OG tube feed Osmolite 1.5 @ 65mL/hr for 18hrs/day with total volume 1170ml/day via OG tube. Per tube feed pump, TF running at goal rate of 65mL/hr; pt received 570 mL over last 24hrs (855kcal) and 1130mL over last 48hrs (1695kcal); approximately 49% of goal rate for 24 hrs. Per EMR: wt (3/22): 142.8#, but questionable due to noted 6# wt gain since yesterday; BM: 3/21; no edema; jennifer 11 with surgical wound on chest.     Factors impacting intake: [ ] none [ ] nausea  [ ] vomiting [ ] diarrhea [ ] constipation  [ ]chewing problems [ ] swallowing issues  [x] other: intubated      Diet Presciption: Diet, NPO with Tube Feed:   Osmolite 1.5 Alexx    Tube Feeding Modality: Orogastric  Total Volume for 24 Hours (mL): 1170  Continuous  Starting Tube Feed Rate {mL per Hour}: 25  Increase Tube Feed Rate by (mL): 10     Every 8 hours  Until Goal Tube Feed Rate (mL per Hour): 65  Tube Feed Duration (in Hours): 18  Tube Feed Start Time: 13:30 (03-19-18 @ 13:26)    Intake: Per tube feed pump, pt received 570 mL over last 24hrs (855kcal) and 1130mL over last 48hrs (1695kcal); approximately 49% of goal rate for 24 hrs.    Current Weight: (3/22): 142.8# (questionable)       Pertinent Medications: MEDICATIONS  (STANDING):  acetylcysteine 10% Inhalation 3 milliLiter(s) Inhalation every 6 hours  ALBUTerol/ipratropium for Nebulization 3 milliLiter(s) Nebulizer every 6 hours  allopurinol 300 milliGRAM(s) Oral daily  aspirin enteric coated 81 milliGRAM(s) Oral daily  atorvastatin 40 milliGRAM(s) Oral at bedtime  buDESOnide   0.5 milliGRAM(s) Respule 0.5 milliGRAM(s) Inhalation two times a day  chlorhexidine 0.12% Liquid 15 milliLiter(s) Swish and Spit two times a day  cholecalciferol 1000 Unit(s) Oral daily  dexmedetomidine Infusion 0.3 MICROgram(s)/kG/Hr (4.65 mL/Hr) IV Continuous <Continuous>  dextrose 5%. 1000 milliLiter(s) (50 mL/Hr) IV Continuous <Continuous>  dextrose 50% Injectable 12.5 Gram(s) IV Push once  dextrose 50% Injectable 25 Gram(s) IV Push once  dextrose 50% Injectable 25 Gram(s) IV Push once  dorzolamide 2%/timolol 0.5% Ophthalmic Solution 1 Drop(s) Both EYES two times a day  enoxaparin Injectable 40 milliGRAM(s) SubCutaneous every 24 hours  fentaNYL   Infusion 1 MICROgram(s)/kG/Hr (6.2 mL/Hr) IV Continuous <Continuous>  gabapentin 300 milliGRAM(s) Oral at bedtime  hydrocortisone sodium succinate Injectable 100 milliGRAM(s) IV Push every 8 hours  insulin lispro (HumaLOG) corrective regimen sliding scale   SubCutaneous every 6 hours  latanoprost 0.005% Ophthalmic Solution 1 Drop(s) Both EYES at bedtime  levothyroxine 100 MICROGram(s) Oral daily  multivitamin 1 Tablet(s) Oral daily  omega-3-Acid Ethyl Esters 1 Gram(s) Oral two times a day  pantoprazole   Suspension 40 milliGRAM(s) Oral before breakfast  phenylephrine    Infusion 2 MICROgram(s)/kG/Min (23.25 mL/Hr) IV Continuous <Continuous>  piperacillin/tazobactam IVPB. 3.375 Gram(s) IV Intermittent every 8 hours  polyethylene glycol 3350 17 Gram(s) Oral two times a day  pyridoxine 100 milliGRAM(s) Oral daily  senna 2 Tablet(s) Oral at bedtime  zinc oxide 40%/lanolin Ointment 1 Application(s) Topical every 6 hours    MEDICATIONS  (PRN):  dextrose Gel 1 Dose(s) Oral once PRN Blood Glucose LESS THAN 70 milliGRAM(s)/deciliter  glucagon  Injectable 1 milliGRAM(s) IntraMuscular once PRN Glucose LESS THAN 70 milligrams/deciliter  lidocaine   Patch 1 Patch Transdermal every 72 hours PRN Neck pain  midazolam Injectable 2 milliGRAM(s) IV Push every 3 hours PRN agitation and/or vent dysynchrony  ondansetron Injectable 4 milliGRAM(s) IV Push every 6 hours PRN Nausea and/or Vomiting    Pertinent Labs: 03-22 Na140 mmol/L Glu 142 mg/dL<H> K+ 3.7 mmol/L Cr  0.89 mg/dL BUN 51 mg/dL<H> 03-22 Phos 4.1 mg/dL 03-22 Alb 2.0 g/dL<L> 03-07 [id number] 5.3 %     CAPILLARY BLOOD GLUCOSE      POCT Blood Glucose.: 157 mg/dL (22 Mar 2018 06:00)  POCT Blood Glucose.: 299 mg/dL (21 Mar 2018 23:34)  POCT Blood Glucose.: 164 mg/dL (21 Mar 2018 18:17)  POCT Blood Glucose.: 128 mg/dL (21 Mar 2018 12:53)    Skin: no edema; jennifer 11 with surgical wound on chest    Estimated Needs:   [x] no change since previous assessment  [ ] recalculated:     Previous Nutrition Diagnosis:   [x] Malnutrition     Nutrition Diagnosis is [x] ongoing  [ ] resolved [ ] not applicable     New Nutrition Diagnosis: [x] not applicable       Interventions: Spoke with RN regarding goal rate TF to meet estimated energy needs   Recommend  [ ] Change Diet To:  [ ] Nutrition Supplement  [x] Nutrition Support: Continue running Osmolite 1.5 @ goal rate of 65mL/hr for 18 hrs  [ ] Other:     Monitoring and Evaluation:   [ ] PO intake [ x ] Tolerance to diet prescription [ x ] weights [ x ] labs[ x ] follow up per protocol  [ ] other:

## 2018-03-22 NOTE — PROGRESS NOTE ADULT - NSHPATTENDINGPLANDISCUSS_GEN_ALL_CORE
at bedside
Dr. Greer (ICU attending) and Kendal (ICU PA)
ICU PA (Kendal)
ICU Team
pt's family, primary team, ID, primary onc Dr Salazar is informed and updated on pt's condition
family at bedside
pt's family, Dr Bahena, pharmacy
 at bedside
 and son at bedside
Dr. Bahena ICU,  at bedside
Dr. Bahena,  and son at bedside
son,  and ICU attending Dr. Greer
Dr. Bahena
Dr. Greer ICU attending

## 2018-03-22 NOTE — PROGRESS NOTE ADULT - ASSESSMENT
71 yo female with Lung CA now with fever, leukocytosis, bandemia, purulent sputum and requiring mech ventilation  Not clear to me how much of this is cancer/inflammatory response we chemo vs. superimposed pneumonia.  Critically ill.  Px poor  Discussed with family at beside- 1 hour at a time

## 2018-03-22 NOTE — PROGRESS NOTE ADULT - SUBJECTIVE AND OBJECTIVE BOX
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    24 hour events: Pt remains intubated and on pressor support. O2 saturation remains between 82-86% despite maximal ventilatory support. Pt on fentanyl gtt, precedex gtt and versed prn. Pt made DNR/DNI per HCP ()    PAST MEDICAL & SURGICAL HISTORY:  Liver lesion  Clostridium difficile colitis  Emphysema  Pneumonia: 2/2018  Generalized intra-abdominal and pelvic swelling, mass and lump  Glaucoma  Aortic valve prosthesis present  CAD (coronary artery disease)  Arthritis  Heart murmur  Other iron deficiency anemia  PVD (peripheral vascular disease)  Hyperlipemia  Hypothyroid  Hypertension  H/O foot surgery: (Right foot, 2010)  History of colonoscopy  S/P carpal tunnel release: (Right, 1/2017)  H/O carotid endarterectomy: Both sides 2002  Aortic valve replaced: 2011 with bovine  Carotid disease, bilateral  Cataract extraction status of left eye  Cataract extraction status of right eye      Review of Systems:  unable to obtain    T(F): 97.5 (03-21-18 @ 23:29), Max: 99 (03-21-18 @ 12:35)  HR: 85 (03-22-18 @ 03:30) (72 - 124)  BP: 105/51 (03-22-18 @ 03:30) (80/41 - 194/83)  RR: 18 (03-22-18 @ 03:30) (16 - 35)  SpO2: 83% (03-22-18 @ 03:30) (75% - 91%)  Wt(kg): --    Mode: AC/ CMV (Assist Control/ Continuous Mandatory Ventilation), RR (machine): 18, TV (machine): 400, FiO2: 100, PEEP: 5    CAPILLARY BLOOD GLUCOSE      POCT Blood Glucose.: 299 mg/dL (21 Mar 2018 23:34)      I&O's Summary    20 Mar 2018 07:01  -  21 Mar 2018 07:00  --------------------------------------------------------  IN: 1558.6 mL / OUT: 0 mL / NET: 1558.6 mL    21 Mar 2018 07:01  -  22 Mar 2018 04:17  --------------------------------------------------------  IN: 1369.1 mL / OUT: 0 mL / NET: 1369.1 mL        Physical Exam:     Gen: sedated  Neuro: sedated on vent  HEENT: NC/AT  Resp: Decreased BS on right. Rhonchi on left  CVS: nl S1/S2, RRR  Abd: soft, nt, nd, +bs  Ext: no edema, +pulses  Skin: well perfused, warm    Meds:    piperacillin/tazobactam IVPB. IV Intermittent  phenylephrine    Infusion IV Continuous  atorvastatin Oral  hydrocortisone sodium succinate Injectable IV Push  insulin lispro (HumaLOG) corrective regimen sliding scale SubCutaneous  levothyroxine Oral  acetylcysteine 10% Inhalation Inhalation  ALBUTerol/ipratropium for Nebulization Nebulizer  buDESOnide   0.5 milliGRAM(s) Respule Inhalation  dexmedetomidine Infusion IV Continuous  fentaNYL   Infusion IV Continuous  gabapentin Oral  midazolam Injectable IV Push PRN  ondansetron Injectable IV Push PRN  aspirin enteric coated Oral  enoxaparin Injectable SubCutaneous  pantoprazole   Suspension Oral  polyethylene glycol 3350 Oral  senna Oral  cholecalciferol Oral  dextrose 5%. IV Continuous  multivitamin Oral  pyridoxine Oral  chlorhexidine 0.12% Liquid Swish and Spit  dorzolamide 2%/timolol 0.5% Ophthalmic Solution Both EYES  latanoprost 0.005% Ophthalmic Solution Both EYES  lidocaine   Patch Transdermal PRN  zinc oxide 40%/lanolin Ointment Topical  omega-3-Acid Ethyl Esters Oral                            8.0    7.6   )-----------( 173      ( 21 Mar 2018 06:36 )             24.1     Bands 5.0    03-21    137  |  102  |  44<H>  ----------------------------<  160<H>  4.2   |  23  |  0.79    Ca    7.6<L>      21 Mar 2018 06:36  Phos  4.5     03-21  Mg     2.4     03-21    TPro  5.3<L>  /  Alb  1.9<L>  /  TBili  0.4  /  DBili  x   /  AST  100<H>  /  ALT  32  /  AlkPhos  93  03-21              .Blood Blood-Peripheral   No growth to date. -- 03-19 @ 11:57  .Sputum Sputum, trap   Normal Respiratory Nancy present   No polymorphonuclear leukocytes per low power field  No Squamous epithelial cells per low power field  No organisms seen per oil power field 03-19 @ 11:40  .Blood Blood-Peripheral   No growth to date. -- 03-19 @ 09:28  .Body Fluid Pleural Fluid   No growth to date.   No polymorphonuclear cells seen  No organisms seen  by cytocentrifuge 03-18 @ 22:14      Bedside US: IVC 1.4 no variations. B Lines b/l , small-mod L pleural effusion      CENTRAL LINE: No          NOLAN: No                     A-LINE: No      GLOBAL ISSUE/BEST PRACTICE:  Analgesia: yes  Sedation: yes  HOB elevation: yes  Stress ulcer prophylaxis: yes  VTE prophylaxis: yes  Glycemic control: yes  Nutrition: yes      CODE STATUS: DNR/DNI  GOC discussion: Y  Critical care time spent (mins): 38  (Reviewing data, imaging, discussing with multidisciplinary team, non inclusive of procedures, discussing goals of care with patient/family)

## 2018-03-22 NOTE — PROCEDURE NOTE - NSPOSTPRCRAD_GEN_A_CORE
tube seen in gastric bubble, tube below diaphragm/post-procedure radiography performed
RIJ, assessed via POCUS, Lung Sliding appreciated via POCUS/central line located in the

## 2018-03-22 NOTE — PROGRESS NOTE ADULT - ASSESSMENT
73 yo woman diagnosed small cell ca of lung with progressive pulmonary involvement  Liver lesion on CT scan is suggestive of metastatic disease.   Started urgent chemotherapy with VP_16 and carboplatin  03/15/18- 3/17/18   s/p Zarexio 300mcg daily, stopped today, continue to monitor daily CBC with diff    restart if develops neutropenia      clinically pt is deteriorating, low sats on 100% O2  remains intubated  as per d/w ICU, pt is unstable to undergo images   it is unclear if respiratory failure caused only by cancer  left pleural effusion is small, pt did not improve after diuretics were given   as per d/w ID, sputum Cx have been negative for resistant bacteria  agree with empiric Tx for PE : Lovenox 1mg/kg every12 h  TLS LW reviewed : daily LDH, uric acid, lytes including K, phos       goals of care d/w family at the bedside     prognosis is poor   most likely extensive stage and cure is not possible( liver lesion is suspicious for mets)  pt is  DNR  response to chemo is usually expected in 3-7 days   if respiratory status does not improve in next 24-48 hours family is considering to proceed with  comfort measures

## 2018-03-22 NOTE — PROGRESS NOTE ADULT - SUBJECTIVE AND OBJECTIVE BOX
Patient is a 72y old  Female who presents with a chief complaint of Came to hospital for chemotherapy. In ER SOB, desaturating. PNA, Rt. pleural effusion on CXR. (14 Mar 2018 23:36)    24 hour events: Patient remains intubated and on pressor support. O2 saturation remains in the 80s despite maximal ventilatory support. Patient on fentanyl gtt, precedex gtt and versed prn. Pt made DNR/DNI per HCP () overnight.    REVIEW OF SYSTEMS  unable to obtain    T(F): 97.4 (03-22-18 @ 04:27), Max: 99 (03-21-18 @ 12:35)  HR: 95 (03-22-18 @ 07:58) (72 - 124)  BP: 134/63 (03-22-18 @ 07:00) (81/47 - 194/83)  RR: 24 (03-22-18 @ 07:00) (18 - 35)  SpO2: 79% (03-22-18 @ 07:58) (75% - 91%)  Wt(kg): --    Mode: AC/ CMV (Assist Control/ Continuous Mandatory Ventilation), RR (machine): 18, TV (machine): 400, FiO2: 100, PEEP: 5    CAPILLARY BLOOD GLUCOSE      I&O's Summary    03-21 @ 07:01  -  03-22 @ 07:00  --------------------------------------------------------  IN: 1509.8 mL / OUT: 0 mL / NET: 1509.8 mL      PHYSICAL EXAM  Gen: sedated  Neuro: sedated on vent  HEENT: NC/AT  Resp: Bilateral rhonchi worsening  CVS: nl S1/S2, RRR  Abd: soft, nt, nd, +bs  Ext: no edema, +pulses  Skin: well perfused, warm    MEDICATIONS  piperacillin/tazobactam IVPB. IV Intermittent    phenylephrine    Infusion IV Continuous    allopurinol Oral  atorvastatin Oral  dextrose 50% Injectable IV Push  dextrose 50% Injectable IV Push  dextrose 50% Injectable IV Push  dextrose Gel Oral PRN  glucagon  Injectable IntraMuscular PRN  hydrocortisone sodium succinate Injectable IV Push  insulin lispro (HumaLOG) corrective regimen sliding scale SubCutaneous  levothyroxine Oral    acetylcysteine 10% Inhalation Inhalation  ALBUTerol/ipratropium for Nebulization Nebulizer  buDESOnide   0.5 milliGRAM(s) Respule Inhalation    dexmedetomidine Infusion IV Continuous  fentaNYL   Infusion IV Continuous  gabapentin Oral  midazolam Injectable IV Push PRN  ondansetron Injectable IV Push PRN      aspirin enteric coated Oral  enoxaparin Injectable SubCutaneous    pantoprazole   Suspension Oral  polyethylene glycol 3350 Oral  senna Oral      cholecalciferol Oral  dextrose 5%. IV Continuous  multivitamin Oral  potassium chloride    Tablet ER Oral  pyridoxine Oral      chlorhexidine 0.12% Liquid Swish and Spit  dorzolamide 2%/timolol 0.5% Ophthalmic Solution Both EYES  latanoprost 0.005% Ophthalmic Solution Both EYES  lidocaine   Patch Transdermal PRN  zinc oxide 40%/lanolin Ointment Topical    omega-3-Acid Ethyl Esters Oral                                     9.8    5.6   )-----------( 197      ( 22 Mar 2018 06:46 )             29.8       03-22    140  |  101  |  51<H>  ----------------------------<  142<H>  3.7   |  30  |  0.89    Ca    8.0<L>      22 Mar 2018 06:46  Phos  4.1     03-22  Mg     2.2     03-22    TPro  6.0  /  Alb  2.0<L>  /  TBili  0.3  /  DBili  x   /  AST  106<H>  /  ALT  69  /  AlkPhos  117  03-22              .Blood Blood-Peripheral   No growth to date. -- 03-19 @ 11:57  .Sputum Sputum, trap   Normal Respiratory Nancy present   No polymorphonuclear leukocytes per low power field  No Squamous epithelial cells per low power field  No organisms seen per oil power field 03-19 @ 11:40  .Blood Blood-Peripheral   No growth to date. -- 03-19 @ 09:28  .Body Fluid Pleural Fluid   No growth to date.   No polymorphonuclear cells seen  No organisms seen  by cytocentrifuge 03-18 @ 22:14        CENTRAL LINE: No          NOLAN: No                     A-LINE: No      GLOBAL ISSUE/BEST PRACTICE:  Analgesia: yes  Sedation: yes  HOB elevation: yes  Stress ulcer prophylaxis: yes  VTE prophylaxis: yes  Glycemic control: yes  Nutrition: yes      CODE STATUS: DNR/DNI  GOC discussion: Y Patient is a 72y old  Female who presents with a chief complaint of Came to hospital for chemotherapy. In ER SOB, desaturating. PNA, Rt. pleural effusion on CXR. (14 Mar 2018 23:36)    24 hour events: Overnight O2 saturation remains in the 80s despite maximal ventilatory support. Patient on fentanyl gtt, precedex gtt and versed prn. Pt made DNR/DNI per HCP () overnight.    REVIEW OF SYSTEMS  unable to obtain    T(F): 97.4 (03-22-18 @ 04:27), Max: 99 (03-21-18 @ 12:35)  HR: 95 (03-22-18 @ 07:58) (72 - 124)  BP: 134/63 (03-22-18 @ 07:00) (81/47 - 194/83)  RR: 24 (03-22-18 @ 07:00) (18 - 35)  SpO2: 79% (03-22-18 @ 07:58) (75% - 91%)  Wt(kg): --    Mode: AC/ CMV (Assist Control/ Continuous Mandatory Ventilation), RR (machine): 18, TV (machine): 400, FiO2: 100, PEEP: 5    CAPILLARY BLOOD GLUCOSE      I&O's Summary    03-21 @ 07:01  -  03-22 @ 07:00  --------------------------------------------------------  IN: 1509.8 mL / OUT: 0 mL / NET: 1509.8 mL      PHYSICAL EXAM  Gen: sedated  Neuro: sedated on vent  HEENT: NC/AT  Resp: Bilateral rhonchi worsening  CVS: nl S1/S2, RRR  Abd: soft, nt, nd, +bs  Ext: no edema, +pulses  Skin: well perfused, warm    MEDICATIONS  piperacillin/tazobactam IVPB. IV Intermittent    phenylephrine    Infusion IV Continuous    allopurinol Oral  atorvastatin Oral  dextrose 50% Injectable IV Push  dextrose 50% Injectable IV Push  dextrose 50% Injectable IV Push  dextrose Gel Oral PRN  glucagon  Injectable IntraMuscular PRN  hydrocortisone sodium succinate Injectable IV Push  insulin lispro (HumaLOG) corrective regimen sliding scale SubCutaneous  levothyroxine Oral    acetylcysteine 10% Inhalation Inhalation  ALBUTerol/ipratropium for Nebulization Nebulizer  buDESOnide   0.5 milliGRAM(s) Respule Inhalation    dexmedetomidine Infusion IV Continuous  fentaNYL   Infusion IV Continuous  gabapentin Oral  midazolam Injectable IV Push PRN  ondansetron Injectable IV Push PRN      aspirin enteric coated Oral  enoxaparin Injectable SubCutaneous    pantoprazole   Suspension Oral  polyethylene glycol 3350 Oral  senna Oral      cholecalciferol Oral  dextrose 5%. IV Continuous  multivitamin Oral  potassium chloride    Tablet ER Oral  pyridoxine Oral      chlorhexidine 0.12% Liquid Swish and Spit  dorzolamide 2%/timolol 0.5% Ophthalmic Solution Both EYES  latanoprost 0.005% Ophthalmic Solution Both EYES  lidocaine   Patch Transdermal PRN  zinc oxide 40%/lanolin Ointment Topical    omega-3-Acid Ethyl Esters Oral                                     9.8    5.6   )-----------( 197      ( 22 Mar 2018 06:46 )             29.8       03-22    140  |  101  |  51<H>  ----------------------------<  142<H>  3.7   |  30  |  0.89    Ca    8.0<L>      22 Mar 2018 06:46  Phos  4.1     03-22  Mg     2.2     03-22    TPro  6.0  /  Alb  2.0<L>  /  TBili  0.3  /  DBili  x   /  AST  106<H>  /  ALT  69  /  AlkPhos  117  03-22              .Blood Blood-Peripheral   No growth to date. -- 03-19 @ 11:57  .Sputum Sputum, trap   Normal Respiratory Nancy present   No polymorphonuclear leukocytes per low power field  No Squamous epithelial cells per low power field  No organisms seen per oil power field 03-19 @ 11:40  .Blood Blood-Peripheral   No growth to date. -- 03-19 @ 09:28  .Body Fluid Pleural Fluid   No growth to date.   No polymorphonuclear cells seen  No organisms seen  by cytocentrifuge 03-18 @ 22:14    Bedside Lung US: rght side with all B lines, + mass on right, Left lung with moderate pleural effusion.    CENTRAL LINE: No          NOLAN: No                     A-LINE: No      GLOBAL ISSUE/BEST PRACTICE:  Analgesia: yes  Sedation: yes  HOB elevation: yes  Stress ulcer prophylaxis: yes  VTE prophylaxis: yes  Glycemic control: yes  Nutrition: yes      CODE STATUS: DNR/DNI  GO discussion: Y Patient is a 72y old  Female who presents with a chief complaint of Came to hospital for chemotherapy. In ER SOB, desaturating. PNA, Rt. pleural effusion on CXR. (14 Mar 2018 23:36)    24 hour events: Overnight O2 saturation remains in the 80s despite maximal ventilatory support. Patient on fentanyl gtt, precedex gtt and versed prn. Pt made DNR/DNI per HCP () overnight. Tried to increase PEEP and patient did not tolerate due to hypoxia.     REVIEW OF SYSTEMS  unable to obtain    T(F): 97.4 (03-22-18 @ 04:27), Max: 99 (03-21-18 @ 12:35)  HR: 95 (03-22-18 @ 07:58) (72 - 124)  BP: 134/63 (03-22-18 @ 07:00) (81/47 - 194/83)  RR: 24 (03-22-18 @ 07:00) (18 - 35)  SpO2: 79% (03-22-18 @ 07:58) (75% - 91%)  Wt(kg): --    Mode: AC/ CMV (Assist Control/ Continuous Mandatory Ventilation), RR (machine): 18, TV (machine): 400, FiO2: 100, PEEP: 5    CAPILLARY BLOOD GLUCOSE      I&O's Summary    03-21 @ 07:01  -  03-22 @ 07:00  --------------------------------------------------------  IN: 1509.8 mL / OUT: 0 mL / NET: 1509.8 mL      PHYSICAL EXAM  Gen: sedated  Neuro: sedated on vent  HEENT: NC/AT  Resp: Bilateral rhonchi worsening  CVS: nl S1/S2, RRR  Abd: soft, nt, nd, +bs  Ext: no edema, +pulses  Skin: well perfused, warm    MEDICATIONS  piperacillin/tazobactam IVPB. IV Intermittent    phenylephrine    Infusion IV Continuous    allopurinol Oral  atorvastatin Oral  dextrose 50% Injectable IV Push  dextrose 50% Injectable IV Push  dextrose 50% Injectable IV Push  dextrose Gel Oral PRN  glucagon  Injectable IntraMuscular PRN  hydrocortisone sodium succinate Injectable IV Push  insulin lispro (HumaLOG) corrective regimen sliding scale SubCutaneous  levothyroxine Oral    acetylcysteine 10% Inhalation Inhalation  ALBUTerol/ipratropium for Nebulization Nebulizer  buDESOnide   0.5 milliGRAM(s) Respule Inhalation    dexmedetomidine Infusion IV Continuous  fentaNYL   Infusion IV Continuous  gabapentin Oral  midazolam Injectable IV Push PRN  ondansetron Injectable IV Push PRN      aspirin enteric coated Oral  enoxaparin Injectable SubCutaneous    pantoprazole   Suspension Oral  polyethylene glycol 3350 Oral  senna Oral      cholecalciferol Oral  dextrose 5%. IV Continuous  multivitamin Oral  potassium chloride    Tablet ER Oral  pyridoxine Oral      chlorhexidine 0.12% Liquid Swish and Spit  dorzolamide 2%/timolol 0.5% Ophthalmic Solution Both EYES  latanoprost 0.005% Ophthalmic Solution Both EYES  lidocaine   Patch Transdermal PRN  zinc oxide 40%/lanolin Ointment Topical    omega-3-Acid Ethyl Esters Oral                                     9.8    5.6   )-----------( 197      ( 22 Mar 2018 06:46 )             29.8       03-22    140  |  101  |  51<H>  ----------------------------<  142<H>  3.7   |  30  |  0.89    Ca    8.0<L>      22 Mar 2018 06:46  Phos  4.1     03-22  Mg     2.2     03-22    TPro  6.0  /  Alb  2.0<L>  /  TBili  0.3  /  DBili  x   /  AST  106<H>  /  ALT  69  /  AlkPhos  117  03-22              .Blood Blood-Peripheral   No growth to date. -- 03-19 @ 11:57  .Sputum Sputum, trap   Normal Respiratory Nancy present   No polymorphonuclear leukocytes per low power field  No Squamous epithelial cells per low power field  No organisms seen per oil power field 03-19 @ 11:40  .Blood Blood-Peripheral   No growth to date. -- 03-19 @ 09:28  .Body Fluid Pleural Fluid   No growth to date.   No polymorphonuclear cells seen  No organisms seen  by cytocentrifuge 03-18 @ 22:14    Bedside Lung US: rght side with all B lines, + mass on right, Left lung with moderate pleural effusion.    CENTRAL LINE: No          NOLAN: No                     A-LINE: No      GLOBAL ISSUE/BEST PRACTICE:  Analgesia: yes  Sedation: yes  HOB elevation: yes  Stress ulcer prophylaxis: yes  VTE prophylaxis: yes  Glycemic control: yes  Nutrition: yes      CODE STATUS: DNR/DNI  GOC discussion: DONNA Patient is a 72y old  Female who presents with a chief complaint of Came to hospital for chemotherapy. In ER SOB, desaturating. PNA, Rt. pleural effusion on CXR. (14 Mar 2018 23:36)    24 hour events: Overnight O2 saturation remains in the 80s despite maximal ventilatory support. Patient on fentanyl gtt, precedex gtt and versed prn. Pt made DNR/DNI per HCP () overnight. Tried to increase PEEP and patient did not tolerate due to hypoxia.     REVIEW OF SYSTEMS  unable to obtain    T(F): 97.4 (03-22-18 @ 04:27), Max: 99 (03-21-18 @ 12:35)  HR: 95 (03-22-18 @ 07:58) (72 - 124)  BP: 134/63 (03-22-18 @ 07:00) (81/47 - 194/83)  RR: 24 (03-22-18 @ 07:00) (18 - 35)  SpO2: 79% (03-22-18 @ 07:58) (75% - 91%)  Wt(kg): --    Mode: AC/ CMV (Assist Control/ Continuous Mandatory Ventilation), RR (machine): 18, TV (machine): 400, FiO2: 100, PEEP: 5    CAPILLARY BLOOD GLUCOSE      I&O's Summary    03-21 @ 07:01  -  03-22 @ 07:00  --------------------------------------------------------  IN: 1509.8 mL / OUT: 0 mL / NET: 1509.8 mL      PHYSICAL EXAM  Gen: sedated  Neuro: sedated on vent  HEENT: NC/AT  Resp: Bilateral rhonchi worsening  CVS: nl S1/S2, RRR  Abd: soft, nt, nd, +bs  Ext: no edema, +pulses  Skin: well perfused, warm    MEDICATIONS  piperacillin/tazobactam IVPB. IV Intermittent    phenylephrine    Infusion IV Continuous    allopurinol Oral  atorvastatin Oral  dextrose 50% Injectable IV Push  dextrose 50% Injectable IV Push  dextrose 50% Injectable IV Push  dextrose Gel Oral PRN  glucagon  Injectable IntraMuscular PRN  hydrocortisone sodium succinate Injectable IV Push  insulin lispro (HumaLOG) corrective regimen sliding scale SubCutaneous  levothyroxine Oral    acetylcysteine 10% Inhalation Inhalation  ALBUTerol/ipratropium for Nebulization Nebulizer  buDESOnide   0.5 milliGRAM(s) Respule Inhalation    dexmedetomidine Infusion IV Continuous  fentaNYL   Infusion IV Continuous  gabapentin Oral  midazolam Injectable IV Push PRN  ondansetron Injectable IV Push PRN      aspirin enteric coated Oral  enoxaparin Injectable SubCutaneous    pantoprazole   Suspension Oral  polyethylene glycol 3350 Oral  senna Oral      cholecalciferol Oral  dextrose 5%. IV Continuous  multivitamin Oral  potassium chloride    Tablet ER Oral  pyridoxine Oral      chlorhexidine 0.12% Liquid Swish and Spit  dorzolamide 2%/timolol 0.5% Ophthalmic Solution Both EYES  latanoprost 0.005% Ophthalmic Solution Both EYES  lidocaine   Patch Transdermal PRN  zinc oxide 40%/lanolin Ointment Topical    omega-3-Acid Ethyl Esters Oral                                     9.8    5.6   )-----------( 197      ( 22 Mar 2018 06:46 )             29.8       03-22    140  |  101  |  51<H>  ----------------------------<  142<H>  3.7   |  30  |  0.89    Ca    8.0<L>      22 Mar 2018 06:46  Phos  4.1     03-22  Mg     2.2     03-22    TPro  6.0  /  Alb  2.0<L>  /  TBili  0.3  /  DBili  x   /  AST  106<H>  /  ALT  69  /  AlkPhos  117  03-22      .Blood Blood-Peripheral   No growth to date. -- 03-19 @ 11:57    .Sputum Sputum, trap   Normal Respiratory Nancy present   No polymorphonuclear leukocytes per low power field  No Squamous epithelial cells per low power field  No organisms seen per oil power field 03-19 @ 11:40    .Blood Blood-Peripheral   No growth to date. -- 03-19 @ 09:28    .Body Fluid Pleural Fluid   No growth to date.   No polymorphonuclear cells seen  No organisms seen  by cytocentrifuge 03-18 @ 22:14    Bedside Lung US: left side with all B lines, + consolidation/mass on right, Left lung with small pleural effusion.    CENTRAL LINE: No          NOLAN: No                     A-LINE: No      GLOBAL ISSUE/BEST PRACTICE:  Analgesia: yes  Sedation: yes  HOB elevation: yes  Stress ulcer prophylaxis: yes  VTE prophylaxis: yes  Glycemic control: yes  Nutrition: yes      CODE STATUS: DNR/DNI  GOC discussion: Y

## 2018-03-22 NOTE — PROGRESS NOTE ADULT - ASSESSMENT
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

## 2018-03-22 NOTE — PROGRESS NOTE ADULT - SUBJECTIVE AND OBJECTIVE BOX
[facility], Division of Infectious Diseases  SHIRA Hernandez A. Lee  [phone]    Name: GENIE MOE  Age: 72y  Gender: Female  MRN: [id number]    Interval History--  Notes reviewed. Discussed with family at bedside.  Discussed with ICU team  FiO2 100%, SaO2 82%  Sedated on vent    Past Medical History--  Liver lesion  Clostridium difficile colitis  Emphysema  Pneumonia  Generalized intra-abdominal and pelvic swelling, mass and lump  Glaucoma  Aortic valve prosthesis present  CAD (coronary artery disease)  Arthritis  Heart murmur  Other iron deficiency anemia  Carpal tunnel syndrome of right wrist  PVD (peripheral vascular disease)  Hyperlipemia  Colitis  Hypothyroid  Hypertension  COPD (chronic obstructive pulmonary disease)  H/O foot surgery  History of colonoscopy  S/P carpal tunnel release  H/O carotid endarterectomy  Aortic valve replaced  Carotid disease, bilateral  Cataract extraction status of left eye  Cataract extraction status of right eye      For details regarding the patient's social history, family history, and other miscellaneous elements, please refer the initial infectious diseases consultation and/or the admitting history and physical examination for this admission.    Allergies    No Known Allergies    Intolerances        Medications--  Antibiotics:  piperacillin/tazobactam IVPB. 3.375 Gram(s) IV Intermittent every 8 hours    Immunologic:    Other:  acetylcysteine 10% Inhalation  ALBUTerol/ipratropium for Nebulization  allopurinol  aspirin enteric coated  atorvastatin  buDESOnide   0.5 milliGRAM(s) Respule  chlorhexidine 0.12% Liquid  cholecalciferol  dexmedetomidine Infusion  dextrose 5%.  dextrose 50% Injectable  dextrose 50% Injectable  dextrose 50% Injectable  dextrose Gel PRN  dorzolamide 2%/timolol 0.5% Ophthalmic Solution  enoxaparin Injectable  fentaNYL   Infusion  gabapentin  glucagon  Injectable PRN  hydrocortisone sodium succinate Injectable  insulin lispro (HumaLOG) corrective regimen sliding scale  latanoprost 0.005% Ophthalmic Solution  levothyroxine  lidocaine   Patch PRN  midazolam Injectable PRN  multivitamin  omega-3-Acid Ethyl Esters  ondansetron Injectable PRN  pantoprazole   Suspension  phenylephrine    Infusion  polyethylene glycol 3350  pyridoxine  senna  zinc oxide 40%/lanolin Ointment      Review of Systems--  Review of systems unable secondary to clinical condition.     Physical Examination--  Vital Signs: T(F): 97.3 (03-22-18 @ 08:35), Max: 99 (03-21-18 @ 12:35)  HR: 84 (03-22-18 @ 08:43)  BP: 118/55 (03-22-18 @ 08:30)  RR: 18 (03-22-18 @ 08:30)  SpO2: 87% (03-22-18 @ 08:43)  Wt(kg): --  General: Nontoxic-appearing Female in no acute distress.  HEENT: AT/NC. Anicteric. Conjunctiva pink and moist. OETT. NGT.  Neck: Not rigid. No sense of mass.  Nodes: None palpable.  Lungs: Coarse B BS, more diminished on R than L  Heart: Regular rate and rhythm. No Murmur. No rub. No gallop. No palpable thrill.  Abdomen: Bowel sounds present and normoactive. Soft. Nondistended. Nontender. No sense of mass. No organomegaly.  Back: Unable  Extremities: No cyanosis or clubbing. 1+edema.   Skin: Warm. Dry. Good turgor. No rash. No vasculitic stigmata.       Laboratory Studies--  CBC                        9.8    5.6   )-----------( 197      ( 22 Mar 2018 06:46 )             29.8       Chemistries  03-22    140  |  101  |  51<H>  ----------------------------<  142<H>  3.7   |  30  |  0.89    Ca    8.0<L>      22 Mar 2018 06:46  Phos  4.1     03-22  Mg     2.2     03-22    TPro  6.0  /  Alb  2.0<L>  /  TBili  0.3  /  DBili  x   /  AST  106<H>  /  ALT  69  /  AlkPhos  117  03-22      Culture Data    Culture - Blood (collected 19 Mar 2018 11:57)  Source: .Blood Blood-Peripheral  Preliminary Report (20 Mar 2018 12:11):    No growth to date.    Culture - Sputum (collected 19 Mar 2018 11:40)  Source: .Sputum Sputum, trap  Gram Stain (19 Mar 2018 13:41):    No polymorphonuclear leukocytes per low power field    No Squamous epithelial cells per low power field    No organisms seen per oil power field  Final Report (21 Mar 2018 08:43):    Normal Respiratory Nancy present    Culture - Blood (collected 19 Mar 2018 09:28)  Source: .Blood Blood-Peripheral  Preliminary Report (20 Mar 2018 10:01):    No growth to date.    Culture - Fungal, Body Fluid (collected 18 Mar 2018 22:14)  Source: .Body Fluid Pleural Fluid  Preliminary Report (20 Mar 2018 07:45):    Testing in progress    Culture - Body Fluid with Gram Stain (collected 18 Mar 2018 22:14)  Source: .Body Fluid Pleural Fluid  Gram Stain (19 Mar 2018 03:41):    No polymorphonuclear cells seen    No organisms seen    by cytocentrifuge  Preliminary Report (19 Mar 2018 17:53):    No growth to date.    Culture - Acid Fast - Body Fluid w/Smear (collected 18 Mar 2018 22:14)  Source: .Body Fluid Pleural Fluid

## 2018-03-23 VITALS — HEART RATE: 84 BPM | OXYGEN SATURATION: 80 % | RESPIRATION RATE: 45 BRPM

## 2018-03-23 DIAGNOSIS — Z71.89 OTHER SPECIFIED COUNSELING: ICD-10-CM

## 2018-03-23 LAB
ALBUMIN SERPL ELPH-MCNC: 1.7 G/DL — LOW (ref 3.3–5)
ALP SERPL-CCNC: 96 U/L — SIGNIFICANT CHANGE UP (ref 40–120)
ALT FLD-CCNC: 50 U/L — SIGNIFICANT CHANGE UP (ref 12–78)
ANION GAP SERPL CALC-SCNC: 9 MMOL/L — SIGNIFICANT CHANGE UP (ref 5–17)
APTT BLD: 31.2 SEC — SIGNIFICANT CHANGE UP (ref 27.5–37.4)
AST SERPL-CCNC: 66 U/L — HIGH (ref 15–37)
BASE EXCESS BLDA CALC-SCNC: 6 MMOL/L — HIGH (ref -2–2)
BILIRUB SERPL-MCNC: 0.2 MG/DL — SIGNIFICANT CHANGE UP (ref 0.2–1.2)
BLOOD GAS COMMENTS ARTERIAL: SIGNIFICANT CHANGE UP
BLOOD GAS COMMENTS ARTERIAL: SIGNIFICANT CHANGE UP
BUN SERPL-MCNC: 63 MG/DL — HIGH (ref 7–23)
CALCIUM SERPL-MCNC: 7.7 MG/DL — LOW (ref 8.5–10.1)
CHLORIDE SERPL-SCNC: 104 MMOL/L — SIGNIFICANT CHANGE UP (ref 96–108)
CO2 SERPL-SCNC: 31 MMOL/L — SIGNIFICANT CHANGE UP (ref 22–31)
CREAT SERPL-MCNC: 0.87 MG/DL — SIGNIFICANT CHANGE UP (ref 0.5–1.3)
CULTURE RESULTS: SIGNIFICANT CHANGE UP
GLUCOSE SERPL-MCNC: 98 MG/DL — SIGNIFICANT CHANGE UP (ref 70–99)
HCO3 BLDA-SCNC: 29 MMOL/L — HIGH (ref 23–27)
HCT VFR BLD CALC: 24.3 % — LOW (ref 34.5–45)
HGB BLD-MCNC: 8 G/DL — LOW (ref 11.5–15.5)
HOROWITZ INDEX BLDA+IHG-RTO: 100 — SIGNIFICANT CHANGE UP
INR BLD: 1.16 RATIO — SIGNIFICANT CHANGE UP (ref 0.88–1.16)
LDH SERPL L TO P-CCNC: 749 U/L — HIGH (ref 50–242)
LYMPHOCYTES # BLD AUTO: 24 % — SIGNIFICANT CHANGE UP (ref 13–44)
MAGNESIUM SERPL-MCNC: 2.3 MG/DL — SIGNIFICANT CHANGE UP (ref 1.6–2.6)
MCHC RBC-ENTMCNC: 29.5 PG — SIGNIFICANT CHANGE UP (ref 27–34)
MCHC RBC-ENTMCNC: 33.2 GM/DL — SIGNIFICANT CHANGE UP (ref 32–36)
MCV RBC AUTO: 89 FL — SIGNIFICANT CHANGE UP (ref 80–100)
MONOCYTES NFR BLD AUTO: 2 % — SIGNIFICANT CHANGE UP (ref 1–9)
NEUTROPHILS NFR BLD AUTO: 60 % — SIGNIFICANT CHANGE UP (ref 43–77)
PCO2 BLDA: 50 MMHG — HIGH (ref 32–46)
PH BLDA: 7.4 — SIGNIFICANT CHANGE UP (ref 7.35–7.45)
PHOSPHATE SERPL-MCNC: 2.3 MG/DL — LOW (ref 2.5–4.5)
PLATELET # BLD AUTO: 150 K/UL — SIGNIFICANT CHANGE UP (ref 150–400)
PO2 BLDA: 34 MMHG — CRITICAL LOW (ref 74–108)
POTASSIUM SERPL-MCNC: 3.5 MMOL/L — SIGNIFICANT CHANGE UP (ref 3.5–5.3)
POTASSIUM SERPL-SCNC: 3.5 MMOL/L — SIGNIFICANT CHANGE UP (ref 3.5–5.3)
PROT SERPL-MCNC: 5.5 G/DL — LOW (ref 6–8.3)
PROTHROM AB SERPL-ACNC: 12.7 SEC — SIGNIFICANT CHANGE UP (ref 9.8–12.7)
RBC # BLD: 2.72 M/UL — LOW (ref 3.8–5.2)
RBC # FLD: 13.3 % — SIGNIFICANT CHANGE UP (ref 10.3–14.5)
SAO2 % BLDA: 68 % — LOW (ref 92–96)
SODIUM SERPL-SCNC: 144 MMOL/L — SIGNIFICANT CHANGE UP (ref 135–145)
SPECIMEN SOURCE: SIGNIFICANT CHANGE UP
URATE SERPL-MCNC: 8.7 MG/DL — HIGH (ref 2.5–7)
WBC # BLD: 1.5 K/UL — LOW (ref 3.8–10.5)
WBC # FLD AUTO: 1.5 K/UL — LOW (ref 3.8–10.5)

## 2018-03-23 PROCEDURE — 87040 BLOOD CULTURE FOR BACTERIA: CPT

## 2018-03-23 PROCEDURE — 87015 SPECIMEN INFECT AGNT CONCNTJ: CPT

## 2018-03-23 PROCEDURE — 71045 X-RAY EXAM CHEST 1 VIEW: CPT | Mod: 26

## 2018-03-23 PROCEDURE — 87116 MYCOBACTERIA CULTURE: CPT

## 2018-03-23 PROCEDURE — 85730 THROMBOPLASTIN TIME PARTIAL: CPT

## 2018-03-23 PROCEDURE — 89051 BODY FLUID CELL COUNT: CPT

## 2018-03-23 PROCEDURE — 82803 BLOOD GASES ANY COMBINATION: CPT

## 2018-03-23 PROCEDURE — 80053 COMPREHEN METABOLIC PANEL: CPT

## 2018-03-23 PROCEDURE — 84145 PROCALCITONIN (PCT): CPT

## 2018-03-23 PROCEDURE — 36415 COLL VENOUS BLD VENIPUNCTURE: CPT

## 2018-03-23 PROCEDURE — 99233 SBSQ HOSP IP/OBS HIGH 50: CPT | Mod: GC

## 2018-03-23 PROCEDURE — 94003 VENT MGMT INPAT SUBQ DAY: CPT

## 2018-03-23 PROCEDURE — 99231 SBSQ HOSP IP/OBS SF/LOW 25: CPT

## 2018-03-23 PROCEDURE — 84550 ASSAY OF BLOOD/URIC ACID: CPT

## 2018-03-23 PROCEDURE — 84157 ASSAY OF PROTEIN OTHER: CPT

## 2018-03-23 PROCEDURE — 87070 CULTURE OTHR SPECIMN AEROBIC: CPT

## 2018-03-23 PROCEDURE — 93005 ELECTROCARDIOGRAM TRACING: CPT

## 2018-03-23 PROCEDURE — 86140 C-REACTIVE PROTEIN: CPT

## 2018-03-23 PROCEDURE — 84100 ASSAY OF PHOSPHORUS: CPT

## 2018-03-23 PROCEDURE — 87205 SMEAR GRAM STAIN: CPT

## 2018-03-23 PROCEDURE — 94760 N-INVAS EAR/PLS OXIMETRY 1: CPT

## 2018-03-23 PROCEDURE — 88305 TISSUE EXAM BY PATHOLOGIST: CPT

## 2018-03-23 PROCEDURE — 99221 1ST HOSP IP/OBS SF/LOW 40: CPT

## 2018-03-23 PROCEDURE — 94002 VENT MGMT INPAT INIT DAY: CPT

## 2018-03-23 PROCEDURE — 99285 EMERGENCY DEPT VISIT HI MDM: CPT | Mod: 25

## 2018-03-23 PROCEDURE — 94640 AIRWAY INHALATION TREATMENT: CPT

## 2018-03-23 PROCEDURE — 83735 ASSAY OF MAGNESIUM: CPT

## 2018-03-23 PROCEDURE — 94660 CPAP INITIATION&MGMT: CPT

## 2018-03-23 PROCEDURE — 83605 ASSAY OF LACTIC ACID: CPT

## 2018-03-23 PROCEDURE — 36600 WITHDRAWAL OF ARTERIAL BLOOD: CPT

## 2018-03-23 PROCEDURE — 71045 X-RAY EXAM CHEST 1 VIEW: CPT

## 2018-03-23 PROCEDURE — 94799 UNLISTED PULMONARY SVC/PX: CPT

## 2018-03-23 PROCEDURE — 99233 SBSQ HOSP IP/OBS HIGH 50: CPT

## 2018-03-23 PROCEDURE — 87075 CULTR BACTERIA EXCEPT BLOOD: CPT

## 2018-03-23 PROCEDURE — 82945 GLUCOSE OTHER FLUID: CPT

## 2018-03-23 PROCEDURE — 87102 FUNGUS ISOLATION CULTURE: CPT

## 2018-03-23 PROCEDURE — 80048 BASIC METABOLIC PNL TOTAL CA: CPT

## 2018-03-23 PROCEDURE — 85610 PROTHROMBIN TIME: CPT

## 2018-03-23 PROCEDURE — 87206 SMEAR FLUORESCENT/ACID STAI: CPT

## 2018-03-23 PROCEDURE — 82042 OTHER SOURCE ALBUMIN QUAN EA: CPT

## 2018-03-23 PROCEDURE — 85027 COMPLETE CBC AUTOMATED: CPT

## 2018-03-23 PROCEDURE — 88108 CYTOPATH CONCENTRATE TECH: CPT

## 2018-03-23 PROCEDURE — 82962 GLUCOSE BLOOD TEST: CPT

## 2018-03-23 PROCEDURE — 83615 LACTATE (LD) (LDH) ENZYME: CPT

## 2018-03-23 PROCEDURE — 83986 ASSAY PH BODY FLUID NOS: CPT

## 2018-03-23 PROCEDURE — 93970 EXTREMITY STUDY: CPT

## 2018-03-23 RX ORDER — POTASSIUM CHLORIDE 20 MEQ
40 PACKET (EA) ORAL ONCE
Qty: 0 | Refills: 0 | Status: COMPLETED | OUTPATIENT
Start: 2018-03-23 | End: 2018-03-23

## 2018-03-23 RX ORDER — MORPHINE SULFATE 50 MG/1
4 CAPSULE, EXTENDED RELEASE ORAL ONCE
Qty: 0 | Refills: 0 | Status: DISCONTINUED | OUTPATIENT
Start: 2018-03-23 | End: 2018-03-23

## 2018-03-23 RX ORDER — POTASSIUM PHOSPHATE, MONOBASIC POTASSIUM PHOSPHATE, DIBASIC 236; 224 MG/ML; MG/ML
15 INJECTION, SOLUTION INTRAVENOUS ONCE
Qty: 0 | Refills: 0 | Status: COMPLETED | OUTPATIENT
Start: 2018-03-23 | End: 2018-03-23

## 2018-03-23 RX ORDER — POTASSIUM CHLORIDE 20 MEQ
40 PACKET (EA) ORAL ONCE
Qty: 0 | Refills: 0 | Status: DISCONTINUED | OUTPATIENT
Start: 2018-03-23 | End: 2018-03-23

## 2018-03-23 RX ORDER — MIDAZOLAM HYDROCHLORIDE 1 MG/ML
2 INJECTION, SOLUTION INTRAMUSCULAR; INTRAVENOUS
Qty: 0 | Refills: 0 | Status: DISCONTINUED | OUTPATIENT
Start: 2018-03-23 | End: 2018-03-23

## 2018-03-23 RX ADMIN — PANTOPRAZOLE SODIUM 40 MILLIGRAM(S): 20 TABLET, DELAYED RELEASE ORAL at 07:04

## 2018-03-23 RX ADMIN — MEROPENEM 100 MILLIGRAM(S): 1 INJECTION INTRAVENOUS at 06:38

## 2018-03-23 RX ADMIN — Medication 0.5 MILLIGRAM(S): at 08:01

## 2018-03-23 RX ADMIN — MORPHINE SULFATE 4 MILLIGRAM(S): 50 CAPSULE, EXTENDED RELEASE ORAL at 11:25

## 2018-03-23 RX ADMIN — Medication 3 MILLILITER(S): at 08:01

## 2018-03-23 RX ADMIN — DEXMEDETOMIDINE HYDROCHLORIDE IN 0.9% SODIUM CHLORIDE 4.65 MICROGRAM(S)/KG/HR: 4 INJECTION INTRAVENOUS at 06:37

## 2018-03-23 RX ADMIN — FENTANYL CITRATE 6.2 MICROGRAM(S)/KG/HR: 50 INJECTION INTRAVENOUS at 08:39

## 2018-03-23 RX ADMIN — POTASSIUM PHOSPHATE, MONOBASIC POTASSIUM PHOSPHATE, DIBASIC 62.5 MILLIMOLE(S): 236; 224 INJECTION, SOLUTION INTRAVENOUS at 08:47

## 2018-03-23 RX ADMIN — ENOXAPARIN SODIUM 60 MILLIGRAM(S): 100 INJECTION SUBCUTANEOUS at 06:51

## 2018-03-23 RX ADMIN — Medication 100 MILLIGRAM(S): at 06:38

## 2018-03-23 RX ADMIN — Medication 100 MICROGRAM(S): at 06:52

## 2018-03-23 RX ADMIN — Medication 1 GRAM(S): at 06:51

## 2018-03-23 RX ADMIN — ZINC OXIDE 1 APPLICATION(S): 200 OINTMENT TOPICAL at 07:01

## 2018-03-23 RX ADMIN — POLYETHYLENE GLYCOL 3350 17 GRAM(S): 17 POWDER, FOR SOLUTION ORAL at 06:39

## 2018-03-23 RX ADMIN — Medication 40 MILLIEQUIVALENT(S): at 08:47

## 2018-03-23 RX ADMIN — DORZOLAMIDE HYDROCHLORIDE TIMOLOL MALEATE 1 DROP(S): 20; 5 SOLUTION/ DROPS OPHTHALMIC at 06:39

## 2018-03-23 RX ADMIN — MORPHINE SULFATE 4 MILLIGRAM(S): 50 CAPSULE, EXTENDED RELEASE ORAL at 11:30

## 2018-03-23 RX ADMIN — Medication 4: at 00:20

## 2018-03-23 RX ADMIN — FENTANYL CITRATE 6.2 MICROGRAM(S)/KG/HR: 50 INJECTION INTRAVENOUS at 06:51

## 2018-03-23 RX ADMIN — MIDAZOLAM HYDROCHLORIDE 2 MILLIGRAM(S): 1 INJECTION, SOLUTION INTRAMUSCULAR; INTRAVENOUS at 11:25

## 2018-03-23 RX ADMIN — MORPHINE SULFATE 4 MILLIGRAM(S): 50 CAPSULE, EXTENDED RELEASE ORAL at 11:29

## 2018-03-23 RX ADMIN — MORPHINE SULFATE 4 MILLIGRAM(S): 50 CAPSULE, EXTENDED RELEASE ORAL at 11:14

## 2018-03-23 RX ADMIN — Medication 3 MILLILITER(S): at 02:05

## 2018-03-23 RX ADMIN — ZINC OXIDE 1 APPLICATION(S): 200 OINTMENT TOPICAL at 00:00

## 2018-03-23 RX ADMIN — Medication 40 MILLIGRAM(S): at 00:11

## 2018-03-23 NOTE — PROGRESS NOTE ADULT - ASSESSMENT
71 yo female with Lung CA now with fever, leukocytosis, bandemia, purulent sputum and requiring mech ventilation  Not clear to me how much of this is cancer/inflammatory response we chemo vs. superimposed pneumonia.  My concern remains that this process is primarily noninfectious in nature.  No objection to escalating Abx yesterday- if we are trying to get her through this acute episode there is really no downside at this point in time.  Too early to expect response from therapy, however.    Discussion with family at bedside regarding goals of care. They are considering withdrawing aggressive support. While one cannot say that she will not survive with absolute certainty, my expectation is that even if she survives this acute deterioration, she odds of her functional status being good enough to get any additional chemotherapy would be exceedingly remote. I'd also anticipate a prolonged weaning process with high likelihood of tracheostomy. I advised the family to reach out to the other doctors taking care of the patient, and after conferring with them and amongst themselves, come to consensus as to what the patient would want to do here (as she cannot tell us herself presently), and then act on her wishes.

## 2018-03-23 NOTE — PROGRESS NOTE ADULT - PROBLEM SELECTOR PROBLEM 1
Respiratory distress
Acute respiratory failure with hypoxia
Respiratory distress
Acute respiratory failure with hypoxia
Malignant neoplasm of right lung, unspecified part of lung
Pneumonia
Respiratory distress
Pneumonia
Pneumonia
Malignant neoplasm of right lung, unspecified part of lung

## 2018-03-23 NOTE — PROGRESS NOTE ADULT - PROVIDER SPECIALTY LIST ADULT
Critical Care
Heme/Onc
Hospitalist
Infectious Disease
Pulmonology
Critical Care
Pulmonology
Infectious Disease
Infectious Disease

## 2018-03-23 NOTE — PROGRESS NOTE ADULT - SUBJECTIVE AND OBJECTIVE BOX
Patient is a 72y old  Female who presents with a chief complaint of Came to hospital for chemotherapy. In ER SOB, desaturating. PNA, Rt. pleural effusion on CXR. (14 Mar 2018 23:36)    24 hour events: Right IJ placed yesterday. This morning SpO2 in 60s, increased PEEP to 10 and RR to 30. ABG showed PaO2 34, patient remains on mechanical vent.       REVIEW OF SYSTEMS  unable to obtain    T(F): 98.5 (03-23-18 @ 04:01), Max: 98.7 (03-23-18 @ 00:02)  HR: 86 (03-23-18 @ 07:30) (79 - 110)  BP: 106/54 (03-23-18 @ 07:30) (83/47 - 174/99)  RR: 30 (03-23-18 @ 07:30) (14 - 30)  SpO2: 73% (03-23-18 @ 07:30) (73% - 87%)  Wt(kg): --    Mode: AC/ CMV (Assist Control/ Continuous Mandatory Ventilation), RR (machine): 30, TV (machine): 400, FiO2: 100, PEEP: 10    CAPILLARY BLOOD GLUCOSE      I&O's Summary    03-22 @ 07:01  -  03-23 @ 07:00  --------------------------------------------------------  IN: 2361 mL / OUT: 2250 mL / NET: 111 mL      PHYSICAL EXAM  Gen: sedated  Neuro: sedated on vent  HEENT: NC/AT  Resp: Bilateral rhonchi, improved  CVS: nl S1/S2, RRR  Abd: soft, nt, nd, +bs  Ext: no edema, +pulses, + RIJ line  Skin: well perfused, warm      MEDICATIONS  meropenem  IVPB   meropenem  IVPB IV Intermittent    phenylephrine    Infusion IV Continuous    allopurinol Oral  atorvastatin Oral  dextrose 50% Injectable IV Push  dextrose 50% Injectable IV Push  dextrose 50% Injectable IV Push  dextrose Gel Oral PRN  glucagon  Injectable IntraMuscular PRN  hydrocortisone sodium succinate Injectable IV Push  insulin lispro (HumaLOG) corrective regimen sliding scale SubCutaneous  levothyroxine Oral    acetylcysteine 10% Inhalation Inhalation  ALBUTerol/ipratropium for Nebulization Nebulizer  buDESOnide   0.5 milliGRAM(s) Respule Inhalation    dexmedetomidine Infusion IV Continuous  fentaNYL   Infusion IV Continuous  gabapentin Oral  midazolam Injectable IV Push PRN  ondansetron Injectable IV Push PRN      aspirin enteric coated Oral  enoxaparin Injectable SubCutaneous    pantoprazole   Suspension Oral  polyethylene glycol 3350 Oral  senna Oral      cholecalciferol Oral  dextrose 5%. IV Continuous  multivitamin Oral  potassium chloride    Tablet ER Oral  potassium phosphate IVPB IV Intermittent  pyridoxine Oral      chlorhexidine 0.12% Liquid Swish and Spit  dorzolamide 2%/timolol 0.5% Ophthalmic Solution Both EYES  latanoprost 0.005% Ophthalmic Solution Both EYES  lidocaine   Patch Transdermal PRN  zinc oxide 40%/lanolin Ointment Topical    omega-3-Acid Ethyl Esters Oral                          9.8    5.6   )-----------( 197      ( 22 Mar 2018 06:46 )             29.8       03-23    144  |  104  |  63<H>  ----------------------------<  98  3.5   |  31  |  0.87    Ca    7.7<L>      23 Mar 2018 06:36  Phos  2.3     03-23  Mg     2.3     03-23    TPro  5.5<L>  /  Alb  1.7<L>  /  TBili  0.2  /  DBili  x   /  AST  66<H>  /  ALT  50  /  AlkPhos  96  03-23          PT/INR - ( 23 Mar 2018 06:36 )   PT: 12.7 sec;   INR: 1.16 ratio         PTT - ( 23 Mar 2018 06:36 )  PTT:31.2 sec    .Blood Blood-Peripheral   No growth to date. -- 03-19 @ 11:57  .Sputum Sputum, trap   Normal Respiratory Nancy present   No polymorphonuclear leukocytes per low power field  No Squamous epithelial cells per low power field  No organisms seen per oil power field 03-19 @ 11:40  .Blood Blood-Peripheral   No growth to date. -- 03-19 @ 09:28  .Body Fluid Pleural Fluid   No growth to date.   No polymorphonuclear cells seen  No organisms seen  by cytocentrifuge 03-18 @ 22:14        CENTRAL LINE: BEATA NOLAN: No                     A-LINE: No      GLOBAL ISSUE/BEST PRACTICE:  Analgesia: yes  Sedation: yes  HOB elevation: yes  Stress ulcer prophylaxis: yes  VTE prophylaxis: yes  Glycemic control: yes  Nutrition: yes      CODE STATUS: DNR/DNI  GOC discussion: Y Patient is a 72y old  Female who presents with a chief complaint of Came to hospital for chemotherapy. In ER SOB, desaturating. PNA, Rt. pleural effusion on CXR. (14 Mar 2018 23:36)    24 hour events: Right IJ placed yesterday. This morning SpO2 in 60s, increased PEEP to 10 and RR to 30. ABG showed PaO2 34, patient remains on mechanical vent.       REVIEW OF SYSTEMS  unable to obtain    T(F): 98.5 (03-23-18 @ 04:01), Max: 98.7 (03-23-18 @ 00:02)  HR: 86 (03-23-18 @ 07:30) (79 - 110)  BP: 106/54 (03-23-18 @ 07:30) (83/47 - 174/99)  RR: 30 (03-23-18 @ 07:30) (14 - 30)  SpO2: 73% (03-23-18 @ 07:30) (73% - 87%)  Wt(kg): --    Mode: AC/ CMV (Assist Control/ Continuous Mandatory Ventilation), RR (machine): 30, TV (machine): 400, FiO2: 100, PEEP: 10    CAPILLARY BLOOD GLUCOSE      I&O's Summary    03-22 @ 07:01  -  03-23 @ 07:00  --------------------------------------------------------  IN: 2361 mL / OUT: 2250 mL / NET: 111 mL      PHYSICAL EXAM  Gen: sedated  Neuro: sedated on vent  HEENT: NC/AT  Resp: Bilateral rhonchi, improved  CVS: nl S1/S2, RRR  Abd: soft, nt, nd, +bs  Ext: no edema, +pulses, + RIJ line  Skin: well perfused, warm      MEDICATIONS  meropenem  IVPB   meropenem  IVPB IV Intermittent    phenylephrine    Infusion IV Continuous    allopurinol Oral  atorvastatin Oral  dextrose 50% Injectable IV Push  dextrose 50% Injectable IV Push  dextrose 50% Injectable IV Push  dextrose Gel Oral PRN  glucagon  Injectable IntraMuscular PRN  hydrocortisone sodium succinate Injectable IV Push  insulin lispro (HumaLOG) corrective regimen sliding scale SubCutaneous  levothyroxine Oral    acetylcysteine 10% Inhalation Inhalation  ALBUTerol/ipratropium for Nebulization Nebulizer  buDESOnide   0.5 milliGRAM(s) Respule Inhalation    dexmedetomidine Infusion IV Continuous  fentaNYL   Infusion IV Continuous  gabapentin Oral  midazolam Injectable IV Push PRN  ondansetron Injectable IV Push PRN      aspirin enteric coated Oral  enoxaparin Injectable SubCutaneous    pantoprazole   Suspension Oral  polyethylene glycol 3350 Oral  senna Oral      cholecalciferol Oral  dextrose 5%. IV Continuous  multivitamin Oral  potassium chloride    Tablet ER Oral  potassium phosphate IVPB IV Intermittent  pyridoxine Oral      chlorhexidine 0.12% Liquid Swish and Spit  dorzolamide 2%/timolol 0.5% Ophthalmic Solution Both EYES  latanoprost 0.005% Ophthalmic Solution Both EYES  lidocaine   Patch Transdermal PRN  zinc oxide 40%/lanolin Ointment Topical    omega-3-Acid Ethyl Esters Oral                            8.0    1.5   )-----------( 150      ( 23 Mar 2018 06:36 )             24.3       03-23    144  |  104  |  63<H>  ----------------------------<  98  3.5   |  31  |  0.87    Ca    7.7<L>      23 Mar 2018 06:36  Phos  2.3     03-23  Mg     2.3     03-23    TPro  5.5<L>  /  Alb  1.7<L>  /  TBili  0.2  /  DBili  x   /  AST  66<H>  /  ALT  50  /  AlkPhos  96  03-23          PT/INR - ( 23 Mar 2018 06:36 )   PT: 12.7 sec;   INR: 1.16 ratio         PTT - ( 23 Mar 2018 06:36 )  PTT:31.2 sec    .Blood Blood-Peripheral   No growth to date. -- 03-19 @ 11:57  .Sputum Sputum, trap   Normal Respiratory Nancy present   No polymorphonuclear leukocytes per low power field  No Squamous epithelial cells per low power field  No organisms seen per oil power field 03-19 @ 11:40  .Blood Blood-Peripheral   No growth to date. -- 03-19 @ 09:28  .Body Fluid Pleural Fluid   No growth to date.   No polymorphonuclear cells seen  No organisms seen  by cytocentrifuge 03-18 @ 22:14        CENTRAL LINE: BEATA NOLAN: No                     A-LINE: No      GLOBAL ISSUE/BEST PRACTICE:  Analgesia: yes  Sedation: yes  HOB elevation: yes  Stress ulcer prophylaxis: yes  VTE prophylaxis: yes  Glycemic control: yes  Nutrition: yes      CODE STATUS: DNR/DNI  GOC discussion: Y Patient is a 72y old  Female who presents with a chief complaint of Came to hospital for chemotherapy. In ER SOB, desaturating. PNA, Rt. pleural effusion on CXR. (14 Mar 2018 23:36)    24 hour events: Right IJ placed yesterday. This morning SpO2 in 60s, increased PEEP to 10 and RR to 30. ABG showed PaO2 34, patient remains on mechanical vent.       REVIEW OF SYSTEMS  unable to obtain    T(F): 98.5 (03-23-18 @ 04:01), Max: 98.7 (03-23-18 @ 00:02)  HR: 86 (03-23-18 @ 07:30) (79 - 110)  BP: 106/54 (03-23-18 @ 07:30) (83/47 - 174/99)  RR: 30 (03-23-18 @ 07:30) (14 - 30)  SpO2: 73% (03-23-18 @ 07:30) (73% - 87%)  Wt(kg): --    Mode: AC/ CMV (Assist Control/ Continuous Mandatory Ventilation), RR (machine): 30, TV (machine): 400, FiO2: 100, PEEP: 10    CAPILLARY BLOOD GLUCOSE      I&O's Summary    03-22 @ 07:01  -  03-23 @ 07:00  --------------------------------------------------------  IN: 2361 mL / OUT: 2250 mL / NET: 111 mL      PHYSICAL EXAM  Gen: sedated  Neuro: sedated on vent  HEENT: NC/AT  Resp: Bilateral rhonchi, improved  CVS: nl S1/S2, RRR  Abd: soft, nt, nd, +bs  Ext: no edema, +pulses, + RIJ line  Skin: well perfused, warm      MEDICATIONS  meropenem  IVPB   meropenem  IVPB IV Intermittent    phenylephrine    Infusion IV Continuous    allopurinol Oral  atorvastatin Oral  dextrose 50% Injectable IV Push  dextrose 50% Injectable IV Push  dextrose 50% Injectable IV Push  dextrose Gel Oral PRN  glucagon  Injectable IntraMuscular PRN  hydrocortisone sodium succinate Injectable IV Push  insulin lispro (HumaLOG) corrective regimen sliding scale SubCutaneous  levothyroxine Oral    acetylcysteine 10% Inhalation Inhalation  ALBUTerol/ipratropium for Nebulization Nebulizer  buDESOnide   0.5 milliGRAM(s) Respule Inhalation    dexmedetomidine Infusion IV Continuous  fentaNYL   Infusion IV Continuous  gabapentin Oral  midazolam Injectable IV Push PRN  ondansetron Injectable IV Push PRN      aspirin enteric coated Oral  enoxaparin Injectable SubCutaneous    pantoprazole   Suspension Oral  polyethylene glycol 3350 Oral  senna Oral      cholecalciferol Oral  dextrose 5%. IV Continuous  multivitamin Oral  potassium chloride    Tablet ER Oral  potassium phosphate IVPB IV Intermittent  pyridoxine Oral      chlorhexidine 0.12% Liquid Swish and Spit  dorzolamide 2%/timolol 0.5% Ophthalmic Solution Both EYES  latanoprost 0.005% Ophthalmic Solution Both EYES  lidocaine   Patch Transdermal PRN  zinc oxide 40%/lanolin Ointment Topical    omega-3-Acid Ethyl Esters Oral                            8.0    1.5   )-----------( 150      ( 23 Mar 2018 06:36 )             24.3       03-23    144  |  104  |  63<H>  ----------------------------<  98  3.5   |  31  |  0.87    Ca    7.7<L>      23 Mar 2018 06:36  Phos  2.3     03-23  Mg     2.3     03-23    TPro  5.5<L>  /  Alb  1.7<L>  /  TBili  0.2  /  DBili  x   /  AST  66<H>  /  ALT  50  /  AlkPhos  96  03-23          PT/INR - ( 23 Mar 2018 06:36 )   PT: 12.7 sec;   INR: 1.16 ratio         PTT - ( 23 Mar 2018 06:36 )  PTT:31.2 sec    .Blood Blood-Peripheral   No growth to date. -- 03-19 @ 11:57  .Sputum Sputum, trap   Normal Respiratory Nancy present   No polymorphonuclear leukocytes per low power field  No Squamous epithelial cells per low power field  No organisms seen per oil power field 03-19 @ 11:40  .Blood Blood-Peripheral   No growth to date. -- 03-19 @ 09:28  .Body Fluid Pleural Fluid   No growth to date.   No polymorphonuclear cells seen  No organisms seen  by cytocentrifuge 03-18 @ 22:14        CENTRAL LINE: BEATA NOLAN: DONNA                    A-LINE: No      GLOBAL ISSUE/BEST PRACTICE:  Analgesia: yes  Sedation: yes  HOB elevation: yes  Stress ulcer prophylaxis: yes  VTE prophylaxis: yes  Glycemic control: yes  Nutrition: yes      CODE STATUS: DNR/DNI  GOC discussion: Y Patient is a 72y old  Female who presents with a chief complaint of Came to hospital for chemotherapy. In ER SOB, desaturating. PNA, Rt. pleural effusion on CXR. (14 Mar 2018 23:36)    24 hour events: Right IJ placed yesterday. This morning SpO2 in 60s, increased PEEP to 10 and RR to 30. ABG showed PaO2 34, patient remains on mechanical vent.       REVIEW OF SYSTEMS  unable to obtain    T(F): 98.5 (03-23-18 @ 04:01), Max: 98.7 (03-23-18 @ 00:02)  HR: 86 (03-23-18 @ 07:30) (79 - 110)  BP: 106/54 (03-23-18 @ 07:30) (83/47 - 174/99)  RR: 30 (03-23-18 @ 07:30) (14 - 30)  SpO2: 73% (03-23-18 @ 07:30) (73% - 87%)  Wt(kg): --    Mode: AC/ CMV (Assist Control/ Continuous Mandatory Ventilation), RR (machine): 30, TV (machine): 400, FiO2: 100, PEEP: 10    CAPILLARY BLOOD GLUCOSE      I&O's Summary    03-22 @ 07:01  -  03-23 @ 07:00  --------------------------------------------------------  IN: 2361 mL / OUT: 2250 mL / NET: 111 mL      PHYSICAL EXAM  Gen: sedated  Neuro: sedated on vent  HEENT: NC/AT  Resp: Bilateral rhonchi, improved  CVS: nl S1/S2, RRR  Abd: soft, nt, nd, +bs  Ext: no edema, +pulses, + RIJ line  Skin: well perfused, warm      MEDICATIONS  meropenem  IVPB   meropenem  IVPB IV Intermittent    phenylephrine    Infusion IV Continuous    allopurinol Oral  atorvastatin Oral  dextrose 50% Injectable IV Push  dextrose 50% Injectable IV Push  dextrose 50% Injectable IV Push  dextrose Gel Oral PRN  glucagon  Injectable IntraMuscular PRN  hydrocortisone sodium succinate Injectable IV Push  insulin lispro (HumaLOG) corrective regimen sliding scale SubCutaneous  levothyroxine Oral    acetylcysteine 10% Inhalation Inhalation  ALBUTerol/ipratropium for Nebulization Nebulizer  buDESOnide   0.5 milliGRAM(s) Respule Inhalation    dexmedetomidine Infusion IV Continuous  fentaNYL   Infusion IV Continuous  gabapentin Oral  midazolam Injectable IV Push PRN  ondansetron Injectable IV Push PRN      aspirin enteric coated Oral  enoxaparin Injectable SubCutaneous    pantoprazole   Suspension Oral  polyethylene glycol 3350 Oral  senna Oral      cholecalciferol Oral  dextrose 5%. IV Continuous  multivitamin Oral  potassium chloride    Tablet ER Oral  potassium phosphate IVPB IV Intermittent  pyridoxine Oral      chlorhexidine 0.12% Liquid Swish and Spit  dorzolamide 2%/timolol 0.5% Ophthalmic Solution Both EYES  latanoprost 0.005% Ophthalmic Solution Both EYES  lidocaine   Patch Transdermal PRN  zinc oxide 40%/lanolin Ointment Topical    omega-3-Acid Ethyl Esters Oral                            8.0    1.5   )-----------( 150      ( 23 Mar 2018 06:36 )             24.3       03-23    144  |  104  |  63<H>  ----------------------------<  98  3.5   |  31  |  0.87    Ca    7.7<L>      23 Mar 2018 06:36  Phos  2.3     03-23  Mg     2.3     03-23    TPro  5.5<L>  /  Alb  1.7<L>  /  TBili  0.2  /  DBili  x   /  AST  66<H>  /  ALT  50  /  AlkPhos  96  03-23          PT/INR - ( 23 Mar 2018 06:36 )   PT: 12.7 sec;   INR: 1.16 ratio         PTT - ( 23 Mar 2018 06:36 )  PTT:31.2 sec    .Blood Blood-Peripheral   No growth to date. -- 03-19 @ 11:57  .Sputum Sputum, trap   Normal Respiratory Nancy present   No polymorphonuclear leukocytes per low power field  No Squamous epithelial cells per low power field  No organisms seen per oil power field 03-19 @ 11:40  .Blood Blood-Peripheral   No growth to date. -- 03-19 @ 09:28  .Body Fluid Pleural Fluid   No growth to date.   No polymorphonuclear cells seen  No organisms seen  by cytocentrifuge 03-18 @ 22:14      < from: Xray Chest 1 View- PORTABLE-Urgent (03.23.18 @ 09:25) >  Sternotomy wires, ET tube, feeding tube again noted. Right central line   again noted. No pneumothorax.    Increased interstitial lung markings without significant change. Large   right pleural effusion increased in size with adjacent opacity. Opacities  left lung overall improving.    Heart size cannot be accurately assessed in this projection.    < end of copied text >      CENTRAL LINE: BEATA NOLAN: DONNA                    A-LINE: No      GLOBAL ISSUE/BEST PRACTICE:  Analgesia: yes  Sedation: yes  HOB elevation: yes  Stress ulcer prophylaxis: yes  VTE prophylaxis : yes      Glycemic control: yes  Nutrition: yes      CODE STATUS: DNR/DNI  GOC discussion: DONNA Patient is a 72y old  Female who presents with a chief complaint of Came to hospital for chemotherapy. In ER SOB, desaturating. PNA, Rt. pleural effusion on CXR. (14 Mar 2018 23:36)    24 hour events: Right IJ placed yesterday. This morning SpO2 in 60s, increased PEEP to 10 and RR to 30. ABG showed PaO2 34, patient remains on mechanical vent. Discussed plan with family and plan is to discontine intubation and place on comfort measures only.      REVIEW OF SYSTEMS  unable to obtain    T(F): 98.5 (03-23-18 @ 04:01), Max: 98.7 (03-23-18 @ 00:02)  HR: 86 (03-23-18 @ 07:30) (79 - 110)  BP: 106/54 (03-23-18 @ 07:30) (83/47 - 174/99)  RR: 30 (03-23-18 @ 07:30) (14 - 30)  SpO2: 73% (03-23-18 @ 07:30) (73% - 87%)  Wt(kg): --    Mode: AC/ CMV (Assist Control/ Continuous Mandatory Ventilation), RR (machine): 30, TV (machine): 400, FiO2: 100, PEEP: 10    CAPILLARY BLOOD GLUCOSE      I&O's Summary    03-22 @ 07:01  -  03-23 @ 07:00  --------------------------------------------------------  IN: 2361 mL / OUT: 2250 mL / NET: 111 mL      PHYSICAL EXAM  Gen: sedated  Neuro: sedated on vent  HEENT: NC/AT  Resp: Bilateral rhonchi, improved  CVS: nl S1/S2, RRR  Abd: soft, nt, nd, +bs  Ext: no edema, +pulses, + RIJ line  Skin: well perfused, warm      MEDICATIONS  meropenem  IVPB   meropenem  IVPB IV Intermittent    phenylephrine    Infusion IV Continuous    allopurinol Oral  atorvastatin Oral  dextrose 50% Injectable IV Push  dextrose 50% Injectable IV Push  dextrose 50% Injectable IV Push  dextrose Gel Oral PRN  glucagon  Injectable IntraMuscular PRN  hydrocortisone sodium succinate Injectable IV Push  insulin lispro (HumaLOG) corrective regimen sliding scale SubCutaneous  levothyroxine Oral    acetylcysteine 10% Inhalation Inhalation  ALBUTerol/ipratropium for Nebulization Nebulizer  buDESOnide   0.5 milliGRAM(s) Respule Inhalation    dexmedetomidine Infusion IV Continuous  fentaNYL   Infusion IV Continuous  gabapentin Oral  midazolam Injectable IV Push PRN  ondansetron Injectable IV Push PRN      aspirin enteric coated Oral  enoxaparin Injectable SubCutaneous    pantoprazole   Suspension Oral  polyethylene glycol 3350 Oral  senna Oral      cholecalciferol Oral  dextrose 5%. IV Continuous  multivitamin Oral  potassium chloride    Tablet ER Oral  potassium phosphate IVPB IV Intermittent  pyridoxine Oral      chlorhexidine 0.12% Liquid Swish and Spit  dorzolamide 2%/timolol 0.5% Ophthalmic Solution Both EYES  latanoprost 0.005% Ophthalmic Solution Both EYES  lidocaine   Patch Transdermal PRN  zinc oxide 40%/lanolin Ointment Topical    omega-3-Acid Ethyl Esters Oral                            8.0    1.5   )-----------( 150      ( 23 Mar 2018 06:36 )             24.3       03-23    144  |  104  |  63<H>  ----------------------------<  98  3.5   |  31  |  0.87    Ca    7.7<L>      23 Mar 2018 06:36  Phos  2.3     03-23  Mg     2.3     03-23    TPro  5.5<L>  /  Alb  1.7<L>  /  TBili  0.2  /  DBili  x   /  AST  66<H>  /  ALT  50  /  AlkPhos  96  03-23          PT/INR - ( 23 Mar 2018 06:36 )   PT: 12.7 sec;   INR: 1.16 ratio         PTT - ( 23 Mar 2018 06:36 )  PTT:31.2 sec    .Blood Blood-Peripheral   No growth to date. -- 03-19 @ 11:57  .Sputum Sputum, trap   Normal Respiratory Nancy present   No polymorphonuclear leukocytes per low power field  No Squamous epithelial cells per low power field  No organisms seen per oil power field 03-19 @ 11:40  .Blood Blood-Peripheral   No growth to date. -- 03-19 @ 09:28  .Body Fluid Pleural Fluid   No growth to date.   No polymorphonuclear cells seen  No organisms seen  by cytocentrifuge 03-18 @ 22:14      < from: Xray Chest 1 View- PORTABLE-Urgent (03.23.18 @ 09:25) >  Sternotomy wires, ET tube, feeding tube again noted. Right central line   again noted. No pneumothorax.    Increased interstitial lung markings without significant change. Large   right pleural effusion increased in size with adjacent opacity. Opacities  left lung overall improving.    Heart size cannot be accurately assessed in this projection.    < end of copied text >      Bedside Lung US: Right sided increase B lines, minimal to moderate left pleural effusion    CENTRAL LINE: BEATA NOLAN: Y                    A-LINE: No      GLOBAL ISSUE/BEST PRACTICE:  Analgesia: yes  Sedation: yes  HOB elevation: yes  Stress ulcer prophylaxis: yes  VTE prophylaxis : yes      Glycemic control: yes  Nutrition: yes      CODE STATUS: DNR/DNI  GOC discussion: Y Patient is a 72y old  Female who presents with a chief complaint of Came to hospital for chemotherapy. In ER SOB, desaturating. PNA, Rt. pleural effusion on CXR. (14 Mar 2018 23:36)    24 hour events: Right IJ placed yesterday for poor IV access. This morning SpO2 in 60s, increased PEEP to 10 and RR to 30. ABG showed PaO2 34, patient remains on mechanical vent. Discussed plan with family and plan is to discontine intubation and place on comfort measures only.      REVIEW OF SYSTEMS  unable to obtain    T(F): 98.5 (03-23-18 @ 04:01), Max: 98.7 (03-23-18 @ 00:02)  HR: 86 (03-23-18 @ 07:30) (79 - 110)  BP: 106/54 (03-23-18 @ 07:30) (83/47 - 174/99)  RR: 30 (03-23-18 @ 07:30) (14 - 30)  SpO2: 73% (03-23-18 @ 07:30) (73% - 87%)  Wt(kg): --    Mode: AC/ CMV (Assist Control/ Continuous Mandatory Ventilation), RR (machine): 30, TV (machine): 400, FiO2: 100, PEEP: 10    CAPILLARY BLOOD GLUCOSE      I&O's Summary    03-22 @ 07:01  -  03-23 @ 07:00  --------------------------------------------------------  IN: 2361 mL / OUT: 2250 mL / NET: 111 mL      PHYSICAL EXAM  Gen: sedated  Neuro: sedated on vent  HEENT: NC/AT  Resp: Bilateral rhonchi, improved  CVS: nl S1/S2, RRR  Abd: soft, nt, nd, +bs  Ext: no edema, +pulses, + RIJ line  Skin: well perfused, warm      MEDICATIONS  meropenem  IVPB   meropenem  IVPB IV Intermittent    phenylephrine    Infusion IV Continuous    allopurinol Oral  atorvastatin Oral  dextrose 50% Injectable IV Push  dextrose 50% Injectable IV Push  dextrose 50% Injectable IV Push  dextrose Gel Oral PRN  glucagon  Injectable IntraMuscular PRN  hydrocortisone sodium succinate Injectable IV Push  insulin lispro (HumaLOG) corrective regimen sliding scale SubCutaneous  levothyroxine Oral    acetylcysteine 10% Inhalation Inhalation  ALBUTerol/ipratropium for Nebulization Nebulizer  buDESOnide   0.5 milliGRAM(s) Respule Inhalation    dexmedetomidine Infusion IV Continuous  fentaNYL   Infusion IV Continuous  gabapentin Oral  midazolam Injectable IV Push PRN  ondansetron Injectable IV Push PRN      aspirin enteric coated Oral  enoxaparin Injectable SubCutaneous    pantoprazole   Suspension Oral  polyethylene glycol 3350 Oral  senna Oral      cholecalciferol Oral  dextrose 5%. IV Continuous  multivitamin Oral  potassium chloride    Tablet ER Oral  potassium phosphate IVPB IV Intermittent  pyridoxine Oral      chlorhexidine 0.12% Liquid Swish and Spit  dorzolamide 2%/timolol 0.5% Ophthalmic Solution Both EYES  latanoprost 0.005% Ophthalmic Solution Both EYES  lidocaine   Patch Transdermal PRN  zinc oxide 40%/lanolin Ointment Topical    omega-3-Acid Ethyl Esters Oral                            8.0    1.5   )-----------( 150      ( 23 Mar 2018 06:36 )             24.3       03-23    144  |  104  |  63<H>  ----------------------------<  98  3.5   |  31  |  0.87    Ca    7.7<L>      23 Mar 2018 06:36  Phos  2.3     03-23  Mg     2.3     03-23    TPro  5.5<L>  /  Alb  1.7<L>  /  TBili  0.2  /  DBili  x   /  AST  66<H>  /  ALT  50  /  AlkPhos  96  03-23          PT/INR - ( 23 Mar 2018 06:36 )   PT: 12.7 sec;   INR: 1.16 ratio         PTT - ( 23 Mar 2018 06:36 )  PTT:31.2 sec    .Blood Blood-Peripheral   No growth to date. -- 03-19 @ 11:57  .Sputum Sputum, trap   Normal Respiratory Nancy present   No polymorphonuclear leukocytes per low power field  No Squamous epithelial cells per low power field  No organisms seen per oil power field 03-19 @ 11:40  .Blood Blood-Peripheral   No growth to date. -- 03-19 @ 09:28  .Body Fluid Pleural Fluid   No growth to date.   No polymorphonuclear cells seen  No organisms seen  by cytocentrifuge 03-18 @ 22:14      < from: Xray Chest 1 View- PORTABLE-Urgent (03.23.18 @ 09:25) >  Sternotomy wires, ET tube, feeding tube again noted. Right central line   again noted. No pneumothorax.    Increased interstitial lung markings without significant change. Large   right pleural effusion increased in size with adjacent opacity. Opacities  left lung overall improving.    Heart size cannot be accurately assessed in this projection.    < end of copied text >      Bedside Lung US: Right sided increase B lines, minimal to moderate left pleural effusion    CENTRAL LINE: RIJ TLC          NOLAN: Y                    A-LINE: No      GLOBAL ISSUE/BEST PRACTICE:  Analgesia: yes  Sedation: yes  HOB elevation: yes  Stress ulcer prophylaxis: yes  VTE prophylaxis : yes      Glycemic control: yes  Nutrition: yes      CODE STATUS: DNR/DNI  GOC discussion: Y

## 2018-03-23 NOTE — DISCHARGE NOTE FOR THE EXPIRED PATIENT - HOSPITAL COURSE
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 Patient with progressive respiratory failure likely due to HCAP and SCLCa. Patient progressively declined in respiratory status despite increasing oxygen requirements, recruitment maneuvers, thoracentesis of pleural effusion, broad spec abx, chemotherapy, empiric tx for PE. Multidisciplinary GOC discussion with family took place. Family elected for Comfort measures for the patient. Patient  11:34am 3/23/2018 with family at bedside. 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 Patient with progressive respiratory failure likely due to HCAP and SCLCa. Patient progressively declined in respiratory status despite increasing oxygen requirements, recruitment maneuvers, thoracentesis of pleural effusion, broad spec abx, chemotherapy, empiric tx for PE. Multidisciplinary GOC discussion with family took place. Family elected for Comfort measures for the patient.  Pt seen with son at bedside prior to expiration. Patient  11:34am 3/23/2018 with family at bedside.     Time spent: 65 minutes  Spoke to PMD Dr Pradhan who made aware of expiration

## 2018-03-23 NOTE — PROVIDER CONTACT NOTE (EICU) - ACTION/TREATMENT ORDERED:
Discussion of terminal wean by family and notified by charge RN. Pt  shortly after. Referral made. Reference # [id number]

## 2018-03-23 NOTE — PROGRESS NOTE ADULT - ASSESSMENT
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 Patient with progressive respiratory failure likely due to HCAP and SCLCa, now DNR/DNI.     -NEURO: sedated while intubated, continue Precedex gtt, Fentanyl gtt, versed PRN, for vent synchrony.  -CARDIO: Septic shock, possibly 2/2 to adrenal cause - on low dose Phenylephrine and stress steroids. Bioprosthetic AVR, CAD- Continue ASA, statin and Lovaza for HLD.  Holding Metoprolol in setting of hypotension.   -RESP: Acute respiratory failure hypoxia in the setting of SCLCa of right lung, COPD, HCAP with persistent hypoxia, on full vent support. ABG with profound hypoxemia, increased ventilation, not responding well. ? PE given persistent hypoxia, on FD Lovenox to treat empirically. Moderate left pleural effusion, still not good candidate for thoracentesis given high risk. Continue full vent support, titrating O2 for sats>88-90%. Pleural fluid exudate, culture negative, cytology negative. Continue Duonebs.   -GI: Continue tube feeds via NGT Jevity, continue PO meds via NGT.   -ENDO: Continue Synthroid for hypothyroidism.   -RENAL: Replete K with 40 PO KCl and Phos with KPhos 15 mmol x 1. Renal function stable. Multani in place for strict Is Os.    -ID: On Meropenem for HCAP.   -HEME/ONC: Continue chemo per heme/onc for metastatic small cell lung cancer: s/p Chemo 3/17 (carboplatin, etoposide) as per heme/onc for metastatic SCLC, s/p 5 days of growth factor support with Zarexio. Check uric acid and LDH levels daily to monitor for Tumor Lysis. Lovenox for DVT ppx.   -Very poor prognosis, patient now DNR/DNI. 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 Patient with progressive respiratory failure likely due to HCAP and SCLCa, now DNR/DNI.     -NEURO: sedated while intubated, continue Precedex gtt, Fentanyl gtt, versed PRN, for vent synchrony.  -CARDIO: Septic shock, possibly 2/2 to adrenal cause - on low dose Phenylephrine and stress steroids. Bioprosthetic AVR, CAD- Continue ASA, statin and Lovaza for HLD.  Holding Metoprolol in setting of hypotension.   -RESP: Acute respiratory failure hypoxia in the setting of SCLCa of right lung, COPD, HCAP with persistent hypoxia, on full vent support. ABG with profound hypoxemia, increased ventilation, not responding well. ? PE given persistent hypoxia, on FD Lovenox to treat empirically. Moderate left pleural effusion, still not good candidate for thoracentesis given high risk. Continue full vent support, titrating O2 for sats>88-90%. Pleural fluid exudate, culture negative, cytology negative. Continue Duonebs.   -GI: Continue tube feeds via NGT Jevity, continue PO meds via NGT.   -ENDO: Continue Synthroid for hypothyroidism.   -RENAL: Replete K with 40 PO KCl and Phos with KPhos 15 mmol x 1. Renal function stable. Multani in place for strict Is Os.    -ID: On Meropenem for HCAP. WBC 1.5.   -HEME/ONC: Continue chemo per heme/onc for metastatic small cell lung cancer: s/p Chemo 3/17 (carboplatin, etoposide) as per heme/onc for metastatic SCLC, s/p 5 days of growth factor support with Zarexio. Check uric acid and LDH levels daily to monitor for Tumor Lysis. Lovenox for DVT ppx.   -Very poor prognosis, patient now DNR/DNI. 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 Patient with progressive respiratory failure likely due to HCAP and SCLCa, now DNR. Plan for comfort measures today as per family.    -NEURO: sedated while intubated, Will give 4 mg IVP Morphine x 1, will change Versed to q15 minutes. Continue Precedex gtt, Fentanyl gtt.  -CARDIO: Septic shock, possibly 2/2 to adrenal cause - on low dose Phenylephrine and stress steroids. Bioprosthetic AVR, CAD- Continue ASA, statin and Lovaza for HLD.    -RESP: Acute respiratory failure hypoxia in the setting of SCLCa of right lung, COPD, HCAP with persistent hypoxia, on full vent support. ABG with profound hypoxemia, increased ventilation, not responding. Family plans to place on comfort measures.   -? PE given persistent hypoxia, on FD Lovenox to treating empirically.    -GI: Tube feeds via NGT Jevity, continue PO meds via NGT for now until comfort measures placed.   -ENDO: Continue Synthroid for hypothyroidism.   -RENAL: Repleted K with 40 PO KCl and Phos with KPhos 15 mmol x 1. Renal function stable. Multani in place for strict Is Os.    -ID: On Meropenem for HCAP.   -HEME/ONC: s/p chemotherapy per heme/onc for metastatic small cell lung cancer, WBC 1.5 today which indicates the chemo is working on the bone marrow but not affecting the SCLCa given persisting profound hypoxia.   -Very poor prognosis, patient now DNR. To be made comfort measures only and withdraw intubation tube.

## 2018-03-23 NOTE — PROVIDER CONTACT NOTE (CRITICAL VALUE NOTIFICATION) - ACTION/TREATMENT ORDERED:
increase RR to 22.
remain on mechanical vent
fluid
no orders given
none
Increased PEEP to 10 and RR to 30

## 2018-03-23 NOTE — PROGRESS NOTE ADULT - PROBLEM SELECTOR PROBLEM 2
Malignant neoplasm of right lung, unspecified part of lung
Atelectasis of right lung
Lung cancer
Pleural effusion
Lung cancer
Lung cancer
Pleural effusion

## 2018-03-23 NOTE — PROGRESS NOTE ADULT - SUBJECTIVE AND OBJECTIVE BOX
INTERVAL HPI/OVERNIGHT EVENTS/SUBJECTIVE:  Patient continues to require ventilatory support. Worsening leukopenia. Pain medications increased for comfort. Family aware of difficulty and unlikelihood of successful extubation.     ICU Vital Signs Last 24 Hrs  T(C): 36.9 (23 Mar 2018 04:01), Max: 37.1 (23 Mar 2018 00:02)  T(F): 98.5 (23 Mar 2018 04:01), Max: 98.7 (23 Mar 2018 00:02)  HR: 85 (23 Mar 2018 09:00) (79 - 110)  BP: 104/53 (23 Mar 2018 08:30) (83/47 - 174/99)  BP(mean): 77 (23 Mar 2018 08:30) (61 - 117)  ABP: --  ABP(mean): --  RR: 30 (23 Mar 2018 09:00) (14 - 30)  SpO2: 79% (23 Mar 2018 09:00) (73% - 87%)    I&O's Detail    22 Mar 2018 07:01  -  23 Mar 2018 07:00  --------------------------------------------------------  IN:    dexmedetomidine Infusion: 155.8 mL    fentaNYL  Infusion: 592.2 mL    Osmolite: 1170 mL    phenylephrine   Infusion: 55 mL    phenylephrine   Infusion: 38 mL    Solution: 150 mL    Solution: 200 mL  Total IN: 2361 mL    OUT:    Indwelling Catheter - Urethral: 2250 mL  Total OUT: 2250 mL    Total NET: 111 mL      23 Mar 2018 07:01  -  23 Mar 2018 10:24  --------------------------------------------------------  IN:    dexmedetomidine Infusion: 15.6 mL    fentaNYL  Infusion: 68.2 mL    Osmolite: 130 mL    phenylephrine   Infusion: 20 mL    Solution: 250 mL  Total IN: 483.8 mL    OUT:    Indwelling Catheter - Urethral: 55 mL  Total OUT: 55 mL    Total NET: 428.8 mL    Mode: AC/ CMV (Assist Control/ Continuous Mandatory Ventilation)  RR (machine): 30  TV (machine): 400  FiO2: 100  PEEP: 10  ITime: 1  MAP: 18.9  PIP: 31    ABG - ( 23 Mar 2018 06:05 )  pH: 7.40  /  pCO2: 50    /  pO2: 34    / HCO3: 29    / Base Excess: 6.0   /  SaO2: 68        MEDICATIONS  (STANDING):  dexmedetomidine Infusion 0.3 MICROgram(s)/kG/Hr (4.65 mL/Hr) IV Continuous <Continuous>  fentaNYL   Infusion 1 MICROgram(s)/kG/Hr (6.2 mL/Hr) IV Continuous <Continuous>  morphine  - Injectable 4 milliGRAM(s) IV Push once    MEDICATIONS  (PRN):  lidocaine   Patch 1 Patch Transdermal every 72 hours PRN Neck pain  midazolam Injectable 2 milliGRAM(s) IV Push every 15 minutes PRN agitation and/or vent dysynchrony      NUTRITION/IVF:  NPO    CENTRAL LINE:  YES    HEATON:  YES    MISC:     PHYSICAL EXAM:    Gen: intubated, sedated, not following commands    Eyes: PERRLA    Neurological: not following commands. RASS -1    Neck: midline trachea, supple    Pulmonary: no w/r/c noted. coarse BS    Cardiovascular: NSR     Gastrointestinal: soft nt nd, no guarding or rebound    Genitourinary: heaton in place    Extremities: no c/c/e    Skin: intacdt    LABS:  CBC Full  -  ( 23 Mar 2018 06:36 )  WBC Count : 1.5 K/uL  Hemoglobin : 8.0 g/dL  Hematocrit : 24.3 %  Platelet Count - Automated : 150 K/uL  Mean Cell Volume : 89.0 fl  Mean Cell Hemoglobin : 29.5 pg  Mean Cell Hemoglobin Concentration : 33.2 gm/dL  Auto Neutrophil # : x  Auto Lymphocyte # : x  Auto Monocyte # : x  Auto Eosinophil # : x  Auto Basophil # : x  Auto Neutrophil % : x  Auto Lymphocyte % : x  Auto Monocyte % : x  Auto Eosinophil % : x  Auto Basophil % : x    03-23    144  |  104  |  63<H>  ----------------------------<  98  3.5   |  31  |  0.87    Ca    7.7<L>      23 Mar 2018 06:36  Phos  2.3     03-23  Mg     2.3     03-23    TPro  5.5<L>  /  Alb  1.7<L>  /  TBili  0.2  /  DBili  x   /  AST  66<H>  /  ALT  50  /  AlkPhos  96  03-23    PT/INR - ( 23 Mar 2018 06:36 )   PT: 12.7 sec;   INR: 1.16 ratio         PTT - ( 23 Mar 2018 06:36 )  PTT:31.2 sec    RECENT CULTURES:  03-19 .Blood Blood-Peripheral XXXX XXXX   No growth to date.    03-19 .Sputum Sputum, trap XXXX   No polymorphonuclear leukocytes per low power field  No Squamous epithelial cells per low power field  No organisms seen per oil power field   Normal Respiratory Nancy present    03-19 .Blood Blood-Peripheral XXXX XXXX   No growth to date.    03-18 .Body Fluid Pleural Fluid XXXX   No polymorphonuclear cells seen  No organisms seen  by cytocentrifuge   No growth to date.        LIVER FUNCTIONS - ( 23 Mar 2018 06:36 )  Alb: 1.7 g/dL / Pro: 5.5 g/dL / ALK PHOS: 96 U/L / ALT: 50 U/L / AST: 66 U/L / GGT: x             CAPILLARY BLOOD GLUCOSE      RADIOLOGY & ADDITIONAL STUDIES:    ASSESSMENT/PLAN:  72yFemale presenting with  1. Acute Hypoxemic Respiratory failure requiring intubation  2. SCCA on Chemo  3. Shock, septic      Plan:  -Patient is unable to wean from ventilator. Waxing and waning mental status. Family discussion had with intensivist/and team with family. Family at this time is opting for comfort measures only. Will have terminal extubation this day. Comfort and support offered to all family.     CRITICAL CARE TIME SPENT: 32 minutes INTERVAL HPI/OVERNIGHT EVENTS/SUBJECTIVE:  Patient continues to require ventilatory support. Worsening leukopenia. Pain medications increased for comfort. Family aware of difficulty and unlikelihood of successful extubation.     ICU Vital Signs Last 24 Hrs  T(C): 36.9 (23 Mar 2018 04:01), Max: 37.1 (23 Mar 2018 00:02)  T(F): 98.5 (23 Mar 2018 04:01), Max: 98.7 (23 Mar 2018 00:02)  HR: 85 (23 Mar 2018 09:00) (79 - 110)  BP: 104/53 (23 Mar 2018 08:30) (83/47 - 174/99)  BP(mean): 77 (23 Mar 2018 08:30) (61 - 117)  ABP: --  ABP(mean): --  RR: 30 (23 Mar 2018 09:00) (14 - 30)  SpO2: 79% (23 Mar 2018 09:00) (73% - 87%)    I&O's Detail    22 Mar 2018 07:01  -  23 Mar 2018 07:00  --------------------------------------------------------  IN:    dexmedetomidine Infusion: 155.8 mL    fentaNYL  Infusion: 592.2 mL    Osmolite: 1170 mL    phenylephrine   Infusion: 55 mL    phenylephrine   Infusion: 38 mL    Solution: 150 mL    Solution: 200 mL  Total IN: 2361 mL    OUT:    Indwelling Catheter - Urethral: 2250 mL  Total OUT: 2250 mL    Total NET: 111 mL      23 Mar 2018 07:01  -  23 Mar 2018 10:24  --------------------------------------------------------  IN:    dexmedetomidine Infusion: 15.6 mL    fentaNYL  Infusion: 68.2 mL    Osmolite: 130 mL    phenylephrine   Infusion: 20 mL    Solution: 250 mL  Total IN: 483.8 mL    OUT:    Indwelling Catheter - Urethral: 55 mL  Total OUT: 55 mL    Total NET: 428.8 mL    Mode: AC/ CMV (Assist Control/ Continuous Mandatory Ventilation)  RR (machine): 30  TV (machine): 400  FiO2: 100  PEEP: 10  ITime: 1  MAP: 18.9  PIP: 31    ABG - ( 23 Mar 2018 06:05 )  pH: 7.40  /  pCO2: 50    /  pO2: 34    / HCO3: 29    / Base Excess: 6.0   /  SaO2: 68        MEDICATIONS  (STANDING):  dexmedetomidine Infusion 0.3 MICROgram(s)/kG/Hr (4.65 mL/Hr) IV Continuous <Continuous>  fentaNYL   Infusion 1 MICROgram(s)/kG/Hr (6.2 mL/Hr) IV Continuous <Continuous>  morphine  - Injectable 4 milliGRAM(s) IV Push once    MEDICATIONS  (PRN):  lidocaine   Patch 1 Patch Transdermal every 72 hours PRN Neck pain  midazolam Injectable 2 milliGRAM(s) IV Push every 15 minutes PRN agitation and/or vent dysynchrony      NUTRITION/IVF:  NPO    CENTRAL LINE:  YES    HEATON:  YES    MISC:     PHYSICAL EXAM:    Gen: intubated, sedated, not following commands    Eyes: PERRLA    Neurological: not following commands. RASS -1    Neck: midline trachea, supple    Pulmonary: no w/r/c noted. coarse BS    Cardiovascular: NSR     Gastrointestinal: soft nt nd, no guarding or rebound    Genitourinary: heaton in place    Extremities: no c/c/e    Skin: intacdt    LABS:  CBC Full  -  ( 23 Mar 2018 06:36 )  WBC Count : 1.5 K/uL  Hemoglobin : 8.0 g/dL  Hematocrit : 24.3 %  Platelet Count - Automated : 150 K/uL  Mean Cell Volume : 89.0 fl  Mean Cell Hemoglobin : 29.5 pg  Mean Cell Hemoglobin Concentration : 33.2 gm/dL  Auto Neutrophil # : x  Auto Lymphocyte # : x  Auto Monocyte # : x  Auto Eosinophil # : x  Auto Basophil # : x  Auto Neutrophil % : x  Auto Lymphocyte % : x  Auto Monocyte % : x  Auto Eosinophil % : x  Auto Basophil % : x    03-23    144  |  104  |  63<H>  ----------------------------<  98  3.5   |  31  |  0.87    Ca    7.7<L>      23 Mar 2018 06:36  Phos  2.3     03-23  Mg     2.3     03-23    TPro  5.5<L>  /  Alb  1.7<L>  /  TBili  0.2  /  DBili  x   /  AST  66<H>  /  ALT  50  /  AlkPhos  96  03-23    PT/INR - ( 23 Mar 2018 06:36 )   PT: 12.7 sec;   INR: 1.16 ratio         PTT - ( 23 Mar 2018 06:36 )  PTT:31.2 sec    RECENT CULTURES:  03-19 .Blood Blood-Peripheral XXXX XXXX   No growth to date.    03-19 .Sputum Sputum, trap XXXX   No polymorphonuclear leukocytes per low power field  No Squamous epithelial cells per low power field  No organisms seen per oil power field   Normal Respiratory Nancy present    03-19 .Blood Blood-Peripheral XXXX XXXX   No growth to date.    03-18 .Body Fluid Pleural Fluid XXXX   No polymorphonuclear cells seen  No organisms seen  by cytocentrifuge   No growth to date.        LIVER FUNCTIONS - ( 23 Mar 2018 06:36 )  Alb: 1.7 g/dL / Pro: 5.5 g/dL / ALK PHOS: 96 U/L / ALT: 50 U/L / AST: 66 U/L / GGT: x             CAPILLARY BLOOD GLUCOSE      RADIOLOGY & ADDITIONAL STUDIES:    ASSESSMENT/PLAN:  72yFemale presenting with  1. Acute Hypoxemic Respiratory failure requiring intubation  2. SCCA on Chemo  3. Shock, septic, likely due to PNA      Plan:  -Patient is unable to wean from ventilator. Waxing and waning mental status. Family discussion had with intensivist/and team with family. Family at this time is opting for comfort measures only. Will have terminal extubation this day. Comfort and support offered to all family.     CRITICAL CARE TIME SPENT: 32 minutes

## 2018-03-23 NOTE — PROGRESS NOTE ADULT - PROBLEM SELECTOR PLAN 3
will follow
will follow
chronic,   -apprec pulm recs  -cont intubation and resp care  -cont chemo, monitor resp status
chronic,   -apprec pulm recs  -cont hi flow and resp care  -cont chemo, monitor resp status
chronic,   -apprec pulm recs  -cont intubation and resp care  -cont chemo, monitor resp status
chronic,   -apprec pulm recs  -cont intubation and resp care  -cont chemo, monitor resp status
continue bronchodilators, at this point their appears to be no active wheezing on exam.  continue prednisone, if develops active wheezing will change to solumedrol stress dose.
will follow

## 2018-03-23 NOTE — PROGRESS NOTE ADULT - PROBLEM SELECTOR PLAN 2
-R effusion, non drainable at this time  -cont hi flow   -apprec pulm recs, plan as above  -icu plan of care
-R effusion, non drainable at this time  -cont intubation  -apprec pulm recs, plan as above  -icu plan of care
-R effusion, non drainable at this time  -lasix given  -cont intubation  -apprec pulm recs, plan as above  -icu plan of care
appears to be improved aeration following intubation, with thick plugs removed with lavage after intubation  May ultimately require a bronch for airway clearing if continues  repeat CXR in am  will send sputum for culture
unlikely direct cause of acute change in status
S/P Chemo  Px guarded
S/P Chemo  Px guarded
-R effusion, non drainable at this time  -cont intubation  -apprec pulm recs, plan as above  -icu plan of care

## 2018-03-23 NOTE — PROGRESS NOTE ADULT - PROBLEM SELECTOR PLAN 5
As above  Reached out to Onc for follow up
chronic, stable  cont home meds
PPI for gastric ulcer prophylaxis  DVT prophylaxis with lovenox  HOB>30  Chlorohexidine for oral decontamination while intubated
chronic, stable  cont home meds

## 2018-03-23 NOTE — PROGRESS NOTE ADULT - PROBLEM SELECTOR PLAN 4
WBC normal, s/p chemo
WBC now low w chemo  Need for GCSF per heme and depends on clinical course
chronic  cotn home meds
Chemotx as per oncology  Explained to family prior to intubation that given her need to be placed on mechanical vent support and her underlying condition (emphysema and SCCA) that the ability to liberate her from the ventilator is difficult to assess.  They understood and wished to proceed forward with intubation.  We had an extensive conversation regarding placement of tracheostomy if we are unable to extubated her following optimization of her pulmonary secretions and right lung aeration.  At this point they wish to proceed forward with all care and what to give her the full 7-10days of treatment before a reassessment given the recommendations from oncology.
chronic  cotn home meds
will follow trend.

## 2018-03-23 NOTE — PROGRESS NOTE ADULT - PROBLEM SELECTOR PLAN 1
remains on HFNC  keep sat > 88 pct  oral and skin care  SCLC - s/p chemo as per Onc  cont albuterol and mucomyst and pulmicort and prednisone systemic  prognosis guarded  supportive ICU Care and regimen  may require intubation   will follow  serial labs  serial clinical exam  I and O  nutrition as tolerated
Hypoxemic resp distress  pt with Mets Lung Ca  need to rule out VTE  doubt Pleural Eff, suspect atelectasis and lung ca and poss mucus impaction  will need CT chest when stable  for now - will check LE Dopplers, will check US chest right side to assess for eff  nebs - albuterol and mucomyst  on low dose Prednisone  oral hygiene  skin care  may need empiric AC for possible PE, pt remains on High Flow NC - consider PE as high on the list of differential in the setting of mets cancer  am labs pending  supportive ICU care regimen  prognosis guarded
SCLC - on chemo  resp failure  oral and skin care  vent support  prognosis very poor  supportive ICU care and regimen  chest pt  suction prn  oral hygiene  bronchodilators  pt is full code  Onc following  I and O, keep in balance  will follow  family aware of poor prognosis  s.p. Thoracentesis yesterday, 1 L drained
lung ca - SCLC - on chemo  copd and resp distress and tumor burden - would increase systemic steroids in dose, would change inhaler bronchodilator therapy to Nebulized form  will repeat CXR this am  I and O noted, pt is positive, would use Diuresis to keep in balance  on chemo as per Onc  on HFNC  keep sat > 88 pct  remains very high risk for intubation and IMV  prognosis guarded to poor  discussed my findings with patent at BS this am  will follow
lung ca with mets - SCLS - on chemo as per Onc  resp distress, copd and lung ca - abx deferred, ID eval and follow up noted  on HFNC - 45 LPM, high fio2  keep sat > 88 pct  oral and skin care  nebs and pulmicort  chest pt  suction PRN  oral hygiene  skin hygiene  ICU supportive care  I and O  remains high risk for intubation  will follow
-complicated by acute resp failure  -appre hemat recs: continue  chemotherapy with -16 and carboplatin today; day #3, start growth factor support with Zarexio tomorrow afternoon (24 hours after completion of day #3)  -monitor course, apprec pulm recs: cont nebs and prednisone
-complicated by acute resp failure req intubation  -s/p Chemo 3/17 (carboplatin, etoposide) as per heme/onc for metastatic SCLC, on growth factor support with Zarexio for a total of 5 days,   -apprec hemat/onco recs  -monitor course, apprec pulm recs
-complicated by acute resp failure req intubation  -s/p Chemo 3/17 (carboplatin, etoposide) as per heme/onc for metastatic SCLC, on growth factor support with Zarexio for a total of 5 days,   -monitor course, apprec pulm recs: cont nebs and prednisone
Prior therapy with ceftriaxone/Azithro agree with HCAP coverage with Vanco/Zosyn pending results of sputum and other studies, recommend MRSA pcr to determine need for Vanco. anticipate 5 day duration of abx but further recs to follow
Prior therapy with ceftriaxone/Azithro agree with HCAP coverage with Zosyn would stop Vanco. anticipate 5 day duration of abx so 3/23 as last day
Prior therapy with ceftriaxone/Azithro agree with HCAP coverage with Zosyn would stop Vanco. anticipate 5 day duration of abx so 3/23 as last day
continue full vent support, titrating O2 for sats>88-90%  will utilize PEEP strategy for recruitment of atelectatic right lung  attempt to maintain TV 6-8cc/kg IDW, keeping PIPs <30  will utilize left side down positioning to improve VQ mismatch  vent bundle in place (HOB>30, PPI, peridex)  ABG in 1 hour to evaluate ventilation status
Continhe HCAP coverage with Zosyn would stop Vanco. anticipate 5 day duration of abx so 3/23 as last day
On Meropenem. S/P Fluconazole dose.   Role of Abx TBD
-complicated by acute resp failure req intubation  -s/p Chemo 3/17 (carboplatin, etoposide) as per heme/onc for metastatic SCLC, on growth factor support with Zarexio for a total of 5 days, today is day 3.  -monitor course, apprec pulm recs: cont nebs and prednisone

## 2018-03-23 NOTE — PROGRESS NOTE ADULT - ATTENDING COMMENTS
Call if ID input needed over the weekend, Dr. Sai Mcdaniel is on call.    Abdias Barrios MD  [phone]

## 2018-03-23 NOTE — PROGRESS NOTE ADULT - PROBLEM SELECTOR PROBLEM 4
Leukocytosis, unspecified type
Leukocytosis, unspecified type
Essential hypertension
Leukocytosis, unspecified type
Metastatic cancer

## 2018-03-23 NOTE — PROVIDER CONTACT NOTE (CRITICAL VALUE NOTIFICATION) - NAME OF MD/NP/PA/DO NOTIFIED:
FABIO Masters
[facility]
Adryan Daly NP
Dr. Bahena
Dr. Rolle
SCOTTY Calderón
Suni FAJARDO

## 2018-03-23 NOTE — PROGRESS NOTE ADULT - SUBJECTIVE AND OBJECTIVE BOX
[facility], Division of Infectious Diseases  SHIRA Hernandez A. Lee  [phone]    Name: GENIE MOE  Age: 72y  Gender: Female  MRN: [id number]    Interval History--  Notes reviewed. Sedated on vent. Remains on 100%FiO2, but SaO2 in upper 70's, PaO2 30's.   Antibiotics escalated yesterday, d/w Heme-Onc & CCM.      Past Medical History--  Liver lesion  Clostridium difficile colitis  Emphysema  Pneumonia  Generalized intra-abdominal and pelvic swelling, mass and lump  Glaucoma  Aortic valve prosthesis present  CAD (coronary artery disease)  Arthritis  Heart murmur  Other iron deficiency anemia  Carpal tunnel syndrome of right wrist  PVD (peripheral vascular disease)  Hyperlipemia  Colitis  Hypothyroid  Hypertension  COPD (chronic obstructive pulmonary disease)  H/O foot surgery  History of colonoscopy  S/P carpal tunnel release  H/O carotid endarterectomy  Aortic valve replaced  Carotid disease, bilateral  Cataract extraction status of left eye  Cataract extraction status of right eye      For details regarding the patient's social history, family history, and other miscellaneous elements, please refer the initial infectious diseases consultation and/or the admitting history and physical examination for this admission.    Allergies    No Known Allergies    Intolerances        Medications--  Antibiotics:    Immunologic:    Other:  dexmedetomidine Infusion  fentaNYL   Infusion  lidocaine   Patch PRN  midazolam Injectable PRN  morphine  - Injectable      Review of Systems--  Review of systems unable secondary to clinical condition.     Physical Examination--  Vital Signs: T(F): 98.5 (03-23-18 @ 04:01), Max: 98.7 (03-23-18 @ 00:02)  HR: 85 (03-23-18 @ 09:00)  BP: 104/53 (03-23-18 @ 08:30)  RR: 30 (03-23-18 @ 09:00)  SpO2: 79% (03-23-18 @ 09:00)  Wt(kg): --  General: Nontoxic-appearing Female in no acute distress.  HEENT: AT/NC. Anicteric. Conjunctiva pink and moist. OETT. NGT.  Neck: Not rigid. No sense of mass.  Nodes: None palpable.  Lungs: Coarse B BS, more diminished on R than L  Heart: Regular rate and rhythm. No Murmur. No rub. No gallop. No palpable thrill.  Abdomen: Bowel sounds present and normoactive. Soft. Nondistended. Nontender. No sense of mass. No organomegaly.  Back: Unable  Extremities: No cyanosis or clubbing. 1+edema.   Skin: Warm. Dry. Good turgor. No rash. No vasculitic stigmata.      Laboratory Studies--  CBC                        8.0    1.5   )-----------( 150      ( 23 Mar 2018 06:36 )             24.3       Chemistries  03-23    144  |  104  |  63<H>  ----------------------------<  98  3.5   |  31  |  0.87    Ca    7.7<L>      23 Mar 2018 06:36  Phos  2.3     03-23  Mg     2.3     03-23    TPro  5.5<L>  /  Alb  1.7<L>  /  TBili  0.2  /  DBili  x   /  AST  66<H>  /  ALT  50  /  AlkPhos  96  03-23      Culture Data    Culture - Blood (collected 19 Mar 2018 11:57)  Source: .Blood Blood-Peripheral  Preliminary Report (20 Mar 2018 12:11):    No growth to date.    Culture - Sputum (collected 19 Mar 2018 11:40)  Source: .Sputum Sputum, trap  Gram Stain (19 Mar 2018 13:41):    No polymorphonuclear leukocytes per low power field    No Squamous epithelial cells per low power field    No organisms seen per oil power field  Final Report (21 Mar 2018 08:43):    Normal Respiratory Nancy present    Culture - Blood (collected 19 Mar 2018 09:28)  Source: .Blood Blood-Peripheral  Preliminary Report (20 Mar 2018 10:01):    No growth to date.    Culture - Fungal, Body Fluid (collected 18 Mar 2018 22:14)  Source: .Body Fluid Pleural Fluid  Preliminary Report (20 Mar 2018 07:45):    Testing in progress    Culture - Body Fluid with Gram Stain (collected 18 Mar 2018 22:14)  Source: .Body Fluid Pleural Fluid  Gram Stain (19 Mar 2018 03:41):    No polymorphonuclear cells seen    No organisms seen    by cytocentrifuge  Preliminary Report (19 Mar 2018 17:53):    No growth to date.    Culture - Acid Fast - Body Fluid w/Smear (collected 18 Mar 2018 22:14)  Source: .Body Fluid Pleural Fluid

## 2018-03-23 NOTE — PROGRESS NOTE ADULT - ATTENDING COMMENTS
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  Progressive resp failure likely due to HCAP and SCLCa, and continues to require maximal support from vent.  Continues to deteriorate with worsening hypoxemia.    Discussed at length with family today.  Pt with SpO2 in low 80s despite chemo for SCLCa, escalated Abx, empiric AC for PE, diuresis, trialing different modes of ventilation.  Family is in agreement to remove from life support.  Pt put on comfort measures.  Removed from ventilator and  peacefully surrounded by her family.

## 2018-03-23 NOTE — PROGRESS NOTE ADULT - PROBLEM SELECTOR PROBLEM 5
Goals of care, counseling/discussion
Hyperlipemia
Need for prophylactic measure

## 2018-03-23 NOTE — PROGRESS NOTE ADULT - PROBLEM SELECTOR PROBLEM 3
COPD (chronic obstructive pulmonary disease)
Other emphysema
Pleural effusion, right
COPD (chronic obstructive pulmonary disease)

## 2018-04-18 LAB
CULTURE RESULTS: SIGNIFICANT CHANGE UP
SPECIMEN SOURCE: SIGNIFICANT CHANGE UP

## 2018-05-09 LAB
CULTURE RESULTS: SIGNIFICANT CHANGE UP
SPECIMEN SOURCE: SIGNIFICANT CHANGE UP

## 2018-10-30 NOTE — H&P PST ADULT - NSANTHSNORERD_ENT_A_CORE
Visit Information    Have you changed or started any medications since your last visit including any over-the-counter medicines, vitamins, or herbal medicines? no   Are you having any side effects from any of your medications? -  no  Have you stopped taking any of your medications? Is so, why? -  no    Have you seen any other physician or provider since your last visit? No  Have you had any other diagnostic tests since your last visit? No  Have you been seen in the emergency room and/or had an admission to a hospital since we last saw you? No  Have you had your routine dental cleaning in the past 6 months? no    Have you activated your [employer] account? If not, what are your barriers?  Yes     Patient Care Team:  Bryant Arriaga MD as PCP - General (Internal Medicine)  Ritchie Mobley MD as PCP - S Attributed Provider  Nigel Cat MD as Obstetrician (Perinatology)  Sim Negron DO as Consulting Physician (General Surgery)    Medical History Review  Past Medical, Family, and Social History reviewed and does contribute to the patient presenting condition    Health Maintenance   Topic Date Due    Flu vaccine (1) 09/01/2018    Chlamydia screen  09/14/2019    Cervical cancer screen  09/14/2021    DTaP/Tdap/Td vaccine (8 - Td) 05/17/2025    Pneumococcal med risk  Completed    HIV screen  Completed no preventive care reminders to display for this patient. Allergies   Allergen Reactions    Lidocaine Hives         Current Outpatient Prescriptions   Medication Sig Dispense Refill    medroxyPROGESTERone (DEPO-PROVERA) 150 MG/ML injection Inject 1 mL into the muscle every 3 months 1 mL 3    fluticasone (FLOVENT HFA) 110 MCG/ACT inhaler Inhale 2 puffs into the lungs 2 times daily 1 Inhaler 3    cetirizine (ZYRTEC ALLERGY) 10 MG tablet Take 1 tablet by mouth daily 30 tablet 0    albuterol sulfate HFA (PROAIR HFA) 108 (90 Base) MCG/ACT inhaler Inhale 2 puffs into the lungs every 6 hours as needed for Wheezing 1 Inhaler 5    fexofenadine (ALLEGRA ALLERGY) 60 MG tablet Take 1 tablet by mouth daily as needed (ITCHING) 30 tablet 2    diphenhydrAMINE-zinc acetate (BENADRYL ITCH STOPPING) 1-0.1 % cream Apply topically 3 times daily as needed. 1 Tube 2    montelukast (SINGULAIR) 10 MG tablet Take 1 tablet by mouth nightly 30 tablet 5    fluticasone (FLONASE) 50 MCG/ACT nasal spray 1 spray by Nasal route daily 1 Bottle 3    docusate sodium (COLACE) 100 MG capsule Take 1 capsule by mouth 2 times daily as needed for Constipation. 60 capsule 2     No current facility-administered medications for this visit.         Social History   Substance Use Topics    Smoking status: Never Smoker    Smokeless tobacco: Never Used    Alcohol use No       Family History   Problem Relation Age of Onset    Asthma Brother     Learning Disabilities Brother     Substance Abuse Maternal Grandmother     Diabetes Maternal Grandmother     Cancer Maternal Grandmother     Heart Disease Paternal Uncle     Early Death Paternal Uncle 62        MI    Learning Disabilities Mother     Breast Cancer Neg Hx     Colon Cancer Neg Hx     Eclampsia Neg Hx     Hypertension Neg Hx     Ovarian Cancer Neg Hx      Labor Neg Hx     Spont Abortions Neg Hx     Stroke Neg Hx     Uterine Cancer Neg Hx No

## 2019-01-07 NOTE — ED ADULT NURSE NOTE - NEURO WDL
[FreeTextEntry1] : f/u acne [de-identified] : 16 y/o F presenting for f/u for acne. Tolerating isotretinoin well with mild dryness. Uses moisturizers regularly. Denies headaches, joint pain, muscle aches, abdominal pain, diarrhea, and mood changes.\par  Alert and oriented to person, place and time, memory intact, behavior appropriate to situation, PERRL.

## 2019-08-21 NOTE — PROGRESS NOTE ADULT - PROBLEM/PLAN-5
Patient called back and stated he will hold off on scheduling colonoscopy when he can figure out his schedule. Patient requested a reminder letter.    DISPLAY PLAN FREE TEXT

## 2019-10-04 NOTE — ED ADULT TRIAGE NOTE - AS TEMP SITE
Patient:   MARIO BENOIT            MRN: [id number]            FIN: [id number]               Age:   2 days     Sex:  MALE     :  15   Associated Diagnoses:   Encounter for circumcision   Author:   FELIPA THAPA      Procedure   Indication for procedure   Indication: .     Consent / Time Out / Team   Time of procedure: Date/ Time:  17 10:15:00.     Time out: patient, procedure, site.     Preparation   Sterile preparation of site: in usual fashion, with 10 % povidone iodine, draped to expose affected area.     Anesthesia: None.     Position: restrained.     Technique   Instrument used: ADARTISo 1.3.     Hemostasis achieved: by direct pressure.     Dressing applied: petroleum jelly, petroleum gauze.     Specimen   Specimen: disposed of.     Immediate assessment   Procedure tolerated: well.     Estimated blood loss: 0  ml.     Complications   Complications: none.     Postoperative Diagnosis   Encounter for circumcision (TRT42-TJ Z41.2, Diagnosis, Hospitalized, Medical).     Performed by   FELIPA THAPA.     Assistant   nurses.     Findings   None.     Blood Administered   No.     Grafts/Implants   None.              Electronically Signed On 2017 10:16  __________________________________________________   FELIPA THAPA    
oral

## 2020-05-21 NOTE — ED ADULT NURSE REASSESSMENT NOTE - TEMPLATE LIST FOR HEAD TO TOE ASSESSMENT
Respiratory Quality 110: Preventive Care And Screening: Influenza Immunization: Influenza Immunization previously received during influenza season Detail Level: Detailed Quality 226: Preventive Care And Screening: Tobacco Use: Screening And Cessation Intervention: Patient screened for tobacco use and is an ex/non-smoker Quality 128: Preventive Care And Screening: Body Mass Index (Bmi) Screening And Follow-Up Plan: BMI is documented within normal parameters and no follow-up plan is required.

## 2020-06-25 NOTE — ED ADULT TRIAGE NOTE - ACCOMPANIED BY
Left non-detailed message for patient to call back.  Please schedule med check by phone or video when patient calls back.  (see previous notes for details)    I have been unable to reach this patient by phone.  A letter is being sent to the last known home address.      Thanks Ernestina     Immediate family member

## 2020-09-06 NOTE — ED ADULT TRIAGE NOTE - BMI (KG/M2)
patient complains of frontal headache x couple of weeks constantly with nausea and vomiting, patient denies visual disturbances 25.9

## 2021-04-04 NOTE — PROVIDER CONTACT NOTE (CRITICAL VALUE NOTIFICATION) - ASSESSMENT
Parents state changed patients formula 5 days ago  Concerned about constipation
1 LITER FLUIDS ADMINISTERED PRIOR TO REPEAT.
first 1 LITER NACL infusing

## 2021-05-11 NOTE — PROGRESS NOTE ADULT - PROBLEM SELECTOR PROBLEM 2
Awaiting HPV.    Renata Blankenship RN BSN, Pap Tracking
Acute on chronic respiratory failure with hypoxemia
Pneumonia
Pneumonia

## 2021-09-21 NOTE — ED ADULT NURSE NOTE - NSSISCREENINGQ3_ED_A_ED
Advance Care Planning     Advance Care Planning Activator (Inpatient)  Conversation Note      Date of ACP Conversation: 9/21/2021     Conversation Conducted with: Patient with Decision Making Capacity    ACP Activator: [address] [facility] Decision Maker:     Current Designated Health Care Decision Maker:     Primary Decision Maker: Robert Gómez - [phone]    Secondary Decision Maker: Alverto Martino Child - [phone]    Today we documented Decision Maker(s) consistent with Legal Next of Kin hierarchy. Care Preferences    Ventilation: \"If you were in your present state of health and suddenly became very ill and were unable to breathe on your own, what would your preference be about the use of a ventilator (breathing machine) if it were available to you? \"      Would the patient desire the use of ventilator (breathing machine)?: yes    \"If your health worsens and it becomes clear that your chance of recovery is unlikely, what would your preference be about the use of a ventilator (breathing machine) if it were available to you? \"     Would the patient desire the use of ventilator (breathing machine)?: Yes      Resuscitation  \"CPR works best to restart the heart when there is a sudden event, like a heart attack, in someone who is otherwise healthy. Unfortunately, CPR does not typically restart the heart for people who have serious health conditions or who are very sick. \"    \"In the event your heart stopped as a result of an underlying serious health condition, would you want attempts to be made to restart your heart (answer \"yes\" for attempt to resuscitate) or would you prefer a natural death (answer \"no\" for do not attempt to resuscitate)? \" yes       [x] Yes   [] No   Educated Patient / Garfield Abrams regarding differences between Advance Directives and portable DNR orders.     Length of ACP Conversation in minutes:   10    Conversation Outcomes:  [x] ACP discussion completed  [] Existing advance directive reviewed with patient; no changes to patient's previously recorded wishes  [] New Advance Directive completed  [] Portable Do Not Rescitate prepared for Provider review and signature  [] POLST/POST/MOLST/MOST prepared for Provider review and signature      Follow-up plan:    [] Schedule follow-up conversation to continue planning  [] Referred individual to Provider for additional questions/concerns   [] Advised patient/agent/surrogate to review completed ACP document and update if needed with changes in condition, patient preferences or care setting    [x] This note routed to one or more involved healthcare providers No

## 2021-11-04 NOTE — BRIEF OPERATIVE NOTE - SURGICAL START DATE/TIME
07-Feb-2017 Successful administration of 5 ml omnipaque, 5 ml of preserv free 0.9, 0.1 ml of gadovast, 10ml 1% lido  Patient escorted to MRI

## 2021-12-14 NOTE — ED PROVIDER NOTE - NS ED MD EM SELECTION
ROS   GENERAL: No fever, +chills, + body aches, no malaise, fatigue  ENT No earache, coryza, sore throat   NECK: No stiffness swollen glands or dysphagia   RESPIRATORY: +cough, no dyspnea, pleuritic chest pain   HEART: no chest pain   ABDOMEN: No abdominal pain, +nausea, no vomiting, + diarrhea   : No dysuria, increase frequency or urgency, No discharge   MUSCULAR-SKELETAL: No myalgia, arthralgia   NEUROLOGY: No headache vertigo, paresthesia, focal deficits, diplopia   SKIN: No rash, abrasion   All other ROS are negative [id number] Comprehensive
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
## 2022-02-03 NOTE — ASU PATIENT PROFILE, ADULT - PMH
NULL Carpal tunnel syndrome of right wrist    Colitis    COPD (chronic obstructive pulmonary disease)    Heart murmur    Hyperlipemia    Hypertension    Hypothyroid    Other iron deficiency anemia    PVD (peripheral vascular disease) Aortic valve prosthesis present    Arthritis    CAD (coronary artery disease)    Carpal tunnel syndrome of right wrist    Colitis    COPD (chronic obstructive pulmonary disease)    Heart murmur    Hyperlipemia    Hypertension    Hypothyroid    Other iron deficiency anemia    PVD (peripheral vascular disease)

## 2022-06-23 NOTE — PATIENT PROFILE ADULT. - FUNCTIONAL SCREEN CURRENT LEVEL: EATING, MLM
"0) independent

## 2022-06-27 NOTE — H&P PST ADULT - FUNCTIONAL SCREEN CURRENT LEVEL: TOILETING, MLM
Stage 2: Additional Anesthesia Type: 1% lidocaine with epinephrine and a 1:10 solution of 8.4% sodium bicarbonate (0) independent

## 2023-05-05 NOTE — ASU PATIENT PROFILE, ADULT - MUTUALITY COMMENT, PROFILE
Goal Outcome Evaluation:               Pt s/p Surgical intervention for Left hip fx per Dr. Harmon today. Pt returned to unit, post op orders initiated. VSS on 02 @ 3LPM per NC. Family has remained at bedside. Plan ongoing.    none

## 2023-05-10 NOTE — H&P ADULT - PROBLEM SELECTOR PROBLEM 3
I have reviewed discharge instructions with the parent. The parent verbalized understanding. Patient left ED via Discharge Method: ambulatory to Home with mother and older brother. Opportunity for questions and clarification provided. Patient given 0 scripts.               Governor Dexter, WESLY  05/09/23 9830 SOB (shortness of breath)

## 2023-10-17 NOTE — ED PROVIDER NOTE - NSTIMEPROVIDERCAREINITIATE_GEN_ER
Topical Retinoid counseling:  Patient advised to apply a pea-sized amount only at bedtime and wait 30 minutes after washing their face before applying.  If too drying, patient may add a non-comedogenic moisturizer. The patient verbalized understanding of the proper use and possible adverse effects of retinoids.  All of the patient's questions and concerns were addressed. High Dose Vitamin A Counseling: Side effects reviewed, pt to contact office should one occur. Patient Specific Counseling (Will Not Stick From Patient To Patient): VALIDATION: PASS\\nNOTE: THE VALIDATION TEXT WILL NOT BE VISIBLE WHEN YOU FINALIZE\\n\\n\\nIpledge Number: [id number]\\nReported Weight in pounds:130 130 lbs\\nMonths of Therapy: 7\\n\\nDosage Month 1: 30mg Daily\\nDosage Month 2: 30mg BID\\nDosage Month 3: 30mg BID\\nDosage Month 4: 30mg BID\\nDosage Month 5: 30mg BID\\nDosage Month 6: 30mg BID\\nDosage Month 7: 30mg BID\\nCumulative Dosage: 198 mg/kg\\nNext Month's Dose: Continue 30mg BID\\nAnticipated Length of Continuing Therapy: The patient has met or exceeded the target therapeutic goal of 135 mg/kg\\n\\nTreatment Protocol:\\n1 mg/kg until a cumulative dose of 120-150 mg/kg is reached.\\n\\nPrimary Contraception Method: Abstinence.\\n\\nLabs: Laboratory Evaluation was Deferred This Month\\n\\nCounseling:\\nI reviewed the side effect in detail. Patient should be on two forms of birth control, get monthly blood tests, not donate blood, not drive at night if vision affected, and not share medication.\\n\\nSide Effects:\\nNo Depression\\nCheilitis - I recommended application of Vaseline or Aquaphor numerous times a day (as often as every hour) and before going to bed.\\nXerosis - I recommended application of Cetaphil or CeraVe numerous times a day and before going to bed to all dry areas.\\nNosebleeds - I explained this is common when taking isotretinoin. I recommended saline mist in each nostril multiple times a day. If this worsens they will contact us.\\nNo Headache\\nNo Myalgia\\nNo Retinoid Dermatitis\\n\\nComments: After discussing her treatment course we decided to discontinue isotretinoin therapy at this time. I explained that she would need to continue her birth control methods for at least one month after the last dosage. She should also get a pregnancy test one month after the last dose. She shouldn't donate blood for one month after the last dose. She should call with any new symptoms of depression. Spironolactone Pregnancy And Lactation Text: This medication can cause feminization of the male fetus and should be avoided during pregnancy. The active metabolite is also found in breast milk. Dapsone Pregnancy And Lactation Text: This medication is Pregnancy Category C and is not considered safe during pregnancy or breast feeding. Isotretinoin Counseling: Patient should get monthly blood tests, not donate blood, not drive at night if vision affected, not share medication, and not undergo elective surgery for 6 months after tx completed. Side effects reviewed, pt to contact office should one occur. Topical Retinoid Pregnancy And Lactation Text: This medication is Pregnancy Category C. It is unknown if this medication is excreted in breast milk. Topical Sulfur Applications Counseling: Topical Sulfur Counseling: Patient counseled that this medication may cause skin irritation or allergic reactions.  In the event of skin irritation, the patient was advised to reduce the amount of the drug applied or use it less frequently.   The patient verbalized understanding of the proper use and possible adverse effects of topical sulfur application.  All of the patient's questions and concerns were addressed. Erythromycin Counseling:  I discussed with the patient the risks of erythromycin including but not limited to GI upset, allergic reaction, drug rash, diarrhea, increase in liver enzymes, and yeast infections. Dapsone Counseling: I discussed with the patient the risks of dapsone including but not limited to hemolytic anemia, agranulocytosis, rashes, methemoglobinemia, kidney failure, peripheral neuropathy, headaches, GI upset, and liver toxicity.  Patients who start dapsone require monitoring including baseline LFTs and weekly CBCs for the first month, then every month thereafter.  The patient verbalized understanding of the proper use and possible adverse effects of dapsone.  All of the patient's questions and concerns were addressed. Topical Sulfur Applications Pregnancy And Lactation Text: This medication is Pregnancy Category C and has an unknown safety profile during pregnancy. It is unknown if this topical medication is excreted in breast milk. Tetracycline Pregnancy And Lactation Text: This medication is Pregnancy Category D and not consider safe during pregnancy. It is also excreted in breast milk. Tazorac Counseling:  Patient advised that medication is irritating and drying.  Patient may need to apply sparingly and wash off after an hour before eventually leaving it on overnight.  The patient verbalized understanding of the proper use and possible adverse effects of tazorac.  All of the patient's questions and concerns were addressed. Birth Control Pills Counseling: Birth Control Pill Counseling: I discussed with the patient the potential side effects of OCPs including but not limited to increased risk of stroke, heart attack, thrombophlebitis, deep venous thrombosis, hepatic adenomas, breast changes, GI upset, headaches, and depression.  The patient verbalized understanding of the proper use and possible adverse effects of OCPs. All of the patient's questions and concerns were addressed. Sarecycline Counseling: Patient advised regarding possible photosensitivity and discoloration of the teeth, skin, lips, tongue and gums.  Patient instructed to avoid sunlight, if possible.  When exposed to sunlight, patients should wear protective clothing, sunglasses, and sunscreen.  The patient was instructed to call the office immediately if the following severe adverse effects occur:  hearing changes, easy bruising/bleeding, severe headache, or vision changes.  The patient verbalized understanding of the proper use and possible adverse effects of sarecycline.  All of the patient's questions and concerns were addressed. Benzoyl Peroxide Counseling: Patient counseled that medicine may cause skin irritation and bleach clothing.  In the event of skin irritation, the patient was advised to reduce the amount of the drug applied or use it less frequently.   The patient verbalized understanding of the proper use and possible adverse effects of benzoyl peroxide.  All of the patient's questions and concerns were addressed. Azithromycin Counseling:  I discussed with the patient the risks of azithromycin including but not limited to GI upset, allergic reaction, drug rash, diarrhea, and yeast infections. Benzoyl Peroxide Pregnancy And Lactation Text: This medication is Pregnancy Category C. It is unknown if benzoyl peroxide is excreted in breast milk. Bactrim Counseling:  I discussed with the patient the risks of sulfa antibiotics including but not limited to GI upset, allergic reaction, drug rash, diarrhea, dizziness, photosensitivity, and yeast infections.  Rarely, more serious reactions can occur including but not limited to aplastic anemia, agranulocytosis, methemoglobinemia, blood dyscrasias, liver or kidney failure, lung infiltrates or desquamative/blistering drug rashes. Detail Level: Detailed Doxycycline Pregnancy And Lactation Text: This medication is Pregnancy Category D and not consider safe during pregnancy. It is also excreted in breast milk but is considered safe for shorter treatment courses. Spironolactone Counseling: Patient advised regarding risks of diarrhea, abdominal pain, hyperkalemia, birth defects (for female patients), liver toxicity and renal toxicity. The patient may need blood work to monitor liver and kidney function and potassium levels while on therapy. The patient verbalized understanding of the proper use and possible adverse effects of spironolactone.  All of the patient's questions and concerns were addressed. Isotretinoin Pregnancy And Lactation Text: This medication is Pregnancy Category X and is considered extremely dangerous during pregnancy. It is unknown if it is excreted in breast milk. Use Enhanced Medication Counseling?: No Birth Control Pills Pregnancy And Lactation Text: This medication should be avoided if pregnant and for the first 30 days post-partum. Azithromycin Pregnancy And Lactation Text: This medication is considered safe during pregnancy and is also secreted in breast milk. Tazorac Pregnancy And Lactation Text: This medication is not safe during pregnancy. It is unknown if this medication is excreted in breast milk. Bactrim Pregnancy And Lactation Text: This medication is Pregnancy Category D and is known to cause fetal risk.  It is also excreted in breast milk. Erythromycin Pregnancy And Lactation Text: This medication is Pregnancy Category B and is considered safe during pregnancy. It is also excreted in breast milk. Doxycycline Counseling:  Patient counseled regarding possible photosensitivity and increased risk for sunburn.  Patient instructed to avoid sunlight, if possible.  When exposed to sunlight, patients should wear protective clothing, sunglasses, and sunscreen.  The patient was instructed to call the office immediately if the following severe adverse effects occur:  hearing changes, easy bruising/bleeding, severe headache, or vision changes.  The patient verbalized understanding of the proper use and possible adverse effects of doxycycline.  All of the patient's questions and concerns were addressed. Topical Clindamycin Counseling: Patient counseled that this medication may cause skin irritation or allergic reactions.  In the event of skin irritation, the patient was advised to reduce the amount of the drug applied or use it less frequently.   The patient verbalized understanding of the proper use and possible adverse effects of clindamycin.  All of the patient's questions and concerns were addressed. High Dose Vitamin A Pregnancy And Lactation Text: High dose vitamin A therapy is contraindicated during pregnancy and breast feeding. Tetracycline Counseling: Patient counseled regarding possible photosensitivity and increased risk for sunburn.  Patient instructed to avoid sunlight, if possible.  When exposed to sunlight, patients should wear protective clothing, sunglasses, and sunscreen.  The patient was instructed to call the office immediately if the following severe adverse effects occur:  hearing changes, easy bruising/bleeding, severe headache, or vision changes.  The patient verbalized understanding of the proper use and possible adverse effects of tetracycline.  All of the patient's questions and concerns were addressed. Patient understands to avoid pregnancy while on therapy due to potential birth defects. Topical Clindamycin Pregnancy And Lactation Text: This medication is Pregnancy Category B and is considered safe during pregnancy. It is unknown if it is excreted in breast milk. Minocycline Counseling: Patient advised regarding possible photosensitivity and discoloration of the teeth, skin, lips, tongue and gums.  Patient instructed to avoid sunlight, if possible.  When exposed to sunlight, patients should wear protective clothing, sunglasses, and sunscreen.  The patient was instructed to call the office immediately if the following severe adverse effects occur:  hearing changes, easy bruising/bleeding, severe headache, or vision changes.  The patient verbalized understanding of the proper use and possible adverse effects of minocycline.  All of the patient's questions and concerns were addressed. Azelaic Acid Counseling: Patient counseled that medicine may cause skin irritation and to avoid applying near the eyes.  In the event of skin irritation, the patient was advised to reduce the amount of the drug applied or use it less frequently.   The patient verbalized understanding of the proper use and possible adverse effects of azelaic acid.  All of the patient's questions and concerns were addressed. Aklief Pregnancy And Lactation Text: It is unknown if this medication is safe to use during pregnancy.  It is unknown if this medication is excreted in breast milk.  Breastfeeding women should use the topical cream on the smallest area of the skin for the shortest time needed while breastfeeding.  Do not apply to nipple and areola. Aklief counseling:  Patient advised to apply a pea-sized amount only at bedtime and wait 30 minutes after washing their face before applying.  If too drying, patient may add a non-comedogenic moisturizer.  The most commonly reported side effects including irritation, redness, scaling, dryness, stinging, burning, itching, and increased risk of sunburn.  The patient verbalized understanding of the proper use and possible adverse effects of retinoids.  All of the patient's questions and concerns were addressed. Winlevi Pregnancy And Lactation Text: This medication is considered safe during pregnancy and breastfeeding. Winlevi Counseling:  I discussed with the patient the risks of topical clascoterone including but not limited to erythema, scaling, itching, and stinging. Patient voiced their understanding. Azelaic Acid Pregnancy And Lactation Text: This medication is considered safe during pregnancy and breast feeding. Patient Specific Counseling (Will Not Stick From Patient To Patient): -Patient will call our clinic in 6 months to give update on how treatment is going for her acne. We will discuss and review if continuation of current treatment is best option or if another route is necessary. Patient Specific Counseling (Will Not Stick From Patient To Patient): HORMONAL ACNE PERSISTING DESPITE HIGHER CUMULATIVE DOSE ISOTRETINOIN. HAD ALLERGIC REACTION TO SPIRONOLACTONE AND DOES NOT WANT TO TAKE OCPS. ADD BACK CLINDAMYCIN LOTION AND TRETINOIN AND WAIT 6 MONTHS. 05-Feb-2018 14:53

## 2023-11-15 NOTE — PROGRESS NOTE ADULT - PROBLEM/PLAN-2
DISPLAY PLAN FREE TEXT Sotyktu Counseling:  I discussed the most common side effects of Sotyktu including: common cold, sore throat, sinus infections, cold sores, canker sores, folliculitis, and acne.  I also discussed more serious side effects of Sotyktu including but not limited to: serious allergic reactions; increased risk for infections such as TB; cancers such as lymphomas; rhabdomyolysis and elevated CPK; and elevated triglycerides and liver enzymes.

## 2024-01-01 NOTE — ASU PATIENT PROFILE, ADULT - VISION (WITH CORRECTIVE LENSES IF THE PATIENT USUALLY WEARS THEM):
Normal vision: sees adequately in most situations; can see medication labels, newsprint/wears glasses
-Bleeding from circumcision site (boy babies only)

## 2025-01-08 NOTE — ASU PREOP CHECKLIST - LATEX ALLERGY
"Ely Sims (62 y.o. Female)       Date of Birth   1962    Social Security Number       Address   [address] [address]    Home Phone   [phone]    MRN   [id number]       Religious   Roman [employer]    Marital Status   Single                            Admission Date   1/7/25    Admission Type   Emergency    Admitting Provider   Estefania Jacobs MD    Attending Provider   Frank Karimi MD    Department, Room/Bed   [address], [address]       Discharge Date       Discharge Disposition       Discharge Destination                                 Attending Provider: Frank Karimi MD    Allergies: No Known Allergies    Isolation: None   Infection: None   Code Status: CPR    Ht: 170.2 cm (67.01\")   Wt: 51.3 kg (113 lb 3.2 oz)    Admission Cmt: None   Principal Problem: Acute kidney injury (ALBER) with acute tubular necrosis (ATN) [N17.0]                   Active Insurance as of 1/7/2025       Primary Coverage       Payor Plan Insurance Group Employer/Plan Group    [facility] [id number]       Payor Plan Address Payor Plan Phone Number Payor Plan Fax Number Effective Dates    [address]   1/1/2024 - None Entered    [facility] [id number]         Subscriber Name Subscriber Birth Date Member ID       ELY SIMS 1962 [id number]                     Emergency Contacts        (Rel.) Home Phone Work Phone Mobile Phone    ManpreetUbaldo (Partner) -- -- [phone]    Elia CadenaA -- -- [phone]    DON OBRIEN (Sister) -- -- [phone]    CARL CADENA (Daughter) -- -- [phone]                "
no